# Patient Record
Sex: FEMALE | Race: WHITE | Employment: OTHER | URBAN - METROPOLITAN AREA
[De-identification: names, ages, dates, MRNs, and addresses within clinical notes are randomized per-mention and may not be internally consistent; named-entity substitution may affect disease eponyms.]

---

## 2017-01-03 PROBLEM — S72.002A FRACTURE OF FEMORAL NECK, LEFT (HCC): Status: ACTIVE | Noted: 2017-01-03

## 2017-01-17 ENCOUNTER — OFFICE VISIT (OUTPATIENT)
Dept: ORTHOPEDIC SURGERY | Age: 72
End: 2017-01-17

## 2017-01-17 DIAGNOSIS — M25.552 PAIN OF LEFT HIP JOINT: Primary | ICD-10-CM

## 2017-01-17 DIAGNOSIS — S72.002D CLOSED FRACTURE OF NECK OF LEFT FEMUR WITH ROUTINE HEALING, SUBSEQUENT ENCOUNTER: ICD-10-CM

## 2017-01-17 PROBLEM — S72.009D CLOSED FRACTURE OF NECK OF FEMUR WITH ROUTINE HEALING: Status: ACTIVE | Noted: 2017-01-17

## 2017-01-17 PROCEDURE — 73502 X-RAY EXAM HIP UNI 2-3 VIEWS: CPT | Performed by: ORTHOPAEDIC SURGERY

## 2017-01-17 PROCEDURE — 99024 POSTOP FOLLOW-UP VISIT: CPT | Performed by: ORTHOPAEDIC SURGERY

## 2017-01-17 RX ORDER — TRIAMTERENE AND HYDROCHLOROTHIAZIDE 75; 50 MG/1; MG/1
TABLET ORAL
COMMUNITY
Start: 2016-12-21 | End: 2017-01-24 | Stop reason: ALTCHOICE

## 2017-01-24 ENCOUNTER — OFFICE VISIT (OUTPATIENT)
Dept: INTERNAL MEDICINE CLINIC | Age: 72
End: 2017-01-24

## 2017-01-24 VITALS
HEART RATE: 72 BPM | DIASTOLIC BLOOD PRESSURE: 70 MMHG | HEIGHT: 69 IN | BODY MASS INDEX: 30.21 KG/M2 | WEIGHT: 204 LBS | SYSTOLIC BLOOD PRESSURE: 114 MMHG

## 2017-01-24 DIAGNOSIS — M81.0 OSTEOPOROSIS: ICD-10-CM

## 2017-01-24 DIAGNOSIS — Z09 HOSPITAL DISCHARGE FOLLOW-UP: Primary | ICD-10-CM

## 2017-01-24 DIAGNOSIS — S72.002D CLOSED FRACTURE OF NECK OF LEFT FEMUR WITH ROUTINE HEALING, SUBSEQUENT ENCOUNTER: ICD-10-CM

## 2017-01-24 DIAGNOSIS — N30.00 ACUTE CYSTITIS WITHOUT HEMATURIA: ICD-10-CM

## 2017-01-24 DIAGNOSIS — I10 ESSENTIAL HYPERTENSION: ICD-10-CM

## 2017-01-24 PROCEDURE — 4040F PNEUMOC VAC/ADMIN/RCVD: CPT | Performed by: FAMILY MEDICINE

## 2017-01-24 PROCEDURE — G8427 DOCREV CUR MEDS BY ELIG CLIN: HCPCS | Performed by: FAMILY MEDICINE

## 2017-01-24 PROCEDURE — 4005F PHARM THX FOR OP RXD: CPT | Performed by: FAMILY MEDICINE

## 2017-01-24 PROCEDURE — G8484 FLU IMMUNIZE NO ADMIN: HCPCS | Performed by: FAMILY MEDICINE

## 2017-01-24 PROCEDURE — 1036F TOBACCO NON-USER: CPT | Performed by: FAMILY MEDICINE

## 2017-01-24 PROCEDURE — G8419 CALC BMI OUT NRM PARAM NOF/U: HCPCS | Performed by: FAMILY MEDICINE

## 2017-01-24 PROCEDURE — 1090F PRES/ABSN URINE INCON ASSESS: CPT | Performed by: FAMILY MEDICINE

## 2017-01-24 PROCEDURE — 1111F DSCHRG MED/CURRENT MED MERGE: CPT | Performed by: FAMILY MEDICINE

## 2017-01-24 PROCEDURE — G8399 PT W/DXA RESULTS DOCUMENT: HCPCS | Performed by: FAMILY MEDICINE

## 2017-01-24 PROCEDURE — 3014F SCREEN MAMMO DOC REV: CPT | Performed by: FAMILY MEDICINE

## 2017-01-24 PROCEDURE — 1123F ACP DISCUSS/DSCN MKR DOCD: CPT | Performed by: FAMILY MEDICINE

## 2017-01-24 PROCEDURE — 99214 OFFICE O/P EST MOD 30 MIN: CPT | Performed by: FAMILY MEDICINE

## 2017-01-24 PROCEDURE — 3017F COLORECTAL CA SCREEN DOC REV: CPT | Performed by: FAMILY MEDICINE

## 2017-01-24 RX ORDER — ALENDRONATE SODIUM 70 MG/1
TABLET ORAL
Qty: 4 TABLET | Refills: 11 | Status: SHIPPED | OUTPATIENT
Start: 2017-01-24 | End: 2017-11-27 | Stop reason: SINTOL

## 2017-01-24 ASSESSMENT — ENCOUNTER SYMPTOMS
TROUBLE SWALLOWING: 0
FACIAL SWELLING: 0
DIARRHEA: 0
NAUSEA: 0
CHEST TIGHTNESS: 0
CONSTIPATION: 0
RHINORRHEA: 1
SHORTNESS OF BREATH: 0
VOMITING: 0
WHEEZING: 0

## 2017-01-25 ENCOUNTER — CARE COORDINATION (OUTPATIENT)
Dept: CASE MANAGEMENT | Age: 72
End: 2017-01-25

## 2017-01-25 DIAGNOSIS — Z12.11 COLON CANCER SCREENING: ICD-10-CM

## 2017-01-25 LAB
BACTERIA: ABNORMAL /HPF
BILIRUBIN URINE: ABNORMAL
BLOOD, URINE: ABNORMAL
CLARITY: ABNORMAL
COLOR: ABNORMAL
COMMENT UA: ABNORMAL
CONTROL: NORMAL
EPITHELIAL CELLS, UA: 11 /HPF (ref 0–5)
GLUCOSE URINE: NEGATIVE MG/DL
HEMOCCULT STL QL: NEGATIVE
KETONES, URINE: NEGATIVE MG/DL
LEUKOCYTE ESTERASE, URINE: ABNORMAL
MICROSCOPIC EXAMINATION: YES
NITRITE, URINE: NEGATIVE
PH UA: 6.5
PROTEIN UA: ABNORMAL MG/DL
RBC UA: ABNORMAL /HPF (ref 0–2)
SPECIFIC GRAVITY UA: 1.02
UROBILINOGEN, URINE: 0.2 E.U./DL
WBC UA: 150 /HPF (ref 0–5)

## 2017-01-25 PROCEDURE — 82274 ASSAY TEST FOR BLOOD FECAL: CPT | Performed by: FAMILY MEDICINE

## 2017-01-27 ENCOUNTER — CARE COORDINATION (OUTPATIENT)
Dept: CASE MANAGEMENT | Age: 72
End: 2017-01-27

## 2017-01-27 LAB — URINE CULTURE, ROUTINE: NORMAL

## 2017-01-31 ENCOUNTER — CARE COORDINATION (OUTPATIENT)
Dept: CASE MANAGEMENT | Age: 72
End: 2017-01-31

## 2017-02-07 ENCOUNTER — CARE COORDINATION (OUTPATIENT)
Dept: CASE MANAGEMENT | Age: 72
End: 2017-02-07

## 2017-02-14 ENCOUNTER — CARE COORDINATION (OUTPATIENT)
Dept: CASE MANAGEMENT | Age: 72
End: 2017-02-14

## 2017-02-21 ENCOUNTER — OFFICE VISIT (OUTPATIENT)
Dept: ORTHOPEDIC SURGERY | Age: 72
End: 2017-02-21

## 2017-02-21 DIAGNOSIS — S72.002D CLOSED FRACTURE OF NECK OF LEFT FEMUR WITH ROUTINE HEALING, SUBSEQUENT ENCOUNTER: ICD-10-CM

## 2017-02-21 DIAGNOSIS — M25.552 PAIN OF LEFT HIP JOINT: Primary | ICD-10-CM

## 2017-02-21 PROCEDURE — 73502 X-RAY EXAM HIP UNI 2-3 VIEWS: CPT | Performed by: ORTHOPAEDIC SURGERY

## 2017-02-21 PROCEDURE — 99024 POSTOP FOLLOW-UP VISIT: CPT | Performed by: ORTHOPAEDIC SURGERY

## 2017-02-21 RX ORDER — MELOXICAM 15 MG/1
15 TABLET ORAL DAILY
Qty: 30 TABLET | Refills: 3 | Status: SHIPPED | OUTPATIENT
Start: 2017-02-21 | End: 2017-05-08 | Stop reason: SDUPTHER

## 2017-02-23 ENCOUNTER — CARE COORDINATION (OUTPATIENT)
Dept: CASE MANAGEMENT | Age: 72
End: 2017-02-23

## 2017-03-08 ENCOUNTER — CARE COORDINATION (OUTPATIENT)
Dept: CASE MANAGEMENT | Age: 72
End: 2017-03-08

## 2017-03-29 DIAGNOSIS — I10 ESSENTIAL HYPERTENSION: Primary | ICD-10-CM

## 2017-03-29 RX ORDER — LISINOPRIL 10 MG/1
TABLET ORAL
Qty: 30 TABLET | Refills: 5 | Status: SHIPPED | OUTPATIENT
Start: 2017-03-29 | End: 2017-11-27 | Stop reason: SINTOL

## 2017-04-04 ENCOUNTER — OFFICE VISIT (OUTPATIENT)
Dept: ORTHOPEDIC SURGERY | Age: 72
End: 2017-04-04

## 2017-04-04 DIAGNOSIS — S72.002D CLOSED FRACTURE OF NECK OF LEFT FEMUR WITH ROUTINE HEALING, SUBSEQUENT ENCOUNTER: ICD-10-CM

## 2017-04-04 DIAGNOSIS — M25.552 LEFT HIP PAIN: Primary | ICD-10-CM

## 2017-04-04 PROCEDURE — 73502 X-RAY EXAM HIP UNI 2-3 VIEWS: CPT | Performed by: ORTHOPAEDIC SURGERY

## 2017-04-04 PROCEDURE — 99024 POSTOP FOLLOW-UP VISIT: CPT | Performed by: ORTHOPAEDIC SURGERY

## 2017-04-07 ENCOUNTER — CARE COORDINATION (OUTPATIENT)
Dept: CASE MANAGEMENT | Age: 72
End: 2017-04-07

## 2017-04-25 ENCOUNTER — OFFICE VISIT (OUTPATIENT)
Dept: INTERNAL MEDICINE CLINIC | Age: 72
End: 2017-04-25

## 2017-04-25 VITALS
BODY MASS INDEX: 30.24 KG/M2 | WEIGHT: 204.8 LBS | DIASTOLIC BLOOD PRESSURE: 82 MMHG | SYSTOLIC BLOOD PRESSURE: 120 MMHG | HEART RATE: 80 BPM

## 2017-04-25 DIAGNOSIS — F33.42 RECURRENT MAJOR DEPRESSIVE DISORDER, IN FULL REMISSION (HCC): ICD-10-CM

## 2017-04-25 DIAGNOSIS — G25.81 RESTLESS LEGS SYNDROME: ICD-10-CM

## 2017-04-25 DIAGNOSIS — Z23 NEED FOR SHINGLES VACCINE: ICD-10-CM

## 2017-04-25 DIAGNOSIS — E55.9 VITAMIN D DEFICIENCY: ICD-10-CM

## 2017-04-25 DIAGNOSIS — G40.109 LOCALIZATION-RELATED FOCAL EPILEPSY WITH SIMPLE PARTIAL SEIZURES (HCC): ICD-10-CM

## 2017-04-25 DIAGNOSIS — Z23 NEED FOR PROPHYLACTIC VACCINATION WITH COMBINED DIPHTHERIA-TETANUS-PERTUSSIS (DTP) VACCINE: ICD-10-CM

## 2017-04-25 DIAGNOSIS — Z00.00 ROUTINE GENERAL MEDICAL EXAMINATION AT A HEALTH CARE FACILITY: Primary | ICD-10-CM

## 2017-04-25 DIAGNOSIS — Z12.31 ENCOUNTER FOR SCREENING MAMMOGRAM FOR BREAST CANCER: ICD-10-CM

## 2017-04-25 PROBLEM — F32.5 MAJOR DEPRESSION IN FULL REMISSION (HCC): Status: ACTIVE | Noted: 2017-04-25

## 2017-04-25 PROCEDURE — G0438 PPPS, INITIAL VISIT: HCPCS | Performed by: FAMILY MEDICINE

## 2017-04-25 RX ORDER — ERGOCALCIFEROL 1.25 MG/1
CAPSULE ORAL
Qty: 12 CAPSULE | Refills: 3 | Status: SHIPPED | OUTPATIENT
Start: 2017-04-25 | End: 2018-05-06 | Stop reason: SDUPTHER

## 2017-04-25 RX ORDER — ROPINIROLE 0.5 MG/1
TABLET, FILM COATED ORAL
Qty: 270 TABLET | Refills: 3 | Status: SHIPPED | OUTPATIENT
Start: 2017-04-25 | End: 2018-06-13 | Stop reason: SDUPTHER

## 2017-04-25 ASSESSMENT — LIFESTYLE VARIABLES
HOW OFTEN DURING THE LAST YEAR HAVE YOU NEEDED AN ALCOHOLIC DRINK FIRST THING IN THE MORNING TO GET YOURSELF GOING AFTER A NIGHT OF HEAVY DRINKING: 0
HOW OFTEN DO YOU HAVE A DRINK CONTAINING ALCOHOL: 4
HOW OFTEN DURING THE LAST YEAR HAVE YOU FAILED TO DO WHAT WAS NORMALLY EXPECTED FROM YOU BECAUSE OF DRINKING: 0
AUDIT-C TOTAL SCORE: 4
HOW OFTEN DURING THE LAST YEAR HAVE YOU HAD A FEELING OF GUILT OR REMORSE AFTER DRINKING: 0
HOW MANY STANDARD DRINKS CONTAINING ALCOHOL DO YOU HAVE ON A TYPICAL DAY: 0
HAS A RELATIVE, FRIEND, DOCTOR, OR ANOTHER HEALTH PROFESSIONAL EXPRESSED CONCERN ABOUT YOUR DRINKING OR SUGGESTED YOU CUT DOWN: 0
HOW OFTEN DURING THE LAST YEAR HAVE YOU BEEN UNABLE TO REMEMBER WHAT HAPPENED THE NIGHT BEFORE BECAUSE YOU HAD BEEN DRINKING: 0
HOW OFTEN DO YOU HAVE SIX OR MORE DRINKS ON ONE OCCASION: 0
AUDIT TOTAL SCORE: 4
HOW OFTEN DURING THE LAST YEAR HAVE YOU FOUND THAT YOU WERE NOT ABLE TO STOP DRINKING ONCE YOU HAD STARTED: 0
HAVE YOU OR SOMEONE ELSE BEEN INJURED AS A RESULT OF YOUR DRINKING: 0

## 2017-04-25 ASSESSMENT — ANXIETY QUESTIONNAIRES: GAD7 TOTAL SCORE: 2

## 2017-04-25 ASSESSMENT — PATIENT HEALTH QUESTIONNAIRE - PHQ9: SUM OF ALL RESPONSES TO PHQ QUESTIONS 1-9: 2

## 2017-05-08 DIAGNOSIS — S72.002A CLOSED FRACTURE OF NECK OF LEFT FEMUR, INITIAL ENCOUNTER (HCC): Primary | ICD-10-CM

## 2017-05-08 RX ORDER — MELOXICAM 15 MG/1
TABLET ORAL
Qty: 120 TABLET | Refills: 2 | Status: SHIPPED | OUTPATIENT
Start: 2017-05-08 | End: 2017-11-27 | Stop reason: ALTCHOICE

## 2017-05-30 DIAGNOSIS — S72.002D CLOSED FRACTURE OF NECK OF LEFT FEMUR WITH ROUTINE HEALING, SUBSEQUENT ENCOUNTER: Primary | ICD-10-CM

## 2017-05-30 RX ORDER — AMOXICILLIN 500 MG/1
CAPSULE ORAL
Qty: 4 CAPSULE | Refills: 0 | Status: SHIPPED | OUTPATIENT
Start: 2017-05-30 | End: 2018-04-23 | Stop reason: ALTCHOICE

## 2017-06-15 ENCOUNTER — OFFICE VISIT (OUTPATIENT)
Dept: INTERNAL MEDICINE CLINIC | Age: 72
End: 2017-06-15

## 2017-06-15 VITALS
HEART RATE: 76 BPM | DIASTOLIC BLOOD PRESSURE: 78 MMHG | HEIGHT: 69 IN | SYSTOLIC BLOOD PRESSURE: 136 MMHG | WEIGHT: 204 LBS | BODY MASS INDEX: 30.21 KG/M2

## 2017-06-15 DIAGNOSIS — Z91.81 AT HIGH RISK FOR FALLS: ICD-10-CM

## 2017-06-15 DIAGNOSIS — E55.9 VITAMIN D DEFICIENCY: ICD-10-CM

## 2017-06-15 DIAGNOSIS — K21.9 GASTROESOPHAGEAL REFLUX DISEASE WITHOUT ESOPHAGITIS: ICD-10-CM

## 2017-06-15 DIAGNOSIS — E53.8 B12 DEFICIENCY: ICD-10-CM

## 2017-06-15 DIAGNOSIS — R41.3 MEMORY CHANGE: Primary | ICD-10-CM

## 2017-06-15 DIAGNOSIS — R79.89 ELEVATED TSH: ICD-10-CM

## 2017-06-15 DIAGNOSIS — I10 ESSENTIAL HYPERTENSION: ICD-10-CM

## 2017-06-15 DIAGNOSIS — E78.00 PURE HYPERCHOLESTEROLEMIA: ICD-10-CM

## 2017-06-15 DIAGNOSIS — M81.0 OSTEOPOROSIS: ICD-10-CM

## 2017-06-15 LAB
A/G RATIO: 1.7 (ref 1.1–2.2)
ALBUMIN SERPL-MCNC: 4.3 G/DL (ref 3.4–5)
ALP BLD-CCNC: 103 U/L (ref 40–129)
ALT SERPL-CCNC: 9 U/L (ref 10–40)
ANION GAP SERPL CALCULATED.3IONS-SCNC: 14 MMOL/L (ref 3–16)
AST SERPL-CCNC: 12 U/L (ref 15–37)
BASOPHILS ABSOLUTE: 0 K/UL (ref 0–0.2)
BASOPHILS RELATIVE PERCENT: 0.8 %
BILIRUB SERPL-MCNC: 0.6 MG/DL (ref 0–1)
BUN BLDV-MCNC: 9 MG/DL (ref 7–20)
CALCIUM SERPL-MCNC: 9.1 MG/DL (ref 8.3–10.6)
CHLORIDE BLD-SCNC: 101 MMOL/L (ref 99–110)
CHOLESTEROL, TOTAL: 260 MG/DL (ref 0–199)
CO2: 28 MMOL/L (ref 21–32)
CREAT SERPL-MCNC: 0.5 MG/DL (ref 0.6–1.2)
EOSINOPHILS ABSOLUTE: 0.1 K/UL (ref 0–0.6)
EOSINOPHILS RELATIVE PERCENT: 3.2 %
GFR AFRICAN AMERICAN: >60
GFR NON-AFRICAN AMERICAN: >60
GLOBULIN: 2.5 G/DL
GLUCOSE BLD-MCNC: 82 MG/DL (ref 70–99)
HCT VFR BLD CALC: 42.3 % (ref 36–48)
HDLC SERPL-MCNC: 76 MG/DL (ref 40–60)
HEMOGLOBIN: 13.6 G/DL (ref 12–16)
LDL CHOLESTEROL CALCULATED: 168 MG/DL
LYMPHOCYTES ABSOLUTE: 1.4 K/UL (ref 1–5.1)
LYMPHOCYTES RELATIVE PERCENT: 30 %
MAGNESIUM: 2.5 MG/DL (ref 1.8–2.4)
MCH RBC QN AUTO: 29.3 PG (ref 26–34)
MCHC RBC AUTO-ENTMCNC: 32.2 G/DL (ref 31–36)
MCV RBC AUTO: 90.9 FL (ref 80–100)
MONOCYTES ABSOLUTE: 0.3 K/UL (ref 0–1.3)
MONOCYTES RELATIVE PERCENT: 7.3 %
NEUTROPHILS ABSOLUTE: 2.8 K/UL (ref 1.7–7.7)
NEUTROPHILS RELATIVE PERCENT: 58.7 %
PDW BLD-RTO: 16.4 % (ref 12.4–15.4)
PLATELET # BLD: 246 K/UL (ref 135–450)
PMV BLD AUTO: 8.6 FL (ref 5–10.5)
POTASSIUM SERPL-SCNC: 4.9 MMOL/L (ref 3.5–5.1)
RBC # BLD: 4.65 M/UL (ref 4–5.2)
SODIUM BLD-SCNC: 143 MMOL/L (ref 136–145)
T4 FREE: 1.2 NG/DL (ref 0.9–1.8)
TOTAL PROTEIN: 6.8 G/DL (ref 6.4–8.2)
TRIGL SERPL-MCNC: 81 MG/DL (ref 0–150)
TSH REFLEX: 5.23 UIU/ML (ref 0.27–4.2)
VITAMIN B-12: 281 PG/ML (ref 211–911)
VITAMIN D 25-HYDROXY: 34.3 NG/ML
VLDLC SERPL CALC-MCNC: 16 MG/DL
WBC # BLD: 4.7 K/UL (ref 4–11)

## 2017-06-15 PROCEDURE — 4005F PHARM THX FOR OP RXD: CPT | Performed by: FAMILY MEDICINE

## 2017-06-15 PROCEDURE — G8399 PT W/DXA RESULTS DOCUMENT: HCPCS | Performed by: FAMILY MEDICINE

## 2017-06-15 PROCEDURE — 1036F TOBACCO NON-USER: CPT | Performed by: FAMILY MEDICINE

## 2017-06-15 PROCEDURE — 99214 OFFICE O/P EST MOD 30 MIN: CPT | Performed by: FAMILY MEDICINE

## 2017-06-15 PROCEDURE — 3017F COLORECTAL CA SCREEN DOC REV: CPT | Performed by: FAMILY MEDICINE

## 2017-06-15 PROCEDURE — G8417 CALC BMI ABV UP PARAM F/U: HCPCS | Performed by: FAMILY MEDICINE

## 2017-06-15 PROCEDURE — G8427 DOCREV CUR MEDS BY ELIG CLIN: HCPCS | Performed by: FAMILY MEDICINE

## 2017-06-15 PROCEDURE — 1090F PRES/ABSN URINE INCON ASSESS: CPT | Performed by: FAMILY MEDICINE

## 2017-06-15 PROCEDURE — 3014F SCREEN MAMMO DOC REV: CPT | Performed by: FAMILY MEDICINE

## 2017-06-15 PROCEDURE — 1123F ACP DISCUSS/DSCN MKR DOCD: CPT | Performed by: FAMILY MEDICINE

## 2017-06-15 PROCEDURE — 4040F PNEUMOC VAC/ADMIN/RCVD: CPT | Performed by: FAMILY MEDICINE

## 2017-06-15 ASSESSMENT — ENCOUNTER SYMPTOMS
COUGH: 0
CHEST TIGHTNESS: 0
SHORTNESS OF BREATH: 0
WHEEZING: 0

## 2017-06-28 ENCOUNTER — TELEPHONE (OUTPATIENT)
Dept: INTERNAL MEDICINE CLINIC | Age: 72
End: 2017-06-28

## 2017-06-28 DIAGNOSIS — E78.00 PURE HYPERCHOLESTEROLEMIA: Primary | ICD-10-CM

## 2017-06-28 RX ORDER — ROSUVASTATIN CALCIUM 10 MG/1
TABLET, COATED ORAL
Qty: 15 TABLET | Refills: 5 | Status: SHIPPED | OUTPATIENT
Start: 2017-06-28 | End: 2017-11-27 | Stop reason: SDUPTHER

## 2017-09-08 ENCOUNTER — HOSPITAL ENCOUNTER (OUTPATIENT)
Dept: MAMMOGRAPHY | Age: 72
Discharge: HOME | End: 2017-09-08
Attending: FAMILY MEDICINE | Admitting: FAMILY MEDICINE

## 2017-09-08 DIAGNOSIS — Z12.31 ENCOUNTER FOR SCREENING MAMMOGRAM FOR BREAST CANCER: ICD-10-CM

## 2017-11-27 ENCOUNTER — OFFICE VISIT (OUTPATIENT)
Dept: INTERNAL MEDICINE CLINIC | Age: 72
End: 2017-11-27

## 2017-11-27 VITALS
HEIGHT: 69 IN | HEART RATE: 80 BPM | SYSTOLIC BLOOD PRESSURE: 120 MMHG | WEIGHT: 204 LBS | DIASTOLIC BLOOD PRESSURE: 82 MMHG | BODY MASS INDEX: 30.21 KG/M2

## 2017-11-27 DIAGNOSIS — Z23 NEEDS FLU SHOT: ICD-10-CM

## 2017-11-27 DIAGNOSIS — E78.00 PURE HYPERCHOLESTEROLEMIA: ICD-10-CM

## 2017-11-27 DIAGNOSIS — K21.9 GASTROESOPHAGEAL REFLUX DISEASE, ESOPHAGITIS PRESENCE NOT SPECIFIED: ICD-10-CM

## 2017-11-27 DIAGNOSIS — Z79.899 HIGH RISK MEDICATION USE: ICD-10-CM

## 2017-11-27 DIAGNOSIS — E53.8 B12 DEFICIENCY: Primary | ICD-10-CM

## 2017-11-27 DIAGNOSIS — G25.81 RESTLESS LEGS SYNDROME: ICD-10-CM

## 2017-11-27 DIAGNOSIS — R79.89 ELEVATED TSH: ICD-10-CM

## 2017-11-27 DIAGNOSIS — R05.9 COUGH: ICD-10-CM

## 2017-11-27 DIAGNOSIS — I10 ESSENTIAL HYPERTENSION: ICD-10-CM

## 2017-11-27 LAB
A/G RATIO: 1.9 (ref 1.1–2.2)
ALBUMIN SERPL-MCNC: 4.5 G/DL (ref 3.4–5)
ALP BLD-CCNC: 100 U/L (ref 40–129)
ALT SERPL-CCNC: 12 U/L (ref 10–40)
ANION GAP SERPL CALCULATED.3IONS-SCNC: 19 MMOL/L (ref 3–16)
AST SERPL-CCNC: 15 U/L (ref 15–37)
BASOPHILS ABSOLUTE: 0.1 K/UL (ref 0–0.2)
BASOPHILS RELATIVE PERCENT: 0.8 %
BILIRUB SERPL-MCNC: 0.5 MG/DL (ref 0–1)
BUN BLDV-MCNC: 8 MG/DL (ref 7–20)
CALCIUM SERPL-MCNC: 9.3 MG/DL (ref 8.3–10.6)
CHLORIDE BLD-SCNC: 102 MMOL/L (ref 99–110)
CHOLESTEROL, TOTAL: 232 MG/DL (ref 0–199)
CO2: 26 MMOL/L (ref 21–32)
CREAT SERPL-MCNC: 0.6 MG/DL (ref 0.6–1.2)
EOSINOPHILS ABSOLUTE: 0.2 K/UL (ref 0–0.6)
EOSINOPHILS RELATIVE PERCENT: 2.8 %
GFR AFRICAN AMERICAN: >60
GFR NON-AFRICAN AMERICAN: >60
GLOBULIN: 2.4 G/DL
GLUCOSE BLD-MCNC: 85 MG/DL (ref 70–99)
HCT VFR BLD CALC: 44.1 % (ref 36–48)
HDLC SERPL-MCNC: 98 MG/DL (ref 40–60)
HEMOGLOBIN: 14.1 G/DL (ref 12–16)
LDL CHOLESTEROL CALCULATED: 121 MG/DL
LYMPHOCYTES ABSOLUTE: 1.8 K/UL (ref 1–5.1)
LYMPHOCYTES RELATIVE PERCENT: 30 %
MCH RBC QN AUTO: 30.7 PG (ref 26–34)
MCHC RBC AUTO-ENTMCNC: 32.1 G/DL (ref 31–36)
MCV RBC AUTO: 95.8 FL (ref 80–100)
MONOCYTES ABSOLUTE: 0.3 K/UL (ref 0–1.3)
MONOCYTES RELATIVE PERCENT: 5.3 %
NEUTROPHILS ABSOLUTE: 3.7 K/UL (ref 1.7–7.7)
NEUTROPHILS RELATIVE PERCENT: 61.1 %
PDW BLD-RTO: 15 % (ref 12.4–15.4)
PLATELET # BLD: 268 K/UL (ref 135–450)
PMV BLD AUTO: 9 FL (ref 5–10.5)
POTASSIUM SERPL-SCNC: 4.9 MMOL/L (ref 3.5–5.1)
RBC # BLD: 4.6 M/UL (ref 4–5.2)
SODIUM BLD-SCNC: 147 MMOL/L (ref 136–145)
TOTAL CK: 74 U/L (ref 26–192)
TOTAL PROTEIN: 6.9 G/DL (ref 6.4–8.2)
TRIGL SERPL-MCNC: 64 MG/DL (ref 0–150)
TSH REFLEX: 3.91 UIU/ML (ref 0.27–4.2)
VALPROIC ACID LEVEL: 38.1 UG/ML (ref 50–100)
VITAMIN B-12: 578 PG/ML (ref 211–911)
VLDLC SERPL CALC-MCNC: 13 MG/DL
WBC # BLD: 6 K/UL (ref 4–11)

## 2017-11-27 PROCEDURE — G8427 DOCREV CUR MEDS BY ELIG CLIN: HCPCS | Performed by: FAMILY MEDICINE

## 2017-11-27 PROCEDURE — G8484 FLU IMMUNIZE NO ADMIN: HCPCS | Performed by: FAMILY MEDICINE

## 2017-11-27 PROCEDURE — G8417 CALC BMI ABV UP PARAM F/U: HCPCS | Performed by: FAMILY MEDICINE

## 2017-11-27 PROCEDURE — 4040F PNEUMOC VAC/ADMIN/RCVD: CPT | Performed by: FAMILY MEDICINE

## 2017-11-27 PROCEDURE — 99214 OFFICE O/P EST MOD 30 MIN: CPT | Performed by: FAMILY MEDICINE

## 2017-11-27 PROCEDURE — 3017F COLORECTAL CA SCREEN DOC REV: CPT | Performed by: FAMILY MEDICINE

## 2017-11-27 PROCEDURE — 3014F SCREEN MAMMO DOC REV: CPT | Performed by: FAMILY MEDICINE

## 2017-11-27 PROCEDURE — G0008 ADMIN INFLUENZA VIRUS VAC: HCPCS | Performed by: FAMILY MEDICINE

## 2017-11-27 PROCEDURE — 1090F PRES/ABSN URINE INCON ASSESS: CPT | Performed by: FAMILY MEDICINE

## 2017-11-27 PROCEDURE — 1123F ACP DISCUSS/DSCN MKR DOCD: CPT | Performed by: FAMILY MEDICINE

## 2017-11-27 PROCEDURE — 90662 IIV NO PRSV INCREASED AG IM: CPT | Performed by: FAMILY MEDICINE

## 2017-11-27 PROCEDURE — G8399 PT W/DXA RESULTS DOCUMENT: HCPCS | Performed by: FAMILY MEDICINE

## 2017-11-27 PROCEDURE — 1036F TOBACCO NON-USER: CPT | Performed by: FAMILY MEDICINE

## 2017-11-27 RX ORDER — OMEPRAZOLE 20 MG/1
20 CAPSULE, DELAYED RELEASE ORAL DAILY
Qty: 90 CAPSULE | Refills: 0
Start: 2017-11-27 | End: 2017-12-28 | Stop reason: SDUPTHER

## 2017-11-27 RX ORDER — VALSARTAN 160 MG/1
160 TABLET ORAL DAILY
Qty: 90 TABLET | Refills: 3 | Status: SHIPPED | OUTPATIENT
Start: 2017-11-27 | End: 2018-04-23 | Stop reason: ALTCHOICE

## 2017-11-27 RX ORDER — ROSUVASTATIN CALCIUM 10 MG/1
TABLET, COATED ORAL
Qty: 45 TABLET | Refills: 2 | Status: SHIPPED | OUTPATIENT
Start: 2017-11-27 | End: 2018-12-09 | Stop reason: SDUPTHER

## 2017-11-27 RX ORDER — LISINOPRIL 10 MG/1
TABLET ORAL
Qty: 90 TABLET | Refills: 2 | Status: CANCELLED | OUTPATIENT
Start: 2017-11-27

## 2017-11-27 RX ORDER — FAMOTIDINE 20 MG/1
20 TABLET, FILM COATED ORAL NIGHTLY
Qty: 90 TABLET | Refills: 3 | Status: SHIPPED | OUTPATIENT
Start: 2017-11-27 | End: 2018-12-09 | Stop reason: SDUPTHER

## 2017-11-27 ASSESSMENT — ENCOUNTER SYMPTOMS
COUGH: 0
WHEEZING: 0
CHEST TIGHTNESS: 0
SHORTNESS OF BREATH: 0

## 2017-11-27 NOTE — PROGRESS NOTES
Subjective:      Patient ID: Pedro Bain is a 67 y.o. female. HPI   Chief Complaint   Patient presents with    Check-Up      follow up. Fasting today       5-6 month FU of HTN, HLP, RLS, elevated TSH, B12 deficiency.  is still doing poorly, even with heart valve surgery and pacer. She has been under a lot of stress with him and also her sister. Sister . Was in ECF with Alzheimers. Did not feel the ECF took good care of her. She did get hearing aides. She is really happy with her current aides. Restless legs symptoms. Started to have more cramps with starting the Rosuvastatin. Taking an extra ropinorole = 3 per day. This seems to make it better. She had 2 weeks of postnasal drip cough in Oct.  Still has a cough at night. Would like it to go away. She is on ACEI. Has used Flonase in past but not using recently. Patient Active Problem List   Diagnosis    Hypertension    Restless legs syndrome    Seasonal affective disorder ([address]    Hypercholesterolemia    Vitamin D deficiency    Localization-related focal epilepsy with simple partial seizures ([address]    Disturbance of skin sensation    Vasomotor rhinitis    Venous angioma of brain ([address]    Atypical facial pain    Macular degeneration (senile) of retina    Pain of both hip joints    Gastroesophageal reflux disease without esophagitis    17 LEFT hip hemiarthroplasty    Closed fracture of neck of femur with routine healing    Osteoporosis    Major depression in full remission ([address]    Elevated TSH    B12 deficiency         Outpatient Prescriptions Marked as Taking for the 17 encounter (Office Visit) with Tanja Ayala MD   Medication Sig Dispense Refill    rosuvastatin (CRESTOR) 10 MG tablet Take 1 tablet every other day on even days of the month.  15 tablet 5    Biotin 5000 MCG CAPS Take 5,000 mcg by mouth      vitamin D (ERGOCALCIFEROL) 39126 UNITS CAPS capsule TAKE ONE CAPSULE BY MOUTH WEEKLY 12 capsule 3    rOPINIRole (REQUIP) 0.5 MG tablet TAKE 2-3 TABLETS BY MOUTH AT BEDTIME OR IN EVENING FOR RESTLESS LEGS 270 tablet 3    aspirin 81 MG tablet Take 81 mg by mouth daily      lisinopril (PRINIVIL;ZESTRIL) 10 MG tablet Take 1 tablet daily for high blood pressure 30 tablet 5    divalproex (DEPAKOTE ER) 500 MG ER tablet Take 1 tablet by mouth daily Take 1 tablet by mouth  daily (Patient taking differently: Take 500 mg by mouth every other day Take 1 tablet by mouth  daily) 90 tablet 1    omeprazole (PRILOSEC) 20 MG capsule Take 1 capsule by mouth 2 times daily. 180 capsule 3    Multiple Vitamins-Minerals (ICAPS) CAPS Take  by mouth daily. Social History   Substance Use Topics    Smoking status: Former Smoker     Packs/day: 0.30     Years: 1.00     Types: Cigarettes     Quit date: 1/1/1966    Smokeless tobacco: Never Used      Comment: quit age 25    Alcohol use 3.0 oz/week     5 Glasses of wine per week      Comment: vodka drink daily             Review of Systems   Constitutional: Negative for activity change and unexpected weight change. Respiratory: Negative for cough, chest tightness, shortness of breath and wheezing. Cardiovascular: Negative for chest pain, palpitations and leg swelling. Skin: Negative for rash and wound. Neurological: Negative for dizziness, syncope, facial asymmetry, speech difficulty, weakness, numbness and headaches. Balance is still not very good. Objective:   Physical Exam   Constitutional: She is oriented to person, place, and time. She appears well-developed and well-nourished. No distress. Eyes: Conjunctivae are normal. No scleral icterus. Neck: Normal range of motion. Neck supple. Carotid bruit is not present. No thyroid mass and no thyromegaly present. Cardiovascular: Normal rate, regular rhythm, S1 normal, S2 normal, normal heart sounds and intact distal pulses. No murmur heard.   Pulmonary/Chest: Effort normal and breath sounds normal. No respiratory distress. She has no decreased breath sounds. She has no wheezes. She has no rhonchi. She has no rales. Abdominal: Soft. Normal appearance and bowel sounds are normal. She exhibits no abdominal bruit and no mass. There is no hepatomegaly. There is no tenderness. Lymphadenopathy:     She has no cervical adenopathy. Neurological: She is alert and oriented to person, place, and time. She displays no tremor. She exhibits normal muscle tone. Coordination and gait normal.   Skin: Skin is warm and dry. She is not diaphoretic. No cyanosis. No pallor. Nails show no clubbing. Psychiatric: She has a normal mood and affect. Her speech is normal and behavior is normal. Cognition and memory are normal.   Vitals reviewed. Lab Results   Component Value Date    LDLCALC 168 (H) 06/15/2017    LDLDIRECT 126 (H) 05/29/2015     Lab Results   Component Value Date     06/15/2017    K 4.9 06/15/2017     06/15/2017    CO2 28 06/15/2017    BUN 9 06/15/2017    CREATININE 0.5 (L) 06/15/2017    GLUCOSE 82 06/15/2017    CALCIUM 9.1 06/15/2017    PROT 6.8 06/15/2017    LABALBU 4.3 06/15/2017    BILITOT 0.6 06/15/2017    ALKPHOS 103 06/15/2017    AST 12 (L) 06/15/2017    ALT 9 (L) 06/15/2017    LABGLOM >60 06/15/2017    GFRAA >60 06/15/2017    AGRATIO 1.7 06/15/2017    GLOB 2.5 06/15/2017         Wt Readings from Last 3 Encounters:   11/27/17 204 lb (92.5 kg)   06/15/17 204 lb (92.5 kg)   04/25/17 204 lb 12.8 oz (92.9 kg)     Temp Readings from Last 3 Encounters:   01/06/17 97.9 °F (36.6 °C) (Oral)   06/16/16 98.2 °F (36.8 °C)   03/14/12 98.5 °F (36.9 °C) (Oral)     BP Readings from Last 3 Encounters:   11/27/17 120/82   06/15/17 136/78   04/25/17 120/82     Pulse Readings from Last 3 Encounters:   11/27/17 80   06/15/17 76   04/25/17 80         Assessment:      1. Essential hypertension  BP: 120/82   At goal and meeting medical guidelines.   Continue treatment, but trial of ARB

## 2017-12-15 ENCOUNTER — OFFICE VISIT (OUTPATIENT)
Dept: ORTHOPEDIC SURGERY | Age: 72
End: 2017-12-15

## 2017-12-15 DIAGNOSIS — S72.002D CLOSED FRACTURE OF NECK OF LEFT FEMUR WITH ROUTINE HEALING, SUBSEQUENT ENCOUNTER: ICD-10-CM

## 2017-12-15 DIAGNOSIS — M25.552 LEFT HIP PAIN: Primary | ICD-10-CM

## 2017-12-15 PROCEDURE — G8399 PT W/DXA RESULTS DOCUMENT: HCPCS | Performed by: ORTHOPAEDIC SURGERY

## 2017-12-15 PROCEDURE — 73502 X-RAY EXAM HIP UNI 2-3 VIEWS: CPT | Performed by: ORTHOPAEDIC SURGERY

## 2017-12-15 PROCEDURE — 3014F SCREEN MAMMO DOC REV: CPT | Performed by: ORTHOPAEDIC SURGERY

## 2017-12-15 PROCEDURE — G8427 DOCREV CUR MEDS BY ELIG CLIN: HCPCS | Performed by: ORTHOPAEDIC SURGERY

## 2017-12-15 PROCEDURE — 1123F ACP DISCUSS/DSCN MKR DOCD: CPT | Performed by: ORTHOPAEDIC SURGERY

## 2017-12-15 PROCEDURE — G8417 CALC BMI ABV UP PARAM F/U: HCPCS | Performed by: ORTHOPAEDIC SURGERY

## 2017-12-15 PROCEDURE — G8484 FLU IMMUNIZE NO ADMIN: HCPCS | Performed by: ORTHOPAEDIC SURGERY

## 2017-12-15 PROCEDURE — 1036F TOBACCO NON-USER: CPT | Performed by: ORTHOPAEDIC SURGERY

## 2017-12-15 PROCEDURE — 1090F PRES/ABSN URINE INCON ASSESS: CPT | Performed by: ORTHOPAEDIC SURGERY

## 2017-12-15 PROCEDURE — 99213 OFFICE O/P EST LOW 20 MIN: CPT | Performed by: ORTHOPAEDIC SURGERY

## 2017-12-15 PROCEDURE — 4040F PNEUMOC VAC/ADMIN/RCVD: CPT | Performed by: ORTHOPAEDIC SURGERY

## 2017-12-15 PROCEDURE — 3017F COLORECTAL CA SCREEN DOC REV: CPT | Performed by: ORTHOPAEDIC SURGERY

## 2017-12-15 NOTE — PROGRESS NOTES
hardware is intact and in good position. No evidence of fracture or dislocation         Assessment :  Status post left hip hemiarthroplasty    Impression:  Encounter Diagnoses   Name Primary?  Left hip pain Yes    Closed fracture of neck of left femur with routine healing, subsequent encounter        Office Procedures:  Orders Placed This Encounter   Procedures    XR HIP LEFT (2-3 VIEWS)     [id number]     Order Specific Question:   Reason for exam:     Answer:   Pain       Treatment Plan:  She is doing very well at this time. I did encourage her for low impact exercise continuation.   I will see her back in 2 years with repeat x-rays of the left hip

## 2017-12-28 ENCOUNTER — TELEPHONE (OUTPATIENT)
Dept: INTERNAL MEDICINE CLINIC | Age: 72
End: 2017-12-28

## 2017-12-28 DIAGNOSIS — K21.9 GASTROESOPHAGEAL REFLUX DISEASE, ESOPHAGITIS PRESENCE NOT SPECIFIED: ICD-10-CM

## 2017-12-28 RX ORDER — OMEPRAZOLE 20 MG/1
20 CAPSULE, DELAYED RELEASE ORAL DAILY
Qty: 90 CAPSULE | Refills: 0 | Status: SHIPPED | OUTPATIENT
Start: 2017-12-28 | End: 2021-03-10 | Stop reason: SDUPTHER

## 2017-12-28 NOTE — TELEPHONE ENCOUNTER
Pt asking for script for prilosec to be sent to [facility]. (last year to get for free through [employer])  Ph# [phone], fax [phone]. Needing it processed today or tomorrow.

## 2018-02-28 ENCOUNTER — TELEPHONE (OUTPATIENT)
Dept: ORTHOPEDIC SURGERY | Age: 73
End: 2018-02-28

## 2018-04-23 ENCOUNTER — OFFICE VISIT (OUTPATIENT)
Dept: INTERNAL MEDICINE CLINIC | Age: 73
End: 2018-04-23

## 2018-04-23 VITALS
DIASTOLIC BLOOD PRESSURE: 92 MMHG | HEART RATE: 76 BPM | WEIGHT: 204.4 LBS | SYSTOLIC BLOOD PRESSURE: 142 MMHG | BODY MASS INDEX: 30.27 KG/M2 | HEIGHT: 69 IN

## 2018-04-23 DIAGNOSIS — Z12.11 COLON CANCER SCREENING: ICD-10-CM

## 2018-04-23 DIAGNOSIS — Z13.31 POSITIVE DEPRESSION SCREENING: ICD-10-CM

## 2018-04-23 DIAGNOSIS — I10 ESSENTIAL HYPERTENSION: ICD-10-CM

## 2018-04-23 DIAGNOSIS — Z79.899 HIGH RISK MEDICATION USE: ICD-10-CM

## 2018-04-23 DIAGNOSIS — R53.83 FATIGUE, UNSPECIFIED TYPE: ICD-10-CM

## 2018-04-23 DIAGNOSIS — E78.00 PURE HYPERCHOLESTEROLEMIA: ICD-10-CM

## 2018-04-23 DIAGNOSIS — G40.109 LOCALIZATION-RELATED FOCAL EPILEPSY WITH SIMPLE PARTIAL SEIZURES (HCC): ICD-10-CM

## 2018-04-23 DIAGNOSIS — E55.9 VITAMIN D DEFICIENCY: ICD-10-CM

## 2018-04-23 DIAGNOSIS — F33.8 SEASONAL AFFECTIVE DISORDER (HCC): ICD-10-CM

## 2018-04-23 DIAGNOSIS — F33.42 RECURRENT MAJOR DEPRESSIVE DISORDER, IN FULL REMISSION (HCC): ICD-10-CM

## 2018-04-23 DIAGNOSIS — M81.0 AGE-RELATED OSTEOPOROSIS WITHOUT CURRENT PATHOLOGICAL FRACTURE: Primary | ICD-10-CM

## 2018-04-23 LAB
A/G RATIO: 2.1 (ref 1.1–2.2)
ALBUMIN SERPL-MCNC: 4.5 G/DL (ref 3.4–5)
ALP BLD-CCNC: 104 U/L (ref 40–129)
ALT SERPL-CCNC: 13 U/L (ref 10–40)
ANION GAP SERPL CALCULATED.3IONS-SCNC: 14 MMOL/L (ref 3–16)
AST SERPL-CCNC: 17 U/L (ref 15–37)
BASOPHILS ABSOLUTE: 0 K/UL (ref 0–0.2)
BASOPHILS RELATIVE PERCENT: 0.7 %
BILIRUB SERPL-MCNC: 0.6 MG/DL (ref 0–1)
BUN BLDV-MCNC: 11 MG/DL (ref 7–20)
CALCIUM SERPL-MCNC: 9 MG/DL (ref 8.3–10.6)
CHLORIDE BLD-SCNC: 102 MMOL/L (ref 99–110)
CO2: 29 MMOL/L (ref 21–32)
CREAT SERPL-MCNC: 0.6 MG/DL (ref 0.6–1.2)
EOSINOPHILS ABSOLUTE: 0.2 K/UL (ref 0–0.6)
EOSINOPHILS RELATIVE PERCENT: 2.8 %
GFR AFRICAN AMERICAN: >60
GFR NON-AFRICAN AMERICAN: >60
GLOBULIN: 2.1 G/DL
GLUCOSE BLD-MCNC: 88 MG/DL (ref 70–99)
HCT VFR BLD CALC: 43.4 % (ref 36–48)
HEMOGLOBIN: 14.3 G/DL (ref 12–16)
LYMPHOCYTES ABSOLUTE: 1.7 K/UL (ref 1–5.1)
LYMPHOCYTES RELATIVE PERCENT: 31.4 %
MAGNESIUM: 2.4 MG/DL (ref 1.8–2.4)
MCH RBC QN AUTO: 31.7 PG (ref 26–34)
MCHC RBC AUTO-ENTMCNC: 32.9 G/DL (ref 31–36)
MCV RBC AUTO: 96.4 FL (ref 80–100)
MONOCYTES ABSOLUTE: 0.3 K/UL (ref 0–1.3)
MONOCYTES RELATIVE PERCENT: 6.4 %
NEUTROPHILS ABSOLUTE: 3.2 K/UL (ref 1.7–7.7)
NEUTROPHILS RELATIVE PERCENT: 58.7 %
PDW BLD-RTO: 14.9 % (ref 12.4–15.4)
PLATELET # BLD: 246 K/UL (ref 135–450)
PMV BLD AUTO: 9.1 FL (ref 5–10.5)
POTASSIUM SERPL-SCNC: 4.8 MMOL/L (ref 3.5–5.1)
RBC # BLD: 4.5 M/UL (ref 4–5.2)
SODIUM BLD-SCNC: 145 MMOL/L (ref 136–145)
T4 FREE: 1.3 NG/DL (ref 0.9–1.8)
TOTAL PROTEIN: 6.6 G/DL (ref 6.4–8.2)
TSH REFLEX: 4.54 UIU/ML (ref 0.27–4.2)
VITAMIN B-12: 514 PG/ML (ref 211–911)
VITAMIN D 25-HYDROXY: 44.6 NG/ML
WBC # BLD: 5.4 K/UL (ref 4–11)

## 2018-04-23 PROCEDURE — 3017F COLORECTAL CA SCREEN DOC REV: CPT | Performed by: FAMILY MEDICINE

## 2018-04-23 PROCEDURE — G8399 PT W/DXA RESULTS DOCUMENT: HCPCS | Performed by: FAMILY MEDICINE

## 2018-04-23 PROCEDURE — 1036F TOBACCO NON-USER: CPT | Performed by: FAMILY MEDICINE

## 2018-04-23 PROCEDURE — 99214 OFFICE O/P EST MOD 30 MIN: CPT | Performed by: FAMILY MEDICINE

## 2018-04-23 PROCEDURE — 1123F ACP DISCUSS/DSCN MKR DOCD: CPT | Performed by: FAMILY MEDICINE

## 2018-04-23 PROCEDURE — G8431 POS CLIN DEPRES SCRN F/U DOC: HCPCS | Performed by: FAMILY MEDICINE

## 2018-04-23 PROCEDURE — G8427 DOCREV CUR MEDS BY ELIG CLIN: HCPCS | Performed by: FAMILY MEDICINE

## 2018-04-23 PROCEDURE — 1090F PRES/ABSN URINE INCON ASSESS: CPT | Performed by: FAMILY MEDICINE

## 2018-04-23 PROCEDURE — G8417 CALC BMI ABV UP PARAM F/U: HCPCS | Performed by: FAMILY MEDICINE

## 2018-04-23 PROCEDURE — G0444 DEPRESSION SCREEN ANNUAL: HCPCS | Performed by: FAMILY MEDICINE

## 2018-04-23 PROCEDURE — 4040F PNEUMOC VAC/ADMIN/RCVD: CPT | Performed by: FAMILY MEDICINE

## 2018-04-23 RX ORDER — VALSARTAN AND HYDROCHLOROTHIAZIDE 160; 12.5 MG/1; MG/1
1 TABLET, FILM COATED ORAL DAILY
Qty: 90 TABLET | Refills: 3 | Status: SHIPPED | OUTPATIENT
Start: 2018-04-23 | End: 2019-06-26 | Stop reason: SDUPTHER

## 2018-04-23 ASSESSMENT — PATIENT HEALTH QUESTIONNAIRE - PHQ9
10. IF YOU CHECKED OFF ANY PROBLEMS, HOW DIFFICULT HAVE THESE PROBLEMS MADE IT FOR YOU TO DO YOUR WORK, TAKE CARE OF THINGS AT HOME, OR GET ALONG WITH OTHER PEOPLE: 0
9. THOUGHTS THAT YOU WOULD BE BETTER OFF DEAD, OR OF HURTING YOURSELF: 0
6. FEELING BAD ABOUT YOURSELF - OR THAT YOU ARE A FAILURE OR HAVE LET YOURSELF OR YOUR FAMILY DOWN: 0
5. POOR APPETITE OR OVEREATING: 1
2. FEELING DOWN, DEPRESSED OR HOPELESS: 2
SUM OF ALL RESPONSES TO PHQ9 QUESTIONS 1 & 2: 2
1. LITTLE INTEREST OR PLEASURE IN DOING THINGS: 0
4. FEELING TIRED OR HAVING LITTLE ENERGY: 3
3. TROUBLE FALLING OR STAYING ASLEEP: 3
SUM OF ALL RESPONSES TO PHQ QUESTIONS 1-9: 10
8. MOVING OR SPEAKING SO SLOWLY THAT OTHER PEOPLE COULD HAVE NOTICED. OR THE OPPOSITE, BEING SO FIGETY OR RESTLESS THAT YOU HAVE BEEN MOVING AROUND A LOT MORE THAN USUAL: 1
7. TROUBLE CONCENTRATING ON THINGS, SUCH AS READING THE NEWSPAPER OR WATCHING TELEVISION: 0

## 2018-04-23 ASSESSMENT — ENCOUNTER SYMPTOMS
CHEST TIGHTNESS: 0
COUGH: 0
WHEEZING: 0
SHORTNESS OF BREATH: 0

## 2018-05-06 DIAGNOSIS — E55.9 VITAMIN D DEFICIENCY: ICD-10-CM

## 2018-05-07 RX ORDER — ERGOCALCIFEROL 1.25 MG/1
CAPSULE ORAL
Qty: 12 CAPSULE | Refills: 2 | Status: SHIPPED | OUTPATIENT
Start: 2018-05-07 | End: 2019-06-26 | Stop reason: SDUPTHER

## 2018-05-30 ENCOUNTER — HOSPITAL ENCOUNTER (OUTPATIENT)
Dept: CT IMAGING | Age: 73
Discharge: HOME | End: 2018-05-30
Attending: NURSE PRACTITIONER | Admitting: NURSE PRACTITIONER

## 2018-05-30 DIAGNOSIS — R29.810 DROOPING OF MOUTH: ICD-10-CM

## 2018-05-30 DIAGNOSIS — G40.909 EPILEPSY WITHOUT STATUS EPILEPTICUS, NOT INTRACTABLE (HCC): ICD-10-CM

## 2018-08-02 ENCOUNTER — TELEPHONE (OUTPATIENT)
Dept: INTERNAL MEDICINE CLINIC | Age: 73
End: 2018-08-02

## 2018-08-02 NOTE — TELEPHONE ENCOUNTER
Pt call back to cancel the all message what jean-claude was took. She states that everything is fine, she got her answer.

## 2018-08-02 NOTE — TELEPHONE ENCOUNTER
Pt calling, she is currently taking Valsartan. She is aware of the recall. She has not been contacted by [employer] or notified that her medication was affected. I advised her to call the pharmacy and enquire. She states her sister called them as she takes the same medication and was told they do not have to contact Pt's about this. Pt was hesitant to even try and call them herself. I advised a message would be sent back to physician to advise.

## 2018-10-24 ENCOUNTER — OFFICE VISIT (OUTPATIENT)
Dept: INTERNAL MEDICINE CLINIC | Age: 73
End: 2018-10-24
Payer: MEDICARE

## 2018-10-24 VITALS
HEIGHT: 69 IN | HEART RATE: 68 BPM | SYSTOLIC BLOOD PRESSURE: 132 MMHG | BODY MASS INDEX: 30.54 KG/M2 | WEIGHT: 206.2 LBS | DIASTOLIC BLOOD PRESSURE: 82 MMHG

## 2018-10-24 DIAGNOSIS — Z23 INFLUENZA VACCINE NEEDED: ICD-10-CM

## 2018-10-24 DIAGNOSIS — E55.9 VITAMIN D DEFICIENCY: ICD-10-CM

## 2018-10-24 DIAGNOSIS — D18.02 VENOUS ANGIOMA OF BRAIN (HCC): ICD-10-CM

## 2018-10-24 DIAGNOSIS — E53.8 B12 DEFICIENCY: ICD-10-CM

## 2018-10-24 DIAGNOSIS — I10 ESSENTIAL HYPERTENSION: ICD-10-CM

## 2018-10-24 DIAGNOSIS — F33.42 RECURRENT MAJOR DEPRESSIVE DISORDER, IN FULL REMISSION (HCC): Primary | ICD-10-CM

## 2018-10-24 DIAGNOSIS — R53.82 CHRONIC FATIGUE: ICD-10-CM

## 2018-10-24 DIAGNOSIS — E78.00 PURE HYPERCHOLESTEROLEMIA: ICD-10-CM

## 2018-10-24 DIAGNOSIS — K21.9 GASTROESOPHAGEAL REFLUX DISEASE WITHOUT ESOPHAGITIS: ICD-10-CM

## 2018-10-24 DIAGNOSIS — Z23 NEED FOR TDAP VACCINATION: ICD-10-CM

## 2018-10-24 DIAGNOSIS — R79.89 ELEVATED TSH: ICD-10-CM

## 2018-10-24 DIAGNOSIS — G40.909 SEIZURE DISORDER (HCC): ICD-10-CM

## 2018-10-24 LAB
A/G RATIO: 2 (ref 1.1–2.2)
ALBUMIN SERPL-MCNC: 4.5 G/DL (ref 3.4–5)
ALP BLD-CCNC: 90 U/L (ref 40–129)
ALT SERPL-CCNC: 11 U/L (ref 10–40)
ANION GAP SERPL CALCULATED.3IONS-SCNC: 12 MMOL/L (ref 3–16)
AST SERPL-CCNC: 14 U/L (ref 15–37)
BASOPHILS ABSOLUTE: 0 K/UL (ref 0–0.2)
BASOPHILS RELATIVE PERCENT: 0.6 %
BILIRUB SERPL-MCNC: 0.6 MG/DL (ref 0–1)
BUN BLDV-MCNC: 8 MG/DL (ref 7–20)
CALCIUM SERPL-MCNC: 9.6 MG/DL (ref 8.3–10.6)
CHLORIDE BLD-SCNC: 99 MMOL/L (ref 99–110)
CHOLESTEROL, TOTAL: 202 MG/DL (ref 0–199)
CO2: 29 MMOL/L (ref 21–32)
CREAT SERPL-MCNC: 0.5 MG/DL (ref 0.6–1.2)
EOSINOPHILS ABSOLUTE: 0.2 K/UL (ref 0–0.6)
EOSINOPHILS RELATIVE PERCENT: 3.1 %
GFR AFRICAN AMERICAN: >60
GFR NON-AFRICAN AMERICAN: >60
GLOBULIN: 2.3 G/DL
GLUCOSE BLD-MCNC: 91 MG/DL (ref 70–99)
HCT VFR BLD CALC: 42.2 % (ref 36–48)
HDLC SERPL-MCNC: 79 MG/DL (ref 40–60)
HEMOGLOBIN: 13.9 G/DL (ref 12–16)
LDL CHOLESTEROL CALCULATED: 99 MG/DL
LYMPHOCYTES ABSOLUTE: 1.7 K/UL (ref 1–5.1)
LYMPHOCYTES RELATIVE PERCENT: 31.7 %
MAGNESIUM: 2.2 MG/DL (ref 1.8–2.4)
MCH RBC QN AUTO: 31.6 PG (ref 26–34)
MCHC RBC AUTO-ENTMCNC: 33 G/DL (ref 31–36)
MCV RBC AUTO: 95.7 FL (ref 80–100)
MONOCYTES ABSOLUTE: 0.3 K/UL (ref 0–1.3)
MONOCYTES RELATIVE PERCENT: 6.4 %
NEUTROPHILS ABSOLUTE: 3.1 K/UL (ref 1.7–7.7)
NEUTROPHILS RELATIVE PERCENT: 58.2 %
PDW BLD-RTO: 13.8 % (ref 12.4–15.4)
PLATELET # BLD: 285 K/UL (ref 135–450)
PMV BLD AUTO: 8.8 FL (ref 5–10.5)
POTASSIUM SERPL-SCNC: 4.4 MMOL/L (ref 3.5–5.1)
RBC # BLD: 4.41 M/UL (ref 4–5.2)
SODIUM BLD-SCNC: 140 MMOL/L (ref 136–145)
T4 FREE: 1.3 NG/DL (ref 0.9–1.8)
TOTAL PROTEIN: 6.8 G/DL (ref 6.4–8.2)
TRIGL SERPL-MCNC: 118 MG/DL (ref 0–150)
TSH REFLEX: 5.03 UIU/ML (ref 0.27–4.2)
URIC ACID, SERUM: 5.4 MG/DL (ref 2.6–6)
VALPROIC ACID LEVEL: 45 UG/ML (ref 50–100)
VITAMIN B-12: 887 PG/ML (ref 211–911)
VITAMIN D 25-HYDROXY: 40.3 NG/ML
VLDLC SERPL CALC-MCNC: 24 MG/DL
WBC # BLD: 5.4 K/UL (ref 4–11)

## 2018-10-24 PROCEDURE — 1101F PT FALLS ASSESS-DOCD LE1/YR: CPT | Performed by: FAMILY MEDICINE

## 2018-10-24 PROCEDURE — 1036F TOBACCO NON-USER: CPT | Performed by: FAMILY MEDICINE

## 2018-10-24 PROCEDURE — 3017F COLORECTAL CA SCREEN DOC REV: CPT | Performed by: FAMILY MEDICINE

## 2018-10-24 PROCEDURE — G8399 PT W/DXA RESULTS DOCUMENT: HCPCS | Performed by: FAMILY MEDICINE

## 2018-10-24 PROCEDURE — G8417 CALC BMI ABV UP PARAM F/U: HCPCS | Performed by: FAMILY MEDICINE

## 2018-10-24 PROCEDURE — 4040F PNEUMOC VAC/ADMIN/RCVD: CPT | Performed by: FAMILY MEDICINE

## 2018-10-24 PROCEDURE — G8482 FLU IMMUNIZE ORDER/ADMIN: HCPCS | Performed by: FAMILY MEDICINE

## 2018-10-24 PROCEDURE — 1090F PRES/ABSN URINE INCON ASSESS: CPT | Performed by: FAMILY MEDICINE

## 2018-10-24 PROCEDURE — G8427 DOCREV CUR MEDS BY ELIG CLIN: HCPCS | Performed by: FAMILY MEDICINE

## 2018-10-24 PROCEDURE — G0008 ADMIN INFLUENZA VIRUS VAC: HCPCS | Performed by: FAMILY MEDICINE

## 2018-10-24 PROCEDURE — 90662 IIV NO PRSV INCREASED AG IM: CPT | Performed by: FAMILY MEDICINE

## 2018-10-24 PROCEDURE — 99214 OFFICE O/P EST MOD 30 MIN: CPT | Performed by: FAMILY MEDICINE

## 2018-10-24 PROCEDURE — 1123F ACP DISCUSS/DSCN MKR DOCD: CPT | Performed by: FAMILY MEDICINE

## 2018-10-24 ASSESSMENT — ENCOUNTER SYMPTOMS
SHORTNESS OF BREATH: 0
WHEEZING: 0
COUGH: 0
CHEST TIGHTNESS: 0

## 2018-10-24 NOTE — PROGRESS NOTES
Subjective:      Patient ID: Yoko Barton is a 68 y.o. female. HPI   Chief Complaint   Patient presents with    6 Month Follow-Up     F/U labs and med (pt is fasting)     FU of HTN, HLP, SAD, venous angioma of brain, GERD    Other than fatigue, she  Is doing OK. Increased dose of Depakote has helped. Found a shoe lift helps her gait. One leg longer than the other due to joint replacement. Her  is dependent on her but she is able to get out for short trips. She is napping because not able to sleep all night with him. She is tired but trying to hold up. Does not think she is depressed. She would like to lose weight and asks about contrave. dwp contraindication with seizures.     Patient Active Problem List   Diagnosis    Hypertension    Restless legs syndrome    Seasonal affective disorder ([address]    Hypercholesterolemia    Vitamin D deficiency    Localization-related focal epilepsy with simple partial seizures ([address]    Disturbance of skin sensation    Vasomotor rhinitis    Venous angioma of brain ([facility])    Atypical facial pain    Macular degeneration (senile) of retina    Pain of both hip joints    Gastroesophageal reflux disease without esophagitis    1/4/17 LEFT hip hemiarthroplasty    Closed fracture of neck of femur with routine healing    Osteoporosis    Major depression in full remission ([address]    Elevated TSH    B12 deficiency         Outpatient Prescriptions Marked as Taking for the 10/24/18 encounter (Office Visit) with Haylie Chua MD   Medication Sig Dispense Refill    rOPINIRole (REQUIP) 0.5 MG tablet TAKE 2-3 TABLETS BY MOUTH AT BEDTIME OR IN EVENING FOR RESTLESS LEGS 270 tablet 1    vitamin D (ERGOCALCIFEROL) 63236 units CAPS capsule TAKE ONE CAPSULE BY MOUTH WEEKLY 12 capsule 2    valsartan-hydrochlorothiazide (DIOVAN HCT) 160-12.5 MG per tablet Take 1 tablet by mouth daily 90 tablet 3    omeprazole (PRILOSEC) 20 MG delayed release capsule Take 1 capsule by mouth Daily In AM before first bite of food 90 capsule 0    rosuvastatin (CRESTOR) 10 MG tablet Take 1 tablet every other day on even days of the month. 45 tablet 2    famotidine (PEPCID) 20 MG tablet Take 1 tablet by mouth nightly 90 tablet 3    Biotin 5000 MCG CAPS Take 5,000 mcg by mouth      aspirin 81 MG tablet Take 81 mg by mouth daily      Multiple Vitamins-Minerals (ICAPS) CAPS Take  by mouth daily. Social History   Substance Use Topics    Smoking status: Former Smoker     Packs/day: 0.30     Years: 1.00     Types: Cigarettes     Quit date: 1/1/1966    Smokeless tobacco: Never Used      Comment: quit age 25    Alcohol use 3.0 oz/week     5 Glasses of wine per week      Comment: vodka drink daily           Review of Systems   Constitutional: Positive for fatigue. Negative for unexpected weight change. Respiratory: Negative for cough, chest tightness, shortness of breath and wheezing. Cardiovascular: Negative for chest pain, palpitations and leg swelling. Skin: Negative for rash and wound. Neurological: Negative for dizziness, syncope, facial asymmetry, speech difficulty, weakness, numbness and headaches. Objective:   Physical Exam   Constitutional: She is oriented to person, place, and time. She appears well-developed and well-nourished. No distress. Eyes: Conjunctivae are normal. No scleral icterus. Neck: Normal range of motion. Neck supple. Carotid bruit is not present. No thyroid mass and no thyromegaly present. Cardiovascular: Normal rate, regular rhythm, S1 normal, S2 normal, normal heart sounds and intact distal pulses. No murmur heard. Pulmonary/Chest: Effort normal and breath sounds normal. No respiratory distress. She has no decreased breath sounds. She has no wheezes. She has no rhonchi. She has no rales. Abdominal: Soft. Normal appearance and bowel sounds are normal. She exhibits no abdominal bruit and no mass. There is no hepatomegaly.  There is no tenderness. Lymphadenopathy:     She has no cervical adenopathy. Neurological: She is alert and oriented to person, place, and time. She displays no tremor. She exhibits normal muscle tone. Coordination and gait normal.   Skin: Skin is warm and dry. She is not diaphoretic. No cyanosis. No pallor. Nails show no clubbing. Psychiatric: She has a normal mood and affect. Her speech is normal and behavior is normal. Cognition and memory are normal.   Pleasant and smiling. Looks tired more than depressed   Vitals reviewed. Wt Readings from Last 3 Encounters:   10/24/18 206 lb 3.2 oz (93.5 kg)   04/23/18 204 lb 6.4 oz (92.7 kg)   11/27/17 204 lb (92.5 kg)     Temp Readings from Last 3 Encounters:   01/06/17 97.9 °F (36.6 °C) (Oral)   06/16/16 98.2 °F (36.8 °C)   03/14/12 98.5 °F (36.9 °C) (Oral)     BP Readings from Last 3 Encounters:   10/24/18 132/82   04/23/18 (!) 142/92   11/27/17 120/82     Pulse Readings from Last 3 Encounters:   10/24/18 68   04/23/18 76   11/27/17 80       . Lab Results   Component Value Date     04/23/2018    K 4.8 04/23/2018     04/23/2018    CO2 29 04/23/2018    BUN 11 04/23/2018    CREATININE 0.6 04/23/2018    GLUCOSE 88 04/23/2018    CALCIUM 9.0 04/23/2018    PROT 6.6 04/23/2018    LABALBU 4.5 04/23/2018    BILITOT 0.6 04/23/2018    ALKPHOS 104 04/23/2018    AST 17 04/23/2018    ALT 13 04/23/2018    LABGLOM >60 04/23/2018    GFRAA >60 04/23/2018    AGRATIO 2.1 04/23/2018    GLOB 2.1 04/23/2018       Lab Results   Component Value Date    LDLCALC 121 (H) 11/27/2017    LDLDIRECT 126 (H) 05/29/2015     Lab Results   Component Value Date    WBC 5.4 04/23/2018    HGB 14.3 04/23/2018    HCT 43.4 04/23/2018    MCV 96.4 04/23/2018     04/23/2018       Assessment:      1. Recurrent major depressive disorder, in full remission ([facility]  Seems to be doing better. Hopefully, will not decline with the winter. Still under tremendous stress with ill .   No change in

## 2018-12-09 DIAGNOSIS — K21.9 GASTROESOPHAGEAL REFLUX DISEASE, ESOPHAGITIS PRESENCE NOT SPECIFIED: ICD-10-CM

## 2018-12-09 DIAGNOSIS — E78.00 PURE HYPERCHOLESTEROLEMIA: ICD-10-CM

## 2018-12-10 RX ORDER — FAMOTIDINE 20 MG/1
20 TABLET, FILM COATED ORAL NIGHTLY
Qty: 90 TABLET | Refills: 2 | Status: SHIPPED | OUTPATIENT
Start: 2018-12-10 | End: 2019-09-11 | Stop reason: SDUPTHER

## 2018-12-10 RX ORDER — ROSUVASTATIN CALCIUM 10 MG/1
TABLET, COATED ORAL
Qty: 45 TABLET | Refills: 2 | Status: SHIPPED | OUTPATIENT
Start: 2018-12-10 | End: 2019-09-12 | Stop reason: SDUPTHER

## 2019-03-18 DIAGNOSIS — G25.81 RESTLESS LEGS SYNDROME: ICD-10-CM

## 2019-03-18 RX ORDER — ROPINIROLE 0.5 MG/1
TABLET, FILM COATED ORAL
Qty: 270 TABLET | Refills: 1 | Status: SHIPPED | OUTPATIENT
Start: 2019-03-18 | End: 2019-10-05 | Stop reason: SDUPTHER

## 2019-05-30 ENCOUNTER — OFFICE VISIT (OUTPATIENT)
Dept: INTERNAL MEDICINE CLINIC | Age: 74
End: 2019-05-30
Payer: MEDICARE

## 2019-05-30 VITALS
WEIGHT: 211 LBS | DIASTOLIC BLOOD PRESSURE: 84 MMHG | BODY MASS INDEX: 31.25 KG/M2 | SYSTOLIC BLOOD PRESSURE: 122 MMHG | HEART RATE: 88 BPM | HEIGHT: 69 IN

## 2019-05-30 DIAGNOSIS — R60.0 BILATERAL LEG EDEMA: ICD-10-CM

## 2019-05-30 DIAGNOSIS — I87.2 VENOUS STASIS DERMATITIS OF BOTH LOWER EXTREMITIES: Primary | ICD-10-CM

## 2019-05-30 PROBLEM — S72.009D CLOSED FRACTURE OF NECK OF FEMUR WITH ROUTINE HEALING: Status: RESOLVED | Noted: 2017-01-17 | Resolved: 2019-05-30

## 2019-05-30 PROCEDURE — 1036F TOBACCO NON-USER: CPT | Performed by: FAMILY MEDICINE

## 2019-05-30 PROCEDURE — G8399 PT W/DXA RESULTS DOCUMENT: HCPCS | Performed by: FAMILY MEDICINE

## 2019-05-30 PROCEDURE — G8417 CALC BMI ABV UP PARAM F/U: HCPCS | Performed by: FAMILY MEDICINE

## 2019-05-30 PROCEDURE — 3017F COLORECTAL CA SCREEN DOC REV: CPT | Performed by: FAMILY MEDICINE

## 2019-05-30 PROCEDURE — 99213 OFFICE O/P EST LOW 20 MIN: CPT | Performed by: FAMILY MEDICINE

## 2019-05-30 PROCEDURE — 1090F PRES/ABSN URINE INCON ASSESS: CPT | Performed by: FAMILY MEDICINE

## 2019-05-30 PROCEDURE — G8427 DOCREV CUR MEDS BY ELIG CLIN: HCPCS | Performed by: FAMILY MEDICINE

## 2019-05-30 PROCEDURE — 4040F PNEUMOC VAC/ADMIN/RCVD: CPT | Performed by: FAMILY MEDICINE

## 2019-05-30 PROCEDURE — 1123F ACP DISCUSS/DSCN MKR DOCD: CPT | Performed by: FAMILY MEDICINE

## 2019-05-30 RX ORDER — FUROSEMIDE 20 MG/1
TABLET ORAL
Qty: 60 TABLET | Refills: 2 | Status: SHIPPED | OUTPATIENT
Start: 2019-05-30 | End: 2019-06-21 | Stop reason: SDUPTHER

## 2019-05-30 ASSESSMENT — ENCOUNTER SYMPTOMS
SHORTNESS OF BREATH: 0
CHEST TIGHTNESS: 0
COUGH: 0
WHEEZING: 0

## 2019-05-30 NOTE — PROGRESS NOTES
Subjective:      Patient ID: Valeria Singletary is a 68 y.o. female. HPI     Chief Complaint   Patient presents with    Edema     bilateral leg edema. Lower legs and feet. Painful and red. Hot to the touch. Started around April 15th when her  was in the hospital.      Same day visit for symptom present about 5-6 weeks. Both legs are swelling. Her  was in the hospital 4/10 - 4/16/19 and then again 5/14- 5/19/19    She cannot get a pair of sandals on. The swelling does go down some overnight. They start hurting within 1 hour of being up. Outpatient Medications Marked as Taking for the 5/30/19 encounter (Office Visit) with Ayla Steen MD   Medication Sig Dispense Refill    rOPINIRole (REQUIP) 0.5 MG tablet TAKE 2-3 TABLETS BY MOUTH AT BEDTIME OR IN EVENING FOR RESTLESS LEGS 270 tablet 1    rosuvastatin (CRESTOR) 10 MG tablet TAKE 1 TABLET EVERY OTHER DAY ON EVEN DAYS OF THE MONTH. 45 tablet 2    famotidine (PEPCID) 20 MG tablet TAKE 1 TABLET BY MOUTH NIGHTLY 90 tablet 2    vitamin D (ERGOCALCIFEROL) 25478 units CAPS capsule TAKE ONE CAPSULE BY MOUTH WEEKLY 12 capsule 2    valsartan-hydrochlorothiazide (DIOVAN HCT) 160-12.5 MG per tablet Take 1 tablet by mouth daily 90 tablet 3    omeprazole (PRILOSEC) 20 MG delayed release capsule Take 1 capsule by mouth Daily In AM before first bite of food 90 capsule 0    Biotin 5000 MCG CAPS Take 5,000 mcg by mouth      aspirin 81 MG tablet Take 81 mg by mouth daily      divalproex (DEPAKOTE ER) 500 MG ER tablet Take 1 tablet by mouth daily Take 1 tablet by mouth  daily 90 tablet 1    Multiple Vitamins-Minerals (ICAPS) CAPS Take  by mouth daily.            Patient Active Problem List   Diagnosis    Hypertension    Restless legs syndrome    Seasonal affective disorder ([address]    Hypercholesterolemia    Vitamin D deficiency    Localization-related focal epilepsy with simple partial seizures ([address]    Disturbance of skin sensation    Vasomotor rhinitis    Venous angioma of brain (HCC)    Atypical facial pain    Macular degeneration (senile) of retina    Pain of both hip joints    Gastroesophageal reflux disease without esophagitis    1/4/17 LEFT hip hemiarthroplasty    Closed fracture of neck of femur with routine healing    Osteoporosis    Major depression in full remission ([facility]    Elevated TSH    B12 deficiency         Review of Systems   Constitutional: Negative for fever. Respiratory: Negative for cough, chest tightness, shortness of breath and wheezing. Cardiovascular: Positive for palpitations (when laying down) and leg swelling. Negative for chest pain. Skin: Negative for rash and wound. The legs do itch   Neurological: Negative for dizziness, syncope, facial asymmetry, speech difficulty, weakness, numbness and headaches. Objective:   Physical Exam   Constitutional: She appears well-developed and well-nourished. Cardiovascular: Normal rate, regular rhythm, normal heart sounds and intact distal pulses. + varicose veins of LEs   Pulmonary/Chest: Effort normal and breath sounds normal.   Musculoskeletal: She exhibits edema (2+ tense non or min pitting edema). Skin: Skin is warm and dry. No rash noted. There is erythema. No pallor. Skin on lower legs to ankles and feet is red diffusely but not warm and not indurated. Psychiatric: She has a normal mood and affect. Her behavior is normal. Cognition and memory are normal.   Vitals reviewed.       Wt Readings from Last 3 Encounters:   05/30/19 211 lb (95.7 kg)   10/24/18 206 lb 3.2 oz (93.5 kg)   04/23/18 204 lb 6.4 oz (92.7 kg)     Temp Readings from Last 3 Encounters:   01/06/17 97.9 °F (36.6 °C) (Oral)   06/16/16 98.2 °F (36.8 °C)   03/14/12 98.5 °F (36.9 °C) (Oral)     BP Readings from Last 3 Encounters:   05/30/19 122/84   10/24/18 132/82   04/23/18 (!) 142/92     Pulse Readings from Last 3 Encounters:   05/30/19 88   10/24/18 68   04/23/18 76

## 2019-05-30 NOTE — PATIENT INSTRUCTIONS
ACE wraps are an option if you cannot get the stockings. A stocking hola can help you put the stocking on. Put talcum powder on the legs before you put the stockings on. Put them on as soon as you get up out of bed. We will use Lasix short term to get some of the initial swelling down. Patient Education        Leg and Ankle Edema: Care Instructions  Your Care Instructions  Swelling in the legs, ankles, and feet is called edema. It is common after you sit or stand for a while. Long plane flights or car rides often cause swelling in the legs and feet. You may also have swelling if you have to stand for long periods of time at your job. Problems with the veins in the legs (varicose veins) and changes in hormones can also cause swelling. Sometimes the swelling in the ankles and feet is caused by a more serious problem, such as heart failure, infection, blood clots, or liver or kidney disease. Follow-up care is a key part of your treatment and safety. Be sure to make and go to all appointments, and call your doctor if you are having problems. It's also a good idea to know your test results and keep a list of the medicines you take. How can you care for yourself at home? · If your doctor gave you medicine, take it as prescribed. Call your doctor if you think you are having a problem with your medicine. · Whenever you are resting, raise your legs up. Try to keep the swollen area higher than the level of your heart. · Take breaks from standing or sitting in one position. ? Walk around to increase the blood flow in your lower legs. ? Move your feet and ankles often while you stand, or tighten and relax your leg muscles. · Wear support stockings. Put them on in the morning, before swelling gets worse. · Eat a balanced diet. Lose weight if you need to. · Limit the amount of salt (sodium) in your diet. Salt holds fluid in the body and may increase swelling. When should you call for help?   Call [phone] anytime you think you may need emergency care. For example, call if:    · You have symptoms of a blood clot in your lung (called a pulmonary embolism). These may include:  ? Sudden chest pain. ? Trouble breathing. ? Coughing up blood.    Call your doctor now or seek immediate medical care if:    · You have signs of a blood clot, such as:  ? Pain in your calf, back of the knee, thigh, or groin. ? Redness and swelling in your leg or groin.     · You have symptoms of infection, such as:  ? Increased pain, swelling, warmth, or redness. ? Red streaks or pus. ? A fever.    Watch closely for changes in your health, and be sure to contact your doctor if:    · Your swelling is getting worse.     · You have new or worsening pain in your legs.     · You do not get better as expected. Where can you learn more? Go to [email or web address].[email or web address] org and sign in to your [employer] account. Enter E485 in the ROLI box to learn more about \"Leg and Ankle Edema: Care Instructions. \"     If you do not have an account, please click on the \"Sign Up Now\" link. Current as of: September 23, 2018  Content Version: 12.0  © 5507-6013 [employer]. Care instructions adapted under license by [facility] ([facility]). If you have questions about a medical condition or this instruction, always ask your healthcare professional. [place] [address] any warranty or liability for your use of this information.

## 2019-06-26 ENCOUNTER — OFFICE VISIT (OUTPATIENT)
Dept: INTERNAL MEDICINE CLINIC | Age: 74
End: 2019-06-26
Payer: MEDICARE

## 2019-06-26 VITALS
BODY MASS INDEX: 31.25 KG/M2 | HEIGHT: 69 IN | DIASTOLIC BLOOD PRESSURE: 72 MMHG | WEIGHT: 211 LBS | HEART RATE: 80 BPM | SYSTOLIC BLOOD PRESSURE: 110 MMHG

## 2019-06-26 DIAGNOSIS — F33.8 SEASONAL AFFECTIVE DISORDER (HCC): ICD-10-CM

## 2019-06-26 DIAGNOSIS — I10 ESSENTIAL HYPERTENSION: Primary | ICD-10-CM

## 2019-06-26 DIAGNOSIS — R60.0 BILATERAL LEG EDEMA: ICD-10-CM

## 2019-06-26 DIAGNOSIS — G40.109 LOCALIZATION-RELATED FOCAL EPILEPSY WITH SIMPLE PARTIAL SEIZURES (HCC): ICD-10-CM

## 2019-06-26 DIAGNOSIS — E78.00 PURE HYPERCHOLESTEROLEMIA: ICD-10-CM

## 2019-06-26 DIAGNOSIS — E55.9 VITAMIN D DEFICIENCY: ICD-10-CM

## 2019-06-26 DIAGNOSIS — E03.8 SUBCLINICAL HYPOTHYROIDISM: ICD-10-CM

## 2019-06-26 DIAGNOSIS — K21.9 GASTROESOPHAGEAL REFLUX DISEASE WITHOUT ESOPHAGITIS: ICD-10-CM

## 2019-06-26 DIAGNOSIS — M81.0 AGE-RELATED OSTEOPOROSIS WITHOUT CURRENT PATHOLOGICAL FRACTURE: ICD-10-CM

## 2019-06-26 DIAGNOSIS — D18.02 VENOUS ANGIOMA OF BRAIN (HCC): ICD-10-CM

## 2019-06-26 DIAGNOSIS — F33.42 RECURRENT MAJOR DEPRESSIVE DISORDER, IN FULL REMISSION (HCC): ICD-10-CM

## 2019-06-26 PROBLEM — F32.5 MAJOR DEPRESSION IN FULL REMISSION (HCC): Status: RESOLVED | Noted: 2017-04-25 | Resolved: 2019-06-26

## 2019-06-26 PROBLEM — M85.89 OSTEOPENIA OF MULTIPLE SITES: Status: ACTIVE | Noted: 2017-01-24

## 2019-06-26 LAB
BASOPHILS ABSOLUTE: 0 K/UL (ref 0–0.2)
BASOPHILS RELATIVE PERCENT: 0.8 %
EOSINOPHILS ABSOLUTE: 0.1 K/UL (ref 0–0.6)
EOSINOPHILS RELATIVE PERCENT: 1.9 %
HCT VFR BLD CALC: 38 % (ref 36–48)
HEMOGLOBIN: 12.6 G/DL (ref 12–16)
LYMPHOCYTES ABSOLUTE: 1.3 K/UL (ref 1–5.1)
LYMPHOCYTES RELATIVE PERCENT: 25.9 %
MCH RBC QN AUTO: 31.4 PG (ref 26–34)
MCHC RBC AUTO-ENTMCNC: 33.2 G/DL (ref 31–36)
MCV RBC AUTO: 94.7 FL (ref 80–100)
MONOCYTES ABSOLUTE: 0.7 K/UL (ref 0–1.3)
MONOCYTES RELATIVE PERCENT: 13.6 %
NEUTROPHILS ABSOLUTE: 2.9 K/UL (ref 1.7–7.7)
NEUTROPHILS RELATIVE PERCENT: 57.8 %
PDW BLD-RTO: 13.8 % (ref 12.4–15.4)
PLATELET # BLD: 283 K/UL (ref 135–450)
PMV BLD AUTO: 8.3 FL (ref 5–10.5)
RBC # BLD: 4.01 M/UL (ref 4–5.2)
WBC # BLD: 5 K/UL (ref 4–11)

## 2019-06-26 PROCEDURE — 3017F COLORECTAL CA SCREEN DOC REV: CPT | Performed by: FAMILY MEDICINE

## 2019-06-26 PROCEDURE — 99214 OFFICE O/P EST MOD 30 MIN: CPT | Performed by: FAMILY MEDICINE

## 2019-06-26 PROCEDURE — 1090F PRES/ABSN URINE INCON ASSESS: CPT | Performed by: FAMILY MEDICINE

## 2019-06-26 PROCEDURE — G8427 DOCREV CUR MEDS BY ELIG CLIN: HCPCS | Performed by: FAMILY MEDICINE

## 2019-06-26 PROCEDURE — G8399 PT W/DXA RESULTS DOCUMENT: HCPCS | Performed by: FAMILY MEDICINE

## 2019-06-26 PROCEDURE — G8417 CALC BMI ABV UP PARAM F/U: HCPCS | Performed by: FAMILY MEDICINE

## 2019-06-26 PROCEDURE — 1036F TOBACCO NON-USER: CPT | Performed by: FAMILY MEDICINE

## 2019-06-26 PROCEDURE — 4040F PNEUMOC VAC/ADMIN/RCVD: CPT | Performed by: FAMILY MEDICINE

## 2019-06-26 PROCEDURE — 1123F ACP DISCUSS/DSCN MKR DOCD: CPT | Performed by: FAMILY MEDICINE

## 2019-06-26 RX ORDER — ERGOCALCIFEROL 1.25 MG/1
CAPSULE ORAL
Qty: 12 CAPSULE | Refills: 2 | Status: SHIPPED | OUTPATIENT
Start: 2019-06-26 | End: 2021-03-10 | Stop reason: SDUPTHER

## 2019-06-26 RX ORDER — VALSARTAN AND HYDROCHLOROTHIAZIDE 160; 12.5 MG/1; MG/1
1 TABLET, FILM COATED ORAL DAILY
Qty: 90 TABLET | Refills: 3 | Status: SHIPPED | OUTPATIENT
Start: 2019-06-26 | End: 2020-07-16

## 2019-06-26 RX ORDER — FUROSEMIDE 20 MG/1
TABLET ORAL
Qty: 60 TABLET | Refills: 5 | Status: SHIPPED | OUTPATIENT
Start: 2019-06-26 | End: 2020-01-06

## 2019-06-26 NOTE — PROGRESS NOTES
Normal rate, regular rhythm, S1 normal, S2 normal, normal heart sounds and intact distal pulses. No murmur heard. Pulmonary/Chest: Effort normal and breath sounds normal. No respiratory distress. She has no decreased breath sounds. She has no wheezes. She has no rhonchi. She has no rales. Abdominal: Soft. Normal appearance and bowel sounds are normal. She exhibits no abdominal bruit and no mass. There is no hepatomegaly. There is no tenderness. Lymphadenopathy:     She has no cervical adenopathy. Neurological: She is alert and oriented to person, place, and time. She displays no tremor. She exhibits normal muscle tone. Coordination and gait normal.   Skin: Skin is warm and dry. She is not diaphoretic. No cyanosis. No pallor. Nails show no clubbing. Psychiatric: She has a normal mood and affect. Her speech is normal and behavior is normal. Cognition and memory are normal.   Pleasant and smiling. Looks less depressed than in the past.  Hopeful. Vitals reviewed. Wt Readings from Last 3 Encounters:   06/26/19 211 lb (95.7 kg)   05/30/19 211 lb (95.7 kg)   10/24/18 206 lb 3.2 oz (93.5 kg)     Temp Readings from Last 3 Encounters:   01/06/17 97.9 °F (36.6 °C) (Oral)   06/16/16 98.2 °F (36.8 °C)   03/14/12 98.5 °F (36.9 °C) (Oral)     BP Readings from Last 3 Encounters:   06/26/19 110/72   05/30/19 122/84   10/24/18 132/82     Pulse Readings from Last 3 Encounters:   06/26/19 80   05/30/19 88   10/24/18 68     .   Lab Results   Component Value Date    WBC 5.4 10/24/2018    HGB 13.9 10/24/2018    HCT 42.2 10/24/2018    MCV 95.7 10/24/2018     10/24/2018     Lab Results   Component Value Date     10/24/2018    K 4.4 10/24/2018    CL 99 10/24/2018    CO2 29 10/24/2018    BUN 8 10/24/2018    CREATININE 0.5 (L) 10/24/2018    GLUCOSE 91 10/24/2018    CALCIUM 9.6 10/24/2018    PROT 6.8 10/24/2018    LABALBU 4.5 10/24/2018    BILITOT 0.6 10/24/2018    ALKPHOS 90 10/24/2018    AST 14 (L) 10/24/2018    ALT 11 10/24/2018    LABGLOM >60 10/24/2018    GFRAA >60 10/24/2018    AGRATIO 2.0 10/24/2018    GLOB 2.3 10/24/2018       Lab Results   Component Value Date    LDLCALC 99 10/24/2018    LDLDIRECT 126 (H) 05/29/2015       The 10-year ASCVD risk score (Gilford Marinas., et al., 2013) is: 12.7%    Values used to calculate the score:      Age: 68 years      Sex: Female      Is Non- : No      Diabetic: No      Tobacco smoker: No      Systolic Blood Pressure: 591 mmHg      Is BP treated: Yes      HDL Cholesterol: 79 mg/dL      Total Cholesterol: 202 mg/dL      Assessment:      1. Essential hypertension  BP: 110/72   At goal and meeting medical guidelines. Continue treatment.    - valsartan-hydrochlorothiazide (DIOVAN HCT) 160-12.5 MG per tablet; Take 1 tablet by mouth daily  Dispense: 90 tablet; Refill: 3  - Comprehensive Metabolic Panel  - Uric Acid    2. Hypercholesterolemia  Lab Results   Component Value Date    LDLCALC 99 10/24/2018    LDLDIRECT 126 (H) 05/29/2015     At goal and meeting medical guidelines. Continue treatment. 3. Age-related osteoporosis without current pathological fracture  Her last scan 2-1/2 years ago showed osteopenia not osteoporosis. She had side effects with alendronate so was not on currently any active treatment. She is due for bone density sometime this year. This will be ordered at the next visit. If her FRAX score is elevated or T-scores -2.5 or more negative, we will consider other medication. 4. Gastroesophageal reflux disease without esophagitis  On PPI and H2 blocker. Symptoms controlled. - CBC Auto Differential  - Comprehensive Metabolic Panel    5. Vitamin D deficiency  Continues to take prescription strength high-dose vitamin D. This keeps her in range and has helped her bone density  - vitamin D (ERGOCALCIFEROL) 06589 units CAPS capsule; TAKE ONE CAPSULE BY MOUTH WEEKLY  Dispense: 12 capsule; Refill: 2    6.  Bilateral leg edema  Appears to be due to venous insufficiency. We spent time discussing special stockings and tricks to try to get them on. We may need to consider zipper stockings. We did review and discuss surgical treatment. Surgery is a temporary fix for varicose veins. She will likely develop new ones within 1 to 2 years. - furosemide (LASIX) 20 MG tablet; TAKE 1 OR 2 TABS DAILY AT LUNCH OR IN THE AFTERNOON UNTIL SWELLING DOWN. Dispense: 60 tablet; Refill: 5  - CBC Auto Differential    7. Subclinical hypothyroidism  Monitor  - TSH with Reflex    8. Localization-related focal epilepsy with simple partial seizures ([facility]  She is active with neurologist.  - Valproic acid level, total    9. Recurrent major depressive disorder, in full remission [facility])  She is doing well. A lot of her depression is reactive because of her 's really poor health. She appears to be staying optimistic. 10. Seasonal affective disorder ([facility]  Doing fine this time of year. 11. Ven[facility] ([facility]  Continues to follow-up with neurology. This appears to be stable. Plan:      See orders. See after visit summary, patient instructions, and reference hand-outs. A PRINTED SUMMARY = AVS GIVEN TO THE PATIENT. Discussed use, benefit, and side effects of prescribed medications. Barriers to medication compliance addressed. All patient questions answered.           Saadia Herman MD

## 2019-06-27 LAB
A/G RATIO: 1.9 (ref 1.1–2.2)
ALBUMIN SERPL-MCNC: 4.3 G/DL (ref 3.4–5)
ALP BLD-CCNC: 79 U/L (ref 40–129)
ALT SERPL-CCNC: 11 U/L (ref 10–40)
ANION GAP SERPL CALCULATED.3IONS-SCNC: 15 MMOL/L (ref 3–16)
AST SERPL-CCNC: 16 U/L (ref 15–37)
BILIRUB SERPL-MCNC: <0.2 MG/DL (ref 0–1)
BUN BLDV-MCNC: 9 MG/DL (ref 7–20)
CALCIUM SERPL-MCNC: 9.1 MG/DL (ref 8.3–10.6)
CHLORIDE BLD-SCNC: 99 MMOL/L (ref 99–110)
CO2: 27 MMOL/L (ref 21–32)
CREAT SERPL-MCNC: 0.7 MG/DL (ref 0.6–1.2)
GFR AFRICAN AMERICAN: >60
GFR NON-AFRICAN AMERICAN: >60
GLOBULIN: 2.3 G/DL
GLUCOSE BLD-MCNC: 82 MG/DL (ref 70–99)
POTASSIUM SERPL-SCNC: 4.2 MMOL/L (ref 3.5–5.1)
SODIUM BLD-SCNC: 141 MMOL/L (ref 136–145)
T4 FREE: 1.3 NG/DL (ref 0.9–1.8)
TOTAL PROTEIN: 6.6 G/DL (ref 6.4–8.2)
TSH REFLEX: 5.11 UIU/ML (ref 0.27–4.2)
URIC ACID, SERUM: 5.8 MG/DL (ref 2.6–6)
VALPROIC ACID LEVEL: 52.4 UG/ML (ref 50–100)

## 2019-07-05 ENCOUNTER — TELEPHONE (OUTPATIENT)
Dept: INTERNAL MEDICINE CLINIC | Age: 74
End: 2019-07-05

## 2019-07-05 RX ORDER — BENZONATATE 100 MG/1
100 CAPSULE ORAL EVERY 6 HOURS PRN
Qty: 30 CAPSULE | Refills: 2 | Status: SHIPPED | OUTPATIENT
Start: 2019-07-05 | End: 2019-08-04

## 2019-07-05 ASSESSMENT — ENCOUNTER SYMPTOMS
SHORTNESS OF BREATH: 0
WHEEZING: 0
CHEST TIGHTNESS: 0
COUGH: 0

## 2019-07-05 NOTE — TELEPHONE ENCOUNTER
Pt c/o cough , thick mucus - white, had fever taking mucinex - no better     [facility] in [place]

## 2019-09-11 DIAGNOSIS — K21.9 GASTROESOPHAGEAL REFLUX DISEASE, ESOPHAGITIS PRESENCE NOT SPECIFIED: ICD-10-CM

## 2019-09-11 RX ORDER — FAMOTIDINE 20 MG/1
20 TABLET, FILM COATED ORAL NIGHTLY
Qty: 90 TABLET | Refills: 2 | Status: SHIPPED | OUTPATIENT
Start: 2019-09-11 | End: 2021-03-10 | Stop reason: SDUPTHER

## 2019-09-12 DIAGNOSIS — E78.00 PURE HYPERCHOLESTEROLEMIA: ICD-10-CM

## 2019-09-12 RX ORDER — ROSUVASTATIN CALCIUM 10 MG/1
TABLET, COATED ORAL
Qty: 45 TABLET | Refills: 2 | Status: SHIPPED | OUTPATIENT
Start: 2019-09-12 | End: 2021-03-10 | Stop reason: SDUPTHER

## 2019-10-05 DIAGNOSIS — G25.81 RESTLESS LEGS SYNDROME: ICD-10-CM

## 2019-10-07 RX ORDER — ROPINIROLE 0.5 MG/1
TABLET, FILM COATED ORAL
Qty: 270 TABLET | Refills: 1 | Status: SHIPPED | OUTPATIENT
Start: 2019-10-07 | End: 2020-04-13

## 2019-10-23 ENCOUNTER — OFFICE VISIT (OUTPATIENT)
Dept: INTERNAL MEDICINE CLINIC | Age: 74
End: 2019-10-23
Payer: MEDICARE

## 2019-10-23 VITALS
HEART RATE: 80 BPM | BODY MASS INDEX: 29.62 KG/M2 | WEIGHT: 200 LBS | SYSTOLIC BLOOD PRESSURE: 134 MMHG | DIASTOLIC BLOOD PRESSURE: 80 MMHG | HEIGHT: 69 IN

## 2019-10-23 DIAGNOSIS — M25.551 PAIN OF BOTH HIP JOINTS: ICD-10-CM

## 2019-10-23 DIAGNOSIS — R73.09 ABNORMAL GLUCOSE: ICD-10-CM

## 2019-10-23 DIAGNOSIS — Z79.899 HIGH RISK MEDICATION USE: ICD-10-CM

## 2019-10-23 DIAGNOSIS — K21.9 GASTROESOPHAGEAL REFLUX DISEASE WITHOUT ESOPHAGITIS: ICD-10-CM

## 2019-10-23 DIAGNOSIS — E78.00 PURE HYPERCHOLESTEROLEMIA: Primary | ICD-10-CM

## 2019-10-23 DIAGNOSIS — Z78.0 POSTMENOPAUSAL: ICD-10-CM

## 2019-10-23 DIAGNOSIS — G40.109 LOCALIZATION-RELATED FOCAL EPILEPSY WITH SIMPLE PARTIAL SEIZURES (HCC): ICD-10-CM

## 2019-10-23 DIAGNOSIS — G25.81 RESTLESS LEGS SYNDROME: ICD-10-CM

## 2019-10-23 DIAGNOSIS — M25.552 PAIN OF BOTH HIP JOINTS: ICD-10-CM

## 2019-10-23 DIAGNOSIS — Z12.31 ENCOUNTER FOR SCREENING MAMMOGRAM FOR BREAST CANCER: ICD-10-CM

## 2019-10-23 DIAGNOSIS — E55.9 VITAMIN D DEFICIENCY: ICD-10-CM

## 2019-10-23 DIAGNOSIS — R53.83 FATIGUE, UNSPECIFIED TYPE: ICD-10-CM

## 2019-10-23 LAB
A/G RATIO: 2 (ref 1.1–2.2)
ALBUMIN SERPL-MCNC: 4.5 G/DL (ref 3.4–5)
ALP BLD-CCNC: 89 U/L (ref 40–129)
ALT SERPL-CCNC: 11 U/L (ref 10–40)
ANION GAP SERPL CALCULATED.3IONS-SCNC: 15 MMOL/L (ref 3–16)
AST SERPL-CCNC: 14 U/L (ref 15–37)
BASOPHILS ABSOLUTE: 0.1 K/UL (ref 0–0.2)
BASOPHILS RELATIVE PERCENT: 0.7 %
BILIRUB SERPL-MCNC: 0.5 MG/DL (ref 0–1)
BUN BLDV-MCNC: 8 MG/DL (ref 7–20)
CALCIUM SERPL-MCNC: 9.4 MG/DL (ref 8.3–10.6)
CHLORIDE BLD-SCNC: 97 MMOL/L (ref 99–110)
CHOLESTEROL, TOTAL: 248 MG/DL (ref 0–199)
CO2: 29 MMOL/L (ref 21–32)
CREAT SERPL-MCNC: 0.7 MG/DL (ref 0.6–1.2)
EOSINOPHILS ABSOLUTE: 0.1 K/UL (ref 0–0.6)
EOSINOPHILS RELATIVE PERCENT: 1.9 %
GFR AFRICAN AMERICAN: >60
GFR NON-AFRICAN AMERICAN: >60
GLOBULIN: 2.3 G/DL
GLUCOSE BLD-MCNC: 91 MG/DL (ref 70–99)
HCT VFR BLD CALC: 42.3 % (ref 36–48)
HDLC SERPL-MCNC: 90 MG/DL (ref 40–60)
HEMOGLOBIN: 13.9 G/DL (ref 12–16)
LDL CHOLESTEROL CALCULATED: 140 MG/DL
LYMPHOCYTES ABSOLUTE: 1.9 K/UL (ref 1–5.1)
LYMPHOCYTES RELATIVE PERCENT: 26.5 %
MAGNESIUM: 2.3 MG/DL (ref 1.8–2.4)
MCH RBC QN AUTO: 31 PG (ref 26–34)
MCHC RBC AUTO-ENTMCNC: 32.9 G/DL (ref 31–36)
MCV RBC AUTO: 94.2 FL (ref 80–100)
MONOCYTES ABSOLUTE: 0.5 K/UL (ref 0–1.3)
MONOCYTES RELATIVE PERCENT: 7.6 %
NEUTROPHILS ABSOLUTE: 4.5 K/UL (ref 1.7–7.7)
NEUTROPHILS RELATIVE PERCENT: 63.3 %
PDW BLD-RTO: 14 % (ref 12.4–15.4)
PLATELET # BLD: 289 K/UL (ref 135–450)
PMV BLD AUTO: 8.4 FL (ref 5–10.5)
POTASSIUM SERPL-SCNC: 4 MMOL/L (ref 3.5–5.1)
RBC # BLD: 4.5 M/UL (ref 4–5.2)
SODIUM BLD-SCNC: 141 MMOL/L (ref 136–145)
T4 FREE: 1.3 NG/DL (ref 0.9–1.8)
TOTAL PROTEIN: 6.8 G/DL (ref 6.4–8.2)
TRIGL SERPL-MCNC: 89 MG/DL (ref 0–150)
TSH REFLEX: 6.05 UIU/ML (ref 0.27–4.2)
VALPROIC ACID LEVEL: 36.3 UG/ML (ref 50–100)
VITAMIN B-12: 503 PG/ML (ref 211–911)
VITAMIN D 25-HYDROXY: 35.6 NG/ML
VLDLC SERPL CALC-MCNC: 18 MG/DL
WBC # BLD: 7.1 K/UL (ref 4–11)

## 2019-10-23 PROCEDURE — 4040F PNEUMOC VAC/ADMIN/RCVD: CPT | Performed by: FAMILY MEDICINE

## 2019-10-23 PROCEDURE — G8427 DOCREV CUR MEDS BY ELIG CLIN: HCPCS | Performed by: FAMILY MEDICINE

## 2019-10-23 PROCEDURE — G8399 PT W/DXA RESULTS DOCUMENT: HCPCS | Performed by: FAMILY MEDICINE

## 2019-10-23 PROCEDURE — 3017F COLORECTAL CA SCREEN DOC REV: CPT | Performed by: FAMILY MEDICINE

## 2019-10-23 PROCEDURE — 99214 OFFICE O/P EST MOD 30 MIN: CPT | Performed by: FAMILY MEDICINE

## 2019-10-23 PROCEDURE — 1036F TOBACCO NON-USER: CPT | Performed by: FAMILY MEDICINE

## 2019-10-23 PROCEDURE — G8417 CALC BMI ABV UP PARAM F/U: HCPCS | Performed by: FAMILY MEDICINE

## 2019-10-23 PROCEDURE — 1123F ACP DISCUSS/DSCN MKR DOCD: CPT | Performed by: FAMILY MEDICINE

## 2019-10-23 PROCEDURE — G8482 FLU IMMUNIZE ORDER/ADMIN: HCPCS | Performed by: FAMILY MEDICINE

## 2019-10-23 PROCEDURE — 1090F PRES/ABSN URINE INCON ASSESS: CPT | Performed by: FAMILY MEDICINE

## 2019-10-23 RX ORDER — GABAPENTIN 300 MG/1
300 CAPSULE ORAL EVERY EVENING
Qty: 90 CAPSULE | Refills: 0
Start: 2019-10-23 | End: 2020-02-24 | Stop reason: SINTOL

## 2019-10-24 LAB
ESTIMATED AVERAGE GLUCOSE: 102.5 MG/DL
HBA1C MFR BLD: 5.2 %

## 2019-10-26 ASSESSMENT — ENCOUNTER SYMPTOMS
CHEST TIGHTNESS: 0
SHORTNESS OF BREATH: 0
COUGH: 0
WHEEZING: 0
ABDOMINAL PAIN: 0

## 2019-12-26 ENCOUNTER — NURSE TRIAGE (OUTPATIENT)
Dept: OTHER | Facility: CLINIC | Age: 74
End: 2019-12-26

## 2019-12-27 ENCOUNTER — OFFICE VISIT (OUTPATIENT)
Dept: INTERNAL MEDICINE CLINIC | Age: 74
End: 2019-12-27
Payer: MEDICARE

## 2019-12-27 VITALS
DIASTOLIC BLOOD PRESSURE: 80 MMHG | TEMPERATURE: 97.7 F | OXYGEN SATURATION: 96 % | HEART RATE: 90 BPM | SYSTOLIC BLOOD PRESSURE: 122 MMHG | WEIGHT: 205 LBS | BODY MASS INDEX: 30.27 KG/M2

## 2019-12-27 DIAGNOSIS — J01.10 ACUTE FRONTAL SINUSITIS, RECURRENCE NOT SPECIFIED: Primary | ICD-10-CM

## 2019-12-27 PROCEDURE — G8427 DOCREV CUR MEDS BY ELIG CLIN: HCPCS | Performed by: INTERNAL MEDICINE

## 2019-12-27 PROCEDURE — 4040F PNEUMOC VAC/ADMIN/RCVD: CPT | Performed by: INTERNAL MEDICINE

## 2019-12-27 PROCEDURE — 99213 OFFICE O/P EST LOW 20 MIN: CPT | Performed by: INTERNAL MEDICINE

## 2019-12-27 PROCEDURE — 1123F ACP DISCUSS/DSCN MKR DOCD: CPT | Performed by: INTERNAL MEDICINE

## 2019-12-27 PROCEDURE — G8417 CALC BMI ABV UP PARAM F/U: HCPCS | Performed by: INTERNAL MEDICINE

## 2019-12-27 PROCEDURE — 3017F COLORECTAL CA SCREEN DOC REV: CPT | Performed by: INTERNAL MEDICINE

## 2019-12-27 PROCEDURE — 1036F TOBACCO NON-USER: CPT | Performed by: INTERNAL MEDICINE

## 2019-12-27 PROCEDURE — G8482 FLU IMMUNIZE ORDER/ADMIN: HCPCS | Performed by: INTERNAL MEDICINE

## 2019-12-27 PROCEDURE — 1090F PRES/ABSN URINE INCON ASSESS: CPT | Performed by: INTERNAL MEDICINE

## 2019-12-27 PROCEDURE — G8399 PT W/DXA RESULTS DOCUMENT: HCPCS | Performed by: INTERNAL MEDICINE

## 2019-12-27 RX ORDER — AMOXICILLIN AND CLAVULANATE POTASSIUM 500; 125 MG/1; MG/1
1 TABLET, FILM COATED ORAL 3 TIMES DAILY
Qty: 30 TABLET | Refills: 0 | Status: SHIPPED | OUTPATIENT
Start: 2019-12-27 | End: 2020-01-06

## 2020-01-06 RX ORDER — FUROSEMIDE 20 MG/1
TABLET ORAL
Qty: 180 TABLET | Refills: 1 | Status: SHIPPED | OUTPATIENT
Start: 2020-01-06 | End: 2020-07-16

## 2020-01-16 ENCOUNTER — CARE COORDINATION (OUTPATIENT)
Dept: CARE COORDINATION | Age: 75
End: 2020-01-16

## 2020-01-16 ENCOUNTER — TELEPHONE (OUTPATIENT)
Dept: RHEUMATOLOGY | Age: 75
End: 2020-01-16

## 2020-01-16 NOTE — CARE COORDINATION
Unsuccessful outreach for care coordination engagement. Message left on vm to return call to [phone].

## 2020-01-16 NOTE — LETTER
1/16/2020    [address] [place] [address] Dr Jagdish [place] [address]      Dear Ronda Ramirez,    My name is Hernán Goode and I am a registered nurse who partners with Tyree Zaidi MD to improve patients' health. Tyree Zaidi MD believes you would benefit from working with me. As a member of your health care team, I would work with other providers involved in your care, offer education for your specific health conditions, and connect you with additional resources as needed. I will collaborate with Tyree Zaidi MD to support you in following your treatment plan. The additional support I provide is no additional cost to you. My primary focus is to help you achieve specific goals and improve your health. We are committed to walk with you on this journey and look forward to working with you. Please call me to further discuss your healthcare needs. I am available by phone or for appointments at the office. You can reach me at [phone].     In good health,     Henrán Goode RN

## 2020-01-29 ENCOUNTER — HOSPITAL ENCOUNTER (OUTPATIENT)
Dept: GENERAL RADIOLOGY | Age: 75
Discharge: HOME | End: 2020-01-29
Payer: MEDICARE

## 2020-01-29 ENCOUNTER — HOSPITAL ENCOUNTER (OUTPATIENT)
Dept: WOMENS IMAGING | Age: 75
Discharge: HOME | End: 2020-01-29
Payer: MEDICARE

## 2020-01-29 PROCEDURE — 77080 DXA BONE DENSITY AXIAL: CPT

## 2020-01-29 PROCEDURE — 77063 BREAST TOMOSYNTHESIS BI: CPT

## 2020-02-05 ENCOUNTER — HOSPITAL ENCOUNTER (OUTPATIENT)
Dept: WOMENS IMAGING | Age: 75
Discharge: HOME | End: 2020-02-05
Payer: MEDICARE

## 2020-02-05 PROCEDURE — 3209999900 MAM MAMMOGRAM AT NO CHARGE

## 2020-02-19 ENCOUNTER — CARE COORDINATION (OUTPATIENT)
Dept: CARE COORDINATION | Age: 75
End: 2020-02-19

## 2020-02-24 ENCOUNTER — OFFICE VISIT (OUTPATIENT)
Dept: INTERNAL MEDICINE CLINIC | Age: 75
End: 2020-02-24
Payer: MEDICARE

## 2020-02-24 VITALS
DIASTOLIC BLOOD PRESSURE: 76 MMHG | SYSTOLIC BLOOD PRESSURE: 136 MMHG | BODY MASS INDEX: 30.66 KG/M2 | HEART RATE: 88 BPM | HEIGHT: 69 IN | WEIGHT: 207 LBS

## 2020-02-24 PROCEDURE — 1090F PRES/ABSN URINE INCON ASSESS: CPT | Performed by: FAMILY MEDICINE

## 2020-02-24 PROCEDURE — G8399 PT W/DXA RESULTS DOCUMENT: HCPCS | Performed by: FAMILY MEDICINE

## 2020-02-24 PROCEDURE — 4040F PNEUMOC VAC/ADMIN/RCVD: CPT | Performed by: FAMILY MEDICINE

## 2020-02-24 PROCEDURE — G8417 CALC BMI ABV UP PARAM F/U: HCPCS | Performed by: FAMILY MEDICINE

## 2020-02-24 PROCEDURE — 1123F ACP DISCUSS/DSCN MKR DOCD: CPT | Performed by: FAMILY MEDICINE

## 2020-02-24 PROCEDURE — 3017F COLORECTAL CA SCREEN DOC REV: CPT | Performed by: FAMILY MEDICINE

## 2020-02-24 PROCEDURE — G8482 FLU IMMUNIZE ORDER/ADMIN: HCPCS | Performed by: FAMILY MEDICINE

## 2020-02-24 PROCEDURE — 99214 OFFICE O/P EST MOD 30 MIN: CPT | Performed by: FAMILY MEDICINE

## 2020-02-24 PROCEDURE — G8427 DOCREV CUR MEDS BY ELIG CLIN: HCPCS | Performed by: FAMILY MEDICINE

## 2020-02-24 PROCEDURE — 1036F TOBACCO NON-USER: CPT | Performed by: FAMILY MEDICINE

## 2020-02-24 RX ORDER — ALENDRONATE SODIUM 70 MG/1
TABLET ORAL
Qty: 4 TABLET | Refills: 5 | Status: SHIPPED | OUTPATIENT
Start: 2020-02-24 | End: 2020-06-26

## 2020-02-24 SDOH — ECONOMIC STABILITY: TRANSPORTATION INSECURITY
IN THE PAST 12 MONTHS, HAS LACK OF TRANSPORTATION KEPT YOU FROM MEETINGS, WORK, OR FROM GETTING THINGS NEEDED FOR DAILY LIVING?: NO

## 2020-02-24 SDOH — ECONOMIC STABILITY: INCOME INSECURITY: HOW HARD IS IT FOR YOU TO PAY FOR THE VERY BASICS LIKE FOOD, HOUSING, MEDICAL CARE, AND HEATING?: NOT HARD AT ALL

## 2020-02-24 SDOH — ECONOMIC STABILITY: FOOD INSECURITY: WITHIN THE PAST 12 MONTHS, YOU WORRIED THAT YOUR FOOD WOULD RUN OUT BEFORE YOU GOT MONEY TO BUY MORE.: NEVER TRUE

## 2020-02-24 SDOH — ECONOMIC STABILITY: FOOD INSECURITY: WITHIN THE PAST 12 MONTHS, THE FOOD YOU BOUGHT JUST DIDN'T LAST AND YOU DIDN'T HAVE MONEY TO GET MORE.: NEVER TRUE

## 2020-02-24 SDOH — ECONOMIC STABILITY: TRANSPORTATION INSECURITY
IN THE PAST 12 MONTHS, HAS THE LACK OF TRANSPORTATION KEPT YOU FROM MEDICAL APPOINTMENTS OR FROM GETTING MEDICATIONS?: NO

## 2020-02-24 NOTE — PROGRESS NOTES
Subjective:      Patient ID: Armani Woodard is a 76 y.o. female. HPI   Chief Complaint   Patient presents with    Medication Check     FU of HLP, h/o HTN, RLS, seasonal affective, GERD and localized epilepsy  She also had DEXA scan and shows 1 femoral neck T score in the minus 2.5 range. She has good teeth and no plan on extractions or implants at this time    Would like to discuss her neurologist.  Judi Navarrete was forcing her to do specific testing. She was not convinced she has epilepsy and feels like she should be off the Depakote due to osteoporosis risk. Gabe Alfaro did stop the medication completely on her own for 5 days but got scared and went back to 500 mg per day instead of 1000 mg per day. No seizures or symptoms for a long time including being off the med. She really does not want to do the hospital video EEG. Has decided to drive the 1 hour trip and stay with [employer]. Likes [employer] a lot. Says she listens to her and her needs. Stopped 300 mg gabapentin at bedtime. Made her too drowsy. RLS is doing OK. Adjusted time of her ropinirole. Hips still hurt. Probably will need to do something in the future but living with it for now. See Assessment for more detail on conditions addressed today.     Patient Active Problem List   Diagnosis    Hypertension    Restless legs syndrome    Seasonal affective disorder ([address]    Hypercholesterolemia    Vitamin D deficiency    Localization-related focal epilepsy with simple partial seizures ([address]    Disturbance of skin sensation    Vasomotor rhinitis    Venous angioma of brain (HCC)    Atypical facial pain    Macular degeneration (senile) of retina    Pain of both hip joints    Gastroesophageal reflux disease without esophagitis    1/4/17 LEFT hip hemiarthroplasty    Osteopenia of multiple sites    Elevated TSH    B12 deficiency         Outpatient Medications Marked as Taking for the 2/24/20 encounter (Office Visit) with Zia Anders MD   Medication Sig Dispense Refill    furosemide (LASIX) 20 MG tablet TAKE 1 OR 2 TABS DAILY AT LUNCH OR IN THE AFTERNOON UNTIL SWELLING DOWN. 180 tablet 1    rOPINIRole (REQUIP) 0.5 MG tablet TAKE 2-3 TABLETS BY MOUTH AT BEDTIME OR IN EVENING FOR RESTLESS LEGS (Patient taking differently: 2-4 mg TAKE 2-3 TABLETS BY MOUTH AT BEDTIME OR IN EVENING FOR RESTLESS LEGS) 270 tablet 1    rosuvastatin (CRESTOR) 10 MG tablet TAKE 1 TABLET EVERY OTHER DAY ON EVEN DAYS OF THE MONTH. 45 tablet 2    famotidine (PEPCID) 20 MG tablet TAKE 1 TABLET BY MOUTH NIGHTLY 90 tablet 2    valsartan-hydrochlorothiazide (DIOVAN HCT) 160-12.5 MG per tablet Take 1 tablet by mouth daily 90 tablet 3    vitamin D (ERGOCALCIFEROL) 85835 units CAPS capsule TAKE ONE CAPSULE BY MOUTH WEEKLY 12 capsule 2    omeprazole (PRILOSEC) 20 MG delayed release capsule Take 1 capsule by mouth Daily In AM before first bite of food 90 capsule 0    Biotin 5000 MCG CAPS Take 5,000 mcg by mouth      aspirin 81 MG tablet Take 81 mg by mouth daily      divalproex (DEPAKOTE ER) 500 MG ER tablet Take 1 tablet by mouth daily Take 1 tablet by mouth  daily 90 tablet 1    Multiple Vitamins-Minerals (ICAPS) CAPS Take  by mouth daily. Social History     Tobacco Use    Smoking status: Former Smoker     Packs/day: 0.30     Years: 1.00     Pack years: 0.30     Types: Cigarettes     Last attempt to quit: 1966     Years since quittin.1    Smokeless tobacco: Never Used    Tobacco comment: quit age 25   Substance Use Topics    Alcohol use: Yes     Alcohol/week: 5.0 standard drinks     Types: 5 Glasses of wine per week     Comment: vodka drink daily    Drug use: No         Review of Systems   Constitutional: Negative for unexpected weight change. Respiratory: Negative for cough, chest tightness, shortness of breath and wheezing. Cardiovascular: Negative for chest pain, palpitations and leg swelling.    Skin: Negative for rash and wound. Neurological: Negative for dizziness, syncope, facial asymmetry, speech difficulty, weakness, numbness and headaches. Psychiatric/Behavioral: Negative for dysphoric mood. Objective:   Physical Exam  Vitals signs reviewed. Constitutional:       General: She is not in acute distress. Appearance: Normal appearance. She is well-developed. She is not diaphoretic. Eyes:      General: No scleral icterus. Neck:      Musculoskeletal: Neck supple. Thyroid: No thyroid mass or thyromegaly. Vascular: No carotid bruit. Cardiovascular:      Rate and Rhythm: Normal rate and regular rhythm. Heart sounds: Normal heart sounds, S1 normal and S2 normal. No murmur. Pulmonary:      Effort: Pulmonary effort is normal. No respiratory distress. Breath sounds: Normal breath sounds. No decreased breath sounds, wheezing, rhonchi or rales. Abdominal:      General: Bowel sounds are normal. There is no abdominal bruit. Palpations: Abdomen is soft. There is no hepatomegaly or mass. Tenderness: There is no abdominal tenderness. Musculoskeletal:      Right lower leg: No edema. Left lower leg: No edema. Lymphadenopathy:      Cervical: No cervical adenopathy. Skin:     General: Skin is warm and dry. Coloration: Skin is not pale. Nails: There is no clubbing. Neurological:      Mental Status: She is alert and oriented to person, place, and time. Motor: No tremor or abnormal muscle tone. Coordination: Coordination normal.      Gait: Gait normal.   Psychiatric:         Speech: Speech normal.         Behavior: Behavior normal.         Assessment:      1. Localized osteoporosis without current pathological fracture  Reviewed DEXA scan. Start oral med and if not tolerated then will consider Reclast or Prolia.     She will work with neurology to see if she can get off of down on Depakote   - alendronate (FOSAMAX) 70 MG tablet; 1 tab each wk w/8 oz. water

## 2020-02-25 PROBLEM — M81.6 LOCALIZED OSTEOPOROSIS WITHOUT CURRENT PATHOLOGICAL FRACTURE: Status: ACTIVE | Noted: 2020-02-25

## 2020-02-25 ASSESSMENT — ENCOUNTER SYMPTOMS
SHORTNESS OF BREATH: 0
WHEEZING: 0
CHEST TIGHTNESS: 0
COUGH: 0

## 2020-03-06 ENCOUNTER — CARE COORDINATION (OUTPATIENT)
Dept: CARE COORDINATION | Age: 75
End: 2020-03-06

## 2020-04-13 RX ORDER — ROPINIROLE 0.5 MG/1
TABLET, FILM COATED ORAL
Qty: 270 TABLET | Refills: 1 | Status: SHIPPED | OUTPATIENT
Start: 2020-04-13 | End: 2020-12-24

## 2020-05-22 ENCOUNTER — NURSE TRIAGE (OUTPATIENT)
Dept: OTHER | Facility: CLINIC | Age: 75
End: 2020-05-22

## 2020-06-16 ENCOUNTER — TELEPHONE (OUTPATIENT)
Dept: INTERNAL MEDICINE CLINIC | Age: 75
End: 2020-06-16

## 2020-06-16 NOTE — TELEPHONE ENCOUNTER
Patient requesting an in office visit with PCP. Said she feels tired and her heart flutters occasionally.     [phone]

## 2020-06-26 ENCOUNTER — OFFICE VISIT (OUTPATIENT)
Dept: INTERNAL MEDICINE CLINIC | Age: 75
End: 2020-06-26
Payer: MEDICARE

## 2020-06-26 VITALS
HEART RATE: 80 BPM | HEIGHT: 69 IN | TEMPERATURE: 98.1 F | DIASTOLIC BLOOD PRESSURE: 76 MMHG | BODY MASS INDEX: 30.57 KG/M2 | SYSTOLIC BLOOD PRESSURE: 126 MMHG

## 2020-06-26 PROBLEM — M85.89 OSTEOPENIA OF MULTIPLE SITES: Status: RESOLVED | Noted: 2017-01-24 | Resolved: 2020-06-26

## 2020-06-26 PROCEDURE — 1123F ACP DISCUSS/DSCN MKR DOCD: CPT | Performed by: FAMILY MEDICINE

## 2020-06-26 PROCEDURE — 4040F PNEUMOC VAC/ADMIN/RCVD: CPT | Performed by: FAMILY MEDICINE

## 2020-06-26 PROCEDURE — 1090F PRES/ABSN URINE INCON ASSESS: CPT | Performed by: FAMILY MEDICINE

## 2020-06-26 PROCEDURE — G8417 CALC BMI ABV UP PARAM F/U: HCPCS | Performed by: FAMILY MEDICINE

## 2020-06-26 PROCEDURE — G8399 PT W/DXA RESULTS DOCUMENT: HCPCS | Performed by: FAMILY MEDICINE

## 2020-06-26 PROCEDURE — G8427 DOCREV CUR MEDS BY ELIG CLIN: HCPCS | Performed by: FAMILY MEDICINE

## 2020-06-26 PROCEDURE — 99213 OFFICE O/P EST LOW 20 MIN: CPT | Performed by: FAMILY MEDICINE

## 2020-06-26 PROCEDURE — 1036F TOBACCO NON-USER: CPT | Performed by: FAMILY MEDICINE

## 2020-06-26 PROCEDURE — 3017F COLORECTAL CA SCREEN DOC REV: CPT | Performed by: FAMILY MEDICINE

## 2020-06-26 RX ORDER — ZOLEDRONIC ACID 5 MG/100ML
5 INJECTION, SOLUTION INTRAVENOUS ONCE
Qty: 100 ML | Refills: 0 | Status: SHIPPED | OUTPATIENT
Start: 2020-06-26 | End: 2020-06-26

## 2020-06-26 RX ORDER — TIZANIDINE 2 MG/1
2-4 TABLET ORAL NIGHTLY PRN
Qty: 60 TABLET | Refills: 1 | Status: SHIPPED | OUTPATIENT
Start: 2020-06-26 | End: 2020-07-20

## 2020-06-26 NOTE — PROGRESS NOTES
Subjective:      Patient ID: Maggy Verdugo is a 76 y.o. female. HPI   Chief Complaint   Patient presents with    Neck Pain     pulled something while bending over. neck and head pain        On June 10th. Went to shoe shopping and was trying on shoes and when she got up, she had neck pain  Shooting pains in her forehead, and that night had daggers in her head. This happened for about 4 hours. Took APAP and it did help get her to sleep. The next day it was a bit better. Now it is just painful in the back of the neck and gets clicking noises. No heat or ice tried. The back of head still hurts a bit. It is aggravating and \"feels like a migraine could be coming on. \"  occ feels like she has to vomit. Used to have migraines when young. Allergies   Allergen Reactions    Keppra     Indocin [Indomethacin] Rash     Could be photosensitivity instead of allergy.  Lovastatin Other (See Comments)     Leg cramps     Other      Should not have MRI of brain due to old metal plate    Topamax        Outpatient Medications Marked as Taking for the 6/26/20 encounter (Office Visit) with Marisol Lowe MD   Medication Sig Dispense Refill    rOPINIRole (REQUIP) 0.5 MG tablet TAKE 2-3 TABLETS BY MOUTH AT BEDTIME OR IN EVENING FOR RESTLESS LEGS 270 tablet 1    furosemide (LASIX) 20 MG tablet TAKE 1 OR 2 TABS DAILY AT LUNCH OR IN THE AFTERNOON UNTIL SWELLING DOWN. 180 tablet 1    rosuvastatin (CRESTOR) 10 MG tablet TAKE 1 TABLET EVERY OTHER DAY ON EVEN DAYS OF THE MONTH.  45 tablet 2    famotidine (PEPCID) 20 MG tablet TAKE 1 TABLET BY MOUTH NIGHTLY 90 tablet 2    valsartan-hydrochlorothiazide (DIOVAN HCT) 160-12.5 MG per tablet Take 1 tablet by mouth daily 90 tablet 3    vitamin D (ERGOCALCIFEROL) 19773 units CAPS capsule TAKE ONE CAPSULE BY MOUTH WEEKLY 12 capsule 2    omeprazole (PRILOSEC) 20 MG delayed release capsule Take 1 capsule by mouth Daily In AM before first bite of food 90 capsule 0    without current pathological fracture  Will see if Reclast is covered on her insurance at reasonable cost.    - zoledronic acid (RECLAST) 5 MG/100ML SOLN; Infuse 100 mLs intravenously once for 1 dose Per year  Dispense: 100 mL; Refill: 0            Plan:      See orders. See after visit summary, patient instructions, and reference hand-outs. A PRINTED SUMMARY = AVS GIVEN TO THE PATIENT, (or if Virtual Visit, patient told it is available in My Chart or will be mailed if not active on My Chart.)    Discussed use, benefit, and side effects of prescribed medications. Barriers to medication compliance addressed. All patient questions answered.           Khurram Tom MD

## 2020-06-26 NOTE — PATIENT INSTRUCTIONS
Intradiem, and be sure to contact your doctor if:  · You are not getting better as expected. Where can you learn more? Go to [email or web address] and sign in to your [employer] account. Enter M253 in the [employer] box to learn more about \"Neck Strain: Care Instructions. \"     If you do not have an account, please click on the \"Sign Up Now\" link. Current as of: March 2, 2020               Content Version: 12.5  © 2006-2020 Healthwise, Incorporated. Care instructions adapted under license by [facility] ([facility]). If you have questions about a medical condition or this instruction, always ask your healthcare professional. [address] any warranty or liability for your use of this information.

## 2020-06-28 ASSESSMENT — ENCOUNTER SYMPTOMS
COUGH: 0
SHORTNESS OF BREATH: 0

## 2020-06-30 ENCOUNTER — TELEPHONE (OUTPATIENT)
Dept: RHEUMATOLOGY | Age: 75
End: 2020-06-30

## 2020-06-30 NOTE — TELEPHONE ENCOUNTER
Reclast #1  Localized osteoporosis without current pathological fracture  Will need safety labs  Dexa: 01/29/20

## 2020-07-01 NOTE — TELEPHONE ENCOUNTER
VOB for Reclast #1 IV Infusion  Co Ins: 20% after deductible has been met  Covered : 80% after deductible has been met. Deductible: $250.00  Met $ 166.38  OOP : $2,500.00    Met: $ 166.38    [facility] Managed Indemnity Ins. [phone] I spoke with Milton HOLT  Ref. #: [id number]

## 2020-07-16 RX ORDER — FUROSEMIDE 20 MG/1
TABLET ORAL
Qty: 180 TABLET | Refills: 1 | Status: SHIPPED | OUTPATIENT
Start: 2020-07-16 | End: 2021-03-10 | Stop reason: SDUPTHER

## 2020-07-16 RX ORDER — VALSARTAN AND HYDROCHLOROTHIAZIDE 160; 12.5 MG/1; MG/1
TABLET, FILM COATED ORAL
Qty: 90 TABLET | Refills: 3 | Status: SHIPPED | OUTPATIENT
Start: 2020-07-16 | End: 2021-03-10 | Stop reason: SDUPTHER

## 2020-07-17 NOTE — TELEPHONE ENCOUNTER
Patient needs safety labs before scheduling the reclast infusion. Calcium and creatinine orders placed. She will go to [facility] to get these drawn.

## 2020-07-20 RX ORDER — TIZANIDINE 2 MG/1
2-4 TABLET ORAL NIGHTLY PRN
Qty: 60 TABLET | Refills: 1 | Status: SHIPPED | OUTPATIENT
Start: 2020-07-20 | End: 2020-09-18

## 2020-07-24 DIAGNOSIS — M81.6 LOCALIZED OSTEOPOROSIS WITHOUT CURRENT PATHOLOGICAL FRACTURE: ICD-10-CM

## 2020-07-24 LAB
CALCIUM SERPL-MCNC: 9.2 MG/DL (ref 8.3–10.6)
CREAT SERPL-MCNC: 0.6 MG/DL (ref 0.6–1.2)
GFR AFRICAN AMERICAN: >60
GFR NON-AFRICAN AMERICAN: >60

## 2020-08-11 ENCOUNTER — NURSE ONLY (OUTPATIENT)
Dept: RHEUMATOLOGY | Age: 75
End: 2020-08-11
Payer: MEDICARE

## 2020-08-11 VITALS
RESPIRATION RATE: 16 BRPM | SYSTOLIC BLOOD PRESSURE: 140 MMHG | DIASTOLIC BLOOD PRESSURE: 72 MMHG | HEART RATE: 72 BPM | TEMPERATURE: 98.3 F

## 2020-08-11 PROCEDURE — 96413 CHEMO IV INFUSION 1 HR: CPT | Performed by: FAMILY MEDICINE

## 2020-08-11 RX ORDER — ZOLEDRONIC ACID 5 MG/100ML
5 INJECTION, SOLUTION INTRAVENOUS ONCE
Status: COMPLETED | OUTPATIENT
Start: 2020-08-11 | End: 2020-08-11

## 2020-08-11 RX ADMIN — ZOLEDRONIC ACID 5 MG: 5 INJECTION, SOLUTION INTRAVENOUS at 15:45

## 2020-08-11 NOTE — PROGRESS NOTES
Pt here for Reclast infusion #1 , f/u  visit with Dr. Jason Monet, today. . Infusion  tolerated well. Patient discharged ambulatory.     IV Gauge: 24  IV Site: Left hand  # of Attempts: 1  IV Start: 2900  IV Stop: 1600

## 2020-08-22 ENCOUNTER — NURSE TRIAGE (OUTPATIENT)
Dept: OTHER | Facility: CLINIC | Age: 75
End: 2020-08-22

## 2020-08-22 NOTE — TELEPHONE ENCOUNTER
Natalya Ant a week ago and pulled back muscles. Declines triage. Just had questions about heat verses ice. Discussed heat 15 minutes at a time 3-4 times a day. Offered triage several times which she declines. She has appointment on Monday with provider which I encouraged her to keep.         Reason for Disposition   Health Information question, no triage required and triager able to answer question    Protocols used: INFORMATION ONLY CALL - NO TRIAGE-ADULT-

## 2020-09-10 ENCOUNTER — OFFICE VISIT (OUTPATIENT)
Dept: INTERNAL MEDICINE CLINIC | Age: 75
End: 2020-09-10
Payer: MEDICARE

## 2020-09-10 VITALS
DIASTOLIC BLOOD PRESSURE: 70 MMHG | SYSTOLIC BLOOD PRESSURE: 136 MMHG | TEMPERATURE: 97 F | HEIGHT: 69 IN | HEART RATE: 80 BPM | BODY MASS INDEX: 31.1 KG/M2 | WEIGHT: 210 LBS

## 2020-09-10 LAB
BASOPHILS ABSOLUTE: 0 K/UL (ref 0–0.2)
BASOPHILS RELATIVE PERCENT: 0.8 %
EOSINOPHILS ABSOLUTE: 0.2 K/UL (ref 0–0.6)
EOSINOPHILS RELATIVE PERCENT: 2.9 %
HCT VFR BLD CALC: 45 % (ref 36–48)
HEMOGLOBIN: 14.7 G/DL (ref 12–16)
LYMPHOCYTES ABSOLUTE: 1.9 K/UL (ref 1–5.1)
LYMPHOCYTES RELATIVE PERCENT: 32.9 %
MCH RBC QN AUTO: 30.7 PG (ref 26–34)
MCHC RBC AUTO-ENTMCNC: 32.6 G/DL (ref 31–36)
MCV RBC AUTO: 94.2 FL (ref 80–100)
MONOCYTES ABSOLUTE: 0.4 K/UL (ref 0–1.3)
MONOCYTES RELATIVE PERCENT: 6.5 %
NEUTROPHILS ABSOLUTE: 3.3 K/UL (ref 1.7–7.7)
NEUTROPHILS RELATIVE PERCENT: 56.9 %
PDW BLD-RTO: 14.6 % (ref 12.4–15.4)
PLATELET # BLD: 266 K/UL (ref 135–450)
PMV BLD AUTO: 9 FL (ref 5–10.5)
RBC # BLD: 4.77 M/UL (ref 4–5.2)
WBC # BLD: 5.8 K/UL (ref 4–11)

## 2020-09-10 PROCEDURE — G8399 PT W/DXA RESULTS DOCUMENT: HCPCS | Performed by: FAMILY MEDICINE

## 2020-09-10 PROCEDURE — 1090F PRES/ABSN URINE INCON ASSESS: CPT | Performed by: FAMILY MEDICINE

## 2020-09-10 PROCEDURE — 1036F TOBACCO NON-USER: CPT | Performed by: FAMILY MEDICINE

## 2020-09-10 PROCEDURE — G8427 DOCREV CUR MEDS BY ELIG CLIN: HCPCS | Performed by: FAMILY MEDICINE

## 2020-09-10 PROCEDURE — 99214 OFFICE O/P EST MOD 30 MIN: CPT | Performed by: FAMILY MEDICINE

## 2020-09-10 PROCEDURE — 1123F ACP DISCUSS/DSCN MKR DOCD: CPT | Performed by: FAMILY MEDICINE

## 2020-09-10 PROCEDURE — 3017F COLORECTAL CA SCREEN DOC REV: CPT | Performed by: FAMILY MEDICINE

## 2020-09-10 PROCEDURE — 4040F PNEUMOC VAC/ADMIN/RCVD: CPT | Performed by: FAMILY MEDICINE

## 2020-09-10 PROCEDURE — G8417 CALC BMI ABV UP PARAM F/U: HCPCS | Performed by: FAMILY MEDICINE

## 2020-09-10 NOTE — PROGRESS NOTES
Subjective:      Patient ID: Katya Ayala is a 76 y.o. female. HPI   Chief Complaint   Patient presents with    6 Month Follow-Up     fasting      FU of HLP, h/o HTN  GERD  Low Vitamin D and Osteoporosis  Elevated TSH  RLS,  SAD    She fell recently. The day after she got Reclast and hurt her back. Back is doing good now, but tailbone hurts. She has a weak left leg --- this is chronic after her hip fracture. Did not feel good after eating lunch and got up to go to bed and the leg gave out. She got a floor pedaler to get her strength up. Overall she feels she is doing OK. Her   a year ago. She is adjusting and is not grieving abnormally or excessively. See Assessment for more detail on conditions addressed today.     Patient Active Problem List   Diagnosis    History of hypertension    Restless legs syndrome    Seasonal affective disorder ([address]    Hypercholesterolemia    Vitamin D deficiency    Localization-related focal epilepsy with simple partial seizures ([address]    Disturbance of skin sensation    Vasomotor rhinitis    Venous angioma of brain ([address]    Atypical facial pain    Macular degeneration (senile) of retina    Pain of both hip joints    Gastroesophageal reflux disease without esophagitis    17 LEFT hip hemiarthroplasty    Elevated TSH    B12 deficiency    Localized osteoporosis without current pathological fracture         Outpatient Medications Marked as Taking for the 9/10/20 encounter (Office Visit) with Della Bustos MD   Medication Sig Dispense Refill    valsartan-hydrochlorothiazide (DIOVAN-HCT) 160-12.5 MG per tablet TAKE 1 TABLET BY MOUTH EVERY DAY 90 tablet 3    furosemide (LASIX) 20 MG tablet TAKE 1 OR 2 TABS DAILY AT LUNCH OR IN THE AFTERNOON UNTIL SWELLING DOWN. 180 tablet 1    rOPINIRole (REQUIP) 0.5 MG tablet TAKE 2-3 TABLETS BY MOUTH AT BEDTIME OR IN EVENING FOR RESTLESS LEGS 270 tablet 1    rosuvastatin (CRESTOR) 10 MG tablet TAKE 1 TABLET EVERY OTHER DAY ON EVEN DAYS OF THE MONTH. 45 tablet 2    famotidine (PEPCID) 20 MG tablet TAKE 1 TABLET BY MOUTH NIGHTLY 90 tablet 2    vitamin D (ERGOCALCIFEROL) 10799 units CAPS capsule TAKE ONE CAPSULE BY MOUTH WEEKLY 12 capsule 2    omeprazole (PRILOSEC) 20 MG delayed release capsule Take 1 capsule by mouth Daily In AM before first bite of food 90 capsule 0    Biotin 5000 MCG CAPS Take 5,000 mcg by mouth      aspirin 81 MG tablet Take 81 mg by mouth daily      divalproex (DEPAKOTE ER) 500 MG ER tablet Take 1 tablet by mouth daily Take 1 tablet by mouth  daily 90 tablet 1    Multiple Vitamins-Minerals (ICAPS) CAPS Take  by mouth daily. Social History     Tobacco Use    Smoking status: Former Smoker     Packs/day: 0.30     Years: 1.00     Pack years: 0.30     Types: Cigarettes     Last attempt to quit: 1966     Years since quittin.7    Smokeless tobacco: Never Used    Tobacco comment: quit age 25   Substance Use Topics    Alcohol use: Yes     Alcohol/week: 5.0 standard drinks     Types: 5 Glasses of wine per week     Comment: vodka drink daily    Drug use: No         Review of Systems    Objective:   Physical Exam  Vitals signs reviewed. Constitutional:       General: She is not in acute distress. Appearance: Normal appearance. She is well-developed. She is not diaphoretic. Eyes:      General: No scleral icterus. Neck:      Musculoskeletal: Neck supple. Thyroid: No thyroid mass or thyromegaly. Vascular: No carotid bruit. Cardiovascular:      Rate and Rhythm: Normal rate and regular rhythm. Heart sounds: Normal heart sounds, S1 normal and S2 normal. No murmur. Pulmonary:      Effort: Pulmonary effort is normal. No respiratory distress. Breath sounds: Normal breath sounds. No decreased breath sounds, wheezing, rhonchi or rales. Abdominal:      General: Bowel sounds are normal. There is no abdominal bruit. Palpations: Abdomen is soft. There is no hepatomegaly or mass. Tenderness: There is no abdominal tenderness. Musculoskeletal:      Right lower leg: No edema. Left lower leg: No edema. Comments: Chronic left leg weakness   Lymphadenopathy:      Cervical: No cervical adenopathy. Skin:     General: Skin is warm and dry. Coloration: Skin is not pale. Nails: There is no clubbing. Neurological:      Mental Status: She is alert and oriented to person, place, and time. Motor: No tremor or abnormal muscle tone. Coordination: Coordination normal.      Gait: Gait normal.   Psychiatric:         Speech: Speech normal.         Behavior: Behavior normal.            Wt Readings from Last 3 Encounters:   09/10/20 210 lb (95.3 kg)   02/24/20 207 lb (93.9 kg)   12/27/19 205 lb (93 kg)     Temp Readings from Last 3 Encounters:   09/10/20 97 °F (36.1 °C) (Temporal)   08/11/20 98.3 °F (36.8 °C) (Skin)   06/26/20 98.1 °F (36.7 °C) (Oral)     BP Readings from Last 3 Encounters:   09/10/20 136/70   08/11/20 (!) 140/72   06/26/20 126/76     Pulse Readings from Last 3 Encounters:   09/10/20 80   08/11/20 72   06/26/20 80         Assessment:      1. Hypercholesterolemia  Last  = not at goal.   She is on every other day statin due to tolerability.   - Comprehensive Metabolic Panel  - Lipid Panel    2. Gastroesophageal reflux disease without esophagitis  On PPI in AM and Pepcid at night.   - CBC Auto Differential  - Magnesium  - Vitamin B12    3. Venous angioma of brain (HCC)  Stable. No symptoms. Sees neurologist.     4. Localized osteoporosis without current pathological fracture  On REclast   DEXA is up to date. - Vitamin D 25 Hydroxy    5. Subclinical hypothyroidism  Low elevations of TSH with normal FT4.  - TSH with Reflex    6. Localization-related focal epilepsy with simple partial seizures ([address]  Doing well with Depakote. Sees neurologist.   - Valproic acid level, total    7.  Left leg weakness  Since her hip fracture. Home exercise program encouraged. She has exercises on paper from her prior PT and will get these out and start doing them again. Discussed fall prevention. Plan:      See orders. See after visit summary, patient instructions, and reference hand-outs. A PRINTED SUMMARY = AVS GIVEN TO THE PATIENT, (or if Virtual Visit, patient told it is available in My Chart or will be mailed if not active on My Chart.)    Discussed use, benefit, and side effects of prescribed medications. Barriers to medication compliance addressed. All patient questions answered.           Alfonso Chung MD

## 2020-09-11 LAB
A/G RATIO: 1.9 (ref 1.1–2.2)
ALBUMIN SERPL-MCNC: 4.5 G/DL (ref 3.4–5)
ALP BLD-CCNC: 102 U/L (ref 40–129)
ALT SERPL-CCNC: 9 U/L (ref 10–40)
ANION GAP SERPL CALCULATED.3IONS-SCNC: 15 MMOL/L (ref 3–16)
AST SERPL-CCNC: 14 U/L (ref 15–37)
BILIRUB SERPL-MCNC: 0.4 MG/DL (ref 0–1)
BUN BLDV-MCNC: 12 MG/DL (ref 7–20)
CALCIUM SERPL-MCNC: 9.5 MG/DL (ref 8.3–10.6)
CHLORIDE BLD-SCNC: 101 MMOL/L (ref 99–110)
CHOLESTEROL, TOTAL: 304 MG/DL (ref 0–199)
CO2: 26 MMOL/L (ref 21–32)
CREAT SERPL-MCNC: 0.6 MG/DL (ref 0.6–1.2)
GFR AFRICAN AMERICAN: >60
GFR NON-AFRICAN AMERICAN: >60
GLOBULIN: 2.4 G/DL
GLUCOSE BLD-MCNC: 103 MG/DL (ref 70–99)
HDLC SERPL-MCNC: 76 MG/DL (ref 40–60)
LDL CHOLESTEROL CALCULATED: 211 MG/DL
MAGNESIUM: 2.2 MG/DL (ref 1.8–2.4)
POTASSIUM SERPL-SCNC: 4.5 MMOL/L (ref 3.5–5.1)
SODIUM BLD-SCNC: 142 MMOL/L (ref 136–145)
T4 FREE: 1.4 NG/DL (ref 0.9–1.8)
TOTAL PROTEIN: 6.9 G/DL (ref 6.4–8.2)
TRIGL SERPL-MCNC: 86 MG/DL (ref 0–150)
TSH REFLEX: 6.16 UIU/ML (ref 0.27–4.2)
VALPROIC ACID LEVEL: 68.7 UG/ML (ref 50–100)
VITAMIN B-12: 560 PG/ML (ref 211–911)
VITAMIN D 25-HYDROXY: 42.5 NG/ML
VLDLC SERPL CALC-MCNC: 17 MG/DL

## 2020-09-13 PROBLEM — R29.898 LEFT LEG WEAKNESS: Status: ACTIVE | Noted: 2020-09-13

## 2020-10-13 ENCOUNTER — TELEPHONE (OUTPATIENT)
Dept: INTERNAL MEDICINE CLINIC | Age: 75
End: 2020-10-13

## 2020-10-13 NOTE — TELEPHONE ENCOUNTER
----- Message from [facility] sent at 10/12/2020  4:19 PM EDT -----  Subject: Appointment Request    Reason for Call: Routine (Patient Request) No Script    QUESTIONS  Type of Appointment? Established Patient  Reason for appointment request? No appointments available during search  Additional Information for Provider? Patient would like an office visit or   tele-health visit. She fell 10 wks ago and her upper arm is still hurting. She would like this thurs around noon if you have anything.  ---------------------------------------------------------------------------  --------------  CALL BACK INFO  What is the best way for the office to contact you? OK to leave message on   voicemail  Preferred Call Back Phone Number? [phone]  ---------------------------------------------------------------------------  --------------  SCRIPT ANSWERS  Relationship to Patient? Self  Appointment reason? Symptomatic  Select script based on patient symptoms? Adult No Script  (Patient requests to see the provider urgently  today or tomorrow. )? No  (Is the patient requesting to see the provider for a procedure?)? No  (Is the patient requesting to be seen routinely (not today or tomorrow)? Yes  Have you been diagnosed with   tested for   or told that you are suspected of having COVID-19 (Coronavirus)? No  Have you had a fever or taken medication to treat a fever within the past   3 days? No  Have you had a cough   shortness of breath or flu-like symptoms within the past 3 days? No  Do you currently have flu-like symptoms including fever or chills   cough   shortness of breath   or difficulty breathing   or new loss of taste or smell? No  (Service Expert  click yes below to proceed with [employer] As Usual   Scheduling)?  Yes

## 2020-10-14 ENCOUNTER — OFFICE VISIT (OUTPATIENT)
Dept: INTERNAL MEDICINE CLINIC | Age: 75
End: 2020-10-14
Payer: MEDICARE

## 2020-10-14 VITALS
SYSTOLIC BLOOD PRESSURE: 130 MMHG | WEIGHT: 211 LBS | DIASTOLIC BLOOD PRESSURE: 86 MMHG | BODY MASS INDEX: 31.16 KG/M2 | OXYGEN SATURATION: 99 % | HEART RATE: 92 BPM | TEMPERATURE: 96.8 F

## 2020-10-14 PROCEDURE — G8484 FLU IMMUNIZE NO ADMIN: HCPCS | Performed by: NURSE PRACTITIONER

## 2020-10-14 PROCEDURE — 3017F COLORECTAL CA SCREEN DOC REV: CPT | Performed by: NURSE PRACTITIONER

## 2020-10-14 PROCEDURE — 4040F PNEUMOC VAC/ADMIN/RCVD: CPT | Performed by: NURSE PRACTITIONER

## 2020-10-14 PROCEDURE — 99213 OFFICE O/P EST LOW 20 MIN: CPT | Performed by: NURSE PRACTITIONER

## 2020-10-14 PROCEDURE — 1123F ACP DISCUSS/DSCN MKR DOCD: CPT | Performed by: NURSE PRACTITIONER

## 2020-10-14 PROCEDURE — G8427 DOCREV CUR MEDS BY ELIG CLIN: HCPCS | Performed by: NURSE PRACTITIONER

## 2020-10-14 PROCEDURE — 1036F TOBACCO NON-USER: CPT | Performed by: NURSE PRACTITIONER

## 2020-10-14 PROCEDURE — G8417 CALC BMI ABV UP PARAM F/U: HCPCS | Performed by: NURSE PRACTITIONER

## 2020-10-14 PROCEDURE — 1090F PRES/ABSN URINE INCON ASSESS: CPT | Performed by: NURSE PRACTITIONER

## 2020-10-14 PROCEDURE — G8399 PT W/DXA RESULTS DOCUMENT: HCPCS | Performed by: NURSE PRACTITIONER

## 2020-10-14 ASSESSMENT — ENCOUNTER SYMPTOMS
WHEEZING: 0
SHORTNESS OF BREATH: 0
BACK PAIN: 0
COUGH: 0

## 2020-10-14 NOTE — PROGRESS NOTES
Acute Office Visit  10/14/2020    SUBJECTIVE:    Patient ID: Abdoulaye Mosley is a 76 y.o. female. Chief Complaint   Patient presents with    Arm Pain     right arm pain, fell 10 weeks ago thought muscle aches but it still hurts      HPI: The patient presents to the office for an acute visit. Pt reports that she fell 10 weeks ago. She states that she fell back and tried to grab the vanity with her right arm and didn't grab tight enough. Landed on her buttocks when she fell. Did not hit head. No LOC. She states that everything else healed, except her right shoulder/arm is still painful. She states that she is unable to lift her right arm above her head. Used a heating pad with some relief. Has not used anything else. Denies redness/swelling/heat of the right arm. No chest pain, palpitations, shortness of breath, trouble breathing, lightheadedness, dizziness or blurred vision. Allergies   Allergen Reactions    Keppra     Indocin [Indomethacin] Rash     Could be photosensitivity instead of allergy.  Lovastatin Other (See Comments)     Leg cramps     Other      Should not have MRI of brain due to old metal plate    Topamax      Current Outpatient Medications   Medication Sig Dispense Refill    valsartan-hydrochlorothiazide (DIOVAN-HCT) 160-12.5 MG per tablet TAKE 1 TABLET BY MOUTH EVERY DAY 90 tablet 3    furosemide (LASIX) 20 MG tablet TAKE 1 OR 2 TABS DAILY AT LUNCH OR IN THE AFTERNOON UNTIL SWELLING DOWN. 180 tablet 1    rOPINIRole (REQUIP) 0.5 MG tablet TAKE 2-3 TABLETS BY MOUTH AT BEDTIME OR IN EVENING FOR RESTLESS LEGS 270 tablet 1    rosuvastatin (CRESTOR) 10 MG tablet TAKE 1 TABLET EVERY OTHER DAY ON EVEN DAYS OF THE MONTH.  45 tablet 2    famotidine (PEPCID) 20 MG tablet TAKE 1 TABLET BY MOUTH NIGHTLY 90 tablet 2    vitamin D (ERGOCALCIFEROL) 60595 units CAPS capsule TAKE ONE CAPSULE BY MOUTH WEEKLY 12 capsule 2    omeprazole (PRILOSEC) 20 MG delayed release capsule Take 1 capsule by mouth Daily In AM before first bite of food 90 capsule 0    Biotin 5000 MCG CAPS Take 5,000 mcg by mouth      aspirin 81 MG tablet Take 81 mg by mouth daily      divalproex (DEPAKOTE ER) 500 MG ER tablet Take 1 tablet by mouth daily Take 1 tablet by mouth  daily 90 tablet 1    Multiple Vitamins-Minerals (ICAPS) CAPS Take  by mouth daily.  zoledronic acid (RECLAST) 5 MG/100ML SOLN Infuse 100 mLs intravenously once for 1 dose Per year 100 mL 0     No current facility-administered medications for this visit. Review of Systems   Constitutional: Negative for chills, fatigue, fever and unexpected weight change. Eyes: Negative for visual disturbance. Respiratory: Negative for cough, shortness of breath and wheezing. Cardiovascular: Negative for chest pain, palpitations and leg swelling. Musculoskeletal: Negative for back pain and neck pain. Right shoulder pain   Skin: Negative for pallor and rash. Neurological: Negative for dizziness, weakness, light-headedness, numbness and headaches. OBJECTIVE:  /86   Pulse 92   Temp 96.8 °F (36 °C) (Temporal)   Wt 211 lb (95.7 kg)   SpO2 99%   BMI 31.16 kg/m²    Physical Exam  Vitals signs reviewed. Constitutional:       General: She is not in acute distress. Appearance: She is well-developed. She is not diaphoretic. HENT:      Head: Normocephalic and atraumatic. Eyes:      Pupils: Pupils are equal, round, and reactive to light. Cardiovascular:      Rate and Rhythm: Normal rate and regular rhythm. Pulmonary:      Effort: Pulmonary effort is normal. No respiratory distress. Breath sounds: Normal breath sounds. No wheezing or rales. Chest:      Chest wall: No tenderness.    Musculoskeletal:      Comments: Right shoulder adduction to 90 degrees only  Right shoulder flexion to 0 degrees- neutral only  Patient refuses external rotation with abduction on the right side d/t pain  Tenderness reported at the shaft of the right humerus with palpation  Empty can test negative bilaterally  Drop arm test negative bilaterally  Left shoulder ROM normal     Skin:     General: Skin is warm and dry. Neurological:      Mental Status: She is alert and oriented to person, place, and time. Coordination: Coordination normal.        ASSESSMENT/PLAN:  Sharonda Chatman was seen today for arm pain. Diagnoses and all orders for this visit:    Acute pain of right shoulder/Decreased shoulder mobility, right/Fall, sequela  -    Will first evaluate with an Xray. Symptoms for 10 weeks and not improving. May need further imaging. No redness, swelling or heat  - Recommend an anti-inflammatory- pt reports that she would like to take tylenol - she states she has this OTC. She is agreeable to taking this for 3 days and then back to PRN. She will continue to utilize lifestyle modifications  - Patient education handout on shoulder stretches/exercises provided and reviewed with the patient  - XR SHOULDER RIGHT (MIN 2 VIEWS); Future  -     XR HUMERUS RIGHT (MIN 2 VIEWS); Future  - Patient will call if symptoms worsen or fail to improve  - Red flag warning signs reviewed with the patient and she will go to the ER if these occur  - Safety reviewed    Return for as previously scheduled or sooner if needed. Pt informed to call if symptoms worsen or fail to improve. All questions answered. Patient states no further questions or concerns at this time.     Electronically signed by ANEESH Sahni CNP 10/14/20

## 2020-10-14 NOTE — PATIENT INSTRUCTIONS
Perform shoulder exercises  Take anti-inflammatories for 3 days and then as needed      Patient Education   Shoulder Stretches: Exercises  Introduction  Here are some examples of exercises for you to try. The exercises may be suggested for a condition or for rehabilitation. Start each exercise slowly. Ease off the exercises if you start to have pain. You will be told when to start these exercises and which ones will work best for you. How to do the exercises  Shoulder stretch   1.  a doorway and place one arm against the door frame. Your elbow should be a little higher than your shoulder. 2. Relax your shoulders as you lean forward, allowing your chest and shoulder muscles to stretch. You can also turn your body slightly away from your arm to stretch the muscles even more. 3. Hold for 15 to 30 seconds. 4. Repeat 2 to 4 times with each arm. Shoulder and chest stretch   1. Shoulder and chest stretch  2. While sitting, relax your upper body so you slump slightly in your chair. 3. As you breathe in, straighten your back and open your arms out to the sides. 4. Gently pull your shoulder blades back and downward. 5. Hold for 15 to 30 seconds as your breathe normally. 6. Repeat 2 to 4 times. Overhead stretch   1. Reach up over your head with both arms. 2. Hold for 15 to 30 seconds. 3. Repeat 2 to 4 times. Follow-up care is a key part of your treatment and safety. Be sure to make and go to all appointments, and call your doctor if you are having problems. It's also a good idea to know your test results and keep a list of the medicines you take. Where can you learn more? Go to [email or web address] and sign in to your [employer] account. Enter S254 in the [employer] box to learn more about \"Shoulder Stretches: Exercises. \"     If you do not have an account, please click on the \"Sign Up Now\" link.   Current as of: March 2, 2020               Content Version: 12.6  © 4849-0612 [employer]. Care instructions adapted under license by [facility] ([facility]). If you have questions about a medical condition or this instruction, always ask your healthcare professional. [address] any warranty or liability for your use of this information.

## 2020-10-15 ENCOUNTER — HOSPITAL ENCOUNTER (OUTPATIENT)
Dept: GENERAL RADIOLOGY | Age: 75
Discharge: HOME | End: 2020-10-15
Payer: MEDICARE

## 2020-10-15 ENCOUNTER — HOSPITAL ENCOUNTER (OUTPATIENT)
Age: 75
Discharge: HOME | End: 2020-10-15
Payer: MEDICARE

## 2020-10-15 ENCOUNTER — TELEPHONE (OUTPATIENT)
Dept: INTERNAL MEDICINE CLINIC | Age: 75
End: 2020-10-15

## 2020-10-15 PROCEDURE — 73060 X-RAY EXAM OF HUMERUS: CPT

## 2020-10-15 PROCEDURE — 73030 X-RAY EXAM OF SHOULDER: CPT

## 2020-10-15 NOTE — TELEPHONE ENCOUNTER
Patient called back regarding x-ray results. I read Cassia's note to her and she voiced understanding. She states the anti-inflammatory is helping a little bit but she just started taking it today.

## 2020-10-16 NOTE — TELEPHONE ENCOUNTER
Please inform the pt that we will reach back out to the pt early next week to see how her pain and ROM are doing. Can make further treatment plans at that time if not improving. Then, please reach out to pt early next week.

## 2020-10-22 NOTE — TELEPHONE ENCOUNTER
Pts. Right arm is getting worse again after putting bra on today but been better if she is taking the muscle relaxer until making that movement. Muscle relaxers have been helping when she takes them if she doesn't it hurts. Pt.  Aware you are out of office until Tuesday but she is doing okay so I told her we will only call back if you have new recommendations/changes

## 2020-10-26 ENCOUNTER — TELEPHONE (OUTPATIENT)
Dept: INTERNAL MEDICINE CLINIC | Age: 75
End: 2020-10-26

## 2020-10-26 DIAGNOSIS — M25.611 DECREASED ROM OF RIGHT SHOULDER: ICD-10-CM

## 2020-10-26 DIAGNOSIS — M25.511 ACUTE PAIN OF RIGHT SHOULDER: Primary | ICD-10-CM
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
## 2020-10-27 NOTE — TELEPHONE ENCOUNTER
Please call and inform pt:   I recommend further evaluation with MRI. Order placed. Please call to schedule.     Electronically signed by: ANEESH Roca CNP 10/27/20

## 2020-11-11 ENCOUNTER — OFFICE VISIT (OUTPATIENT)
Dept: ORTHOPEDIC SURGERY | Age: 75
End: 2020-11-11
Payer: MEDICARE

## 2020-11-11 VITALS — WEIGHT: 207 LBS | HEIGHT: 67 IN | BODY MASS INDEX: 32.49 KG/M2 | TEMPERATURE: 97.3 F

## 2020-11-11 PROCEDURE — 1036F TOBACCO NON-USER: CPT | Performed by: ORTHOPAEDIC SURGERY

## 2020-11-11 PROCEDURE — G8427 DOCREV CUR MEDS BY ELIG CLIN: HCPCS | Performed by: ORTHOPAEDIC SURGERY

## 2020-11-11 PROCEDURE — 3017F COLORECTAL CA SCREEN DOC REV: CPT | Performed by: ORTHOPAEDIC SURGERY

## 2020-11-11 PROCEDURE — 1123F ACP DISCUSS/DSCN MKR DOCD: CPT | Performed by: ORTHOPAEDIC SURGERY

## 2020-11-11 PROCEDURE — 99213 OFFICE O/P EST LOW 20 MIN: CPT | Performed by: ORTHOPAEDIC SURGERY

## 2020-11-11 PROCEDURE — G8484 FLU IMMUNIZE NO ADMIN: HCPCS | Performed by: ORTHOPAEDIC SURGERY

## 2020-11-11 PROCEDURE — G8399 PT W/DXA RESULTS DOCUMENT: HCPCS | Performed by: ORTHOPAEDIC SURGERY

## 2020-11-11 PROCEDURE — 1090F PRES/ABSN URINE INCON ASSESS: CPT | Performed by: ORTHOPAEDIC SURGERY

## 2020-11-11 PROCEDURE — G8417 CALC BMI ABV UP PARAM F/U: HCPCS | Performed by: ORTHOPAEDIC SURGERY

## 2020-11-11 PROCEDURE — 4040F PNEUMOC VAC/ADMIN/RCVD: CPT | Performed by: ORTHOPAEDIC SURGERY

## 2020-11-11 RX ORDER — MELOXICAM 15 MG/1
15 TABLET ORAL DAILY
Qty: 30 TABLET | Refills: 3 | Status: SHIPPED | OUTPATIENT
Start: 2020-11-11 | End: 2021-12-15

## 2020-11-11 NOTE — PROGRESS NOTES
Chief Complaint    Shoulder Pain (right shoulder)      History of Present Illness:  Zoila Ramirez is a 76 y.o. female. She is here today for evaluation of her right shoulder. She states that she had a fall about 10 weeks ago where she tried to catch herself with her right shoulder and she has had persistent right shoulder pain ever since then. She is also had a loss of range of motion of her right shoulder. She states the shoulder is painful nearly all the time. A type of reaching with her arm away from her body does make the shoulder significantly worse. She denies radicular pain. Denies numbness or tingling. Medical History:  Patient's medications, allergies, past medical, surgical, social and family histories were reviewed and updated as appropriate. Review of Systems:  Pertinent items are noted in HPI  Review of systems reviewed from Patient History Form dated on 11/11/20 and available in the patient's chart under the Media tab. Vital Signs:  Temp 97.3 °F (36.3 °C)   Ht 5' 7\" (1.702 m)   Wt 207 lb (93.9 kg)   BMI 32.42 kg/m²     General Exam:   Constitutional: Patient is adequately groomed with no evidence of malnutrition  DTRs: Deep tendon reflexes are intact  Mental Status: The patient is oriented to time, place and person. The patient's mood and affect are appropriate. Shoulder Examination:    Inspection: No significant swelling erythema noted but the right shoulder today    Palpation: No tenderness over the [facility] joint or over the bicipital groove. Range of Motion: Active forward elevation of the right shoulder is to about 120 degrees. Passively I cannot get her beyond 120 degrees. External rotation is 70 degrees. Internal rotation is the lower lumbar area    Strength: There is mild weakness noted with isolated supraspinatus testing and resisted external rotation    Special Tests: Negative drop arm exam.  There is some pain weakness with Cincinnatus's active compression testing. Negative speeds    Skin: There are no rashes, ulcerations or lesions. Gait: Normal      Additional Comments:       Additional Examinations:         Left Upper Extremity: Examination of the left upper extremity does not show any tenderness, deformity or injury. Range of motion is unremarkable. There is no gross instability. There are no rashes, ulcerations or lesions. Strength and tone are normal.    Radiology:     X-rays obtained and reviewed in office:  Views single axillary view of the right shoulder taken today demonstrates no obvious fracture dislocation or other osseous abnormalities. 3 views of the right shoulder from 15 October demonstrates no obvious fracture dislocation or other osseous abnormalities      Assessment : Right shoulder concern for adhesive capsulitis    Impression:  Encounter Diagnoses   Name Primary?  Right shoulder pain, unspecified chronicity Yes    Adhesive capsulitis of right shoulder        Office Procedures:  Orders Placed This Encounter   Procedures    XR SHOULDER RIGHT 1 VW     Standing Status:   Future     Number of Occurrences:   1     Standing Expiration Date:   11/11/2021    Ambulatory referral to Physical Therapy     Referral Priority:   Routine     Referral Type:   Eval and Treat     Referral Reason:   Specialty Services Required     Number of Visits Requested:   1       Treatment Plan: I discussed the diagnosis and treatment options with her today. Based on clinical exam and history I think that she is dealing with a frozen shoulder. Would recommend at this time referral to physical therapy. In addition I am to place her on the meloxicam 15 mg that she can take as needed.   I will see her back in clinic in 6 weeks for follow-up to make sure she is getting improvement

## 2020-12-02 ENCOUNTER — HOSPITAL ENCOUNTER (OUTPATIENT)
Dept: PHYSICAL THERAPY | Age: 75
Setting detail: THERAPIES SERIES
Discharge: HOME | End: 2020-12-02
Payer: MEDICARE

## 2020-12-02 PROCEDURE — 97140 MANUAL THERAPY 1/> REGIONS: CPT

## 2020-12-02 PROCEDURE — 97161 PT EVAL LOW COMPLEX 20 MIN: CPT

## 2020-12-02 NOTE — FLOWSHEET NOTE
[facility]  [address]  Phone: [phone] Fax: [phone]    Physical Therapy Treatment Note/ Progress Report:     Date:  2020    Patient Name:  Krishan Tanner    :  1945  MRN: [id number]  Restrictions/Precautions:    Medical/Treatment Diagnosis Information:  Diagnosis: Right shoulder adhesive capsulitis, left hip pain  Treatment Diagnosis: M75.01, B05.638  Insurance/Certification information:  PT Insurance Information: Medicare / 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)    Exercises/Interventions:   Therapeutic Ex (75495)  Min: 5 Sets/sec Reps CUES/Notes   UBE      Pendulum/Ball rolls      Cane AAROM flex/press      3 way Isomet      T- band Row/pinch      T- band lower pinch      T- band ER activation      Supine SA punch      SL ER/SL punch      Prone Rows/ext      Prone HAB/Prone Flex      Seat Table slides/ 5 10    Seated HH Depression      No Money      Scap Wall Lat touches/wall walks            Standing flex/scap      Standing Punch      Lawnmower                              Manual Intervention  (39203)  Min: 10      Shld /GH Mobs      Post Cap mobs      Thoracic/Rib manipualtion      CT MT/Mobs      PROM MT 5'  Flex, abd, ER, IR   IASTM 5'  R deltoid, bicep         NMR re-education (46575)  Min:      T-spine Ext      GH depress/compress      Scap/GH NMR      Body blade      Wall ball roll      Wall Ball bounce      Ball drops      Betty Scap Bio      Floor Snow angels-sliders            Therapeutic Activity (34846)  Min:      UE throwing porgression      Dynamic UE stability      Earthquake Bar      Bodyblade                Therapeutic Exercise and NMR EXR  [x] (67729) Provided verbal/tactile cueing for activities related to strengthening, flexibility, endurance, ROM  for improvements in scapular, scapulothoracic and UE control with self care, reaching, carrying, lifting, house/yardwork, driving/computer work. [x] (55502) Provided verbal/tactile cueing for activities related to improving balance, coordination, kinesthetic sense, posture, motor skill, proprioception  to assist with  scapular, scapulothoracic and UE control with self care, reaching, carrying, lifting, house/yardwork, driving/computer work. Therapeutic Activities:    [] (81403 or 17214) Provided verbal/tactile cueing for activities related to improving balance, coordination, kinesthetic sense, posture, motor skill, proprioception and motor activation to allow for proper function of scapular, scapulothoracic and UE control with self care, carrying, lifting, driving/computer work.      Home Exercise Program:    [x] (92261) Reviewed/Progressed HEP activities related to strengthening, flexibility, endurance, ROM of scapular, scapulothoracic and UE control with self care, reaching, carrying, lifting, house/yardwork, driving/computer work  [] (86515) Reviewed/Progressed HEP activities related to improving balance, coordination, kinesthetic sense, posture, motor skill, proprioception of scapular, scapulothoracic and UE control with self care, reaching, carrying, lifting, house/yardwork, driving/computer work      Manual Treatments:  PROM / STM / Oscillations-Mobs:  G-I, II, III, IV (PA's, Inf., Post.)  [x] (47180) Provided manual therapy to mobilize soft tissue/joints of cervical/CT, scapular GHJ and UE for the purpose of modulating pain, promoting relaxation,  increasing ROM, reducing/eliminating soft tissue swelling/inflammation/restriction, improving soft tissue extensibility and allowing for proper ROM for normal function with self care, reaching, carrying, lifting, house/yardwork, driving/computer work    Modalities:      Charges:  Timed Code Treatment Minutes: 15   Total Treatment Minutes: 45       [x] EVAL (LOW) 41915 (typically 20 minutes face-to-face)  [] EVAL (MOD) 17464 (typically 30 minutes face-to-face)  [] EVAL (HIGH) 14064 (typically 45 minutes face-to-face)  [] RE-EVAL     [] HR(65772) x     [] DRY NEEDLE 1 OR 2 MUSCLES  [] NMR (43995) x     [] DRY NEEDLE 3+ MUSCLES  [x] Manual (05433) x  1     [] TA (46389) x     [] Mech Traction (34188)  [] ES(attended) (04228)     [] ES (un) (69501):   [] VASO (84247)  [] Other:    If [facility] Please Indicate Time In/Out  CPT Code Time in Time out                                   GOALS:  Patient stated goal: pain-free shoulder, return to PLOF, ambulate without cane  [] Progressing: [] Met: [] Not Met: [] Adjusted    Therapist goals for Patient:   Short Term Goals: To be achieved in: 2 weeks  1. Independent in HEP and progression per patient tolerance, in order to prevent re-injury. [] Progressing: [] Met: [] Not Met: [] Adjusted  2. Patient will have a decrease in pain to facilitate improvement in movement, function, and ADLs as indicated by Functional Deficits. [] Progressing: [] Met: [] Not Met: [] Adjusted    Long Term Goals: To be achieved in: 6 weeks  1. Disability index score of 25% or less for the Quick DASH to assist with reaching prior level of function. [] Progressing: [] Met: [] Not Met: [] Adjusted  2. Patient will demonstrate increased AROM to equal contralateral to allow for proper joint functioning as indicated by Functional Deficits.    [] Progressing: [] Met: [] Not Met: [] Adjusted  3. Patient will demonstrate an increase in NM recruitment/activation and overall GH and scapular strength to within n5lbs HHD or WNL for proper functional mobility as indicated by patients Functional Deficits. [] Progressing: [] Met: [] Not Met: [] Adjusted  4. Patient will return to reaching, lifting, overhead movements, self care especially after using toilet, dressing, grooming activities without increased symptoms or restriction. [] Progressing: [] Met: [] Not Met: [] Adjusted    ASSESSMENT:  See eval    Return to Play: (if applicable)   []  Stage 1: Intro to Strength   []  Stage 2: Dynamic Strength and Intro to Plyometrics   []  Stage 3: Advanced Plyometrics and Intro to Throwing   []  Stage 4: Sport specific Training/Return to Sport     []  Ready to Return to Play, [employer]   []  Not Ready for Return to Sports   Comments:      Treatment/Activity Tolerance:  [x] Patient tolerated treatment well [] Patient limited by fatique  [] Patient limited by pain  [] Patient limited by other medical complications  [] Other:     Overall Progression Towards Functional goals/ Treatment Progress Update:  [] Patient is progressing as expected towards functional goals listed. [] Progression is slowed due to complexities/Impairments listed. [] Progression has been slowed due to co-morbidities.   [x] Plan just implemented, too soon to assess goals progression <30days   [] Goals require adjustment due to lack of progress  [] Patient is not progressing as expected and requires additional follow up with physician  [] Other    Prognosis for POC: [x] Good [] Fair  [] Poor    Patient requires continued skilled intervention: [x] Yes  [] No      PLAN: See eval  [] Continue per plan of care [] Alter current plan (see comments)  [x] Plan of care initiated [] Hold pending MD visit [] Discharge    Electronically signed by: Silva Quiroz PT     Note: If patient does not return for scheduled/recommended

## 2020-12-02 NOTE — PLAN OF CARE
[facility]  Phone: [phone]   Fax:     [phone]                                                       Physical Therapy Certification    Dear Referring Practitioner: Edouard Chavez,    We had the pleasure of evaluating the following patient for physical therapy services at [address] [place] [address]. A summary of our findings can be found in the initial assessment below. This includes our plan of care. If you have any questions or concerns regarding these findings, please do not hesitate to contact me at the office phone number checked above. Thank you for the referral.       Physician Signature:_______________________________Date:__________________  By signing above (or electronic signature), therapists plan is approved by physician              Patient: Nunu Rao   : 1945   MRN: [id number]  Referring Physician: Referring Practitioner: Edouard Chavez      Evaluation Date: 2020      Medical Diagnosis Information:  Diagnosis: Right shoulder adhesive capsulitis, left hip pain   Treatment Diagnosis: M75.01, M25.552                                         Insurance information: PT Insurance Information: Medicare / [facility]    Precautions/ Contra-indications: None  Latex Allergy:   [x]  NO      []YES  Preferred Language for Healthcare:   [x]English       []other:    C-SSRS Triggered by Intake questionnaire (Past 2 wk assessment ):   [x] No, Questionnaire did not trigger screening.   [] Yes, Patient intake triggered C-SSRS Screening      [] C-SSRS Screening completed  [] PCP notified via Epic     SUBJECTIVE: Patient reports falling in her kitchen about 3 months ago, during which she attempted to catch herself by grabbing her island countertop with the RUE and in the process sustained a significant strain to the shoulder.  She has pain and decreased AROM afterward, specifically notes difficulty reaching behind her back and out to the side. She saw Dr. Brianda Amos and had x-rays that were negative, he diagnosed her with frozen shoulder. She is right hand dominant. She states that she cannot get an MRI because she has history of a brain tumor and has metal in her head. She also has history of left hip replacement, but was unable to rehabilitate it properly as she was taking care of her ailing  at the time and had no time to take care of herself. As a result, her hip has never been \"right\" and she has walked with a cane ever since. She also wears a heel lift on the left side which seems to help some.      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, history of brain tumor  Functional Scale/Score:  Quick DASH - 55% impairment    Pain Scale: 3/10  Easing factors: rest, heat  Provocative factors: reaching, lifting, overhead movements, self care especially after using toilet, dressing, grooming      Type: []Constant   [x]Intermittent  []Radiating [x]Localized []other:     Numbness/Tingling: Denies    Occupation/School: Retired     Living Status/Prior Level of Function: Independent with ADLs and IADLs    OBJECTIVE:     CERV ROM     Cervical Flexion     Cervical Extension     Cervical SB     Cervical rotation          ROM Left Right   Shoulder Flex 118 / 130 97 / 95   Shoulder Abd 100 / 112 56 / 95   Shoulder ER 90 70   Shoulder IR T8 / 50 IT, 25               Strength  Left Right   Shoulder Flex 4/5 3-/5   Shoulder Scap 4/5 3-5   Shoulder ER 4/5 3/5   Shoulder IR 4/5 3/5           Reflexes Normal Abnormal Comments   [x]ALL NORMAL            S1-2 Seated achilles [] []    S1-2 Prone knee bend [] []    L3-4 Patellar tendon [] []    C5-6 Biceps [] []    C6 Brachioradialis [] []    C7-8 Triceps [] []    Clonus [] []    Babinski [] []    Castellanos's [] []      Reflexes/Sensation:    [x]Dermatomes/Myotomes intact    [x]Reflexes equal and normal bilaterally   []Other:    Joint mobility:    []Normal    [x]Hypo - bilat GH joints R>L   []Hyper    Palpation: tender to palpation of right lateral deltoid/insertion, proximal biceps, soft tissue restrictions noted thoughout    Functional Mobility/Transfers: UE assist for sit to stand    Posture: bilateral scapular downward rotation / anterior tilt    Bandages/Dressings/Incisions: NA    Gait: (include devices/WB status): antalgic with standard cane due to left hip dysfunction (decreased stance time, decreased hip extension, lateral trunk lean)    Orthopedic Special Tests: NA                       [x] Patient history, allergies, meds reviewed. Medical chart reviewed. See intake form. Review Of Systems (ROS):  [x]Performed Review of systems (Integumentary, CardioPulmonary, Neurological) by intake and observation. Intake form has been scanned into medical record. Patient has been instructed to contact their primary care physician regarding ROS issues if not already being addressed at this time.       Co-morbidities/Complexities (which will affect course of rehabilitation):   []None           Arthritic conditions   []Rheumatoid arthritis (M05.9)  []Osteoarthritis (M19.91)   Cardiovascular conditions   [x]Hypertension (I10)  []Hyperlipidemia (E78.5)  []Angina pectoris (I20)  []Atherosclerosis (I70)  []CVA Musculoskeletal conditions   []Disc pathology   []Congenital spine pathologies   []Prior surgical intervention  [x]Osteoporosis (M81.8)  [x]Osteopenia (M85.8)   Endocrine conditions   []Hypothyroid (E03.9)  []Hyperthyroid Gastrointestinal conditions   []Constipation (T22.71)   Metabolic conditions   []Morbid obesity (E66.01)  []Diabetes type 1(E10.65) or 2 (E11.65)   []Neuropathy (G60.9)     Pulmonary conditions   []Asthma (J45)  []Coughing   []COPD (J44.9)   Psychological Disorders  []Anxiety (F41.9)  []Depression (F32.9)   []Other:   [x]Other:   Cancer       Barriers to/and or personal factors that will affect carrying tasks   [x]Reduced ability to reach behind back   [x]Reduced ability to sleep    [x]Reduced ability to tolerate any impact through UE or spine   []Reduced ability to  or hold objects   [x]Reduced ability to throw or toss an object   []other:    Participation Restrictions   [x]Reduced participation in self care activities   [x]Reduced participation in home management activities   []Reduced participation in work activities   [x]Reduced participation in social activities. []Reduced participation in sport/recreational activities. Classification/Subgrouping:   []signs/symptoms consistent with post-surgical status including decreased ROM, strength and function.     [x]signs/symptoms consistent with joint sprain/strain    []signs/symptoms consistent with shoulder impingement (internal, external, primary or secondary)   []signs/symptoms consistent with shoulder/elbow/wrist tendinopathy   []Signs/symptoms consistent with Rotator cuff tear   []sign/symptoms consistent with labral tear   []signs/symptoms consistent with rib dysfunction   []signs/symptoms consistent with postural dysfunction   [x]signs/symptoms consistent with Glenohumeral IR Deficit - <45 degrees   []signs/symptoms consistent with facet dysfunction of cervical/thoracic spine   [x]signs/symptoms consistent with pathology which may benefit from Dry Needling   []signs/symptoms which may limit the use of advanced manual therapy techniques: (Elevated CV risk profile, recent trauma, intolerance to end range positions, prior TIA, visual issues, UE neurological compromise )     Prognosis/Rehab Potential:      []Excellent   [x]Good    []Fair   []Poor    Tolerance of evaluation/treatment:    [x]Excellent   []Good    []Fair   []Poor    Physical Therapy Evaluation Complexity Justification  [x] A history of present problem with:  [] no personal factors and/or comorbidities that impact the plan of care;  [x]1-2 personal factors and/or comorbidities that impact the plan of care  []3 personal factors and/or comorbidities that impact the plan of care  [x] An examination of body systems using standardized tests and measures addressing any of the following: body structures and functions (impairments), activity limitations, and/or participation restrictions;:  [x] a total of 1-2 or more elements   [] a total of 3 or more elements   [] a total of 4 or more elements   [x] A clinical presentation with:  [x] stable and/or uncomplicated characteristics   [] evolving clinical presentation with changing characteristics  [] unstable and unpredictable characteristics;   [x] Clinical decision making of [x] low, [] moderate, [] high complexity using standardized patient assessment instrument and/or measurable assessment of functional outcome. [x] EVAL (LOW) 09943 (typically 20 minutes face-to-face)  [] EVAL (MOD) 28463 (typically 30 minutes face-to-face)  [] EVAL (HIGH) 20013 (typically 45 minutes face-to-face)  [] RE-EVAL     PLAN:   Frequency/Duration:  2 days per week for 4-6 Weeks:  Interventions:  [x]  Therapeutic exercise including: strength training, ROM, for scapula, core and Upper extremity, including postural re-education. [x]  NMR activation and proprioception for UE, periscapular and RC muscles and Core, including postural re-education. [x]  Manual therapy as indicated for shoulder, scapula, spine and associated soft tissue including: Dry Needling/IASTM, STM, PROM, Gr I-IV mobilizations, manipulation. [x] Modalities as needed that may include: thermal agents, E-stim, Biofeedback, US, iontophoresis as indicated  [x] Patient education on joint protection, postural re-education, activity modification, progression of HEP.      HEP instruction: table slides (see scanned forms)    GOALS:  Patient stated goal: pain-free shoulder, return to PLOF, ambulate without cane  [] Progressing: [] Met: [] Not Met: [] Adjusted    Therapist goals for Patient:   Short Term Goals:

## 2020-12-04 ENCOUNTER — HOSPITAL ENCOUNTER (OUTPATIENT)
Dept: PHYSICAL THERAPY | Age: 75
Setting detail: THERAPIES SERIES
Discharge: HOME | End: 2020-12-04
Payer: MEDICARE

## 2020-12-04 PROCEDURE — 97110 THERAPEUTIC EXERCISES: CPT

## 2020-12-04 PROCEDURE — 97140 MANUAL THERAPY 1/> REGIONS: CPT

## 2020-12-04 NOTE — FLOWSHEET NOTE
[place] [address] [place] [place]  Phone: [phone] Fax: [phone]    Physical Therapy Treatment Note/ Progress Report:     Date:  2020    Patient Name:  Rafael Moseley    :  1945  MRN: [id number]  Restrictions/Precautions:    Medical/Treatment Diagnosis Information:  · Diagnosis: Right shoulder adhesive capsulitis, left hip pain  · Treatment Diagnosis: M75.01, P19.424  Insurance/Certification information:  PT Insurance Information: [facility]  Physician Information:  Referring Practitioner: Esteban Force of care signed (Y/N):     Date of Patient follow up with Physician:      Progress Report: []  Yes  [x]  No     Date Range for reporting period:  Beginnin20  Ending:  NA    Progress report due (10 Rx/or 30 days whichever is less): 73     Recertification due (POC duration/ or 90 days whichever is less): 20     Visit # Insurance Allowable Auth Needed   2 Medicare []Yes    []No     Pain level:  3/10     SUBJECTIVE:  Patient reports that her shoulder felt great when she left her initial evaluation, but the next day her muscle was really sore from the STM. However, she did her exercises and this loosened it up. Does sense some overall improvement in her symptoms, though still feeling stiff.      OBJECTIVE:    Observation: Antalgic gait with standard cane used in RUE   Test measurements:      RESTRICTIONS/PRECAUTIONS:     Exercises/Interventions:   Therapeutic Ex (74471)  Min: 26 Sets/sec Reps CUES/Notes   UBE 4'  retro   Pendulum/Ball rolls      Cane AAROM flex/press 5 12 3#   3 way Isomet      T- band Row/pinch   add   T- band lower pinch   add   T- band ER activation   add   Supine SA punch   add   SL ER/SL punch/SL abd 2 10 1#   Prone Rows/ext      Prone HAB/Prone Flex      Seat Table slides/ 5 15 Small incline   Seated HH Depression   add   No Money      Scap Wall Lat touches/wall walks kinesthetic sense, posture, motor skill, proprioception of scapular, scapulothoracic and UE control with self care, reaching, carrying, lifting, house/yardwork, driving/computer work      Manual Treatments:  PROM / STM / Oscillations-Mobs:  G-I, II, III, IV (PA's, Inf., Post.)  [x] (78515) Provided manual therapy to mobilize soft tissue/joints of cervical/CT, scapular GHJ and UE for the purpose of modulating pain, promoting relaxation,  increasing ROM, reducing/eliminating soft tissue swelling/inflammation/restriction, improving soft tissue extensibility and allowing for proper ROM for normal function with self care, reaching, carrying, lifting, house/yardwork, driving/computer work    Modalities:  None    Charges:  Timed Code Treatment Minutes: 44   Total Treatment Minutes: 44       [] EVAL (LOW) 67169 (typically 20 minutes face-to-face)  [] EVAL (MOD) 50011 (typically 30 minutes face-to-face)  [] EVAL (HIGH) 12978 (typically 45 minutes face-to-face)  [] RE-EVAL     [x] MY(34433) x 2     [] DRY NEEDLE 1 OR 2 MUSCLES  [] NMR (85158) x     [] DRY NEEDLE 3+ MUSCLES  [x] Manual (33258) x 1      [] TA (45534) x     [] Mech Traction (33518)  [] ES(attended) (48180)     [] ES (un) (70056):   [] VASO (38014)  [] Other:    If [facility] Please Indicate Time In/Out  CPT Code Time in Time out                                   GOALS:  Patient stated goal: pain-free shoulder, return to PLOF, ambulate without cane  [] Progressing: [] Met: [] Not Met: [] Adjusted    Therapist goals for Patient:   Short Term Goals: To be achieved in: 2 weeks  1. Independent in HEP and progression per patient tolerance, in order to prevent re-injury. [] Progressing: [] Met: [] Not Met: [] Adjusted  2. Patient will have a decrease in pain to facilitate improvement in movement, function, and ADLs as indicated by Functional Deficits. [] Progressing: [] Met: [] Not Met: [] Adjusted    Long Term Goals: To be achieved in: 6 weeks  1.  Disability index score of 25% or less for the Quick DASH to assist with reaching prior level of function. [] Progressing: [] Met: [] Not Met: [] Adjusted  2. Patient will demonstrate increased AROM to equal contralateral to allow for proper joint functioning as indicated by Functional Deficits. [] Progressing: [] Met: [] Not Met: [] Adjusted  3. Patient will demonstrate an increase in NM recruitment/activation and overall GH and scapular strength to within n5lbs HHD or WNL for proper functional mobility as indicated by patients Functional Deficits. [] Progressing: [] Met: [] Not Met: [] Adjusted  4. Patient will return to reaching, lifting, overhead movements, self care especially after using toilet, dressing, grooming activities without increased symptoms or restriction. [] Progressing: [] Met: [] Not Met: [] Adjusted    ASSESSMENT:  See eval    Return to Play: (if applicable)   []  Stage 1: Intro to Strength   []  Stage 2: Dynamic Strength and Intro to Plyometrics   []  Stage 3: Advanced Plyometrics and Intro to Throwing   []  Stage 4: Sport specific Training/Return to Sport     []  Ready to Return to Play, [employer]   []  Not Ready for Return to Sports   Comments:      Treatment/Activity Tolerance:  [x] Patient tolerated treatment well [] Patient limited by fatique  [] Patient limited by pain  [] Patient limited by other medical complications  [] Other:     Overall Progression Towards Functional goals/ Treatment Progress Update:  [] Patient is progressing as expected towards functional goals listed. [] Progression is slowed due to complexities/Impairments listed. [] Progression has been slowed due to co-morbidities.   [x] Plan just implemented, too soon to assess goals progression <30days   [] Goals require adjustment due to lack of progress  [] Patient is not progressing as expected and requires additional follow up with physician  [] Other    Prognosis for POC: [x] Good [] Fair  [] Poor    Patient requires continued skilled intervention: [x] Yes  [] No      PLAN:  [x] Continue per plan of care [] Alter current plan (see comments)  [] Plan of care initiated [] Hold pending MD visit [] Discharge    Electronically signed by: Maco Monique PT     Note: If patient does not return for scheduled/recommended follow up visits, this note will serve as a discharge from care along with the most recent update on progress.

## 2020-12-09 ENCOUNTER — HOSPITAL ENCOUNTER (OUTPATIENT)
Dept: PHYSICAL THERAPY | Age: 75
Setting detail: THERAPIES SERIES
Discharge: HOME | End: 2020-12-09
Payer: MEDICARE

## 2020-12-09 PROCEDURE — 97140 MANUAL THERAPY 1/> REGIONS: CPT

## 2020-12-09 PROCEDURE — 97110 THERAPEUTIC EXERCISES: CPT

## 2020-12-09 NOTE — FLOWSHEET NOTE
[address] [place], [place] [address]Y[place]  Phone: [phone] Fax: [phone]    Physical Therapy Treatment Note/ Progress Report:     Date:  2020    Patient Name:  Yue Che    :  1945  MRN: [id number]  Restrictions/Precautions:    Medical/Treatment Diagnosis Information:  · Diagnosis: Right shoulder adhesive capsulitis, left hip pain  · Treatment Diagnosis: M75.01, R70.749  Insurance/Certification information:  PT Insurance Information: Medicare / [facility]  Physician Information:  Referring Practitioner: Milana Plasencia signed (Y/N):     Date of Patient follow up with Physician:      Progress Report: []  Yes  [x]  No     Date Range for reporting period:  Beginnin20  Ending:  NA    Progress report due (10 Rx/or 30 days whichever is less): 97     Recertification due (POC duration/ or 90 days whichever is less): 20     Visit # Insurance Allowable Auth Needed   3 Medicare []Yes    []No     Pain level:  3/10     SUBJECTIVE:  Patient states that she felt good for a couple days following her last session, but then her shoulder stiffened back up again. She had no increased pain with the new exercises added to her program at last session.     OBJECTIVE:    Observation: Antalgic gait with standard cane used in RUE   Test measurements:      RESTRICTIONS/PRECAUTIONS:     Exercises/Interventions:   Therapeutic Ex (70979)  Min: 28 Sets/sec Reps CUES/Notes   UBE 4'  retro   Pendulum/Ball rolls      Cane AAROM flex/press 5 12 3#   3 way Isomet      T- band Row/pinch   add   T- band lower pinch   add   T- band ER activation   add   Supine SA punch   add   SL ER/SL punch/SL abd 2 10 1#   Prone Rows/ext - over table 2 10 1#   Prone HAB/Prone Flex      Wall Slides 5 10 Small range   Seated HH Depression   add   No Money      Scap Wall Lat touches/wall walks            Standing flex/scap      Standing Punch Lawnmower                              Manual Intervention  (54262)  Min: 16      Shld /GH Mobs      Post Cap mobs      Thoracic/Rib manipualtion      CT MT/Mobs      PROM MT 8'  Flex, abd, ER, IR   IASTM 8'  R deltoid, bicep         NMR re-education (68752)  Min:      T-spine Ext      GH depress/compress      Scap/GH NMR      Body blade      Wall ball roll      Wall Ball bounce      Ball drops      Betty Scap Bio      Floor Snow angels-sliders            Therapeutic Activity (74096)  Min:      UE throwing porgression      Dynamic UE stability      Earthquake Bar      Bodyblade                Therapeutic Exercise and NMR EXR  [x] (11002) Provided verbal/tactile cueing for activities related to strengthening, flexibility, endurance, ROM  for improvements in scapular, scapulothoracic and UE control with self care, reaching, carrying, lifting, house/yardwork, driving/computer work. [x] (07549) Provided verbal/tactile cueing for activities related to improving balance, coordination, kinesthetic sense, posture, motor skill, proprioception  to assist with  scapular, scapulothoracic and UE control with self care, reaching, carrying, lifting, house/yardwork, driving/computer work. Therapeutic Activities:    [] (58470 or 69275) Provided verbal/tactile cueing for activities related to improving balance, coordination, kinesthetic sense, posture, motor skill, proprioception and motor activation to allow for proper function of scapular, scapulothoracic and UE control with self care, carrying, lifting, driving/computer work.      Home Exercise Program:    [x] (22170) Reviewed/Progressed HEP activities related to strengthening, flexibility, endurance, ROM of scapular, scapulothoracic and UE control with self care, reaching, carrying, lifting, house/yardwork, driving/computer work  [] (80510) Reviewed/Progressed HEP activities related to improving balance, coordination, kinesthetic sense, posture, motor skill, proprioception of scapular, scapulothoracic and UE control with self care, reaching, carrying, lifting, house/yardwork, driving/computer work      Manual Treatments:  PROM / STM / Oscillations-Mobs:  G-I, II, III, IV (PA's, Inf., Post.)  [x] (21910) Provided manual therapy to mobilize soft tissue/joints of cervical/CT, scapular GHJ and UE for the purpose of modulating pain, promoting relaxation,  increasing ROM, reducing/eliminating soft tissue swelling/inflammation/restriction, improving soft tissue extensibility and allowing for proper ROM for normal function with self care, reaching, carrying, lifting, house/yardwork, driving/computer work    Modalities:  None    Charges:  Timed Code Treatment Minutes: 44   Total Treatment Minutes: 44       [] EVAL (LOW) 83453 (typically 20 minutes face-to-face)  [] EVAL (MOD) 04673 (typically 30 minutes face-to-face)  [] EVAL (HIGH) 43099 (typically 45 minutes face-to-face)  [] RE-EVAL     [x] DEMARCO(42386) x 2     [] DRY NEEDLE 1 OR 2 MUSCLES  [] NMR (92121) x     [] DRY NEEDLE 3+ MUSCLES  [x] Manual (80381) x 1      [] TA (98179) x     [] Mech Traction (45322)  [] ES(attended) (58083)     [] ES (un) (55633):   [] VASO (11783)  [] Other:    If [facility] Please Indicate Time In/Out  CPT Code Time in Time out                                   GOALS:  Patient stated goal: pain-free shoulder, return to PLOF, ambulate without cane  [] Progressing: [] Met: [] Not Met: [] Adjusted    Therapist goals for Patient:   Short Term Goals: To be achieved in: 2 weeks  1. Independent in HEP and progression per patient tolerance, in order to prevent re-injury. [] Progressing: [] Met: [] Not Met: [] Adjusted  2. Patient will have a decrease in pain to facilitate improvement in movement, function, and ADLs as indicated by Functional Deficits. [] Progressing: [] Met: [] Not Met: [] Adjusted    Long Term Goals: To be achieved in: 6 weeks  1.  Disability index score of 25% or less for the Quick DASH to assist with reaching prior level of function. [] Progressing: [] Met: [] Not Met: [] Adjusted  2. Patient will demonstrate increased AROM to equal contralateral to allow for proper joint functioning as indicated by Functional Deficits. [] Progressing: [] Met: [] Not Met: [] Adjusted  3. Patient will demonstrate an increase in NM recruitment/activation and overall GH and scapular strength to within n5lbs HHD or WNL for proper functional mobility as indicated by patients Functional Deficits. [] Progressing: [] Met: [] Not Met: [] Adjusted  4. Patient will return to reaching, lifting, overhead movements, self care especially after using toilet, dressing, grooming activities without increased symptoms or restriction. [] Progressing: [] Met: [] Not Met: [] Adjusted    ASSESSMENT:  Improvement in shoulder stiffness at conclusion, appropriate fatigue with strengthening exercises with no increase in pain. Continued deficits in rotator cuff and periscapular strength. PROM/AROM making gradual gains. Added SL abd and ER as well as wall slides to HEP. Return to Play: (if applicable)   []  Stage 1: Intro to Strength   []  Stage 2: Dynamic Strength and Intro to Plyometrics   []  Stage 3: Advanced Plyometrics and Intro to Throwing   []  Stage 4: Sport specific Training/Return to Sport     []  Ready to Return to Play, [employer]   []  Not Ready for Return to Sports   Comments:      Treatment/Activity Tolerance:  [x] Patient tolerated treatment well [] Patient limited by fatique  [] Patient limited by pain  [] Patient limited by other medical complications  [] Other:     Overall Progression Towards Functional goals/ Treatment Progress Update:  [] Patient is progressing as expected towards functional goals listed. [] Progression is slowed due to complexities/Impairments listed. [] Progression has been slowed due to co-morbidities.   [x] Plan just implemented, too soon to assess goals progression <30days   [] Goals require adjustment due to lack of progress  [] Patient is not progressing as expected and requires additional follow up with physician  [] Other    Prognosis for POC: [x] Good [] Fair  [] Poor    Patient requires continued skilled intervention: [x] Yes  [] No      PLAN:  [x] Continue per plan of care [] Alter current plan (see comments)  [] Plan of care initiated [] Hold pending MD visit [] Discharge    Electronically signed by: Yinka Turpin PT     Note: If patient does not return for scheduled/recommended follow up visits, this note will serve as a discharge from care along with the most recent update on progress.

## 2020-12-11 ENCOUNTER — HOSPITAL ENCOUNTER (OUTPATIENT)
Dept: PHYSICAL THERAPY | Age: 75
Setting detail: THERAPIES SERIES
Discharge: HOME | End: 2020-12-11
Payer: MEDICARE

## 2020-12-11 PROCEDURE — 97140 MANUAL THERAPY 1/> REGIONS: CPT

## 2020-12-11 PROCEDURE — 97110 THERAPEUTIC EXERCISES: CPT

## 2020-12-11 NOTE — FLOWSHEET NOTE
[place] [address] [facility]  Phone: [phone] Fax: [phone]    Physical Therapy Treatment Note/ Progress Report:     Date:  2020    Patient Name:  Marcos Justice    :  1945  MRN: [id number]  Restrictions/Precautions:    Medical/Treatment Diagnosis Information:  · Diagnosis: Right shoulder adhesive capsulitis, left hip pain  · Treatment Diagnosis: M75.01, M02.899  Insurance/Certification information:  PT Insurance Information: Medicare / [facility]  Physician Information:  Referring Practitioner: Ta Balderas signed (Y/N):     Date of Patient follow up with Physician:      Progress Report: []  Yes  [x]  No     Date Range for reporting period:  Beginnin20  Ending:  NA    Progress report due (10 Rx/or 30 days whichever is less): 74     Recertification due (POC duration/ or 90 days whichever is less): 20     Visit # Insurance Allowable Auth Needed   4 Medicare []Yes    []No     Pain level:  3/10     SUBJECTIVE:  Patient states that yesterday her shoulder was basically pain-free. Felt a little more stiff when she woke up this morning though. No increased pain with the new exercises from last session. Attempted the prone HEP exercises at home but does not have a good place to perform them since she cannot lie flat on her bed comfortably.      OBJECTIVE:    Observation: Antalgic gait with standard cane used in RUE   Test measurements:      RESTRICTIONS/PRECAUTIONS:     Exercises/Interventions:   Therapeutic Ex (23040)  Min: 28 Sets/sec Reps CUES/Notes   UBE 4'  retro   Cane AAROM flex/press 5 12 3#   3 way Isomet      T- band Row/pinch 5 12 red   T- band lower pinch 5 12 red   T- band ER activation   add   Supine SA punch   add   SL ER/SL punch/SL abd 2 10 1#   Prone Rows/ext - over table 2 10 2# / 1#   Prone HAB/Prone Flex      Incline table Slides 5 10    Seated HH Depression   add   No related to improving balance, coordination, kinesthetic sense, posture, motor skill, proprioception of scapular, scapulothoracic and UE control with self care, reaching, carrying, lifting, house/yardwork, driving/computer work      Manual Treatments:  PROM / STM / Oscillations-Mobs:  G-I, II, III, IV (PA's, Inf., Post.)  [x] (40256) Provided manual therapy to mobilize soft tissue/joints of cervical/CT, scapular GHJ and UE for the purpose of modulating pain, promoting relaxation,  increasing ROM, reducing/eliminating soft tissue swelling/inflammation/restriction, improving soft tissue extensibility and allowing for proper ROM for normal function with self care, reaching, carrying, lifting, house/yardwork, driving/computer work    Modalities:  None    Charges:  Timed Code Treatment Minutes: 44   Total Treatment Minutes: 44       [] EVAL (LOW) 53604 (typically 20 minutes face-to-face)  [] EVAL (MOD) 80905 (typically 30 minutes face-to-face)  [] EVAL (HIGH) 16740 (typically 45 minutes face-to-face)  [] RE-EVAL     [x] JN(02093) x 2     [] DRY NEEDLE 1 OR 2 MUSCLES  [] NMR (30227) x     [] DRY NEEDLE 3+ MUSCLES  [x] Manual (73013) x 1      [] TA (39344) x     [] Mech Traction (25432)  [] ES(attended) (79015)     [] ES (un) (23981):   [] VASO (44111)  [] Other:    If [facility] Please Indicate Time In/Out  CPT Code Time in Time out                                   GOALS:  Patient stated goal: pain-free shoulder, return to PLOF, ambulate without cane  [] Progressing: [] Met: [] Not Met: [] Adjusted    Therapist goals for Patient:   Short Term Goals: To be achieved in: 2 weeks  1. Independent in HEP and progression per patient tolerance, in order to prevent re-injury. [] Progressing: [] Met: [] Not Met: [] Adjusted  2. Patient will have a decrease in pain to facilitate improvement in movement, function, and ADLs as indicated by Functional Deficits. [] Progressing: [] Met: [] Not Met: [] Adjusted    Long Term Goals:  To be achieved in: 6 weeks  1. Disability index score of 25% or less for the Quick DASH to assist with reaching prior level of function. [] Progressing: [] Met: [] Not Met: [] Adjusted  2. Patient will demonstrate increased AROM to equal contralateral to allow for proper joint functioning as indicated by Functional Deficits. [] Progressing: [] Met: [] Not Met: [] Adjusted  3. Patient will demonstrate an increase in NM recruitment/activation and overall GH and scapular strength to within n5lbs HHD or WNL for proper functional mobility as indicated by patients Functional Deficits. [] Progressing: [] Met: [] Not Met: [] Adjusted  4. Patient will return to reaching, lifting, overhead movements, self care especially after using toilet, dressing, grooming activities without increased symptoms or restriction. [] Progressing: [] Met: [] Not Met: [] Adjusted    ASSESSMENT:  Improvement in shoulder stiffness at conclusion, appropriate fatigue with strengthening exercises with no increase in pain. Continued deficits in rotator cuff and periscapular strength. PROM/AROM making gradual gains. Issued patient a red theraband to add rows and bilateral scapular retraction to HEP. Return to Play: (if applicable)   []  Stage 1: Intro to Strength   []  Stage 2: Dynamic Strength and Intro to Plyometrics   []  Stage 3: Advanced Plyometrics and Intro to Throwing   []  Stage 4: Sport specific Training/Return to Sport     []  Ready to Return to Play, [employer]   []  Not Ready for Return to Sports   Comments:      Treatment/Activity Tolerance:  [x] Patient tolerated treatment well [] Patient limited by fatique  [] Patient limited by pain  [] Patient limited by other medical complications  [] Other:     Overall Progression Towards Functional goals/ Treatment Progress Update:  [] Patient is progressing as expected towards functional goals listed. [] Progression is slowed due to complexities/Impairments listed.   [] Progression has been slowed due to co-morbidities. [x] Plan just implemented, too soon to assess goals progression <30days   [] Goals require adjustment due to lack of progress  [] Patient is not progressing as expected and requires additional follow up with physician  [] Other    Prognosis for POC: [x] Good [] Fair  [] Poor    Patient requires continued skilled intervention: [x] Yes  [] No      PLAN:  [x] Continue per plan of care [] Alter current plan (see comments)  [] Plan of care initiated [] Hold pending MD visit [] Discharge    Electronically signed by: Amador Duffy PT     Note: If patient does not return for scheduled/recommended follow up visits, this note will serve as a discharge from care along with the most recent update on progress.

## 2020-12-14 ENCOUNTER — HOSPITAL ENCOUNTER (OUTPATIENT)
Dept: PHYSICAL THERAPY | Age: 75
Setting detail: THERAPIES SERIES
Discharge: HOME | End: 2020-12-14
Payer: MEDICARE

## 2020-12-14 PROCEDURE — 97110 THERAPEUTIC EXERCISES: CPT

## 2020-12-14 PROCEDURE — 97140 MANUAL THERAPY 1/> REGIONS: CPT

## 2020-12-14 NOTE — FLOWSHEET NOTE
[address] [place] [address] [place] [address]  Phone: [phone] Fax: [phone]    Physical Therapy Treatment Note/ Progress Report:     Date:  2020    Patient Name:  Anabel Menon    :  1945  MRN: [id number]  Restrictions/Precautions:    Medical/Treatment Diagnosis Information:  · Diagnosis: Right shoulder adhesive capsulitis, left hip pain  · Treatment Diagnosis: M75.01, L68.485  Insurance/Certification information:  PT Insurance Information: Medicare / [facility]  Physician Information:  Referring Practitioner: Dottie Elliott of care signed (Y/N):     Date of Patient follow up with Physician:      Progress Report: []  Yes  [x]  No     Date Range for reporting period:  Beginnin20  Ending:  NA    Progress report due (10 Rx/or 30 days whichever is less): 75     Recertification due (POC duration/ or 90 days whichever is less): 20     Visit # Insurance Allowable Auth Needed   5 Medicare []Yes    []No     Pain level:  0/10 currently, 2-3/10 upon waking     SUBJECTIVE:  Patient reports that she is really only having shoulder pain when sleeping at this point. States that she likes to lay on the right shoulder and it is a little sore when she wakes up, but then after awhile it goes away. She continues to have some difficulty reaching the RUE behind her back. OBJECTIVE:   ? Observation: Antalgic gait with standard cane used in RUE  ?  Test measurements:      RESTRICTIONS/PRECAUTIONS:     Exercises/Interventions:   Therapeutic Ex (32413)  Min: 28 Sets/sec Reps CUES/Notes   UBE 4'  retro   Cane AAROM flex/press 5 12 3#   3 way Isomet      T- band Row/pinch 5 12 red   T- band lower pinch 5 12 red   T- band ER activation   add   Supine SA punch   add   SL ER/SL punch/SL abd 2 10 1#   Prone Rows/ext - over table 2 10 2# / 1#   Prone HAB/Prone Flex 1 10 0#   Incline table Slides 5 10    Seated HH Depression   add No Money      Scap Wall Lat touches/wall walks      Towel IR stretch behind back 5 10    Flexbar pass around body 2 10 Green, cw, ccw   Standing flex/scap      Standing Punch      Lawnmower                              Manual Intervention  (02300)  Min: 16      Shld /GH Mobs 5'  Gentle inferior glides   Post Cap mobs 3'  Prone LLLD IR   Thoracic/Rib manipualtion      CT MT/Mobs      PROM MT 8'  Flex, abd, ER, IR   IASTM 0'  R deltoid, bicep         NMR re-education (43730)  Min:      T-spine Ext      GH depress/compress      Scap/GH NMR      Body blade      Wall ball roll      Wall Ball bounce      Ball drops      Betty Scap Bio      Floor Snow angels-sliders            Therapeutic Activity (00650)  Min:      UE throwing porgression      Dynamic UE stability      Earthquake Bar      Bodyblade                Therapeutic Exercise and NMR EXR  [x] (98952) Provided verbal/tactile cueing for activities related to strengthening, flexibility, endurance, ROM  for improvements in scapular, scapulothoracic and UE control with self care, reaching, carrying, lifting, house/yardwork, driving/computer work. [x] (12590) Provided verbal/tactile cueing for activities related to improving balance, coordination, kinesthetic sense, posture, motor skill, proprioception  to assist with  scapular, scapulothoracic and UE control with self care, reaching, carrying, lifting, house/yardwork, driving/computer work. Therapeutic Activities:    [] (91472 or 48149) Provided verbal/tactile cueing for activities related to improving balance, coordination, kinesthetic sense, posture, motor skill, proprioception and motor activation to allow for proper function of scapular, scapulothoracic and UE control with self care, carrying, lifting, driving/computer work.      Home Exercise Program: [x] (01444) Reviewed/Progressed HEP activities related to strengthening, flexibility, endurance, ROM of scapular, scapulothoracic and UE control with self care, reaching, carrying, lifting, house/yardwork, driving/computer work  [] (34055) Reviewed/Progressed HEP activities related to improving balance, coordination, kinesthetic sense, posture, motor skill, proprioception of scapular, scapulothoracic and UE control with self care, reaching, carrying, lifting, house/yardwork, driving/computer work      Manual Treatments:  PROM / STM / Oscillations-Mobs:  G-I, II, III, IV (PA's, Inf., Post.)  [x] (67745) Provided manual therapy to mobilize soft tissue/joints of cervical/CT, scapular GHJ and UE for the purpose of modulating pain, promoting relaxation,  increasing ROM, reducing/eliminating soft tissue swelling/inflammation/restriction, improving soft tissue extensibility and allowing for proper ROM for normal function with self care, reaching, carrying, lifting, house/yardwork, driving/computer work    Modalities:  None    Charges:  Timed Code Treatment Minutes: 44   Total Treatment Minutes: 44       [] EVAL (LOW) 58116 (typically 20 minutes face-to-face)  [] EVAL (MOD) 30692 (typically 30 minutes face-to-face)  [] EVAL (HIGH) 87556 (typically 45 minutes face-to-face)  [] RE-EVAL     [x] NJ(24155) x 2     [] DRY NEEDLE 1 OR 2 MUSCLES  [] NMR (59857) x     [] DRY NEEDLE 3+ MUSCLES  [x] Manual (17759) x 1      [] TA (76471) x     [] [facility] (92718)  [] ES(attended) (41212)     [] ES (un) (73750):   [] VASO (24860)  [] Other:    If [facility] Please Indicate Time In/Out  CPT Code Time in Time out                                   GOALS:  Patient stated goal: pain-free shoulder, return to PLOF, ambulate without cane  [] Progressing: [] Met: [] Not Met: [] Adjusted    Therapist goals for Patient:   Short Term Goals:  To be achieved in: 2 weeks 1. Independent in HEP and progression per patient tolerance, in order to prevent re-injury. [] Progressing: [] Met: [] Not Met: [] Adjusted  2. Patient will have a decrease in pain to facilitate improvement in movement, function, and ADLs as indicated by Functional Deficits. [] Progressing: [] Met: [] Not Met: [] Adjusted    Long Term Goals: To be achieved in: 6 weeks  1. Disability index score of 25% or less for the Quick DASH to assist with reaching prior level of function. [] Progressing: [] Met: [] Not Met: [] Adjusted  2. Patient will demonstrate increased AROM to equal contralateral to allow for proper joint functioning as indicated by Functional Deficits. [] Progressing: [] Met: [] Not Met: [] Adjusted  3. Patient will demonstrate an increase in NM recruitment/activation and overall GH and scapular strength to within n5lbs HHD or WNL for proper functional mobility as indicated by patients Functional Deficits. [] Progressing: [] Met: [] Not Met: [] Adjusted  4. Patient will return to reaching, lifting, overhead movements, self care especially after using toilet, dressing, grooming activities without increased symptoms or restriction. [] Progressing: [] Met: [] Not Met: [] Adjusted    ASSESSMENT:  Improvement in shoulder stiffness at conclusion, appropriate fatigue with strengthening exercises with no increase in pain. Continued deficits in rotator cuff and periscapular strength. PROM/AROM making gradual gains, able to achieve greater functional IR following stretching and flexbar pass around body.      Return to Play: (if applicable)   []  Stage 1: Intro to Strength   []  Stage 2: Dynamic Strength and Intro to Plyometrics   []  Stage 3: Advanced Plyometrics and Intro to Throwing   []  Stage 4: Sport specific Training/Return to Sport     []  Ready to Return to Play, [employer]   []  Not Ready for Return to Sports   Comments:      Treatment/Activity Tolerance: [x] Patient tolerated treatment well [] Patient limited by fatique  [] Patient limited by pain  [] Patient limited by other medical complications  [] Other:     Overall Progression Towards Functional goals/ Treatment Progress Update:  [] Patient is progressing as expected towards functional goals listed. [] Progression is slowed due to complexities/Impairments listed. [] Progression has been slowed due to co-morbidities. [x] Plan just implemented, too soon to assess goals progression <30days   [] Goals require adjustment due to lack of progress  [] Patient is not progressing as expected and requires additional follow up with physician  [] Other    Prognosis for POC: [x] Good [] Fair  [] Poor    Patient requires continued skilled intervention: [x] Yes  [] No      PLAN:  [x] Continue per plan of care [] Alter current plan (see comments)  [] Plan of care initiated [] Hold pending MD visit [] Discharge    Electronically signed by: Chantelle Ward PT     Note: If patient does not return for scheduled/recommended follow up visits, this note will serve as a discharge from care along with the most recent update on progress.

## 2020-12-16 ENCOUNTER — HOSPITAL ENCOUNTER (OUTPATIENT)
Dept: PHYSICAL THERAPY | Age: 75
Setting detail: THERAPIES SERIES
Discharge: HOME | End: 2020-12-16
Payer: MEDICARE

## 2020-12-16 NOTE — FLOWSHEET NOTE
[facility]    Physical Therapy  Cancellation/No-show Note  Patient Name:  Luis Saez  :  1945   Date:  2020  Cancelled visits to date: 1  No-shows to date: 0    For today's appointment patient:  []  Cancelled  [x]  Rescheduled appointment  []  No-show     Reason given by patient:  []  Patient ill  []  Conflicting appointment  []  No transportation    []  Conflict with work  []  No reason given  [x]  Other:     Comments:  Weather    Electronically signed by:  Rosa Maria Burns, PT

## 2020-12-21 ENCOUNTER — HOSPITAL ENCOUNTER (OUTPATIENT)
Dept: PHYSICAL THERAPY | Age: 75
Setting detail: THERAPIES SERIES
Discharge: HOME | End: 2020-12-21
Payer: MEDICARE

## 2020-12-21 PROCEDURE — 97140 MANUAL THERAPY 1/> REGIONS: CPT

## 2020-12-21 PROCEDURE — 97110 THERAPEUTIC EXERCISES: CPT

## 2020-12-21 NOTE — FLOWSHEET NOTE
[facility] [address] [address]  Phone: [phone] Fax: [phone]    Physical Therapy Treatment Note/ Progress Report:     Date:  2020    Patient Name:  Abdoulaye Mosley    :  1945  MRN: [id number]  Restrictions/Precautions:    Medical/Treatment Diagnosis Information:  · Diagnosis: Right shoulder adhesive capsulitis, left hip pain  · Treatment Diagnosis: M75.01, K61.888  Insurance/Certification information:  PT Insurance Information: Medicare / [facility]  Physician Information:  Referring Practitioner: Geneva Williamson of care signed (Y/N):     Date of Patient follow up with Physician:      Progress Report: []  Yes  [x]  No     Date Range for reporting period:  Beginnin20  Ending:  NA    Progress report due (10 Rx/or 30 days whichever is less): 7/3/05     Recertification due (POC duration/ or 90 days whichever is less): 20     Visit # Insurance Allowable Auth Needed   6 Medicare []Yes    []No     Pain level:  0/10 currently, 2-3/10 upon waking     SUBJECTIVE:  Patient states that her shoulder is doing better and better. Now seems to bother her more just when it's cold and is stiff when she first wakes up. Pleased with her continued progress. OBJECTIVE:   ? Observation: Antalgic gait with standard cane used in RUE  ?  Test measurements:        RESTRICTIONS/PRECAUTIONS:     Exercises/Interventions:   Therapeutic Ex (45512)  Min: 28 Sets/sec Reps CUES/Notes   UBE 4'  retro   Cane AAROM flex/press 5 12 3#   3 way Isomet      T- band Row/pinch 5 12 red   T- band lower pinch 5 12 red   T- band ER activation   add   Supine SA punch   add   SL ER/SL punch/SL abd 2 10 1#   Prone Rows/ext - over table 2 10 2# / 1#   Prone HAB/Prone Flex 1 10 0#   Incline table Slides 5 10    Seated HH Depression   add   No Money      Scap Wall Lat touches/wall walks      Towel IR stretch behind back 5 10 Flexbar pass around body 2 10 Green, cw, ccw   Standing flex/scap      Standing Punch      Lawnmower                              Manual Intervention  (82709)  Min: 16      Shld /GH Mobs 5'  Gentle inferior glides   Post Cap mobs 3'  Prone LLLD IR   Thoracic/Rib manipualtion      CT MT/Mobs      PROM MT 8'  Flex, abd, ER, IR   IASTM 0'  R deltoid, bicep         NMR re-education (31445)  Min:      T-spine Ext      GH depress/compress      Scap/GH NMR      Body blade      Wall ball roll      Wall Ball bounce      Ball drops      Betty Scap Bio      Floor Snow angels-sliders            Therapeutic Activity (23450)  Min:      UE throwing porgression      Dynamic UE stability      Earthquake Bar      Bodyblade                Therapeutic Exercise and NMR EXR  [x] (49280) Provided verbal/tactile cueing for activities related to strengthening, flexibility, endurance, ROM  for improvements in scapular, scapulothoracic and UE control with self care, reaching, carrying, lifting, house/yardwork, driving/computer work. [x] (57746) Provided verbal/tactile cueing for activities related to improving balance, coordination, kinesthetic sense, posture, motor skill, proprioception  to assist with  scapular, scapulothoracic and UE control with self care, reaching, carrying, lifting, house/yardwork, driving/computer work. Therapeutic Activities:    [] (06165 or 97363) Provided verbal/tactile cueing for activities related to improving balance, coordination, kinesthetic sense, posture, motor skill, proprioception and motor activation to allow for proper function of scapular, scapulothoracic and UE control with self care, carrying, lifting, driving/computer work.      Home Exercise Program:    [x] (46773) Reviewed/Progressed HEP activities related to strengthening, flexibility, endurance, ROM of scapular, scapulothoracic and UE control with self care, reaching, carrying, lifting, house/yardwork, driving/computer work [] (89701) Reviewed/Progressed HEP activities related to improving balance, coordination, kinesthetic sense, posture, motor skill, proprioception of scapular, scapulothoracic and UE control with self care, reaching, carrying, lifting, house/yardwork, driving/computer work      Manual Treatments:  PROM / STM / Oscillations-Mobs:  G-I, II, III, IV (PA's, Inf., Post.)  [x] (91122) Provided manual therapy to mobilize soft tissue/joints of cervical/CT, scapular GHJ and UE for the purpose of modulating pain, promoting relaxation,  increasing ROM, reducing/eliminating soft tissue swelling/inflammation/restriction, improving soft tissue extensibility and allowing for proper ROM for normal function with self care, reaching, carrying, lifting, house/yardwork, driving/computer work    Modalities:  None    Charges:  Timed Code Treatment Minutes: 44   Total Treatment Minutes: 44       [] EVAL (LOW) 31834 (typically 20 minutes face-to-face)  [] EVAL (MOD) 45154 (typically 30 minutes face-to-face)  [] EVAL (HIGH) 95984 (typically 45 minutes face-to-face)  [] RE-EVAL     [x] AY(66161) x 2     [] DRY NEEDLE 1 OR 2 MUSCLES  [] NMR (22209) x     [] DRY NEEDLE 3+ MUSCLES  [x] Manual (26506) x 1      [] TA (48335) x     [] Mech Traction (68344)  [] ES(attended) (91074)     [] ES (un) (13648):   [] VASO (31647)  [] Other:    If [facility] Please Indicate Time In/Out  CPT Code Time in Time out                                   GOALS:  Patient stated goal: pain-free shoulder, return to PLOF, ambulate without cane  [] Progressing: [] Met: [] Not Met: [] Adjusted    Therapist goals for Patient:   Short Term Goals: To be achieved in: 2 weeks  1. Independent in HEP and progression per patient tolerance, in order to prevent re-injury. [] Progressing: [] Met: [] Not Met: [] Adjusted  2. Patient will have a decrease in pain to facilitate improvement in movement, function, and ADLs as indicated by Functional Deficits. [] Progressing: [] Met: [] Not Met: [] Adjusted    Long Term Goals: To be achieved in: 6 weeks  1. Disability index score of 25% or less for the Quick DASH to assist with reaching prior level of function. [] Progressing: [] Met: [] Not Met: [] Adjusted  2. Patient will demonstrate increased AROM to equal contralateral to allow for proper joint functioning as indicated by Functional Deficits. [] Progressing: [] Met: [] Not Met: [] Adjusted  3. Patient will demonstrate an increase in NM recruitment/activation and overall GH and scapular strength to within n5lbs HHD or WNL for proper functional mobility as indicated by patients Functional Deficits. [] Progressing: [] Met: [] Not Met: [] Adjusted  4. Patient will return to reaching, lifting, overhead movements, self care especially after using toilet, dressing, grooming activities without increased symptoms or restriction. [] Progressing: [] Met: [] Not Met: [] Adjusted    ASSESSMENT:  Improvement in shoulder stiffness at conclusion, appropriate fatigue with strengthening exercises with no increase in pain. Continued deficits in rotator cuff and periscapular strength. PROM/AROM making gradual gains. Return to Play: (if applicable)   []  Stage 1: Intro to Strength   []  Stage 2: Dynamic Strength and Intro to Plyometrics   []  Stage 3: Advanced Plyometrics and Intro to Throwing   []  Stage 4: Sport specific Training/Return to Sport     []  Ready to Return to Play, [employer]   []  Not Ready for Return to Sports   Comments:      Treatment/Activity Tolerance:  [x] Patient tolerated treatment well [] Patient limited by fatique  [] Patient limited by pain  [] Patient limited by other medical complications  [] Other:     Overall Progression Towards Functional goals/ Treatment Progress Update:  [] Patient is progressing as expected towards functional goals listed. [] Progression is slowed due to complexities/Impairments listed. [] Progression has been slowed due to co-morbidities. [x] Plan just implemented, too soon to assess goals progression <30days   [] Goals require adjustment due to lack of progress  [] Patient is not progressing as expected and requires additional follow up with physician  [] Other    Prognosis for POC: [x] Good [] Fair  [] Poor    Patient requires continued skilled intervention: [x] Yes  [] No      PLAN:  [x] Continue per plan of care [] Alter current plan (see comments)  [] Plan of care initiated [] Hold pending MD visit [] Discharge    Electronically signed by: Kraig Omalley PT     Note: If patient does not return for scheduled/recommended follow up visits, this note will serve as a discharge from care along with the most recent update on progress.

## 2020-12-23 ENCOUNTER — HOSPITAL ENCOUNTER (OUTPATIENT)
Dept: PHYSICAL THERAPY | Age: 75
Setting detail: THERAPIES SERIES
Discharge: HOME | End: 2020-12-23
Payer: MEDICARE

## 2020-12-23 PROCEDURE — 97110 THERAPEUTIC EXERCISES: CPT

## 2020-12-23 PROCEDURE — 97140 MANUAL THERAPY 1/> REGIONS: CPT

## 2020-12-23 NOTE — FLOWSHEET NOTE
[address] [place] [address] [facility]  Phone: [phone] Fax: [phone]        Physical Therapy Treatment Note/ Progress Report:     Date:  2020    Patient Name:  Katia Olivares    :  1945  MRN: [id number]  Restrictions/Precautions:    Medical/Treatment Diagnosis Information:  · Diagnosis: Right shoulder adhesive capsulitis, left hip pain  · Treatment Diagnosis: M75.01, H70.134  Insurance/Certification information:  PT Insurance Information: Medicare / [facility]  Physician Information:  Referring Practitioner: Rosa Dumont of care signed (Y/N):     Date of Patient follow up with Physician:      Progress Report: []  Yes  [x]  No     Date Range for reporting period:  Beginnin20  Ending:  NA    Progress report due (10 Rx/or 30 days whichever is less): 27     Recertification due (POC duration/ or 90 days whichever is less): 20     Visit # Insurance Allowable Auth Needed   7 Medicare []Yes    []No     Pain level:  0/10 currently, 2-3/10 upon waking     SUBJECTIVE:  Patient reports continued improvement in her shoulder. Able to reach behind her back a lot easier now especially after warming it up with some stretches. Almost feels that her entire shoulder ROM is just about back to her PLOF. Wants to begin focusing on her hip more now, which never got the appropriate amount of therapy after her hip replacement in 2017. OBJECTIVE:   ? Observation: Antalgic gait with standard cane used in RUE  ?  Test measurements:        RESTRICTIONS/PRECAUTIONS:     Exercises/Interventions:   Therapeutic Ex (74830)  Min: 28 Sets/sec Reps CUES/Notes   UBE 4'  retro   Cane AAROM flex/press 5 12 3#   3 way Isomet      T- band Row/pinch 5 12 red   T- band lower pinch 5 12 red   T- band ER activation   add   Supine SA punch   add   SL ER/SL punch/SL abd 2 10 1#   Prone Rows/ext - over table 2 10 2# / 1# Prone HAB/Prone Flex 1 10 0#   Incline table Slides 5 10    Seated HH Depression   add   No Money      Scap Wall Lat touches/wall walks      Towel IR stretch behind back 5 10    Flexbar pass around body 2 10 Green, cw, ccw   Standing flex/scap      Standing Punch      Lawnmower            Bike 5'     Bridge      SLR      SAQ 5 30 3#, full roll   Clam      Leg press - SL            Manual Intervention  (72052)  Min: 16      Shld /GH Mobs 5'  Gentle inferior glides   Post Cap mobs 3'  Prone LLLD IR   Thoracic/Rib manipualtion      CT MT/Mobs      PROM MT 8'  Flex, abd, ER, IR   IASTM 0'  R deltoid, bicep         NMR re-education (63596)  Min:      T-spine Ext      GH depress/compress      Scap/GH NMR      Body blade      Wall ball roll      Wall Ball bounce      Ball drops      Betty Scap Bio      Floor Snow angels-sliders            Therapeutic Activity (11320)  Min:      UE throwing porgression      Dynamic UE stability      Earthquake Bar      Bodyblade                Therapeutic Exercise and NMR EXR  [x] (81546) Provided verbal/tactile cueing for activities related to strengthening, flexibility, endurance, ROM  for improvements in scapular, scapulothoracic and UE control with self care, reaching, carrying, lifting, house/yardwork, driving/computer work. [x] (16371) Provided verbal/tactile cueing for activities related to improving balance, coordination, kinesthetic sense, posture, motor skill, proprioception  to assist with  scapular, scapulothoracic and UE control with self care, reaching, carrying, lifting, house/yardwork, driving/computer work. Therapeutic Activities:    [] (66898 or 90741) Provided verbal/tactile cueing for activities related to improving balance, coordination, kinesthetic sense, posture, motor skill, proprioception and motor activation to allow for proper function of scapular, scapulothoracic and UE control with self care, carrying, lifting, driving/computer work. Home Exercise Program:    [x] (98883) Reviewed/Progressed HEP activities related to strengthening, flexibility, endurance, ROM of scapular, scapulothoracic and UE control with self care, reaching, carrying, lifting, house/yardwork, driving/computer work  [] (67010) Reviewed/Progressed HEP activities related to improving balance, coordination, kinesthetic sense, posture, motor skill, proprioception of scapular, scapulothoracic and UE control with self care, reaching, carrying, lifting, house/yardwork, driving/computer work      Manual Treatments:  PROM / STM / Oscillations-Mobs:  G-I, II, III, IV (PA's, Inf., Post.)  [x] (29147) Provided manual therapy to mobilize soft tissue/joints of cervical/CT, scapular GHJ and UE for the purpose of modulating pain, promoting relaxation,  increasing ROM, reducing/eliminating soft tissue swelling/inflammation/restriction, improving soft tissue extensibility and allowing for proper ROM for normal function with self care, reaching, carrying, lifting, house/yardwork, driving/computer work    Modalities:  None    Charges:  Timed Code Treatment Minutes: 44   Total Treatment Minutes: 44       [] EVAL (LOW) 23975 (typically 20 minutes face-to-face)  [] EVAL (MOD) 04281 (typically 30 minutes face-to-face)  [] EVAL (HIGH) 83227 (typically 45 minutes face-to-face)  [] RE-EVAL     [x] XB(96580) x 2     [] DRY NEEDLE 1 OR 2 MUSCLES  [] NMR (62995) x     [] DRY NEEDLE 3+ MUSCLES  [x] Manual (00242) x 1      [] TA (24113) x     [] Mech Traction (97564)  [] ES(attended) (11945)     [] ES (un) (72298):   [] VASO (48119)  [] Other:    If [facility] Please Indicate Time In/Out  CPT Code Time in Time out                                   GOALS:  Patient stated goal: pain-free shoulder, return to PLOF, ambulate without cane  [] Progressing: [] Met: [] Not Met: [] Adjusted    Therapist goals for Patient:   Short Term Goals:  To be achieved in: 2 weeks 1. Independent in HEP and progression per patient tolerance, in order to prevent re-injury. [] Progressing: [] Met: [] Not Met: [] Adjusted  2. Patient will have a decrease in pain to facilitate improvement in movement, function, and ADLs as indicated by Functional Deficits. [] Progressing: [] Met: [] Not Met: [] Adjusted    Long Term Goals: To be achieved in: 6 weeks  1. Disability index score of 25% or less for the Quick DASH to assist with reaching prior level of function. [] Progressing: [] Met: [] Not Met: [] Adjusted  2. Patient will demonstrate increased AROM to equal contralateral to allow for proper joint functioning as indicated by Functional Deficits. [] Progressing: [] Met: [] Not Met: [] Adjusted  3. Patient will demonstrate an increase in NM recruitment/activation and overall GH and scapular strength to within n5lbs HHD or WNL for proper functional mobility as indicated by patients Functional Deficits. [] Progressing: [] Met: [] Not Met: [] Adjusted  4. Patient will return to reaching, lifting, overhead movements, self care especially after using toilet, dressing, grooming activities without increased symptoms or restriction. [] Progressing: [] Met: [] Not Met: [] Adjusted    ASSESSMENT:  Improvement in shoulder stiffness at conclusion, appropriate fatigue with strengthening exercises with no increase in pain. Continued deficits in rotator cuff and periscapular strength. PROM/AROM nearly [facility] and equal to contralateral shoulder. 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      Return to Play: (if applicable)   []  Stage 1: Intro to Strength   []  Stage 2: Dynamic Strength and Intro to Plyometrics   []  Stage 3: Advanced Plyometrics and Intro to Throwing   []  Stage 4: Sport specific Training/Return to [employer] []  Ready to Return to Play, [employer] Stages   []  Not Ready for Return to Sports   Comments:      Treatment/Activity Tolerance:  [x] Patient tolerated treatment well [] Patient limited by fatique  [] Patient limited by pain  [] Patient limited by other medical complications  [] Other:     Overall Progression Towards Functional goals/ Treatment Progress Update:  [] Patient is progressing as expected towards functional goals listed. [] Progression is slowed due to complexities/Impairments listed. [] Progression has been slowed due to co-morbidities. [x] Plan just implemented, too soon to assess goals progression <30days   [] Goals require adjustment due to lack of progress  [] Patient is not progressing as expected and requires additional follow up with physician  [] Other    Prognosis for POC: [x] Good [] Fair  [] Poor    Patient requires continued skilled intervention: [x] Yes  [] No      PLAN: Re-eval at next session to include formal physical therapy for s/p history of old left hip hemiarthroplasty in January 2017  [x] Continue per plan of care [] Alter current plan (see comments)  [] Plan of care initiated [] Hold pending MD visit [] Discharge    Electronically signed by: Abiodun Corral PT     Note: If patient does not return for scheduled/recommended follow up visits, this note will serve as a discharge from care along with the most recent update on progress.

## 2020-12-24 RX ORDER — ROPINIROLE 0.5 MG/1
TABLET, FILM COATED ORAL
Qty: 270 TABLET | Refills: 1 | Status: SHIPPED | OUTPATIENT
Start: 2020-12-24 | End: 2021-03-10 | Stop reason: SDUPTHER

## 2020-12-28 ENCOUNTER — HOSPITAL ENCOUNTER (OUTPATIENT)
Dept: PHYSICAL THERAPY | Age: 75
Setting detail: THERAPIES SERIES
Discharge: HOME | End: 2020-12-28
Payer: MEDICARE

## 2020-12-28 PROCEDURE — 97140 MANUAL THERAPY 1/> REGIONS: CPT

## 2020-12-28 PROCEDURE — 97110 THERAPEUTIC EXERCISES: CPT

## 2020-12-28 PROCEDURE — 97164 PT RE-EVAL EST PLAN CARE: CPT

## 2020-12-28 NOTE — PLAN OF CARE
[address] [place] [address] Yash Jane  Phone: [phone] Fax: [phone]          Physical Therapy Re-Certification Plan of Care/MD UPDATE      Dear  Martinez Raymundo,    We had the pleasure of treating the following patient for physical therapy services at [address] [place] [address]. A summary of our findings can be found in the updated assessment below. This includes our plan of care. If you have any questions or concerns regarding these findings, please do not hesitate to contact me at the office phone number checked above.   Thank you for the referral.     Physician Signature:________________________________Date:__________________  By signing above (or electronic signature), therapists plan is approved by physician    Date Range Of Visits: 12/2/20 - 12/28/20  Total Visits to Date: 8  Overall Response to Treatment:   [x]Patient is responding well to treatment and improvement is noted with regards  to goals   []Patient should continue to improve in reasonable time if they continue HEP   []Patient has plateaued and is no longer responding to skilled PT intervention    []Patient is getting worse and would benefit from return to referring MD   []Patient unable to adhere to initial POC SUBJECTIVE:  Patient states that her right shoulder still doing much better, only bothers her when it is cold, which she cannot figure out. Functionally she is doing great and reports significant improvement in her ability to elevate RUE and to reach behind her back, no loner struggling to perform aftercare after using the toilet. States that the left hip and general LLE weakness is now her most prominent concern, as it has never been the same since she had left hip hemiarthroplasty in January 2017. Has very little control of the left hip and LLE in general and has to rely on using her cane for balance, and even then the LLE is so shaky and weak that she looses her balance and at times will fall, which led to her right shoulder strain in the first place. She would very much like to continue rehabbing the right shoulder as needed but also focus on trying to improve her left hip function. OBJECTIVE:   ? Observation: Antalgic gait with standard cane used in RUE  ?  Test measurements:  Quick DASH - 27% impairment, LEFS - 80% impairment            ROM Left Right   Shoulder Flex 118 / 130 114 / 95   Shoulder Abd 100 / 112 100 / 95   Shoulder ER 90 85   Shoulder IR T8 / 50 L2, 45      Hip Flexion 110 WNL   Hip Abduction WNL WNL   Hip ER 20 WNL   Hip IR WNL WNL   Hip Ext Decreased WNL   Strength  Left Right   Shoulder Flex 4/5 3-/5   Shoulder Scap 4/5 3-5   Shoulder ER 4/5 3/5   Shoulder IR 4/5 3/5        Hip Abd 7.0 lb 9.0 lb   Hip Ext NT NT   Knee Ext 22.3 lb 38.2 lb   Knee Flex 21.7 lb  35.4 lb          RESTRICTIONS/PRECAUTIONS:     Exercises/Interventions:   Therapeutic Ex (97103)  Min: 20 Sets/sec Reps CUES/Notes         Bike 5'  Recumbent   Bridge 5 10 Orange band   SLR      SAQ 5 30 3#, full roll   Clam - side lying 5 10 No resistance   Supine clam - unilateral 10 10 Orange band, each   Prone hip ext - over table 5 10 0#, each   BOSU lunge   add               Leg press - SL   add Manual Intervention  (34540)  Min: 8      Shld /GH Mobs 0'  Gentle inferior glides   Post Cap mobs 0'  Prone LLLD IR   Thoracic/Rib manipualtion      CT MT/Mobs      PROM MT 8'  Flex, abd, ER, IR   IASTM 0'  R deltoid, bicep         NMR re-education (32901)  Min: 6      Biodex balance 6'  WS, PS   GH depress/compress      Scap/GH NMR      Body blade      Wall ball roll      Wall Ball bounce      Ball drops      Betty Scap Bio      Floor Snow angels-sliders            Therapeutic Activity (06326)  Min:      UE throwing porgression      Dynamic UE stability      Earthquake Bar      Bodyblade                Therapeutic Exercise and NMR EXR  [x] (79595) Provided verbal/tactile cueing for activities related to strengthening, flexibility, endurance, ROM  for improvements in scapular, scapulothoracic and UE control with self care, reaching, carrying, lifting, house/yardwork, driving/computer work. [x] (27607) Provided verbal/tactile cueing for activities related to improving balance, coordination, kinesthetic sense, posture, motor skill, proprioception  to assist with  scapular, scapulothoracic and UE control with self care, reaching, carrying, lifting, house/yardwork, driving/computer work. Therapeutic Activities:    [] (02100 or 25858) Provided verbal/tactile cueing for activities related to improving balance, coordination, kinesthetic sense, posture, motor skill, proprioception and motor activation to allow for proper function of scapular, scapulothoracic and UE control with self care, carrying, lifting, driving/computer work.      Home Exercise Program:    [x] (57833) Reviewed/Progressed HEP activities related to strengthening, flexibility, endurance, ROM of scapular, scapulothoracic and UE control with self care, reaching, carrying, lifting, house/yardwork, driving/computer work [] (21982) Reviewed/Progressed HEP activities related to improving balance, coordination, kinesthetic sense, posture, motor skill, proprioception of scapular, scapulothoracic and UE control with self care, reaching, carrying, lifting, house/yardwork, driving/computer work      Manual Treatments:  PROM / STM / Oscillations-Mobs:  G-I, II, III, IV (PA's, Inf., Post.)  [x] (75861) Provided manual therapy to mobilize soft tissue/joints of cervical/CT, scapular GHJ and UE for the purpose of modulating pain, promoting relaxation,  increasing ROM, reducing/eliminating soft tissue swelling/inflammation/restriction, improving soft tissue extensibility and allowing for proper ROM for normal function with self care, reaching, carrying, lifting, house/yardwork, driving/computer work    Modalities:  None    Charges:  Timed Code Treatment Minutes: 34   Total Treatment Minutes: 49       [] EVAL (LOW) 08817 (typically 20 minutes face-to-face)  [] EVAL (MOD) 98448 (typically 30 minutes face-to-face)  [] EVAL (HIGH) 83923 (typically 45 minutes face-to-face)  [x] RE-EVAL     [x] LS(89079) x 1     [] DRY NEEDLE 1 OR 2 MUSCLES  [] NMR (70474) x     [] DRY NEEDLE 3+ MUSCLES  [x] Manual (22573) x 1      [] TA (67302) x     [] Mech Traction (00251)  [] ES(attended) (75789)     [] ES (un) (46532):   [] VASO (21159)  [] Other:    If [facility] Please Indicate Time In/Out  CPT Code Time in Time out                                   SHOULDER GOALS:  Patient stated goal: pain-free shoulder, return to PLOF, ambulate without cane  [x] Progressing: [] Met: [] Not Met: [] Adjusted    Therapist goals for Patient:   Short Term Goals: To be achieved in: 2 weeks  1. Independent in HEP and progression per patient tolerance, in order to prevent re-injury. [] Progressing: [x] Met: [] Not Met: [] Adjusted  2. Patient will have a decrease in pain to facilitate improvement in movement, function, and ADLs as indicated by Functional Deficits. [] Progressing: [x] Met: [] Not Met: [] Adjusted    Long Term Goals: To be achieved in: 6 weeks  1. Disability index score of 25% or less for the Quick DASH to assist with reaching prior level of function. [x] Progressing: [] Met: [] Not Met: [] Adjusted  2. Patient will demonstrate increased AROM to equal contralateral to allow for proper joint functioning as indicated by Functional Deficits. [x] Progressing: [] Met: [] Not Met: [] Adjusted  3. Patient will demonstrate an increase in NM recruitment/activation and overall GH and scapular strength to within n5lbs HHD or WNL for proper functional mobility as indicated by patients Functional Deficits. [x] Progressing: [] Met: [] Not Met: [] Adjusted  4. Patient will return to reaching, lifting, overhead movements, self care especially after using toilet, dressing, grooming activities without increased symptoms or restriction. [x] Progressing: [] Met: [] Not Met: [] Adjusted       HIP GOALS:  Patient stated goal: strengthen LLE, ambulate without cane for short distances  [] Progressing: [] Met: [] Not Met: [] Adjusted    Therapist goals for Patient:   Short Term Goals: To be achieved in: 2 weeks  1. Independent in HEP and progression per patient tolerance, in order to prevent re-injury. []?? Progressing: []?? Met: []?? Not Met: []?? Adjusted  2. Patient will have a decrease in pain to facilitate improvement in movement, function, and ADLs as indicated by Functional Deficits. []?? Progressing: []?? Met: []?? Not Met: []?? Adjusted     Long Term Goals: To be achieved in: 4-6 weeks  1. Disability index score of 40% or less for the LEFS to assist with reaching prior level of function.   []?? Progressing: []?? Met: []?? Not Met: []?? Adjusted  2. Patient will demonstrate increased AROM to WFL to allow for proper joint functioning as indicated by patients Functional Deficits.    []?? Progressing: []?? Met: []?? Not Met: []?? Adjusted 3. Patient will demonstrate an increase in Strength to good proximal hip strength and control, within 5lb HHD in LE to allow for proper functional mobility as indicated by patients Functional Deficits. []?? Progressing: []?? Met: []?? Not Met: []?? Adjusted  4. Patient will return to standing, ambulation (with AD as needed), transfers, stairs, squatting without increased symptoms or restriction. [] Progressing: [] Met: [] Not Met: [] Adjusted      ASSESSMENT:  Patient has attended 8 physical therapy visits from 12/2/20 through 12/28/20 for treatment of right shoulder adhesive capsulitis and shoulder strain. Patient is making excellent progress with regard to right shoulder, at least 80% perceived improvement and ROM/strength nearly rivaling uninvolved side at this point. However, patient has long-standing history of chronic left hip dysfunction, fell in January 2017 and had left hip hemiarthroplasty with course of physical therapy afterward but states that her hip never really gained full return to [facility] and has actually been steadily declining, which seems to be large contributor to frequent falls recently. Patient presents with decreased hip ER PROM as well as significant lack of proximal hip control and generalized LLE weakness. Patient would benefit from continued physical therapy to right shoulder with additional focus on left hip rehabilitation and balance training to improve patient's overall function and reduce risk of falls.       Return to Play: (if applicable)   []  Stage 1: Intro to Strength   []  Stage 2: Dynamic Strength and Intro to Plyometrics   []  Stage 3: Advanced Plyometrics and Intro to Throwing   []  Stage 4: Sport specific Training/Return to Sport     []  Ready to Return to Play, [employer]   []  Not Ready for Return to Sports   Comments:      Treatment/Activity Tolerance:  [x] Patient tolerated treatment well [] Patient limited by dian [] Patient limited by pain  [] Patient limited by other medical complications  [] Other:     Overall Progression Towards Functional goals/ Treatment Progress Update:  [x] Patient is progressing as expected towards functional goals listed. - R shoulder  [] Progression is slowed due to complexities/Impairments listed. [] Progression has been slowed due to co-morbidities. [] Plan just implemented, too soon to assess goals progression <30days   [] Goals require adjustment due to lack of progress  [] Patient is not progressing as expected and requires additional follow up with physician  [x] Other: Plan just implemented for left hip rehabilitation, goals added    Prognosis for POC: [x] Good [] Fair  [] Poor    Patient requires continued skilled intervention: [x] Yes  [] No      PLAN: Continue PT 2x/week for 4-6 weeks for shoulder strengthening/ROM as needed, with addition of left hip rehabilitation to focus on ROM, proximal hip strengthening, general LLE strength training, and balance training  [] Continue per plan of care [x] Alter current plan (see comments)  [] Plan of care initiated [] Hold pending MD visit [] Discharge    Electronically signed by: Raman Gross PT     Note: If patient does not return for scheduled/recommended follow up visits, this note will serve as a discharge from care along with the most recent update on progress.

## 2020-12-30 ENCOUNTER — HOSPITAL ENCOUNTER (OUTPATIENT)
Dept: PHYSICAL THERAPY | Age: 75
Setting detail: THERAPIES SERIES
Discharge: HOME | End: 2020-12-30
Payer: MEDICARE

## 2020-12-30 NOTE — FLOWSHEET NOTE
[facility]    Physical Therapy  Cancellation/No-show Note  Patient Name:  Iggy Houser  :  1945   Date:  2020  Cancelled visits to date: 2  No-shows to date: 0    For today's appointment patient:  [x]  Cancelled  []  Rescheduled appointment  []  No-show     Reason given by patient:  []  Patient ill  [x]  Conflicting appointment - mix up with scheduled appointment time, unable to stay   []  No transportation    []  Conflict with work  []  No reason given  []  Other:     Comments:      Electronically signed by:  Acosta Crespo PTA

## 2021-01-04 ENCOUNTER — HOSPITAL ENCOUNTER (OUTPATIENT)
Dept: PHYSICAL THERAPY | Age: 76
Setting detail: THERAPIES SERIES
Discharge: HOME | End: 2021-01-04
Payer: MEDICARE

## 2021-01-04 PROCEDURE — 97110 THERAPEUTIC EXERCISES: CPT

## 2021-01-04 PROCEDURE — 97140 MANUAL THERAPY 1/> REGIONS: CPT

## 2021-01-04 NOTE — FLOWSHEET NOTE
[address] [place], [place] [address] [place]  Phone: [phone] Fax: [phone]        Physical Therapy Treatment Note/ Progress Report:     Date:  2021    Patient Name:  Baljinder Limon    :  1945  MRN: [id number]  Restrictions/Precautions:    Medical/Treatment Diagnosis Information:  · Diagnosis: Right shoulder adhesive capsulitis, left hip pain  · Treatment Diagnosis: M75.01, L18.447  Insurance/Certification information:  PT Insurance Information: Medicare / [facility]  Physician Information:  Referring Practitioner: Kylie Amanda of care signed (Y/N):     Date of Patient follow up with Physician: N/A      Progress Report: [x]  Yes  [x]  No     Date Range for reporting period:  Beginnin20  Ending:  NA    Progress report due (10 Rx/or 30 days whichever is less): 3/5/89     Recertification due (POC duration/ or 90 days whichever is less): 20     Visit # Insurance Allowable Auth Needed   9 Medicare []Yes    []No     Pain level:  0/10 currently, 1-2/10 upon waking     SUBJECTIVE:  Patient reports that she was able to cross the left leg over the right this morning to put on her sock, though she couldn't keep it there for long. LLE still feels very weak but she does feel that her ROM is improving. No issues with last session. States that her shoulder is still doing pretty well, just a little stiff in the morning but works out as she uses it throughout the day. OBJECTIVE:   ? Observation: Antalgic gait with standard cane used in RUE  ?  Test measurements:  Quick DASH - 27% impairment, LEFS - 80% impairment      RESTRICTIONS/PRECAUTIONS:     Exercises/Interventions:   Therapeutic Ex (19634)  Min: 28 Sets/sec Reps CUES/Notes         Bike 5'  Recumbent   Bridge 5 10 Orange band   SLR      SAQ 5 30 3#, full roll   Clam - side lying 5 10 No resistance   Prone hip ext - over table 5 10 0#, each   BOSU lunge   add Supine TrA March 1 10    Slant board gastroc stretch      Sit to stand 2 10 airex on seat, sink for support   Leg press - SL   add         Manual Intervention  (76022)  Min: 8      Shld /GH Mobs 0'  Gentle inferior glides   Post Cap mobs 0'  Prone LLLD IR   Thoracic/Rib manipualtion      CT MT/Mobs      PROM MT 8'  Flex, abd, ER, IR   IASTM 0'  R deltoid, bicep         NMR re-education (41393)  Min: 6      Biodex balance 6'  LS x 2   GH depress/compress      Scap/GH NMR      Body blade      Wall ball roll      Wall Ball bounce      Ball drops      Betty Scap Bio      Floor Snow angels-sliders            Therapeutic Activity (11756)  Min:      UE throwing porgression      Dynamic UE stability      Earthquake Bar      Bodyblade                Therapeutic Exercise and NMR EXR  [x] (16065) Provided verbal/tactile cueing for activities related to strengthening, flexibility, endurance, ROM  for improvements in scapular, scapulothoracic and UE control with self care, reaching, carrying, lifting, house/yardwork, driving/computer work. [x] (49134) Provided verbal/tactile cueing for activities related to improving balance, coordination, kinesthetic sense, posture, motor skill, proprioception  to assist with  scapular, scapulothoracic and UE control with self care, reaching, carrying, lifting, house/yardwork, driving/computer work. Therapeutic Activities:    [] (14454 or 32639) Provided verbal/tactile cueing for activities related to improving balance, coordination, kinesthetic sense, posture, motor skill, proprioception and motor activation to allow for proper function of scapular, scapulothoracic and UE control with self care, carrying, lifting, driving/computer work.      Home Exercise Program: [x] (34736) Reviewed/Progressed HEP activities related to strengthening, flexibility, endurance, ROM of scapular, scapulothoracic and UE control with self care, reaching, carrying, lifting, house/yardwork, driving/computer work  [] (15954) Reviewed/Progressed HEP activities related to improving balance, coordination, kinesthetic sense, posture, motor skill, proprioception of scapular, scapulothoracic and UE control with self care, reaching, carrying, lifting, house/yardwork, driving/computer work      Manual Treatments:  PROM / STM / Oscillations-Mobs:  G-I, II, III, IV (PA's, Inf., Post.)  [x] (91097) Provided manual therapy to mobilize soft tissue/joints of cervical/CT, scapular GHJ and UE for the purpose of modulating pain, promoting relaxation,  increasing ROM, reducing/eliminating soft tissue swelling/inflammation/restriction, improving soft tissue extensibility and allowing for proper ROM for normal function with self care, reaching, carrying, lifting, house/yardwork, driving/computer work    Modalities:  None    Charges:  Timed Code Treatment Minutes: 42   Total Treatment Minutes: 42       [] EVAL (LOW) 96397 (typically 20 minutes face-to-face)  [] EVAL (MOD) 18095 (typically 30 minutes face-to-face)  [] EVAL (HIGH) 83588 (typically 45 minutes face-to-face)  [] RE-EVAL     [x] PG(24288) x 2     [] DRY NEEDLE 1 OR 2 MUSCLES  [] NMR (63297) x     [] DRY NEEDLE 3+ MUSCLES  [x] Manual (33323) x 1      [] TA (76640) x     [] [facility] (39534)  [] ES(attended) (87831)     [] ES (un) (33054):   [] VASO (50380)  [] Other:    If [facility] Please Indicate Time In/Out  CPT Code Time in Time out                                   SHOULDER GOALS:  Patient stated goal: pain-free shoulder, return to PLOF, ambulate without cane  [x] Progressing: [] Met: [] Not Met: [] Adjusted    Therapist goals for Patient:   Short Term Goals:  To be achieved in: 2 weeks 1. Independent in HEP and progression per patient tolerance, in order to prevent re-injury. [] Progressing: [x] Met: [] Not Met: [] Adjusted  2. Patient will have a decrease in pain to facilitate improvement in movement, function, and ADLs as indicated by Functional Deficits. [] Progressing: [x] Met: [] Not Met: [] Adjusted    Long Term Goals: To be achieved in: 6 weeks  1. Disability index score of 25% or less for the Quick DASH to assist with reaching prior level of function. [x] Progressing: [] Met: [] Not Met: [] Adjusted  2. Patient will demonstrate increased AROM to equal contralateral to allow for proper joint functioning as indicated by Functional Deficits. [x] Progressing: [] Met: [] Not Met: [] Adjusted  3. Patient will demonstrate an increase in NM recruitment/activation and overall GH and scapular strength to within n5lbs HHD or WNL for proper functional mobility as indicated by patients Functional Deficits. [x] Progressing: [] Met: [] Not Met: [] Adjusted  4. Patient will return to reaching, lifting, overhead movements, self care especially after using toilet, dressing, grooming activities without increased symptoms or restriction. [x] Progressing: [] Met: [] Not Met: [] Adjusted       HIP GOALS:  Patient stated goal: strengthen LLE, ambulate without cane for short distances  [] Progressing: [] Met: [] Not Met: [] Adjusted    Therapist goals for Patient:   Short Term Goals: To be achieved in: 2 weeks  1. Independent in HEP and progression per patient tolerance, in order to prevent re-injury. []?? Progressing: []?? Met: []?? Not Met: []?? Adjusted  2. Patient will have a decrease in pain to facilitate improvement in movement, function, and ADLs as indicated by Functional Deficits. []?? Progressing: []?? Met: []?? Not Met: []?? Adjusted     Long Term Goals: To be achieved in: 4-6 weeks  1. Disability index score of 40% or less for the LEFS to assist with reaching prior level of function. []?? Progressing: []?? Met: []?? Not Met: []?? Adjusted  2. Patient will demonstrate increased AROM to WFL to allow for proper joint functioning as indicated by patients Functional Deficits. []?? Progressing: []?? Met: []?? Not Met: []?? Adjusted  3. Patient will demonstrate an increase in Strength to good proximal hip strength and control, within 5lb HHD in LE to allow for proper functional mobility as indicated by patients Functional Deficits. []?? Progressing: []?? Met: []?? Not Met: []?? Adjusted  4. Patient will return to standing, ambulation (with AD as needed), transfers, stairs, squatting without increased symptoms or restriction. [] Progressing: [] Met: [] Not Met: [] Adjusted      ASSESSMENT:  Appropriate fatigue at conclusion with no increase in pain. Patient actually quite fatigued by end of session particularly after chair sit exercise. Patient presents with decreased hip ER PROM as well as significant lack of proximal hip control and generalized LLE weakness. Patient would benefit from continued physical therapy to right shoulder with additional focus on left hip rehabilitation and balance training to improve patient's overall function and reduce risk of falls. Return to Play: (if applicable)   []  Stage 1: Intro to Strength   []  Stage 2: Dynamic Strength and Intro to Plyometrics   []  Stage 3: Advanced Plyometrics and Intro to Throwing   []  Stage 4: Sport specific Training/Return to Sport     []  Ready to Return to Play, [employer]   []  Not Ready for Return to Sports   Comments:      Treatment/Activity Tolerance:  [x] Patient tolerated treatment well [] Patient limited by fatique  [] Patient limited by pain  [] Patient limited by other medical complications  [] Other:     Overall Progression Towards Functional goals/ Treatment Progress Update:  [x] Patient is progressing as expected towards functional goals listed.  - R shoulder [] Progression is slowed due to complexities/Impairments listed. [] Progression has been slowed due to co-morbidities. [] Plan just implemented, too soon to assess goals progression <30days   [] Goals require adjustment due to lack of progress  [] Patient is not progressing as expected and requires additional follow up with physician  [x] Other: Plan just implemented for left hip rehabilitation, goals added    Prognosis for POC: [x] Good [] Fair  [] Poor    Patient requires continued skilled intervention: [x] Yes  [] No      PLAN: Continue PT 2x/week for 4-6 weeks for shoulder strengthening/ROM as needed, with addition of left hip rehabilitation to focus on ROM, proximal hip strengthening, general LLE strength training, and balance training  [x] Continue per plan of care [] Alter current plan (see comments)  [] Plan of care initiated [] Hold pending MD visit [] Discharge    Electronically signed by: Yamileth Conway PT     Note: If patient does not return for scheduled/recommended follow up visits, this note will serve as a discharge from care along with the most recent update on progress.

## 2021-01-06 ENCOUNTER — HOSPITAL ENCOUNTER (OUTPATIENT)
Dept: PHYSICAL THERAPY | Age: 76
Setting detail: THERAPIES SERIES
Discharge: HOME | End: 2021-01-06
Payer: MEDICARE

## 2021-01-06 PROCEDURE — 97110 THERAPEUTIC EXERCISES: CPT

## 2021-01-06 PROCEDURE — 97140 MANUAL THERAPY 1/> REGIONS: CPT

## 2021-01-06 NOTE — FLOWSHEET NOTE
[place] [address] [facility] [address]  Phone: [phone] Fax: [phone]        Physical Therapy Treatment Note/ Progress Report:     Date:  2021    Patient Name:  Yoana Mauro    :  1945  MRN: [id number]  Restrictions/Precautions:    Medical/Treatment Diagnosis Information:  · Diagnosis: Right shoulder adhesive capsulitis, left hip pain  · Treatment Diagnosis: M75.01, A85.177  Insurance/Certification information:  PT Insurance Information: Medicare / [facility]  Physician Information:  Referring Practitioner: Sharmaine Dunn of [facility] signed (Y/N):     Date of Patient follow up with Physician: N/A      Progress Report: [x]  Yes  [x]  No     Date Range for reporting period:  Beginnin20  Ending:  NA    Progress report due (10 Rx/or 30 days whichever is less): 3/95/47     Recertification due (POC duration/ or 90 days whichever is less): 21     Visit # Insurance Allowable Auth Needed   10 Medicare []Yes    [x]No     Pain level:  0/10 currently, 1-2/10 upon waking     SUBJECTIVE:  Patient reports that her leg was very shaky for the rest of the day following her last session and even into the next day. Feels that was definitely a sign of getting a good workout. She did not have any increased pain, and today she is feeling less shaky. Wants to keep pushing herself to get stronger. OBJECTIVE:   ? Observation: Antalgic gait with standard cane used in RUE  ?  Test measurements:  Quick DASH - 27% impairment, LEFS - 80% impairment      RESTRICTIONS/PRECAUTIONS:     Exercises/Interventions:   Therapeutic Ex (04599)  Min: 36 Sets/sec Reps CUES/Notes         Bike 5'  Recumbent   Bridge 5 10 Orange band   SLR      SAQ 5 30 4#, full roll   Clam - side lying 5 10 No resistance   Prone hip ext - over table 5 10 0#, each   BOSU lunge   add   Supine TrA  10    Slant board gastroc stretch      TKE 5 15 3.5pl Side stepping 1 15 HHA, no resistance   Sit to stand 2 10 airex on seat, sink for support   Leg press - SL   add         Manual Intervention  (12855)  Min: 8      Shld /GH Mobs 0'  Gentle inferior glides   Post Cap mobs 0'  Prone LLLD IR   Thoracic/Rib manipualtion      CT MT/Mobs      PROM MT 8'  Flex, abd, ER, IR   IASTM 0'  R deltoid, bicep         NMR re-education (37884)  Min: 0      Biodex balance 0'  LS x 2   GH depress/compress      Scap/GH NMR      Body blade      Wall ball roll      Wall Ball bounce      Ball drops      Betty Scap Bio      Floor Snow angels-sliders            Therapeutic Activity (57369)  Min:      UE throwing porgression      Dynamic UE stability      Earthquake Bar      Bodyblade                Therapeutic Exercise and NMR EXR  [x] (69887) Provided verbal/tactile cueing for activities related to strengthening, flexibility, endurance, ROM  for improvements in scapular, scapulothoracic and UE control with self care, reaching, carrying, lifting, house/yardwork, driving/computer work. [x] (73182) Provided verbal/tactile cueing for activities related to improving balance, coordination, kinesthetic sense, posture, motor skill, proprioception  to assist with  scapular, scapulothoracic and UE control with self care, reaching, carrying, lifting, house/yardwork, driving/computer work. Therapeutic Activities:    [] (89957 or 87981) Provided verbal/tactile cueing for activities related to improving balance, coordination, kinesthetic sense, posture, motor skill, proprioception and motor activation to allow for proper function of scapular, scapulothoracic and UE control with self care, carrying, lifting, driving/computer work.      Home Exercise Program:    [x] (48430) Reviewed/Progressed HEP activities related to strengthening, flexibility, endurance, ROM of scapular, scapulothoracic and UE control with self care, reaching, carrying, lifting, house/yardwork, driving/computer work [] (54748) Reviewed/Progressed HEP activities related to improving balance, coordination, kinesthetic sense, posture, motor skill, proprioception of scapular, scapulothoracic and UE control with self care, reaching, carrying, lifting, house/yardwork, driving/computer work      Manual Treatments:  PROM / STM / Oscillations-Mobs:  G-I, II, III, IV (PA's, Inf., Post.)  [x] (30520) Provided manual therapy to mobilize soft tissue/joints of cervical/CT, scapular GHJ and UE for the purpose of modulating pain, promoting relaxation,  increasing ROM, reducing/eliminating soft tissue swelling/inflammation/restriction, improving soft tissue extensibility and allowing for proper ROM for normal function with self care, reaching, carrying, lifting, house/yardwork, driving/computer work    Modalities:  None    Charges:  Timed Code Treatment Minutes: 44   Total Treatment Minutes: 44       [] EVAL (LOW) 54699 (typically 20 minutes face-to-face)  [] EVAL (MOD) 81060 (typically 30 minutes face-to-face)  [] EVAL (HIGH) 69447 (typically 45 minutes face-to-face)  [] RE-EVAL     [x] VY(86605) x 2     [] DRY NEEDLE 1 OR 2 MUSCLES  [] NMR (38010) x     [] DRY NEEDLE 3+ MUSCLES  [x] Manual (67013) x 1      [] TA (70022) x     [] Mech Traction (75495)  [] ES(attended) (17769)     [] ES (un) (00698):   [] VASO (56950)  [] Other:    If [facility] Please Indicate Time In/Out  CPT Code Time in Time out                                   SHOULDER GOALS:  Patient stated goal: pain-free shoulder, return to PLOF, ambulate without cane  [x] Progressing: [] Met: [] Not Met: [] Adjusted    Therapist goals for Patient:   Short Term Goals: To be achieved in: 2 weeks  1. Independent in HEP and progression per patient tolerance, in order to prevent re-injury. [] Progressing: [x] Met: [] Not Met: [] Adjusted  2. Patient will have a decrease in pain to facilitate improvement in movement, function, and ADLs as indicated by Functional Deficits. [] Progressing: [x] Met: [] Not Met: [] Adjusted    Long Term Goals: To be achieved in: 6 weeks  1. Disability index score of 25% or less for the Quick DASH to assist with reaching prior level of function. [x] Progressing: [] Met: [] Not Met: [] Adjusted  2. Patient will demonstrate increased AROM to equal contralateral to allow for proper joint functioning as indicated by Functional Deficits. [x] Progressing: [] Met: [] Not Met: [] Adjusted  3. Patient will demonstrate an increase in NM recruitment/activation and overall GH and scapular strength to within n5lbs HHD or WNL for proper functional mobility as indicated by patients Functional Deficits. [x] Progressing: [] Met: [] Not Met: [] Adjusted  4. Patient will return to reaching, lifting, overhead movements, self care especially after using toilet, dressing, grooming activities without increased symptoms or restriction. [x] Progressing: [] Met: [] Not Met: [] Adjusted       HIP GOALS:  Patient stated goal: strengthen LLE, ambulate without cane for short distances  [] Progressing: [] Met: [] Not Met: [] Adjusted    Therapist goals for Patient:   Short Term Goals: To be achieved in: 2 weeks  1. Independent in HEP and progression per patient tolerance, in order to prevent re-injury. []?? Progressing: []?? Met: []?? Not Met: []?? Adjusted  2. Patient will have a decrease in pain to facilitate improvement in movement, function, and ADLs as indicated by Functional Deficits. []?? Progressing: []?? Met: []?? Not Met: []?? Adjusted     Long Term Goals: To be achieved in: 4-6 weeks  1. Disability index score of 40% or less for the LEFS to assist with reaching prior level of function.   []?? Progressing: []?? Met: []?? Not Met: []?? Adjusted  2. Patient will demonstrate increased AROM to WFL to allow for proper joint functioning as indicated by patients Functional Deficits.    []?? Progressing: []?? Met: []?? Not Met: []?? Adjusted 3. Patient will demonstrate an increase in Strength to good proximal hip strength and control, within 5lb HHD in LE to allow for proper functional mobility as indicated by patients Functional Deficits. []?? Progressing: []?? Met: []?? Not Met: []?? Adjusted  4. Patient will return to standing, ambulation (with AD as needed), transfers, stairs, squatting without increased symptoms or restriction. [] Progressing: [] Met: [] Not Met: [] Adjusted      ASSESSMENT:  Appropriate fatigue at conclusion with no increase in pain. Patient actually quite fatigued by end of session particularly after TKE. Patient presents with decreased hip ER PROM as well as significant lack of proximal hip control and generalized LLE weakness. Patient would benefit from continued physical therapy to right shoulder with additional focus on left hip rehabilitation and balance training to improve patient's overall function and reduce risk of falls. Return to Play: (if applicable)   []  Stage 1: Intro to Strength   []  Stage 2: Dynamic Strength and Intro to Plyometrics   []  Stage 3: Advanced Plyometrics and Intro to Throwing   []  Stage 4: Sport specific Training/Return to Sport     []  Ready to Return to Play, [employer]   []  Not Ready for Return to Sports   Comments:      Treatment/Activity Tolerance:  [x] Patient tolerated treatment well [] Patient limited by fatique  [] Patient limited by pain  [] Patient limited by other medical complications  [] Other:     Overall Progression Towards Functional goals/ Treatment Progress Update:  [x] Patient is progressing as expected towards functional goals listed. - R shoulder  [] Progression is slowed due to complexities/Impairments listed. [] Progression has been slowed due to co-morbidities.   [] Plan just implemented, too soon to assess goals progression <30days   [] Goals require adjustment due to lack of progress [] Patient is not progressing as expected and requires additional follow up with physician  [x] Other: Plan just implemented for left hip rehabilitation, goals added    Prognosis for POC: [x] Good [] Fair  [] Poor    Patient requires continued skilled intervention: [x] Yes  [] No      PLAN: Continue PT 2x/week for 4-6 weeks for shoulder strengthening/ROM as needed, with addition of left hip rehabilitation to focus on ROM, proximal hip strengthening, general LLE strength training, and balance training  [x] Continue per plan of care [] Alter current plan (see comments)  [] Plan of care initiated [] Hold pending MD visit [] Discharge    Electronically signed by: Julissa Reaves PT     Note: If patient does not return for scheduled/recommended follow up visits, this note will serve as a discharge from care along with the most recent update on progress.

## 2021-01-11 ENCOUNTER — HOSPITAL ENCOUNTER (OUTPATIENT)
Dept: PHYSICAL THERAPY | Age: 76
Setting detail: THERAPIES SERIES
Discharge: HOME | End: 2021-01-11
Payer: MEDICARE

## 2021-01-11 PROCEDURE — 97110 THERAPEUTIC EXERCISES: CPT

## 2021-01-11 NOTE — FLOWSHEET NOTE
[place] [address] [facility] [address]  Phone: [phone] Fax: [phone]        Physical Therapy Treatment Note/ Progress Report:     Date:  2021    Patient Name:  Donny Gonzalez    :  1945  MRN: [id number]  Restrictions/Precautions:    Medical/Treatment Diagnosis Information:  · Diagnosis: Right shoulder adhesive capsulitis, left hip pain  · Treatment Diagnosis: M75.01, A13.713  Insurance/Certification information:  PT Insurance Information: Medicare / [facility]  Physician Information:  Referring Practitioner: Boo Ordonez of care signed (Y/N):     Date of Patient follow up with Physician: N/A      Progress Report: [x]  Yes  [x]  No     Date Range for reporting period:  Beginnin20  Ending:  NA    Progress report due (10 Rx/or 30 days whichever is less): 32     Recertification due (POC duration/ or 90 days whichever is less): 21     Visit # Insurance Allowable Auth Needed   11 Medicare []Yes    [x]No     Pain level:  0/10 currently, 1-2/10 upon waking     SUBJECTIVE:  Patient states that she is able to cross her left leg over her right now. She continues to note that the left leg gets very shaky with prolonged use. No increased pain following last session, just increased fatigue. OBJECTIVE:   ? Observation: Antalgic gait with standard cane used in RUE  ?  Test measurements:  Quick DASH - 27% impairment, LEFS - 80% impairment      RESTRICTIONS/PRECAUTIONS:     Exercises/Interventions:   Therapeutic Ex (04254)  Min: 40 Sets/sec Reps CUES/Notes         Bike 5'  Recumbent   Bridge 5 10 Orange band   SLR      SAQ 5 30 4#, full roll   Clam - side lying 5 10 No resistance   BOSU lunge   add   Slide lunge - retro at ledge 1 12 Manual cues for L knee   Standing hip abd 2 10 2# eachm    Supine TrA  10    Slant board gastroc stretch      TKE 5 15 3.5pl   Side stepping 1 15 HHA, orange above knees Sit to stand 2 10 airex on seat, sink for support   Leg press - SL   add         Manual Intervention  (01826)  Min: 5      Shld /GH Mobs 0'  Gentle inferior glides   Post Cap mobs 0'  Prone LLLD IR   Thoracic/Rib manipualtion      CT MT/Mobs      PROM MT 5'  Flex, abd, ER, IR   IASTM 0'  R deltoid, bicep         NMR re-education (90815)  Min: 0      Biodex balance 0'  LS x 2   GH depress/compress      Scap/GH NMR      Body blade      Wall ball roll      Wall Ball bounce      Ball drops      Betty Scap Bio      Floor Snow angels-sliders            Therapeutic Activity (73985)  Min:      UE throwing porgression      Dynamic UE stability      Earthquake Bar      Bodyblade                Therapeutic Exercise and NMR EXR  [x] (80220) Provided verbal/tactile cueing for activities related to strengthening, flexibility, endurance, ROM  for improvements in scapular, scapulothoracic and UE control with self care, reaching, carrying, lifting, house/yardwork, driving/computer work. [x] (73004) Provided verbal/tactile cueing for activities related to improving balance, coordination, kinesthetic sense, posture, motor skill, proprioception  to assist with  scapular, scapulothoracic and UE control with self care, reaching, carrying, lifting, house/yardwork, driving/computer work. Therapeutic Activities:    [] (85882 or 28585) Provided verbal/tactile cueing for activities related to improving balance, coordination, kinesthetic sense, posture, motor skill, proprioception and motor activation to allow for proper function of scapular, scapulothoracic and UE control with self care, carrying, lifting, driving/computer work.      Home Exercise Program:    [x] (72858) Reviewed/Progressed HEP activities related to strengthening, flexibility, endurance, ROM of scapular, scapulothoracic and UE control with self care, reaching, carrying, lifting, house/yardwork, driving/computer work [] (08239) Reviewed/Progressed HEP activities related to improving balance, coordination, kinesthetic sense, posture, motor skill, proprioception of scapular, scapulothoracic and UE control with self care, reaching, carrying, lifting, house/yardwork, driving/computer work      Manual Treatments:  PROM / STM / Oscillations-Mobs:  G-I, II, III, IV (PA's, Inf., Post.)  [x] (26082) Provided manual therapy to mobilize soft tissue/joints of cervical/CT, scapular GHJ and UE for the purpose of modulating pain, promoting relaxation,  increasing ROM, reducing/eliminating soft tissue swelling/inflammation/restriction, improving soft tissue extensibility and allowing for proper ROM for normal function with self care, reaching, carrying, lifting, house/yardwork, driving/computer work    Modalities:  None    Charges:  Timed Code Treatment Minutes: 45   Total Treatment Minutes: 45       [] EVAL (LOW) 76390 (typically 20 minutes face-to-face)  [] EVAL (MOD) 99688 (typically 30 minutes face-to-face)  [] EVAL (HIGH) 59344 (typically 45 minutes face-to-face)  [] RE-EVAL     [x] MORROW(38489) x 3     [] DRY NEEDLE 1 OR 2 MUSCLES  [] NMR (03097) x     [] DRY NEEDLE 3+ MUSCLES  [] Manual (62425) x       [] TA (15017) x     [] Mech Traction (00990)  [] ES(attended) (03669)     [] ES (un) (25836):   [] VASO (87964)  [] Other:    If [facility] Please Indicate Time In/Out  CPT Code Time in Time out                                   SHOULDER GOALS:  Patient stated goal: pain-free shoulder, return to PLOF, ambulate without cane  [x] Progressing: [] Met: [] Not Met: [] Adjusted    Therapist goals for Patient:   Short Term Goals: To be achieved in: 2 weeks  1. Independent in HEP and progression per patient tolerance, in order to prevent re-injury. [] Progressing: [x] Met: [] Not Met: [] Adjusted  2. Patient will have a decrease in pain to facilitate improvement in movement, function, and ADLs as indicated by Functional Deficits. [] Patient is not progressing as expected and requires additional follow up with physician  [x] Other: Plan just implemented for left hip rehabilitation, goals added    Prognosis for POC: [x] Good [] Fair  [] Poor    Patient requires continued skilled intervention: [x] Yes  [] No      PLAN: Continue PT 2x/week for 4-6 weeks for shoulder strengthening/ROM as needed, with addition of left hip rehabilitation to focus on ROM, proximal hip strengthening, general LLE strength training, and balance training  [x] Continue per plan of care [] Alter current plan (see comments)  [] Plan of care initiated [] Hold pending MD visit [] Discharge    Electronically signed by: Barb Nunez PT     Note: If patient does not return for scheduled/recommended follow up visits, this note will serve as a discharge from care along with the most recent update on progress.

## 2021-01-13 ENCOUNTER — HOSPITAL ENCOUNTER (OUTPATIENT)
Dept: PHYSICAL THERAPY | Age: 76
Setting detail: THERAPIES SERIES
Discharge: HOME | End: 2021-01-13
Payer: MEDICARE

## 2021-01-13 PROCEDURE — 97140 MANUAL THERAPY 1/> REGIONS: CPT

## 2021-01-13 PROCEDURE — 97110 THERAPEUTIC EXERCISES: CPT

## 2021-01-13 NOTE — FLOWSHEET NOTE
[address] [place], [place] [address]Yash  Phone: [phone] Fax: [phone]        Physical Therapy Treatment Note/ Progress Report:     Date:  2021    Patient Name:  Brittani Ayala    :  1945  MRN: [id number]  Restrictions/Precautions:    Medical/Treatment Diagnosis Information:  · Diagnosis: Right shoulder adhesive capsulitis, left hip pain  · Treatment Diagnosis: M75.01, B53.349  Insurance/Certification information:  PT Insurance Information: Medicare / [facility]  Physician Information:  Referring Practitioner: Riri Mcgee of care signed (Y/N):     Date of Patient follow up with Physician: N/A      Progress Report: [x]  Yes  [x]  No     Date Range for reporting period:  Beginnin20  Ending:  NA    Progress report due (10 Rx/or 30 days whichever is less):      Recertification due (POC duration/ or 90 days whichever is less): 21     Visit # Insurance Allowable Auth Needed   12 Medicare []Yes    [x]No     Pain level:  0/10 currently, 1-2/10 upon waking     SUBJECTIVE:  Patient reports that her shoulder is more stiff today because she was lifting boxes of pennies yesterday and thinks that all the lifting aggravated it. She feels it would be a good idea to address the shoulder a little bit with some manual today. No complaints from last session, feels that her LLE does continue to get stronger. OBJECTIVE:   ? Observation: Antalgic gait with standard cane used in RUE  ?  Test measurements:  Quick DASH - 27% impairment, LEFS - 80% impairment      RESTRICTIONS/PRECAUTIONS:     Exercises/Interventions:   Therapeutic Ex (14120)  Min: 30 Sets/sec Reps CUES/Notes   UBE 4'  retro         Bike 5'  Recumbent   Bridge 10 12    SLR      SAQ 5 30 4#, full roll   Clam - side lying 5 10 No resistance   BOSU lunge   add   Slide lunge - retro at ledge 1 12 Manual cues for L knee   Standing hip abd 2 10 2# eachm Supine TrA March 1 10    Slant board gastroc stretch      TKE 5 15 3.5pl   Side stepping 2 15 HHA, orange above knees   Sit to stand 2 10 2 airex on seat, sink for support   Step up 1 15 4-6\"   Standing hip 3-way kicks 2 10 Purple band for TKE   On 2\" step   Leg press - SL   add         Manual Intervention  (10685)  Min: 15      Shld /GH Mobs 0'  Gentle inferior glides   Post Cap mobs 0'  Prone LLLD IR   Thoracic/Rib manipualtion      CT MT/Mobs      PROM MT - shoulder 7'  Flex, abd, ER, IR   IASTM 8'  R deltoid, bicep         NMR re-education (05098)  Min: 0      Biodex balance 0'  LS x 2   GH depress/compress      Scap/GH NMR      Body blade      Wall ball roll      Wall Ball bounce      Ball drops      Betty Scap Bio      Floor Snow angels-sliders            Therapeutic Activity (52067)  Min:      UE throwing porgression      Dynamic UE stability      Earthquake Bar      Bodyblade                Therapeutic Exercise and NMR EXR  [x] (12222) Provided verbal/tactile cueing for activities related to strengthening, flexibility, endurance, ROM  for improvements in scapular, scapulothoracic and UE control with self care, reaching, carrying, lifting, house/yardwork, driving/computer work. [x] (37729) Provided verbal/tactile cueing for activities related to improving balance, coordination, kinesthetic sense, posture, motor skill, proprioception  to assist with  scapular, scapulothoracic and UE control with self care, reaching, carrying, lifting, house/yardwork, driving/computer work. Therapeutic Activities:    [] (51332 or 00416) Provided verbal/tactile cueing for activities related to improving balance, coordination, kinesthetic sense, posture, motor skill, proprioception and motor activation to allow for proper function of scapular, scapulothoracic and UE control with self care, carrying, lifting, driving/computer work.      Home Exercise Program: [x] (40739) Reviewed/Progressed HEP activities related to strengthening, flexibility, endurance, ROM of scapular, scapulothoracic and UE control with self care, reaching, carrying, lifting, house/yardwork, driving/computer work  [] (19991) Reviewed/Progressed HEP activities related to improving balance, coordination, kinesthetic sense, posture, motor skill, proprioception of scapular, scapulothoracic and UE control with self care, reaching, carrying, lifting, house/yardwork, driving/computer work      Manual Treatments:  PROM / STM / Oscillations-Mobs:  G-I, II, III, IV (PA's, Inf., Post.)  [x] (57517) Provided manual therapy to mobilize soft tissue/joints of cervical/CT, scapular GHJ and UE for the purpose of modulating pain, promoting relaxation,  increasing ROM, reducing/eliminating soft tissue swelling/inflammation/restriction, improving soft tissue extensibility and allowing for proper ROM for normal function with self care, reaching, carrying, lifting, house/yardwork, driving/computer work    Modalities:  None    Charges:  Timed Code Treatment Minutes: 45   Total Treatment Minutes: 45       [] EVAL (LOW) 36963 (typically 20 minutes face-to-face)  [] EVAL (MOD) 57252 (typically 30 minutes face-to-face)  [] EVAL (HIGH) 49893 (typically 45 minutes face-to-face)  [] RE-EVAL     [x] TL(15732) x 2     [] DRY NEEDLE 1 OR 2 MUSCLES  [] NMR (96771) x     [] DRY NEEDLE 3+ MUSCLES  [x] Manual (02071) x 1      [] TA (45504) x     [] Mech Traction (48762)  [] ES(attended) (01823)     [] ES (un) (06139):   [] VASO (19987)  [] Other:    If BWC Please Indicate Time In/Out  CPT Code Time in Time out                                   SHOULDER GOALS:  Patient stated goal: pain-free shoulder, return to PLOF, ambulate without cane  [x] Progressing: [] Met: [] Not Met: [] Adjusted    Therapist goals for Patient:   Short Term Goals:  To be achieved in: 2 weeks 1. Independent in HEP and progression per patient tolerance, in order to prevent re-injury. [] Progressing: [x] Met: [] Not Met: [] Adjusted  2. Patient will have a decrease in pain to facilitate improvement in movement, function, and ADLs as indicated by Functional Deficits. [] Progressing: [x] Met: [] Not Met: [] Adjusted    Long Term Goals: To be achieved in: 6 weeks  1. Disability index score of 25% or less for the Quick DASH to assist with reaching prior level of function. [x] Progressing: [] Met: [] Not Met: [] Adjusted  2. Patient will demonstrate increased AROM to equal contralateral to allow for proper joint functioning as indicated by Functional Deficits. [x] Progressing: [] Met: [] Not Met: [] Adjusted  3. Patient will demonstrate an increase in NM recruitment/activation and overall GH and scapular strength to within n5lbs HHD or WNL for proper functional mobility as indicated by patients Functional Deficits. [x] Progressing: [] Met: [] Not Met: [] Adjusted  4. Patient will return to reaching, lifting, overhead movements, self care especially after using toilet, dressing, grooming activities without increased symptoms or restriction. [x] Progressing: [] Met: [] Not Met: [] Adjusted       HIP GOALS:  Patient stated goal: strengthen LLE, ambulate without cane for short distances  [] Progressing: [] Met: [] Not Met: [] Adjusted    Therapist goals for Patient:   Short Term Goals: To be achieved in: 2 weeks  1. Independent in HEP and progression per patient tolerance, in order to prevent re-injury. []?? Progressing: []?? Met: []?? Not Met: []?? Adjusted  2. Patient will have a decrease in pain to facilitate improvement in movement, function, and ADLs as indicated by Functional Deficits. []?? Progressing: []?? Met: []?? Not Met: []?? Adjusted     Long Term Goals: To be achieved in: 4-6 weeks  1. Disability index score of 40% or less for the LEFS to assist with reaching prior level of function. [] Progression is slowed due to complexities/Impairments listed. [] Progression has been slowed due to co-morbidities. [] Plan just implemented, too soon to assess goals progression <30days   [] Goals require adjustment due to lack of progress  [] Patient is not progressing as expected and requires additional follow up with physician  [x] Other: Plan just implemented for left hip rehabilitation, goals added    Prognosis for POC: [x] Good [] Fair  [] Poor    Patient requires continued skilled intervention: [x] Yes  [] No      PLAN: Continue PT 2x/week for 4-6 weeks for shoulder strengthening/ROM as needed, with addition of left hip rehabilitation to focus on ROM, proximal hip strengthening, general LLE strength training, and balance training  [x] Continue per plan of care [] Alter current plan (see comments)  [] Plan of care initiated [] Hold pending MD visit [] Discharge    Electronically signed by: Rosemarie Melvin PT     Note: If patient does not return for scheduled/recommended follow up visits, this note will serve as a discharge from care along with the most recent update on progress.

## 2021-01-18 ENCOUNTER — HOSPITAL ENCOUNTER (OUTPATIENT)
Dept: PHYSICAL THERAPY | Age: 76
Setting detail: THERAPIES SERIES
Discharge: HOME | End: 2021-01-18
Payer: MEDICARE

## 2021-01-18 NOTE — FLOWSHEET NOTE
[address], [place] Office    Physical Therapy  Cancellation/No-show Note  Patient Name:  Brianda Walters  :  1945   Date:  2021  Cancelled visits to date: 2  No-shows to date: 0    For today's appointment patient:  []  Cancelled  [x]  Rescheduled appointment  []  No-show     Reason given by patient:  []  Patient ill  []  Conflicting appointment   []  No transportation    []  Conflict with work  []  No reason given  [x]  Other:     Comments: Weather     Electronically signed by:  Liam Champion PTA

## 2021-01-20 ENCOUNTER — HOSPITAL ENCOUNTER (OUTPATIENT)
Dept: PHYSICAL THERAPY | Age: 76
Setting detail: THERAPIES SERIES
Discharge: HOME | End: 2021-01-20
Payer: MEDICARE

## 2021-01-20 PROCEDURE — 97110 THERAPEUTIC EXERCISES: CPT

## 2021-01-20 NOTE — FLOWSHEET NOTE
[address] [place] [address]  Phone: [phone] Fax: [phone]        Physical Therapy Treatment Note/ Progress Report:     Date:  2021    Patient Name:  Guzamn Stoll    :  1945  MRN: [id number]  Restrictions/Precautions:    Medical/Treatment Diagnosis Information:  · Diagnosis: Right shoulder adhesive capsulitis, left hip pain  · Treatment Diagnosis: M75.01, P54.245  Insurance/Certification information:  PT Insurance Information: Medicare / [facility]  Physician Information:  Referring Practitioner: Taisha Goodman of care signed (Y/N):     Date of Patient follow up with Physician: N/A      Progress Report: [x]  Yes  [x]  No     Date Range for reporting period:  Beginnin20  Ending:  NA    Progress report due (10 Rx/or 30 days whichever is less):      Recertification due (POC duration/ or 90 days whichever is less): 21     Visit # Insurance Allowable Auth Needed   13 Medicare []Yes    [x]No     Pain level:  0/10 currently     SUBJECTIVE:  Patient states that her shoulder bruised up a little after the manual therapy from last session, but since that time she has had absolutely no shoulder pain, even upon waking. Left lateral hip was a little sore afterward. Feels that she is getting a little stronger, walking a little more without her cane for short distances. OBJECTIVE:   ? Observation: Antalgic gait with standard cane used in RUE  ?  Test measurements:  Quick DASH - 27% impairment, LEFS - 80% impairment      RESTRICTIONS/PRECAUTIONS:     Exercises/Interventions:   Therapeutic Ex (33825)  Min: 40 Sets/sec Reps CUES/Notes   UBE 4'  retro         Bike 5'  Recumbent   Bridge 10 12    SLR      SAQ 5 30 4#, full roll   Clam - side lying 5 10 No resistance   Supine clam - unilateral 10 10 Maroon band, each   BOSU lunge   add   Slide lunge - retro at ledge 1 15 Manual cues for L knee Standing hip abd 2 10 orange   Supine TrA March 1 10    Slant board gastroc stretch      TKE 5 15 3.5pl   Side stepping 2 15 HHA, orange above knees   Sit to stand 2 10 1 airex on seat, sink for support   Step up 1 15 4-6\"   Standing hip 3-way kicks 2 10 Purple band for TKE   On 2\" step   Leg press - SL   add         Manual Intervention  (81048)  Min: 5      Shld /GH Mobs 0'  Gentle inferior glides   Post Cap mobs 0'  Prone LLLD IR   Thoracic/Rib manipualtion      Hip PROM 5'     PROM MT - shoulder 0'  Flex, abd, ER, IR   IASTM 0'  R deltoid, bicep         NMR re-education (24100)  Min: 0      Biodex balance 0'  LS x 2   GH depress/compress      Scap/GH NMR      Body blade      Wall ball roll      Wall Ball bounce      Ball drops      Betty Scap Bio      Floor Snow angels-sliders            Therapeutic Activity (07339)  Min:      UE throwing porgression      Dynamic UE stability      Earthquake Bar      Bodyblade                Therapeutic Exercise and NMR EXR  [x] (15917) Provided verbal/tactile cueing for activities related to strengthening, flexibility, endurance, ROM  for improvements in scapular, scapulothoracic and UE control with self care, reaching, carrying, lifting, house/yardwork, driving/computer work. [x] (05497) Provided verbal/tactile cueing for activities related to improving balance, coordination, kinesthetic sense, posture, motor skill, proprioception  to assist with  scapular, scapulothoracic and UE control with self care, reaching, carrying, lifting, house/yardwork, driving/computer work. Therapeutic Activities:    [] (22260 or 01198) Provided verbal/tactile cueing for activities related to improving balance, coordination, kinesthetic sense, posture, motor skill, proprioception and motor activation to allow for proper function of scapular, scapulothoracic and UE control with self care, carrying, lifting, driving/computer work.      Home Exercise Program: [x] (84744) Reviewed/Progressed HEP activities related to strengthening, flexibility, endurance, ROM of scapular, scapulothoracic and UE control with self care, reaching, carrying, lifting, house/yardwork, driving/computer work  [] (67167) Reviewed/Progressed HEP activities related to improving balance, coordination, kinesthetic sense, posture, motor skill, proprioception of scapular, scapulothoracic and UE control with self care, reaching, carrying, lifting, house/yardwork, driving/computer work      Manual Treatments:  PROM / STM / Oscillations-Mobs:  G-I, II, III, IV (PA's, Inf., Post.)  [x] (34823) Provided manual therapy to mobilize soft tissue/joints of cervical/CT, scapular GHJ and UE for the purpose of modulating pain, promoting relaxation,  increasing ROM, reducing/eliminating soft tissue swelling/inflammation/restriction, improving soft tissue extensibility and allowing for proper ROM for normal function with self care, reaching, carrying, lifting, house/yardwork, driving/computer work    Modalities:  None    Charges:  Timed Code Treatment Minutes: 45   Total Treatment Minutes: 45       [] EVAL (LOW) 60620 (typically 20 minutes face-to-face)  [] EVAL (MOD) 46429 (typically 30 minutes face-to-face)  [] EVAL (HIGH) 48530 (typically 45 minutes face-to-face)  [] RE-EVAL     [x] FO(72771) x 3     [] DRY NEEDLE 1 OR 2 MUSCLES  [] NMR (90753) x     [] DRY NEEDLE 3+ MUSCLES  [] Manual (26033) x       [] TA (72161) x     [] [facility] (89560)  [] ES(attended) (17382)     [] ES (un) (53726):   [] VASO (69953)  [] Other:    If BWC Please Indicate Time In/Out  CPT Code Time in Time out                                   SHOULDER GOALS:  Patient stated goal: pain-free shoulder, return to PLOF, ambulate without cane  [x] Progressing: [] Met: [] Not Met: [] Adjusted    Therapist goals for Patient:   Short Term Goals:  To be achieved in: 2 weeks 1. Independent in HEP and progression per patient tolerance, in order to prevent re-injury. [] Progressing: [x] Met: [] Not Met: [] Adjusted  2. Patient will have a decrease in pain to facilitate improvement in movement, function, and ADLs as indicated by Functional Deficits. [] Progressing: [x] Met: [] Not Met: [] Adjusted    Long Term Goals: To be achieved in: 6 weeks  1. Disability index score of 25% or less for the Quick DASH to assist with reaching prior level of function. [x] Progressing: [] Met: [] Not Met: [] Adjusted  2. Patient will demonstrate increased AROM to equal contralateral to allow for proper joint functioning as indicated by Functional Deficits. [x] Progressing: [] Met: [] Not Met: [] Adjusted  3. Patient will demonstrate an increase in NM recruitment/activation and overall GH and scapular strength to within n5lbs HHD or WNL for proper functional mobility as indicated by patients Functional Deficits. [x] Progressing: [] Met: [] Not Met: [] Adjusted  4. Patient will return to reaching, lifting, overhead movements, self care especially after using toilet, dressing, grooming activities without increased symptoms or restriction. [x] Progressing: [] Met: [] Not Met: [] Adjusted       HIP GOALS:  Patient stated goal: strengthen LLE, ambulate without cane for short distances  [] Progressing: [] Met: [] Not Met: [] Adjusted    Therapist goals for Patient:   Short Term Goals: To be achieved in: 2 weeks  1. Independent in HEP and progression per patient tolerance, in order to prevent re-injury. []?? Progressing: []?? Met: []?? Not Met: []?? Adjusted  2. Patient will have a decrease in pain to facilitate improvement in movement, function, and ADLs as indicated by Functional Deficits. []?? Progressing: []?? Met: []?? Not Met: []?? Adjusted     Long Term Goals: To be achieved in: 4-6 weeks  1. Disability index score of 40% or less for the LEFS to assist with reaching prior level of function. []?? Progressing: []?? Met: []?? Not Met: []?? Adjusted  2. Patient will demonstrate increased AROM to WFL to allow for proper joint functioning as indicated by patients Functional Deficits. []?? Progressing: []?? Met: []?? Not Met: []?? Adjusted  3. Patient will demonstrate an increase in Strength to good proximal hip strength and control, within 5lb HHD in LE to allow for proper functional mobility as indicated by patients Functional Deficits. []?? Progressing: []?? Met: []?? Not Met: []?? Adjusted  4. Patient will return to standing, ambulation (with AD as needed), transfers, stairs, squatting without increased symptoms or restriction. [] Progressing: [] Met: [] Not Met: [] Adjusted      ASSESSMENT:  Appropriate LLE fatigue at conclusion with no increase in pain. Increased shakiness of LLE as patient fatigues with worsening TKE control. Patient presents with decreased hip ER PROM as well as significant lack of proximal hip control and generalized LLE weakness. Patient would benefit from continued physical therapy to right shoulder as needed with greater focus on left hip rehabilitation and balance training to improve patient's overall function and reduce risk of falls. Return to Play: (if applicable)   []  Stage 1: Intro to Strength   []  Stage 2: Dynamic Strength and Intro to Plyometrics   []  Stage 3: Advanced Plyometrics and Intro to Throwing   []  Stage 4: Sport specific Training/Return to Sport     []  Ready to Return to Play, [employer]   []  Not Ready for Return to Sports   Comments:      Treatment/Activity Tolerance:  [x] Patient tolerated treatment well [] Patient limited by fatique  [] Patient limited by pain  [] Patient limited by other medical complications  [] Other:     Overall Progression Towards Functional goals/ Treatment Progress Update:  [x] Patient is progressing as expected towards functional goals listed.  - R shoulder [] Progression is slowed due to complexities/Impairments listed. [] Progression has been slowed due to co-morbidities. [] Plan just implemented, too soon to assess goals progression <30days   [] Goals require adjustment due to lack of progress  [] Patient is not progressing as expected and requires additional follow up with physician  [x] Other: Plan just implemented for left hip rehabilitation, goals added    Prognosis for POC: [x] Good [] Fair  [] Poor    Patient requires continued skilled intervention: [x] Yes  [] No      PLAN: Continue PT 2x/week for 4-6 weeks for shoulder strengthening/ROM as needed, with addition of left hip rehabilitation to focus on ROM, proximal hip strengthening, general LLE strength training, and balance training  [x] Continue per plan of care [] Alter current plan (see comments)  [] Plan of care initiated [] Hold pending MD visit [] Discharge    Electronically signed by: Liam Champion PT     Note: If patient does not return for scheduled/recommended follow up visits, this note will serve as a discharge from care along with the most recent update on progress.

## 2021-01-22 ENCOUNTER — HOSPITAL ENCOUNTER (OUTPATIENT)
Dept: PHYSICAL THERAPY | Age: 76
Setting detail: THERAPIES SERIES
Discharge: HOME | End: 2021-01-22
Payer: MEDICARE

## 2021-01-22 ENCOUNTER — NURSE TRIAGE (OUTPATIENT)
Dept: OTHER | Facility: CLINIC | Age: 76
End: 2021-01-22

## 2021-01-22 NOTE — TELEPHONE ENCOUNTER
schedule appointment. Attention Provider: Thank you for allowing me to participate in the care of your patient. The  patient was connected to triage in response to information provided to the [facility]. Please do not respond through this encounter as the response is not directed to a shared pool. Patient reports mild cold symptoms: Nasal congestion and dry cough. She also reports some broken blood vessels in her left eye. Recommended home care and encouraged patient to call back with ANY new or worsening symptoms. Provided care advice.

## 2021-01-22 NOTE — FLOWSHEET NOTE
[address], [place] Office    Physical Therapy  Cancellation/No-show Note  Patient Name:  Aziza Valadez  :  1945   Date:  2021  Cancelled visits to date: 4  No-shows to date: 0    For today's appointment patient:  []  Cancelled  [x]  Rescheduled appointment  []  No-show     Reason given by patient:  [x]  Patient ill  []  Conflicting appointment   []  No transportation    []  Conflict with work  []  No reason given  []  Other:     Comments:     Electronically signed by:  Eladio Yanes PT

## 2021-01-25 ENCOUNTER — APPOINTMENT (OUTPATIENT)
Dept: PHYSICAL THERAPY | Age: 76
End: 2021-01-25
Payer: MEDICARE

## 2021-01-26 ENCOUNTER — TELEPHONE (OUTPATIENT)
Dept: INTERNAL MEDICINE CLINIC | Age: 76
End: 2021-01-26

## 2021-01-26 NOTE — TELEPHONE ENCOUNTER
I am not sure who told her to use saline for her blood shot eyes but as long as the drops say ophthalmic on them, it would be OK to use by following instructions on the bottle. Kane Antoine

## 2021-01-26 NOTE — TELEPHONE ENCOUNTER
Pt calling was told to use saline for blood shot eyes---couldn't get so the pharmacist suggested Sodium Chloride Hypertonicigy---she wasn't sure about this stuff so calling here to see if ok to use ---please call pt. Thanks.

## 2021-01-27 ENCOUNTER — HOSPITAL ENCOUNTER (OUTPATIENT)
Dept: PHYSICAL THERAPY | Age: 76
Setting detail: THERAPIES SERIES
Discharge: HOME | End: 2021-01-27
Payer: MEDICARE

## 2021-01-27 PROCEDURE — 97110 THERAPEUTIC EXERCISES: CPT

## 2021-01-27 NOTE — FLOWSHEET NOTE
[facility] [address] [address] [place]  Phone: [phone] Fax: [phone]        Physical Therapy Treatment Note/ Progress Report:     Date:  2021    Patient Name:  Hannah Velasquez    :  1945  MRN: [id number]  Restrictions/Precautions:    Medical/Treatment Diagnosis Information:  · Diagnosis: Right shoulder adhesive capsulitis, left hip pain  · Treatment Diagnosis: M75.01, D77.463  Insurance/Certification information:  PT Insurance Information: Medicare / [facility]  Physician Information:  Referring Practitioner: Nadege Schmidt of care signed (Y/N):     Date of Patient follow up with Physician: N/A      Progress Report: [x]  Yes  [x]  No     Date Range for reporting period:  Beginnin20  Ending:  NA    Progress report due (10 Rx/or 30 days whichever is less):      Recertification due (POC duration/ or 90 days whichever is less): 21     Visit # Insurance Allowable Auth Needed   14 Medicare []Yes    [x]No     Pain level:  0/10 currently     SUBJECTIVE:  Patient notes that some days she feels progress in balance and strength and other days not so much. Does feel that it is on the whole better than when she started PT though. She is using her cane less and less around the house. Continues to report no pain in the shoulder. OBJECTIVE:   ? Observation: Antalgic gait with standard cane used in RUE  ?  Test measurements:  Quick DASH - 27% impairment, LEFS - 80% impairment      RESTRICTIONS/PRECAUTIONS:     Exercises/Interventions:   Therapeutic Ex (82098)  Min: 40 Sets/sec Reps CUES/Notes   UBE 4'  retro         Bike 5'  Recumbent   Bridge 10 12    SLR      SAQ 5 30 4#, full roll   Clam - side lying 5 10 No resistance   Supine clam - unilateral 10 10 Maroon band, each   BOSU lunge   add   Slide lunge - retro at ledge 1 15 Manual cues for L knee   Standing hip abd 2 10 orange   Supine TrA March 1 10 [x] (17073) Reviewed/Progressed HEP activities related to strengthening, flexibility, endurance, ROM of scapular, scapulothoracic and UE control with self care, reaching, carrying, lifting, house/yardwork, driving/computer work  [] (44346) Reviewed/Progressed HEP activities related to improving balance, coordination, kinesthetic sense, posture, motor skill, proprioception of scapular, scapulothoracic and UE control with self care, reaching, carrying, lifting, house/yardwork, driving/computer work      Manual Treatments:  PROM / STM / Oscillations-Mobs:  G-I, II, III, IV (PA's, Inf., Post.)  [x] (35918) Provided manual therapy to mobilize soft tissue/joints of cervical/CT, scapular GHJ and UE for the purpose of modulating pain, promoting relaxation,  increasing ROM, reducing/eliminating soft tissue swelling/inflammation/restriction, improving soft tissue extensibility and allowing for proper ROM for normal function with self care, reaching, carrying, lifting, house/yardwork, driving/computer work    Modalities:  None    Charges:  Timed Code Treatment Minutes: 45   Total Treatment Minutes: 45       [] EVAL (LOW) 21695 (typically 20 minutes face-to-face)  [] EVAL (MOD) 98538 (typically 30 minutes face-to-face)  [] EVAL (HIGH) 54441 (typically 45 minutes face-to-face)  [] RE-EVAL     [x] OK(42999) x 3     [] DRY NEEDLE 1 OR 2 MUSCLES  [] NMR (61012) x     [] DRY NEEDLE 3+ MUSCLES  [] Manual (15270) x       [] TA (13358) x     [] Mech Traction (94399)  [] ES(attended) (54460)     [] ES (un) (70170):   [] VASO (91551)  [] Other:    If BW Please Indicate Time In/Out  CPT Code Time in Time out                                   SHOULDER GOALS:  Patient stated goal: pain-free shoulder, return to PLOF, ambulate without cane  [x] Progressing: [] Met: [] Not Met: [] Adjusted    Therapist goals for Patient:   Short Term Goals:  To be achieved in: 2 weeks 1. Independent in HEP and progression per patient tolerance, in order to prevent re-injury. [] Progressing: [x] Met: [] Not Met: [] Adjusted  2. Patient will have a decrease in pain to facilitate improvement in movement, function, and ADLs as indicated by Functional Deficits. [] Progressing: [x] Met: [] Not Met: [] Adjusted    Long Term Goals: To be achieved in: 6 weeks  1. Disability index score of 25% or less for the Quick DASH to assist with reaching prior level of function. [x] Progressing: [] Met: [] Not Met: [] Adjusted  2. Patient will demonstrate increased AROM to equal contralateral to allow for proper joint functioning as indicated by Functional Deficits. [x] Progressing: [] Met: [] Not Met: [] Adjusted  3. Patient will demonstrate an increase in NM recruitment/activation and overall GH and scapular strength to within n5lbs HHD or WNL for proper functional mobility as indicated by patients Functional Deficits. [x] Progressing: [] Met: [] Not Met: [] Adjusted  4. Patient will return to reaching, lifting, overhead movements, self care especially after using toilet, dressing, grooming activities without increased symptoms or restriction. [x] Progressing: [] Met: [] Not Met: [] Adjusted       HIP GOALS:  Patient stated goal: strengthen LLE, ambulate without cane for short distances  [] Progressing: [] Met: [] Not Met: [] Adjusted    Therapist goals for Patient:   Short Term Goals: To be achieved in: 2 weeks  1. Independent in HEP and progression per patient tolerance, in order to prevent re-injury. []?? Progressing: []?? Met: []?? Not Met: []?? Adjusted  2. Patient will have a decrease in pain to facilitate improvement in movement, function, and ADLs as indicated by Functional Deficits. []?? Progressing: []?? Met: []?? Not Met: []?? Adjusted     Long Term Goals: To be achieved in: 4-6 weeks  1. Disability index score of 40% or less for the LEFS to assist with reaching prior level of function. []?? Progressing: []?? Met: []?? Not Met: []?? Adjusted  2. Patient will demonstrate increased AROM to WFL to allow for proper joint functioning as indicated by patients Functional Deficits. []?? Progressing: []?? Met: []?? Not Met: []?? Adjusted  3. Patient will demonstrate an increase in Strength to good proximal hip strength and control, within 5lb HHD in LE to allow for proper functional mobility as indicated by patients Functional Deficits. []?? Progressing: []?? Met: []?? Not Met: []?? Adjusted  4. Patient will return to standing, ambulation (with AD as needed), transfers, stairs, squatting without increased symptoms or restriction. [] Progressing: [] Met: [] Not Met: [] Adjusted      ASSESSMENT:  Appropriate LLE fatigue at conclusion with no increase in pain. Increased shakiness of LLE as patient fatigues with worsening TKE control. Patient presents with decreased hip ER PROM as well as significant lack of proximal hip control and generalized LLE weakness. Patient would benefit from continued physical therapy to right shoulder as needed with greater focus on left hip rehabilitation and balance training to improve patient's overall function and reduce risk of falls. Return to Play: (if applicable)   []  Stage 1: Intro to Strength   []  Stage 2: Dynamic Strength and Intro to Plyometrics   []  Stage 3: Advanced Plyometrics and Intro to Throwing   []  Stage 4: Sport specific Training/Return to Sport     []  Ready to Return to Play, [employer]   []  Not Ready for Return to Sports   Comments:      Treatment/Activity Tolerance:  [x] Patient tolerated treatment well [] Patient limited by fatique  [] Patient limited by pain  [] Patient limited by other medical complications  [] Other:     Overall Progression Towards Functional goals/ Treatment Progress Update:  [x] Patient is progressing as expected towards functional goals listed.  - R shoulder [] Progression is slowed due to complexities/Impairments listed. [] Progression has been slowed due to co-morbidities. [] Plan just implemented, too soon to assess goals progression <30days   [] Goals require adjustment due to lack of progress  [] Patient is not progressing as expected and requires additional follow up with physician  [x] Other: Plan just implemented for left hip rehabilitation, goals added    Prognosis for POC: [x] Good [] Fair  [] Poor    Patient requires continued skilled intervention: [x] Yes  [] No      PLAN: Continue PT 2x/week for 4-6 weeks for shoulder strengthening/ROM as needed, with addition of left hip rehabilitation to focus on ROM, proximal hip strengthening, general LLE strength training, and balance training  [x] Continue per plan of care [] Alter current plan (see comments)  [] Plan of care initiated [] Hold pending MD visit [] Discharge    Electronically signed by: Zari Bear PT     Note: If patient does not return for scheduled/recommended follow up visits, this note will serve as a discharge from care along with the most recent update on progress.

## 2021-01-29 ENCOUNTER — HOSPITAL ENCOUNTER (OUTPATIENT)
Dept: PHYSICAL THERAPY | Age: 76
Setting detail: THERAPIES SERIES
Discharge: HOME | End: 2021-01-29
Payer: MEDICARE

## 2021-01-29 PROCEDURE — 97110 THERAPEUTIC EXERCISES: CPT

## 2021-01-29 NOTE — FLOWSHEET NOTE
[address] [place] [address]  Phone: [phone] Fax: [phone]        Physical Therapy Treatment Note/ Progress Report:     Date:  2021    Patient Name:  Codie Mcnamara    :  1945  MRN: [id number]  Restrictions/Precautions:    Medical/Treatment Diagnosis Information:  · Diagnosis: Right shoulder adhesive capsulitis, left hip pain  · Treatment Diagnosis: M75.01, F11.688  Insurance/Certification information:  PT Insurance Information: Medicare / [facility]  Physician Information:  Referring Practitioner: Doretha Crew of care signed (Y/N):     Date of Patient follow up with Physician: N/A      Progress Report: []  Yes  [x]  No     Date Range for reporting period:  Beginnin20  Ending:  NA    Progress report due (10 Rx/or 30 days whichever is less): 70     Recertification due (POC duration/ or 90 days whichever is less): 21     Visit # Insurance Allowable Auth Needed   15 Medicare []Yes    [x]No     Pain level:  0/10 currently     SUBJECTIVE:  Patient has no new complaints since last session, felt that last session was a good workout. Notes that she is a little smoother with her revolutions on the bike now. OBJECTIVE:   ? Observation: Antalgic gait with standard cane used in RUE  ?  Test measurements:  Quick DASH - 27% impairment, LEFS - 80% impairment      RESTRICTIONS/PRECAUTIONS:     Exercises/Interventions:   Therapeutic Ex (66380)  Min: 40 Sets/sec Reps CUES/Notes   UBE 4'  retro         Bike 5'  Recumbent   Bridge 10 12    SLR      SAQ 5 30 4#, full roll   Clam - side lying 5 10 No resistance   Supine clam - unilateral 10 10 Maroon band, each   BOSU lunge   add   Slide lunge - retro at ledge 1 15 Manual cues for L knee   Standing hip abd 2 10 orange   Supine TrA  10    Slant board gastroc stretch      TKE 5 15 3.5pl   Side stepping 2 15 HHA, orange above knees Sit to stand 2 10 1 airex on seat, sink for support   Step up 1 15 4-6\"   Lateral step down 2 10 4\"   Standing hip 3-way kicks 5 10 Purple band for TKE   On 2\" step   Wobble board A/P, circles 1 10 DL, standing   Leg press - SL 3 10 40# S7         Manual Intervention  (85485)  Min: 0      Shld /GH Mobs 0'  Gentle inferior glides   Post Cap mobs 0'  Prone LLLD IR   Thoracic/Rib manipualtion      Hip PROM 5'     PROM MT - shoulder 0'  Flex, abd, ER, IR   IASTM 0'  R deltoid, bicep         NMR re-education (03832)  Min: 0      Biodex balance 0'  LS x 2   GH depress/compress      Scap/GH NMR      Body blade      Wall ball roll      Wall Ball bounce      Ball drops      Betty Scap Bio      Floor Snow angels-sliders            Therapeutic Activity (07248)  Min:      UE throwing porgression      Dynamic UE stability      Earthquake Bar      Bodyblade                Therapeutic Exercise and NMR EXR  [x] (80378) Provided verbal/tactile cueing for activities related to strengthening, flexibility, endurance, ROM  for improvements in scapular, scapulothoracic and UE control with self care, reaching, carrying, lifting, house/yardwork, driving/computer work. [x] (68161) Provided verbal/tactile cueing for activities related to improving balance, coordination, kinesthetic sense, posture, motor skill, proprioception  to assist with  scapular, scapulothoracic and UE control with self care, reaching, carrying, lifting, house/yardwork, driving/computer work. Therapeutic Activities:    [] (02659 or 52846) Provided verbal/tactile cueing for activities related to improving balance, coordination, kinesthetic sense, posture, motor skill, proprioception and motor activation to allow for proper function of scapular, scapulothoracic and UE control with self care, carrying, lifting, driving/computer work.      Home Exercise Program: [x] (24891) Reviewed/Progressed HEP activities related to strengthening, flexibility, endurance, ROM of scapular, scapulothoracic and UE control with self care, reaching, carrying, lifting, house/yardwork, driving/computer work  [] (61803) Reviewed/Progressed HEP activities related to improving balance, coordination, kinesthetic sense, posture, motor skill, proprioception of scapular, scapulothoracic and UE control with self care, reaching, carrying, lifting, house/yardwork, driving/computer work      Manual Treatments:  PROM / STM / Oscillations-Mobs:  G-I, II, III, IV (PA's, Inf., Post.)  [x] (43286) Provided manual therapy to mobilize soft tissue/joints of cervical/CT, scapular GHJ and UE for the purpose of modulating pain, promoting relaxation,  increasing ROM, reducing/eliminating soft tissue swelling/inflammation/restriction, improving soft tissue extensibility and allowing for proper ROM for normal function with self care, reaching, carrying, lifting, house/yardwork, driving/computer work    Modalities:  None    Charges:  Timed Code Treatment Minutes: 40   Total Treatment Minutes: 40       [] EVAL (LOW) 75824 (typically 20 minutes face-to-face)  [] EVAL (MOD) 22224 (typically 30 minutes face-to-face)  [] EVAL (HIGH) 62906 (typically 45 minutes face-to-face)  [] RE-EVAL     [x] SI(46186) x 3     [] DRY NEEDLE 1 OR 2 MUSCLES  [] NMR (65650) x     [] DRY NEEDLE 3+ MUSCLES  [] Manual (63629) x       [] TA (19410) x     [] Mech Traction (61802)  [] ES(attended) (65704)     [] ES (un) (79259):   [] VASO (84599)  [] Other:    If [facility] Please Indicate Time In/Out  CPT Code Time in Time out                                   SHOULDER GOALS:  Patient stated goal: pain-free shoulder, return to PLOF, ambulate without cane  [x] Progressing: [] Met: [] Not Met: [] Adjusted    Therapist goals for Patient:   Short Term Goals:  To be achieved in: 2 weeks 1. Independent in HEP and progression per patient tolerance, in order to prevent re-injury. [] Progressing: [x] Met: [] Not Met: [] Adjusted  2. Patient will have a decrease in pain to facilitate improvement in movement, function, and ADLs as indicated by Functional Deficits. [] Progressing: [x] Met: [] Not Met: [] Adjusted    Long Term Goals: To be achieved in: 6 weeks  1. Disability index score of 25% or less for the Quick DASH to assist with reaching prior level of function. [x] Progressing: [] Met: [] Not Met: [] Adjusted  2. Patient will demonstrate increased AROM to equal contralateral to allow for proper joint functioning as indicated by Functional Deficits. [x] Progressing: [] Met: [] Not Met: [] Adjusted  3. Patient will demonstrate an increase in NM recruitment/activation and overall GH and scapular strength to within n5lbs HHD or WNL for proper functional mobility as indicated by patients Functional Deficits. [x] Progressing: [] Met: [] Not Met: [] Adjusted  4. Patient will return to reaching, lifting, overhead movements, self care especially after using toilet, dressing, grooming activities without increased symptoms or restriction. [x] Progressing: [] Met: [] Not Met: [] Adjusted       HIP GOALS:  Patient stated goal: strengthen LLE, ambulate without cane for short distances  [] Progressing: [] Met: [] Not Met: [] Adjusted    Therapist goals for Patient:   Short Term Goals: To be achieved in: 2 weeks  1. Independent in HEP and progression per patient tolerance, in order to prevent re-injury. []?? Progressing: []?? Met: []?? Not Met: []?? Adjusted  2. Patient will have a decrease in pain to facilitate improvement in movement, function, and ADLs as indicated by Functional Deficits. []?? Progressing: []?? Met: []?? Not Met: []?? Adjusted     Long Term Goals: To be achieved in: 4-6 weeks  1. Disability index score of 40% or less for the LEFS to assist with reaching prior level of function. [x] Patient is progressing as expected towards functional goals listed. - R shoulder  [] Progression is slowed due to complexities/Impairments listed. [] Progression has been slowed due to co-morbidities. [] Plan just implemented, too soon to assess goals progression <30days   [] Goals require adjustment due to lack of progress  [] Patient is not progressing as expected and requires additional follow up with physician  [x] Other: Plan just implemented for left hip rehabilitation, goals added    Prognosis for POC: [x] Good [] Fair  [] Poor    Patient requires continued skilled intervention: [x] Yes  [] No      PLAN: Continue PT 2x/week for 4-6 weeks for shoulder strengthening/ROM as needed, with addition of left hip rehabilitation to focus on ROM, proximal hip strengthening, general LLE strength training, and balance training  [x] Continue per plan of care [] Alter current plan (see comments)  [] Plan of care initiated [] Hold pending MD visit [] Discharge    Electronically signed by: Bia Puga PT     Note: If patient does not return for scheduled/recommended follow up visits, this note will serve as a discharge from care along with the most recent update on progress.

## 2021-02-01 ENCOUNTER — APPOINTMENT (OUTPATIENT)
Dept: PHYSICAL THERAPY | Age: 76
End: 2021-02-01
Payer: MEDICARE

## 2021-02-03 ENCOUNTER — HOSPITAL ENCOUNTER (OUTPATIENT)
Dept: PHYSICAL THERAPY | Age: 76
Setting detail: THERAPIES SERIES
Discharge: HOME | End: 2021-02-03
Payer: MEDICARE

## 2021-02-03 PROCEDURE — 97110 THERAPEUTIC EXERCISES: CPT

## 2021-02-03 NOTE — FLOWSHEET NOTE
[place] [address] [address] [place]  Phone: [phone] Fax: [phone]        Physical Therapy Treatment Note/ Progress Report:     Date:  2/3/2021    Patient Name:  Hayley Valencia    :  1945  MRN: [id number]  Restrictions/Precautions:    Medical/Treatment Diagnosis Information:  · Diagnosis: Right shoulder adhesive capsulitis, left hip pain  · Treatment Diagnosis: M75.01, W54.348  Insurance/Certification information:  PT Insurance Information: Medicare / [facility]  Physician Information:  Referring Practitioner: Marilynn Records of care signed (Y/N):     Date of Patient follow up with Physician: N/A      Progress Report: []  Yes  [x]  No     Date Range for reporting period:  Beginnin20  Ending:  NA    Progress report due (10 Rx/or 30 days whichever is less):      Recertification due (POC duration/ or 90 days whichever is less): 21     Visit # Insurance Allowable Auth Needed   16 Medicare []Yes    [x]No     Pain level:  0/10 currently     SUBJECTIVE:  Patient states that she was a little sore/tired after last session but nothing other than muscle fatigue. Can tell that the left leg is getting stronger, but it still gets shaky when she is tired. OBJECTIVE:   ? Observation: Antalgic gait with standard cane used in RUE  ?  Test measurements:  Quick DASH - 27% impairment, LEFS - 80% impairment      RESTRICTIONS/PRECAUTIONS:     Exercises/Interventions:   Therapeutic Ex (12286)  Min: 40 Sets/sec Reps CUES/Notes   UBE 4'  retro         Bike 5'  Recumbent   Bridge 10 12    SLR      SAQ 5 30 4#, full roll   Clam - side lying 5 10 No resistance   Supine clam - unilateral 10 10 Maroon band, each   BOSU lunge   add   Slide lunge - retro at ledge 1 15 Manual cues for L knee   Standing hip abd 2 10 orange   Supine TrA  10    Slant board gastroc stretch      TKE 5 15 3.5pl Side stepping 2 15 HHA, orange at ankles   Sit to stand with TRX 2 10 1 airex on seat, sink for support   Step up 1 15 4-6\"   Lateral step down 2 10 4\"   Standing hip 3-way kicks 5 10 Purple band for TKE   On 2\" step   Wobble board A/P, circles 1 10 DL, standing   SLS - modified with plyo box 20 3    Leg press - SL 3 10 40# S7         Manual Intervention  (26209)  Min: 0      Shld /GH Mobs 0'  Gentle inferior glides   Post Cap mobs 0'  Prone LLLD IR   Thoracic/Rib manipualtion      Hip PROM 5'     PROM MT - shoulder 0'  Flex, abd, ER, IR   IASTM 0'  R deltoid, bicep         NMR re-education (23429)  Min: 0      Biodex balance 0'  LS x 2   GH depress/compress      Scap/GH NMR      Body blade      Wall ball roll      Wall Ball bounce      Ball drops      Betty Scap Bio      Floor Snow angels-sliders            Therapeutic Activity (05843)  Min:      UE throwing porgression      Dynamic UE stability      Earthquake Bar      Bodyblade                Therapeutic Exercise and NMR EXR  [x] (30428) Provided verbal/tactile cueing for activities related to strengthening, flexibility, endurance, ROM  for improvements in scapular, scapulothoracic and UE control with self care, reaching, carrying, lifting, house/yardwork, driving/computer work. [x] (81969) Provided verbal/tactile cueing for activities related to improving balance, coordination, kinesthetic sense, posture, motor skill, proprioception  to assist with  scapular, scapulothoracic and UE control with self care, reaching, carrying, lifting, house/yardwork, driving/computer work. Therapeutic Activities:    [] (04542 or 34664) Provided verbal/tactile cueing for activities related to improving balance, coordination, kinesthetic sense, posture, motor skill, proprioception and motor activation to allow for proper function of scapular, scapulothoracic and UE control with self care, carrying, lifting, driving/computer work.      Home Exercise Program: [x] (31243) Reviewed/Progressed HEP activities related to strengthening, flexibility, endurance, ROM of scapular, scapulothoracic and UE control with self care, reaching, carrying, lifting, house/yardwork, driving/computer work  [] (30848) Reviewed/Progressed HEP activities related to improving balance, coordination, kinesthetic sense, posture, motor skill, proprioception of scapular, scapulothoracic and UE control with self care, reaching, carrying, lifting, house/yardwork, driving/computer work      Manual Treatments:  PROM / STM / Oscillations-Mobs:  G-I, II, III, IV (PA's, Inf., Post.)  [x] (73640) Provided manual therapy to mobilize soft tissue/joints of cervical/CT, scapular GHJ and UE for the purpose of modulating pain, promoting relaxation,  increasing ROM, reducing/eliminating soft tissue swelling/inflammation/restriction, improving soft tissue extensibility and allowing for proper ROM for normal function with self care, reaching, carrying, lifting, house/yardwork, driving/computer work    Modalities:  None    Charges:  Timed Code Treatment Minutes: 40   Total Treatment Minutes: 40       [] EVAL (LOW) 88204 (typically 20 minutes face-to-face)  [] EVAL (MOD) 59184 (typically 30 minutes face-to-face)  [] EVAL (HIGH) 07848 (typically 45 minutes face-to-face)  [] RE-EVAL     [x] QG(45953) x 3     [] DRY NEEDLE 1 OR 2 MUSCLES  [] NMR (00298) x     [] DRY NEEDLE 3+ MUSCLES  [] Manual (61214) x       [] TA (55175) x     [] Mech Traction (78409)  [] ES(attended) (17834)     [] ES (un) (99887):   [] VASO (07459)  [] Other:    If BWC Please Indicate Time In/Out  CPT Code Time in Time out                                   SHOULDER GOALS:  Patient stated goal: pain-free shoulder, return to PLOF, ambulate without cane  [x] Progressing: [] Met: [] Not Met: [] Adjusted    Therapist goals for Patient:   Short Term Goals:  To be achieved in: 2 weeks []?? Progressing: []?? Met: []?? Not Met: []?? Adjusted  2. Patient will demonstrate increased AROM to WFL to allow for proper joint functioning as indicated by patients Functional Deficits. []?? Progressing: []?? Met: []?? Not Met: []?? Adjusted  3. Patient will demonstrate an increase in Strength to good proximal hip strength and control, within 5lb HHD in LE to allow for proper functional mobility as indicated by patients Functional Deficits. []?? Progressing: []?? Met: []?? Not Met: []?? Adjusted  4. Patient will return to standing, ambulation (with AD as needed), transfers, stairs, squatting without increased symptoms or restriction. [] Progressing: [] Met: [] Not Met: [] Adjusted      ASSESSMENT:  Appropriate LLE fatigue at conclusion with no increase in pain. Increased shakiness of LLE as patient fatigues with worsening TKE control, but patient taking a little longer to reach this level of fatigue now with each exercise. Patient presents with decreased hip ER PROM as well as significant lack of proximal hip control and generalized LLE weakness. Patient would benefit from continued physical therapy to right shoulder as needed with greater focus on left hip rehabilitation and balance training to improve patient's overall function and reduce risk of falls.       Return to Play: (if applicable)   []  Stage 1: Intro to Strength   []  Stage 2: Dynamic Strength and Intro to Plyometrics   []  Stage 3: Advanced Plyometrics and Intro to Throwing   []  Stage 4: Sport specific Training/Return to Sport     []  Ready to Return to Play, [employer]   []  Not Ready for Return to Sports   Comments:      Treatment/Activity Tolerance:  [x] Patient tolerated treatment well [] Patient limited by fatique  [] Patient limited by pain  [] Patient limited by other medical complications  [] Other:     Overall Progression Towards Functional goals/ Treatment Progress Update: [x] Patient is progressing as expected towards functional goals listed. - R shoulder  [] Progression is slowed due to complexities/Impairments listed. [] Progression has been slowed due to co-morbidities. [] Plan just implemented, too soon to assess goals progression <30days   [] Goals require adjustment due to lack of progress  [] Patient is not progressing as expected and requires additional follow up with physician  [x] Other: Plan just implemented for left hip rehabilitation, goals added    Prognosis for POC: [x] Good [] Fair  [] Poor    Patient requires continued skilled intervention: [x] Yes  [] No      PLAN: Continue PT 2x/week for 4-6 weeks for shoulder strengthening/ROM as needed, with addition of left hip rehabilitation to focus on ROM, proximal hip strengthening, general LLE strength training, and balance training  [x] Continue per plan of care [] Alter current plan (see comments)  [] Plan of care initiated [] Hold pending MD visit [] Discharge    Electronically signed by: Rosemarie Melvin, PT     Note: If patient does not return for scheduled/recommended follow up visits, this note will serve as a discharge from care along with the most recent update on progress.

## 2021-02-05 ENCOUNTER — HOSPITAL ENCOUNTER (OUTPATIENT)
Dept: PHYSICAL THERAPY | Age: 76
Setting detail: THERAPIES SERIES
Discharge: HOME | End: 2021-02-05
Payer: MEDICARE

## 2021-02-05 PROCEDURE — 97110 THERAPEUTIC EXERCISES: CPT

## 2021-02-05 NOTE — FLOWSHEET NOTE
[address] [place] [address] [place] [address]  Phone: [phone] Fax: [phone]        Physical Therapy Treatment Note/ Progress Report:     Date:  2021    Patient Name:  Zoe Rodas    :  1945  MRN: [id number]  Restrictions/Precautions:    Medical/Treatment Diagnosis Information:  · Diagnosis: Right shoulder adhesive capsulitis, left hip pain  · Treatment Diagnosis: M75.01, N42.416  Insurance/Certification information:  PT Insurance Information: Medicare / [facility]  Physician Information:  Referring Practitioner: Lyndsay Rendon of care signed (Y/N):     Date of Patient follow up with Physician: N/A      Progress Report: []  Yes  [x]  No     Date Range for reporting period:  Beginnin20  Ending:  NA    Progress report due (10 Rx/or 30 days whichever is less):      Recertification due (POC duration/ or 90 days whichever is less): 21     Visit # Insurance Allowable Auth Needed   17 Medicare []Yes    [x]No     Pain level:  0/10 currently     SUBJECTIVE:  Patient denies any increased pain following last session, just got a good workout. Can tell that she is starting to get stronger and more stable. OBJECTIVE:   ? Observation: Antalgic gait with standard cane used in RUE  ?  Test measurements:  Quick DASH - 27% impairment, LEFS - 80% impairment      RESTRICTIONS/PRECAUTIONS:     Exercises/Interventions:   Therapeutic Ex (46965)  Min: 40 Sets/sec Reps CUES/Notes   UBE 4'  retro         Bike 5'  Recumbent   Bridge 10 12    SLR      SAQ 5 30 4#, full roll   Clam - side lying 5 10 No resistance   Supine clam - unilateral 10 10 Maroon band, each   BOSU lunge   add   Slide lunge - retro at ledge 1 15 Manual cues for L knee   Standing hip abd 2 10 orange   Supine TrA  10    Slant board gastroc stretch      TKE 5 15 3.5pl   Side stepping 2 15 HHA, orange at ankles Sit to stand with TRX 2 10 1 airex on seat, sink for support   Step up 3 10 6\"   Lateral step down 2 10 4\"   Standing hip 3-way kicks 5 10 Purple band for TKE   On 2\" step   Wobble board A/P, circles 1 10 DL, standing   SLS - modified with plyo box 20 3    Leg press - SL 3 10 40# S7         Manual Intervention  (89695)  Min: 0      Shld /GH Mobs 0'  Gentle inferior glides   Post Cap mobs 0'  Prone LLLD IR   Thoracic/Rib manipualtion      Hip PROM 5'     PROM MT - shoulder 0'  Flex, abd, ER, IR   IASTM 0'  R deltoid, bicep         NMR re-education (51255)  Min: 0      Biodex balance 0'  LS x 2   GH depress/compress      Scap/GH NMR      Body blade      Wall ball roll      Wall Ball bounce      Ball drops      Betty Scap Bio      Floor Snow angels-sliders            Therapeutic Activity (39482)  Min:      UE throwing porgression      Dynamic UE stability      Earthquake Bar      Bodyblade                Therapeutic Exercise and NMR EXR  [x] (99993) Provided verbal/tactile cueing for activities related to strengthening, flexibility, endurance, ROM  for improvements in scapular, scapulothoracic and UE control with self care, reaching, carrying, lifting, house/yardwork, driving/computer work. [x] (40266) Provided verbal/tactile cueing for activities related to improving balance, coordination, kinesthetic sense, posture, motor skill, proprioception  to assist with  scapular, scapulothoracic and UE control with self care, reaching, carrying, lifting, house/yardwork, driving/computer work. Therapeutic Activities:    [] (95679 or 14311) Provided verbal/tactile cueing for activities related to improving balance, coordination, kinesthetic sense, posture, motor skill, proprioception and motor activation to allow for proper function of scapular, scapulothoracic and UE control with self care, carrying, lifting, driving/computer work.      Home Exercise Program: 1. Independent in HEP and progression per patient tolerance, in order to prevent re-injury. [] Progressing: [x] Met: [] Not Met: [] Adjusted  2. Patient will have a decrease in pain to facilitate improvement in movement, function, and ADLs as indicated by Functional Deficits. [] Progressing: [x] Met: [] Not Met: [] Adjusted    Long Term Goals: To be achieved in: 6 weeks  1. Disability index score of 25% or less for the Quick DASH to assist with reaching prior level of function. [x] Progressing: [] Met: [] Not Met: [] Adjusted  2. Patient will demonstrate increased AROM to equal contralateral to allow for proper joint functioning as indicated by Functional Deficits. [x] Progressing: [] Met: [] Not Met: [] Adjusted  3. Patient will demonstrate an increase in NM recruitment/activation and overall GH and scapular strength to within n5lbs HHD or WNL for proper functional mobility as indicated by patients Functional Deficits. [x] Progressing: [] Met: [] Not Met: [] Adjusted  4. Patient will return to reaching, lifting, overhead movements, self care especially after using toilet, dressing, grooming activities without increased symptoms or restriction. [x] Progressing: [] Met: [] Not Met: [] Adjusted       HIP GOALS:  Patient stated goal: strengthen LLE, ambulate without cane for short distances  [] Progressing: [] Met: [] Not Met: [] Adjusted    Therapist goals for Patient:   Short Term Goals: To be achieved in: 2 weeks  1. Independent in HEP and progression per patient tolerance, in order to prevent re-injury. []?? Progressing: []?? Met: []?? Not Met: []?? Adjusted  2. Patient will have a decrease in pain to facilitate improvement in movement, function, and ADLs as indicated by Functional Deficits. []?? Progressing: []?? Met: []?? Not Met: []?? Adjusted     Long Term Goals: To be achieved in: 4-6 weeks  1. Disability index score of 40% or less for the LEFS to assist with reaching prior level of function. []?? Progressing: []?? Met: []?? Not Met: []?? Adjusted  2. Patient will demonstrate increased AROM to WFL to allow for proper joint functioning as indicated by patients Functional Deficits. []?? Progressing: []?? Met: []?? Not Met: []?? Adjusted  3. Patient will demonstrate an increase in Strength to good proximal hip strength and control, within 5lb HHD in LE to allow for proper functional mobility as indicated by patients Functional Deficits. []?? Progressing: []?? Met: []?? Not Met: []?? Adjusted  4. Patient will return to standing, ambulation (with AD as needed), transfers, stairs, squatting without increased symptoms or restriction. [] Progressing: [] Met: [] Not Met: [] Adjusted      ASSESSMENT:  Appropriate LLE fatigue at conclusion with no increase in pain. Increased shakiness of LLE as patient fatigues with worsening TKE control, but patient taking a little longer to reach this level of fatigue now with each exercise. Patient presents with decreased hip ER PROM as well as significant lack of proximal hip control and generalized LLE weakness. Patient would benefit from continued physical therapy to right shoulder as needed with greater focus on left hip rehabilitation and balance training to improve patient's overall function and reduce risk of falls.       Return to Play: (if applicable)   []  Stage 1: Intro to Strength   []  Stage 2: Dynamic Strength and Intro to Plyometrics   []  Stage 3: Advanced Plyometrics and Intro to Throwing   []  Stage 4: Sport specific Training/Return to Sport     []  Ready to Return to Play, [employer]   []  Not Ready for Return to Sports   Comments:      Treatment/Activity Tolerance:  [x] Patient tolerated treatment well [] Patient limited by fatique  [] Patient limited by pain  [] Patient limited by other medical complications  [] Other:     Overall Progression Towards Functional goals/ Treatment Progress Update: [x] Patient is progressing as expected towards functional goals listed. - R shoulder  [] Progression is slowed due to complexities/Impairments listed. [] Progression has been slowed due to co-morbidities. [] Plan just implemented, too soon to assess goals progression <30days   [] Goals require adjustment due to lack of progress  [] Patient is not progressing as expected and requires additional follow up with physician  [x] Other: Plan just implemented for left hip rehabilitation, goals added    Prognosis for POC: [x] Good [] Fair  [] Poor    Patient requires continued skilled intervention: [x] Yes  [] No      PLAN: Continue PT 2x/week for 4-6 weeks for shoulder strengthening/ROM as needed, with addition of left hip rehabilitation to focus on ROM, proximal hip strengthening, general LLE strength training, and balance training  [x] Continue per plan of care [] Alter current plan (see comments)  [] Plan of care initiated [] Hold pending MD visit [] Discharge    Electronically signed by: Syd Marcum PT     Note: If patient does not return for scheduled/recommended follow up visits, this note will serve as a discharge from care along with the most recent update on progress.

## 2021-02-10 ENCOUNTER — HOSPITAL ENCOUNTER (OUTPATIENT)
Dept: PHYSICAL THERAPY | Age: 76
Setting detail: THERAPIES SERIES
Discharge: HOME | End: 2021-02-10
Payer: MEDICARE

## 2021-02-12 ENCOUNTER — HOSPITAL ENCOUNTER (OUTPATIENT)
Dept: PHYSICAL THERAPY | Age: 76
Setting detail: THERAPIES SERIES
Discharge: HOME | End: 2021-02-12
Payer: MEDICARE

## 2021-02-12 PROCEDURE — 97110 THERAPEUTIC EXERCISES: CPT

## 2021-02-12 NOTE — FLOWSHEET NOTE
[address] [place] [address] [place] [address]  Phone: [phone] Fax: [phone]        Physical Therapy Treatment Note/ Progress Report:     Date:  2021    Patient Name:  Kathrine Williamson    :  1945  MRN: [id number]  Restrictions/Precautions:    Medical/Treatment Diagnosis Information:  · Diagnosis: Right shoulder adhesive capsulitis, left hip pain  · Treatment Diagnosis: M75.01, E21.842  Insurance/Certification information:  PT Insurance Information: Medicare / [facility]  Physician Information:  Referring Practitioner: Torrie Harris of care signed (Y/N):     Date of Patient follow up with Physician: N/A      Progress Report: []  Yes  [x]  No     Date Range for reporting period:  Beginnin20  Ending:  NA    Progress report due (10 Rx/or 30 days whichever is less):      Recertification due (POC duration/ or 90 days whichever is less): 21     Visit # Insurance Allowable Auth Needed   18 Medicare []Yes    [x]No     Pain level:  0/10 currently     SUBJECTIVE:  Patient reports that anytime she has to cancel a therapy appointment she really misses the strength training. She will likely be missing a lot next week because of all the snow in the forecast, so would like to get an updated home program to work on when she cannot come in. Continues to feel that she is making good progress with her strength. Would also like to try walking on the treadmill today to see if she would be safe to do this at home. OBJECTIVE:   ? Observation: Antalgic gait with standard cane used in RUE  ?  Test measurements:  Quick DASH - 27% impairment, LEFS - 80% impairment      RESTRICTIONS/PRECAUTIONS:     Exercises/Interventions:   Therapeutic Ex (81111)  Min: 40 Sets/sec Reps CUES/Notes   Bike 5'           Treadmill 3'  2.0 mph   Bike 5'  Recumbent   Bridge 10 12    SLR      SAQ 5 30 4#, full roll   Clam - side lying 5 10 No resistance Supine clam - unilateral 10 10 Maroon band, each   BOSU lunge   add   Slide lunge - retro at ledge 1 15 Manual cues for L knee   Standing hip abd 2 10 orange   Supine TrA March  1 10    Slant board gastroc stretch      TKE 5 15 3.5pl   Side stepping 2 15 HHA, orange at ankles   Sit to stand with TRX 2 10 1 airex on seat, sink for support   Step up 3 10 6\"   Lateral step down 2 10 4\"   Standing hip 3-way kicks 5 10 Purple band for TKE   On 2\" step   Wobble board A/P, circles 1 10 DL, standing   SLS - modified with plyo box 20 3    Leg press - SL 3 10 40# S7         Manual Intervention  (89130)  Min: 0      Shld /GH Mobs 0'  Gentle inferior glides   Post Cap mobs 0'  Prone LLLD IR   Thoracic/Rib manipualtion      Hip PROM 5'     PROM MT - shoulder 0'  Flex, abd, ER, IR   IASTM 0'  R deltoid, bicep         NMR re-education (96926)  Min: 0      Biodex balance 0'  LS x 2   GH depress/compress      Scap/GH NMR      Body blade      Wall ball roll      Wall Ball bounce      Ball drops      Betty Scap Bio      Floor Snow angels-sliders            Therapeutic Activity (06709)  Min:      UE throwing porgression      Dynamic UE stability      Earthquake Bar      Bodyblade                Therapeutic Exercise and NMR EXR  [x] (46198) Provided verbal/tactile cueing for activities related to strengthening, flexibility, endurance, ROM  for improvements in scapular, scapulothoracic and UE control with self care, reaching, carrying, lifting, house/yardwork, driving/computer work. [x] (55568) Provided verbal/tactile cueing for activities related to improving balance, coordination, kinesthetic sense, posture, motor skill, proprioception  to assist with  scapular, scapulothoracic and UE control with self care, reaching, carrying, lifting, house/yardwork, driving/computer work.     Therapeutic Activities: [] (01824 or 42935) Provided verbal/tactile cueing for activities related to improving balance, coordination, kinesthetic sense, posture, motor skill, proprioception and motor activation to allow for proper function of scapular, scapulothoracic and UE control with self care, carrying, lifting, driving/computer work.      Home Exercise Program:    [x] (03039) Reviewed/Progressed HEP activities related to strengthening, flexibility, endurance, ROM of scapular, scapulothoracic and UE control with self care, reaching, carrying, lifting, house/yardwork, driving/computer work  [] (97404) Reviewed/Progressed HEP activities related to improving balance, coordination, kinesthetic sense, posture, motor skill, proprioception of scapular, scapulothoracic and UE control with self care, reaching, carrying, lifting, house/yardwork, driving/computer work      Manual Treatments:  PROM / STM / Oscillations-Mobs:  G-I, II, III, IV (PA's, Inf., Post.)  [x] (41296) Provided manual therapy to mobilize soft tissue/joints of cervical/CT, scapular GHJ and UE for the purpose of modulating pain, promoting relaxation,  increasing ROM, reducing/eliminating soft tissue swelling/inflammation/restriction, improving soft tissue extensibility and allowing for proper ROM for normal function with self care, reaching, carrying, lifting, house/yardwork, driving/computer work    Modalities:  None    Charges:  Timed Code Treatment Minutes: 40   Total Treatment Minutes: 40       [] EVAL (LOW) 51727 (typically 20 minutes face-to-face)  [] EVAL (MOD) 31865 (typically 30 minutes face-to-face)  [] EVAL (HIGH) 84583 (typically 45 minutes face-to-face)  [] RE-EVAL     [x] UN(66660) x 3     [] DRY NEEDLE 1 OR 2 MUSCLES  [] NMR (20873) x     [] DRY NEEDLE 3+ MUSCLES  [] Manual (42762) x       [] TA (29249) x     [] Mech Traction (03434)  [] ES(attended) (25871)     [] ES (un) (54646):   [] VASO (89297)  [] Other:    If [facility] Please Indicate Time In/Out CPT Code Time in Time out                                   SHOULDER GOALS:  Patient stated goal: pain-free shoulder, return to PLOF, ambulate without cane  [x] Progressing: [] Met: [] Not Met: [] Adjusted    Therapist goals for Patient:   Short Term Goals: To be achieved in: 2 weeks  1. Independent in HEP and progression per patient tolerance, in order to prevent re-injury. [] Progressing: [x] Met: [] Not Met: [] Adjusted  2. Patient will have a decrease in pain to facilitate improvement in movement, function, and ADLs as indicated by Functional Deficits. [] Progressing: [x] Met: [] Not Met: [] Adjusted    Long Term Goals: To be achieved in: 6 weeks  1. Disability index score of 25% or less for the Quick DASH to assist with reaching prior level of function. [x] Progressing: [] Met: [] Not Met: [] Adjusted  2. Patient will demonstrate increased AROM to equal contralateral to allow for proper joint functioning as indicated by Functional Deficits. [x] Progressing: [] Met: [] Not Met: [] Adjusted  3. Patient will demonstrate an increase in NM recruitment/activation and overall GH and scapular strength to within n5lbs HHD or WNL for proper functional mobility as indicated by patients Functional Deficits. [x] Progressing: [] Met: [] Not Met: [] Adjusted  4. Patient will return to reaching, lifting, overhead movements, self care especially after using toilet, dressing, grooming activities without increased symptoms or restriction. [x] Progressing: [] Met: [] Not Met: [] Adjusted       HIP GOALS:  Patient stated goal: strengthen LLE, ambulate without cane for short distances  [] Progressing: [] Met: [] Not Met: [] Adjusted    Therapist goals for Patient:   Short Term Goals: To be achieved in: 2 weeks  1. Independent in HEP and progression per patient tolerance, in order to prevent re-injury.    []?? Progressing: []?? Met: []?? Not Met: []?? Adjusted 2. Patient will have a decrease in pain to facilitate improvement in movement, function, and ADLs as indicated by Functional Deficits. []?? Progressing: []?? Met: []?? Not Met: []?? Adjusted     Long Term Goals: To be achieved in: 4-6 weeks  1. Disability index score of 40% or less for the LEFS to assist with reaching prior level of function.   []?? Progressing: []?? Met: []?? Not Met: []?? Adjusted  2. Patient will demonstrate increased AROM to WFL to allow for proper joint functioning as indicated by patients Functional Deficits. []?? Progressing: []?? Met: []?? Not Met: []?? Adjusted  3. Patient will demonstrate an increase in Strength to good proximal hip strength and control, within 5lb HHD in LE to allow for proper functional mobility as indicated by patients Functional Deficits. []?? Progressing: []?? Met: []?? Not Met: []?? Adjusted  4. Patient will return to standing, ambulation (with AD as needed), transfers, stairs, squatting without increased symptoms or restriction. [] Progressing: [] Met: [] Not Met: [] Adjusted      ASSESSMENT:  Appropriate LLE fatigue at conclusion with no increase in pain. Patient demonstrated adequate safety with use of the treadmill in the clinic today, was given the okay to start attempting treadmill ambulation at home. Also provided patient with updated HEP that focuses on standing LE exercises. Increased shakiness of LLE as patient fatigues with worsening TKE control, but patient taking a little longer to reach this level of fatigue now with each exercise. Patient continues to demonstrate deficits in proximal hip control and generalized LLE weakness. Patient would benefit from continued physical therapy to right shoulder as needed with greater focus on left hip rehabilitation and balance training to improve patient's overall function and reduce risk of falls.       Return to Play: (if applicable)   []  Stage 1: Intro to Strength

## 2021-02-22 ENCOUNTER — HOSPITAL ENCOUNTER (OUTPATIENT)
Dept: PHYSICAL THERAPY | Age: 76
Setting detail: THERAPIES SERIES
Discharge: HOME | End: 2021-02-22
Payer: MEDICARE

## 2021-02-22 PROCEDURE — 97112 NEUROMUSCULAR REEDUCATION: CPT

## 2021-02-22 PROCEDURE — 97110 THERAPEUTIC EXERCISES: CPT

## 2021-02-22 NOTE — FLOWSHEET NOTE
[address] [place], [place] [address]  Phone: [phone] Fax: [phone]        Physical Therapy Treatment Note/ Progress Report:     Date:  2021    Patient Name:  Hannah Velasquez    :  1945  MRN: [id number]  Restrictions/Precautions:    Medical/Treatment Diagnosis Information:  · Diagnosis: Right shoulder adhesive capsulitis, left hip pain  · Treatment Diagnosis: M75.01, U19.001  Insurance/Certification information:  PT Insurance Information: Medicare / [facility]  Physician Information:  Referring Practitioner: Nadege Schmidt of care signed (Y/N):     Date of Patient follow up with Physician: N/A      Progress Report: []  Yes  [x]  No     Date Range for reporting period:  Beginnin20  Ending:  NA    Progress report due (10 Rx/or 30 days whichever is less): 65     Recertification due (POC duration/ or 90 days whichever is less): 21     Visit # Insurance Allowable Auth Needed   19 Medicare []Yes    [x]No     Pain level:  0/10 currently     SUBJECTIVE:  Patient states that she was able to maintain strength with her HEP while she was absent from PT all last week due to the bad weather. She went to the [facility] with her daughter yesterday and was able to walk around the complex without her cane. She did have one minor LOB but she was able to self-correct, which she states is a huge improvement in her balance. OBJECTIVE:   ? Observation: Antalgic gait with standard cane used in RUE  ?  Test measurements:  Quick DASH - 27% impairment, LEFS - 80% impairment      RESTRICTIONS/PRECAUTIONS:     Exercises/Interventions:   Therapeutic Ex (95713)  Min: 30 Sets/sec Reps CUES/Notes   Bike 5'           Treadmill 3'  2.0 mph   Bike 5'  Recumbent   Bridge 10 12    SLR      SAQ 5 30 4#, full roll   Clam - side lying 5 10 No resistance   Supine clam - unilateral 10 10 Maroon band, each   BOSU lunge   add Slide lunge - retro at ledge 1 15 Manual cues for L knee   Standing hip abd 2 10 orange   Supine TrA March  1 10    Heel raises 5 10 6\" step   TKE 5 15 3.5pl   Side stepping 2 15 HHA, orange at ankles   Sit to stand with TRX 2 10 1 airex on seat, sink for support   Step up 3 10 8\"   Lateral step down 2 10 4\"   Standing hip 3-way kicks 5 10 Purple band for TKE   On 2\" step   Wobble board A/P, circles 1 10 DL, standing   SLS - modified with plyo box 20 3    Leg press - SL 3 10 40# S7         Manual Intervention  (94504)  Min: 0      Shld /GH Mobs 0'  Gentle inferior glides   Post Cap mobs 0'  Prone LLLD IR   Thoracic/Rib manipualtion      Hip PROM 5'     PROM MT - shoulder 0'  Flex, abd, ER, IR   IASTM 0'  R deltoid, bicep         NMR re-education (18024)  Min: 0      Biodex balance 10'  LS x 2, RC   GH depress/compress      Scap/GH NMR      Body blade      Wall ball roll      Wall Ball bounce      Ball drops      Betty Scap Bio      Floor Snow angels-sliders            Therapeutic Activity (21234)  Min:      UE throwing porgression      Dynamic UE stability      Earthquake Bar      Bodyblade                Therapeutic Exercise and NMR EXR  [x] (20471) Provided verbal/tactile cueing for activities related to strengthening, flexibility, endurance, ROM  for improvements in scapular, scapulothoracic and UE control with self care, reaching, carrying, lifting, house/yardwork, driving/computer work. [x] (05335) Provided verbal/tactile cueing for activities related to improving balance, coordination, kinesthetic sense, posture, motor skill, proprioception  to assist with  scapular, scapulothoracic and UE control with self care, reaching, carrying, lifting, house/yardwork, driving/computer work.     Therapeutic Activities: [] (97737 or 88021) Provided verbal/tactile cueing for activities related to improving balance, coordination, kinesthetic sense, posture, motor skill, proprioception and motor activation to allow for proper function of scapular, scapulothoracic and UE control with self care, carrying, lifting, driving/computer work.      Home Exercise Program:    [x] (67656) Reviewed/Progressed HEP activities related to strengthening, flexibility, endurance, ROM of scapular, scapulothoracic and UE control with self care, reaching, carrying, lifting, house/yardwork, driving/computer work  [] (29376) Reviewed/Progressed HEP activities related to improving balance, coordination, kinesthetic sense, posture, motor skill, proprioception of scapular, scapulothoracic and UE control with self care, reaching, carrying, lifting, house/yardwork, driving/computer work      Manual Treatments:  PROM / STM / Oscillations-Mobs:  G-I, II, III, IV (PA's, Inf., Post.)  [x] (87216) Provided manual therapy to mobilize soft tissue/joints of cervical/CT, scapular GHJ and UE for the purpose of modulating pain, promoting relaxation,  increasing ROM, reducing/eliminating soft tissue swelling/inflammation/restriction, improving soft tissue extensibility and allowing for proper ROM for normal function with self care, reaching, carrying, lifting, house/yardwork, driving/computer work    Modalities:  None    Charges:  Timed Code Treatment Minutes: 40   Total Treatment Minutes: 40       [] EVAL (LOW) 58799 (typically 20 minutes face-to-face)  [] EVAL (MOD) 56592 (typically 30 minutes face-to-face)  [] EVAL (HIGH) 91579 (typically 45 minutes face-to-face)  [] RE-EVAL     [x] DV(51634) x 2     [] DRY NEEDLE 1 OR 2 MUSCLES  [x] NMR (52100) x  1   [] DRY NEEDLE 3+ MUSCLES  [] Manual (63072) x       [] TA (45259) x     [] Mech Traction (22666)  [] ES(attended) (02228)     [] ES (un) (40447):   [] VASO (16387)  [] Other:    If [facility] Please Indicate Time In/Out CPT Code Time in Time out                                   SHOULDER GOALS:  Patient stated goal: pain-free shoulder, return to PLOF, ambulate without cane  [x] Progressing: [] Met: [] Not Met: [] Adjusted    Therapist goals for Patient:   Short Term Goals: To be achieved in: 2 weeks  1. Independent in HEP and progression per patient tolerance, in order to prevent re-injury. [] Progressing: [x] Met: [] Not Met: [] Adjusted  2. Patient will have a decrease in pain to facilitate improvement in movement, function, and ADLs as indicated by Functional Deficits. [] Progressing: [x] Met: [] Not Met: [] Adjusted    Long Term Goals: To be achieved in: 6 weeks  1. Disability index score of 25% or less for the Quick DASH to assist with reaching prior level of function. [x] Progressing: [] Met: [] Not Met: [] Adjusted  2. Patient will demonstrate increased AROM to equal contralateral to allow for proper joint functioning as indicated by Functional Deficits. [x] Progressing: [] Met: [] Not Met: [] Adjusted  3. Patient will demonstrate an increase in NM recruitment/activation and overall GH and scapular strength to within n5lbs HHD or WNL for proper functional mobility as indicated by patients Functional Deficits. [x] Progressing: [] Met: [] Not Met: [] Adjusted  4. Patient will return to reaching, lifting, overhead movements, self care especially after using toilet, dressing, grooming activities without increased symptoms or restriction. [x] Progressing: [] Met: [] Not Met: [] Adjusted       HIP GOALS:  Patient stated goal: strengthen LLE, ambulate without cane for short distances  [] Progressing: [] Met: [] Not Met: [] Adjusted    Therapist goals for Patient:   Short Term Goals: To be achieved in: 2 weeks  1. Independent in HEP and progression per patient tolerance, in order to prevent re-injury.    []?? Progressing: []?? Met: []?? Not Met: []?? Adjusted 2. Patient will have a decrease in pain to facilitate improvement in movement, function, and ADLs as indicated by Functional Deficits. []?? Progressing: []?? Met: []?? Not Met: []?? Adjusted     Long Term Goals: To be achieved in: 4-6 weeks  1. Disability index score of 40% or less for the LEFS to assist with reaching prior level of function.   []?? Progressing: []?? Met: []?? Not Met: []?? Adjusted  2. Patient will demonstrate increased AROM to WFL to allow for proper joint functioning as indicated by patients Functional Deficits. []?? Progressing: []?? Met: []?? Not Met: []?? Adjusted  3. Patient will demonstrate an increase in Strength to good proximal hip strength and control, within 5lb HHD in LE to allow for proper functional mobility as indicated by patients Functional Deficits. []?? Progressing: []?? Met: []?? Not Met: []?? Adjusted  4. Patient will return to standing, ambulation (with AD as needed), transfers, stairs, squatting without increased symptoms or restriction. [] Progressing: [] Met: [] Not Met: [] Adjusted      ASSESSMENT:  Appropriate LLE fatigue at conclusion with no increase in pain. Increased shakiness of LLE as patient fatigues with worsening TKE control, but patient taking a little longer to reach this level of fatigue now with each exercise. Patient continues to demonstrate deficits in proximal hip control and generalized LLE weakness. Patient would benefit from continued physical therapy to right shoulder as needed with greater focus on left hip rehabilitation and balance training to improve patient's overall function and reduce risk of falls.       Return to Play: (if applicable)   []  Stage 1: Intro to Strength   []  Stage 2: Dynamic Strength and Intro to Plyometrics   []  Stage 3: Advanced Plyometrics and Intro to Throwing   []  Stage 4: Sport specific Training/Return to Sport     []  Ready to Return to Play, Meets All Above Stages   []  Not Ready for Return to Sports Comments:      Treatment/Activity Tolerance:  [x] Patient tolerated treatment well [] Patient limited by fatique  [] Patient limited by pain  [] Patient limited by other medical complications  [] Other:     Overall Progression Towards Functional goals/ Treatment Progress Update:  [x] Patient is progressing as expected towards functional goals listed. - R shoulder  [] Progression is slowed due to complexities/Impairments listed. [] Progression has been slowed due to co-morbidities. [] Plan just implemented, too soon to assess goals progression <30days   [] Goals require adjustment due to lack of progress  [] Patient is not progressing as expected and requires additional follow up with physician  [x] Other: Plan just implemented for left hip rehabilitation, goals added    Prognosis for POC: [x] Good [] Fair  [] Poor    Patient requires continued skilled intervention: [x] Yes  [] No      PLAN: Continue PT 2x/week for 4-6 weeks for shoulder strengthening/ROM as needed, with addition of left hip rehabilitation to focus on ROM, proximal hip strengthening, general LLE strength training, and balance training  [x] Continue per plan of care [] Alter current plan (see comments)  [] Plan of care initiated [] Hold pending MD visit [] Discharge    Electronically signed by: Kellie Amaya PT     Note: If patient does not return for scheduled/recommended follow up visits, this note will serve as a discharge from care along with the most recent update on progress.

## 2021-02-24 ENCOUNTER — HOSPITAL ENCOUNTER (OUTPATIENT)
Dept: PHYSICAL THERAPY | Age: 76
Setting detail: THERAPIES SERIES
Discharge: HOME | End: 2021-02-24
Payer: MEDICARE

## 2021-02-24 PROCEDURE — 97110 THERAPEUTIC EXERCISES: CPT

## 2021-02-24 PROCEDURE — 97112 NEUROMUSCULAR REEDUCATION: CPT

## 2021-02-24 NOTE — PLAN OF CARE
[address] [place], [place] [address]Yash  Phone: [phone] Fax: [phone]          Physical Therapy Re-Certification Plan of Care/MD UPDATE      Dear  Osvaldo Benson,    We had the pleasure of treating the following patient for physical therapy services at [address] [place] [address]. A summary of our findings can be found in the updated assessment below. This includes our plan of care. If you have any questions or concerns regarding these findings, please do not hesitate to contact me at the office phone number checked above.   Thank you for the referral.     Physician Signature:________________________________Date:__________________  By signing above (or electronic signature), therapists plan is approved by physician    Date Range Of Visits: 20 - 21  Total Visits to Date: 21  Overall Response to Treatment:   [x]Patient is responding well to treatment and improvement is noted with regards  to goals   []Patient should continue to improve in reasonable time if they continue HEP   []Patient has plateaued and is no longer responding to skilled PT intervention    []Patient is getting worse and would benefit from return to referring MD   []Patient unable to adhere to initial POC   []Other:                   Physical Therapy Treatment Note/ Progress Report:     Date:  2021    Patient Name:  Daisy Turcios    :  1945  MRN: [id number]  Restrictions/Precautions:    Medical/Treatment Diagnosis Information:  · Diagnosis: Right shoulder adhesive capsulitis, left hip pain  · Treatment Diagnosis: M75.01, N23.070  Insurance/Certification information:  PT Insurance Information: Medicare / [facility]  Physician Information:  Referring Practitioner: Celina Michelle signed (Y/N):     Date of Patient follow up with Physician: N/A      Progress Report: [x]  Yes  [x]  No     Date Range for reporting period: Beginnin20  Endin21    Progress report due (10 Rx/or 30 days whichever is less):      Recertification due (POC duration/ or 90 days whichever is less): 21     Visit # Insurance Allowable Auth Needed   20 Medicare []Yes    []No     Pain level:  0/10 currently, c/o weakness     SUBJECTIVE:  Patient states that her right shoulder is basically a non-issue at this point. Only occasionally feels some stiffness when she first wakes up. She feels that she is gradually getting stronger in her LLE but still has a long way to go. Has definitely seen an improvement in her walking stamina and has been more steady on her feet without the use of her cane    OBJECTIVE:   ? Observation: Antalgic gait with standard cane used in RUE  ?  Test measurements:  Quick DASH - 27% impairment, LEFS - 70% impairment            ROM Left Right   Shoulder Flex 118 / 130 114 / 95   Shoulder Abd 100 / 112 100 / 95   Shoulder ER 90 85   Shoulder IR T8 / 50 L2, 45      Hip Flexion 110 WNL   Hip Abduction WNL WNL   Hip ER 20 WNL   Hip IR WNL WNL   Hip Ext Decreased WNL   Strength  Left Right   Shoulder Flex 4/5 3-/5   Shoulder Scap 4/5 3-5   Shoulder ER 4/5 3/5   Shoulder IR 4/5 3/5        Hip Abd 8.0 lb 12.0 lb   Hip Ext NT NT   Knee Ext 31.6 lb 34.5 lb   Knee Flex 29.2 lb  39.4 lb         RESTRICTIONS/PRECAUTIONS:      Exercises/Interventions:   Therapeutic Ex (23717)  Min: 30 Sets/sec Reps CUES/Notes   Bike 5'                                                                                                                                                       Treadmill 3'   2.0 mph   Bike 5'   Recumbent   Bridge 10 12     SLR         SAQ 5 30 4#, full roll   Clam - side lying 5 10 No resistance   Supine clam - unilateral 10 10 Maroon band, each         BOSU lunge     add   Slide lunge - retro at ledge 1 15 Manual cues for L knee   Standing hip abd 2 10 orange   Supine TrA  10     Heel raises 5 10 6\" step TKE 5 15 3.5pl   Side stepping 2 15 HHA, orange at ankles   Sit to stand with TRX 2 10 1 airex on seat, sink for support   Step up 3 10 8\"   Lateral step down 2 10 4\"   Standing hip 3-way kicks 5 10 Purple band for TKE   On 2\" step   Wobble board A/P, circles 1 10 DL, standing   SLS - modified with plyo box 20 3     Leg press - SL 3 10 40# S7             Manual Intervention  (23077)  Min: 0         Shld /GH Mobs 0'   Gentle inferior glides   Post Cap mobs 0'   Prone LLLD IR   Thoracic/Rib manipualtion         Hip PROM 5'       PROM MT - shoulder 0'   Flex, abd, ER, IR   IASTM 0'   R deltoid, bicep             NMR re-education (53213)  Min: 10         Biodex balance 10'   LS x 2, RC   GH depress/compress         Scap/GH NMR         Body blade         Wall ball roll         Wall Ball bounce         Ball drops         Betty Scap Bio         Floor Snow angels-sliders                   Therapeutic Activity (42528)  Min:         UE throwing porgression         Dynamic UE stability         Earthquake Bar         Bodyblade                    Therapeutic Exercise and NMR EXR  [x] (37586) Provided verbal/tactile cueing for activities related to strengthening, flexibility, endurance, ROM  for improvements in scapular, scapulothoracic and UE control with self care, reaching, carrying, lifting, house/yardwork, driving/computer work. [x] (33015) Provided verbal/tactile cueing for activities related to improving balance, coordination, kinesthetic sense, posture, motor skill, proprioception  to assist with  scapular, scapulothoracic and UE control with self care, reaching, carrying, lifting, house/yardwork, driving/computer work.     Therapeutic Activities: [] (07451 or 60789) Provided verbal/tactile cueing for activities related to improving balance, coordination, kinesthetic sense, posture, motor skill, proprioception and motor activation to allow for proper function of scapular, scapulothoracic and UE control with self care, carrying, lifting, driving/computer work.      Home Exercise Program:    [x] (66703) Reviewed/Progressed HEP activities related to strengthening, flexibility, endurance, ROM of scapular, scapulothoracic and UE control with self care, reaching, carrying, lifting, house/yardwork, driving/computer work  [] (20865) Reviewed/Progressed HEP activities related to improving balance, coordination, kinesthetic sense, posture, motor skill, proprioception of scapular, scapulothoracic and UE control with self care, reaching, carrying, lifting, house/yardwork, driving/computer work      Manual Treatments:  PROM / STM / Oscillations-Mobs:  G-I, II, III, IV (PA's, Inf., Post.)  [x] (69698) Provided manual therapy to mobilize soft tissue/joints of cervical/CT, scapular GHJ and UE for the purpose of modulating pain, promoting relaxation,  increasing ROM, reducing/eliminating soft tissue swelling/inflammation/restriction, improving soft tissue extensibility and allowing for proper ROM for normal function with self care, reaching, carrying, lifting, house/yardwork, driving/computer work    Modalities:  None    Charges:  Timed Code Treatment Minutes: 40   Total Treatment Minutes: 40       [] EVAL (LOW) 76174 (typically 20 minutes face-to-face)  [] EVAL (MOD) 83144 (typically 30 minutes face-to-face)  [] EVAL (HIGH) 83526 (typically 45 minutes face-to-face)  [] RE-EVAL     [x] JZ(31123) x 2     [] DRY NEEDLE 1 OR 2 MUSCLES  [x] NMR (56376) x 1    [] DRY NEEDLE 3+ MUSCLES  [] Manual (80122) x       [] TA (85374) x     [] Mech Traction (19986)  [] ES(attended) (62813)     [] ES (un) (91660):   [] VASO (65569)  [] Other:    If BW Please Indicate Time In/Out CPT Code Time in Time out                                   SHOULDER GOALS:  Patient stated goal: pain-free shoulder, return to PLOF, ambulate without cane  [x] Progressing: [] Met: [] Not Met: [] Adjusted    Therapist goals for Patient:   Short Term Goals: To be achieved in: 2 weeks  1. Independent in HEP and progression per patient tolerance, in order to prevent re-injury. [] Progressing: [x] Met: [] Not Met: [] Adjusted  2. Patient will have a decrease in pain to facilitate improvement in movement, function, and ADLs as indicated by Functional Deficits. [] Progressing: [x] Met: [] Not Met: [] Adjusted    Long Term Goals: To be achieved in: 6 weeks  1. Disability index score of 25% or less for the Quick DASH to assist with reaching prior level of function. [x] Progressing: [] Met: [] Not Met: [] Adjusted  2. Patient will demonstrate increased AROM to equal contralateral to allow for proper joint functioning as indicated by Functional Deficits. [x] Progressing: [] Met: [] Not Met: [] Adjusted  3. Patient will demonstrate an increase in NM recruitment/activation and overall GH and scapular strength to within n5lbs HHD or WNL for proper functional mobility as indicated by patients Functional Deficits. [x] Progressing: [] Met: [] Not Met: [] Adjusted  4. Patient will return to reaching, lifting, overhead movements, self care especially after using toilet, dressing, grooming activities without increased symptoms or restriction. [x] Progressing: [] Met: [] Not Met: [] Adjusted       HIP GOALS:  Patient stated goal: strengthen LLE, ambulate without cane for short distances  [x] Progressing: [] Met: [] Not Met: [] Adjusted    Therapist goals for Patient:   Short Term Goals: To be achieved in: 2 weeks  1. Independent in HEP and progression per patient tolerance, in order to prevent re-injury. []?? Progressing: [x]? ? Met: []?? Not Met: []?? Adjusted 2. Patient will have a decrease in pain to facilitate improvement in movement, function, and ADLs as indicated by Functional Deficits. []?? Progressing: [x]? ? Met: []?? Not Met: []?? Adjusted     Long Term Goals: To be achieved in: 4-6 weeks  1. Disability index score of 40% or less for the LEFS to assist with reaching prior level of function. [x]? ? Progressing: []?? Met: []?? Not Met: []?? Adjusted  2. Patient will demonstrate increased AROM to WFL to allow for proper joint functioning as indicated by patients Functional Deficits. [x]? ? Progressing: []?? Met: []?? Not Met: []?? Adjusted  3. Patient will demonstrate an increase in Strength to good proximal hip strength and control, within 5lb HHD in LE to allow for proper functional mobility as indicated by patients Functional Deficits. [x]? ? Progressing: []?? Met: []?? Not Met: []?? Adjusted  4. Patient will return to standing, ambulation (with AD as needed), transfers, stairs, squatting without increased symptoms or restriction. [x] Progressing: [] Met: [] Not Met: [] Adjusted      ASSESSMENT:  Patient has attended 20 physical therapy visits from 12/2/20 through 2/24/21 for treatment of right shoulder adhesive capsulitis and chronic left hip dysfunction with history of left hip hemiarthroplasty in 2017. Patient's right shoulder symptoms have completely resolved at this point. Hip mobility improving but still tight with ER. Making gradual gains in general LLE strength but she continues to present with deficient proximal hip control L>R and poor core stability. Gait is still antalgic with uneven step length and wide stance, though she is needing to use her cane less often for shorter distances. Patient will benefit from continued physical therapy to right shoulder with additional focus on left hip rehabilitation and balance training to improve patient's overall function and reduce risk of falls.       Return to Play: (if applicable) []  Stage 1: Intro to Strength   []  Stage 2: Dynamic Strength and Intro to Plyometrics   []  Stage 3: Advanced Plyometrics and Intro to Throwing   []  Stage 4: Sport specific Training/Return to Sport     []  Ready to Return to Play, [employer]   []  Not Ready for Return to Sports   Comments:      Treatment/Activity Tolerance:  [x] Patient tolerated treatment well [] Patient limited by fatique  [] Patient limited by pain  [] Patient limited by other medical complications  [] Other:     Overall Progression Towards Functional goals/ Treatment Progress Update:  [x] Patient is progressing as expected towards functional goals listed. [] Progression is slowed due to complexities/Impairments listed. [] Progression has been slowed due to co-morbidities. [] Plan just implemented, too soon to assess goals progression <30days   [] Goals require adjustment due to lack of progress  [] Patient is not progressing as expected and requires additional follow up with physician  [] Other:     Prognosis for POC: [x] Good [] Fair  [] Poor    Patient requires continued skilled intervention: [x] Yes  [] No      PLAN: Continue PT 2x/week for 4-6 weeks for shoulder strengthening/ROM as needed, with continued left hip rehabilitation to focus on ROM, proximal hip strengthening, general LLE strength training, and balance training  [x] Continue per plan of care [] Alter current plan (see comments)  [] Plan of care initiated [] Hold pending MD visit [] Discharge    Electronically signed by: Haley Hall PT     Note: If patient does not return for scheduled/recommended follow up visits, this note will serve as a discharge from care along with the most recent update on progress.

## 2021-03-01 ENCOUNTER — HOSPITAL ENCOUNTER (OUTPATIENT)
Dept: PHYSICAL THERAPY | Age: 76
Setting detail: THERAPIES SERIES
Discharge: HOME | End: 2021-03-01
Payer: MEDICARE

## 2021-03-01 PROCEDURE — 97110 THERAPEUTIC EXERCISES: CPT

## 2021-03-01 PROCEDURE — 97112 NEUROMUSCULAR REEDUCATION: CPT

## 2021-03-01 NOTE — FLOWSHEET NOTE
[address] [address] [place] [address]  Phone: [phone] Fax: [phone]          Physical Therapy Treatment Note/ Progress Report:     Date:  3/1/2021    Patient Name:  Daisy Turcios    :  1945  MRN: [id number]  Restrictions/Precautions:    Medical/Treatment Diagnosis Information:  · Diagnosis: Right shoulder adhesive capsulitis, left hip pain  · Treatment Diagnosis: M75.01, U93.791  Insurance/Certification information:  PT Insurance Information: Medicare / [facility]  Physician Information:  Referring Practitioner: Celina Monroy of care signed (Y/N):     Date of Patient follow up with Physician: N/A      Progress Report: [x]  Yes  [x]  No     Date Range for reporting period:  Beginnin21  Ending:  NA    Progress report due (10 Rx/or 30 days whichever is less):      Recertification due (POC duration/ or 90 days whichever is less): 21     Visit # Insurance Allowable Auth Needed   21 Medicare []Yes    []No     Pain level:  0/10 currently, c/o weakness     SUBJECTIVE:  Patient reports that her left hip was sore for about 2 days following her last therapy session. Pain has subsided since then back to her baseline. She is willing to attempt ambulation/exercise in [employer] today. OBJECTIVE:   ? Observation: Antalgic gait with standard cane used in [place]  ?  Test measurements:  Quick DASH - 27% impairment, LEFS - 70% impairment    RESTRICTIONS/PRECAUTIONS:      Exercises/Interventions:   Therapeutic Ex (83553)  Min: 30 Sets/sec Reps CUES/Notes   Bike 5'                                                                                                                                                       [facility] G Treadmill 25'   80% WB, 3XL short   1.2 mph @ 4 min x 2  HR, march, 3-way hip x 20 reps   Bike 5'   Recumbent   Bridge 10 12     SLR         SAQ 5 30 4#, full roll   Clam - side lying 5 10 No resistance Supine clam - unilateral 10 10 Maroon band, each         BOSU lunge     add   Slide lunge - retro at ledge 1 15 Manual cues for L knee   Standing hip abd 2 10 orange   Supine TrA March  1 10     Heel raises 5 10 6\" step   TKE 5 15 3.5pl   Side stepping 2 15 HHA, orange at ankles   Sit to stand with TRX 2 10 1 airex on seat, sink for support   Step up 3 10 8\"   Lateral step down 2 10 4\"   Standing hip 3-way kicks 5 10 Purple band for TKE   On 2\" step   Wobble board A/P, circles 1 10 DL, standing   SLS - modified with plyo box 20 3     Leg press - SL 3 10 40# S7             Manual Intervention  (42850)  Min: 0         Shld /GH Mobs 0'   Gentle inferior glides   Post Cap mobs 0'   Prone LLLD IR   Thoracic/Rib manipualtion         Hip PROM 5'       PROM MT - shoulder 0'   Flex, abd, ER, IR   IASTM 0'   R deltoid, bicep             NMR re-education (56477)  Min: 10         Biodex balance 10'   LS x 2, RC   GH depress/compress         Scap/GH NMR         Body blade         Wall ball roll         Wall Ball bounce         Ball drops         Betty Scap Bio         Floor Snow angels-sliders                   Therapeutic Activity (81805)  Min:         UE throwing porgression         Dynamic UE stability         Earthquake Bar         Bodyblade                    Therapeutic Exercise and NMR EXR  [x] (48493) Provided verbal/tactile cueing for activities related to strengthening, flexibility, endurance, ROM  for improvements in scapular, scapulothoracic and UE control with self care, reaching, carrying, lifting, house/yardwork, driving/computer work. [x] (60603) Provided verbal/tactile cueing for activities related to improving balance, coordination, kinesthetic sense, posture, motor skill, proprioception  to assist with  scapular, scapulothoracic and UE control with self care, reaching, carrying, lifting, house/yardwork, driving/computer work.     Therapeutic Activities: [] (62121 or 57402) Provided verbal/tactile cueing for activities related to improving balance, coordination, kinesthetic sense, posture, motor skill, proprioception and motor activation to allow for proper function of scapular, scapulothoracic and UE control with self care, carrying, lifting, driving/computer work.      Home Exercise Program:    [x] (96826) Reviewed/Progressed HEP activities related to strengthening, flexibility, endurance, ROM of scapular, scapulothoracic and UE control with self care, reaching, carrying, lifting, house/yardwork, driving/computer work  [] (35204) Reviewed/Progressed HEP activities related to improving balance, coordination, kinesthetic sense, posture, motor skill, proprioception of scapular, scapulothoracic and UE control with self care, reaching, carrying, lifting, house/yardwork, driving/computer work      Manual Treatments:  PROM / STM / Oscillations-Mobs:  G-I, II, III, IV (PA's, Inf., Post.)  [x] (33756) Provided manual therapy to mobilize soft tissue/joints of cervical/CT, scapular GHJ and UE for the purpose of modulating pain, promoting relaxation,  increasing ROM, reducing/eliminating soft tissue swelling/inflammation/restriction, improving soft tissue extensibility and allowing for proper ROM for normal function with self care, reaching, carrying, lifting, house/yardwork, driving/computer work    Modalities:  None    Charges:  Timed Code Treatment Minutes: 40   Total Treatment Minutes: 40       [] EVAL (LOW) 02713 (typically 20 minutes face-to-face)  [] EVAL (MOD) 10358 (typically 30 minutes face-to-face)  [] EVAL (HIGH) 70809 (typically 45 minutes face-to-face)  [] RE-EVAL     [x] MK(07981) x 2     [] DRY NEEDLE 1 OR 2 MUSCLES  [x] NMR (77100) x 1    [] DRY NEEDLE 3+ MUSCLES  [] Manual (00631) x       [] TA (76318) x     [] Mech Traction (50263)  [] ES(attended) (29449)     [] ES (un) (83603):   [] VASO (60345)  [] Other:    If [facility] Please Indicate Time In/Out CPT Code Time in Time out                                   SHOULDER GOALS:  Patient stated goal: pain-free shoulder, return to PLOF, ambulate without cane  [x] Progressing: [] Met: [] Not Met: [] Adjusted    Therapist goals for Patient:   Short Term Goals: To be achieved in: 2 weeks  1. Independent in HEP and progression per patient tolerance, in order to prevent re-injury. [] Progressing: [x] Met: [] Not Met: [] Adjusted  2. Patient will have a decrease in pain to facilitate improvement in movement, function, and ADLs as indicated by Functional Deficits. [] Progressing: [x] Met: [] Not Met: [] Adjusted    Long Term Goals: To be achieved in: 6 weeks  1. Disability index score of 25% or less for the Quick DASH to assist with reaching prior level of function. [x] Progressing: [] Met: [] Not Met: [] Adjusted  2. Patient will demonstrate increased AROM to equal contralateral to allow for proper joint functioning as indicated by Functional Deficits. [x] Progressing: [] Met: [] Not Met: [] Adjusted  3. Patient will demonstrate an increase in NM recruitment/activation and overall GH and scapular strength to within n5lbs HHD or WNL for proper functional mobility as indicated by patients Functional Deficits. [x] Progressing: [] Met: [] Not Met: [] Adjusted  4. Patient will return to reaching, lifting, overhead movements, self care especially after using toilet, dressing, grooming activities without increased symptoms or restriction. [x] Progressing: [] Met: [] Not Met: [] Adjusted       HIP GOALS:  Patient stated goal: strengthen LLE, ambulate without cane for short distances  [x] Progressing: [] Met: [] Not Met: [] Adjusted    Therapist goals for Patient:   Short Term Goals: To be achieved in: 2 weeks  1. Independent in HEP and progression per patient tolerance, in order to prevent re-injury. []?? Progressing: [x]? ? Met: []?? Not Met: []?? Adjusted 2. Patient will have a decrease in pain to facilitate improvement in movement, function, and ADLs as indicated by Functional Deficits. []?? Progressing: [x]? ? Met: []?? Not Met: []?? Adjusted     Long Term Goals: To be achieved in: 4-6 weeks  1. Disability index score of 40% or less for the LEFS to assist with reaching prior level of function. [x]? ? Progressing: []?? Met: []?? Not Met: []?? Adjusted  2. Patient will demonstrate increased AROM to WFL to allow for proper joint functioning as indicated by patients Functional Deficits. [x]? ? Progressing: []?? Met: []?? Not Met: []?? Adjusted  3. Patient will demonstrate an increase in Strength to good proximal hip strength and control, within 5lb HHD in LE to allow for proper functional mobility as indicated by patients Functional Deficits. [x]? ? Progressing: []?? Met: []?? Not Met: []?? Adjusted  4. Patient will return to standing, ambulation (with AD as needed), transfers, stairs, squatting without increased symptoms or restriction. [x] Progressing: [] Met: [] Not Met: [] Adjusted      ASSESSMENT:  Good tolerance to Alter G training, appropriate fatigue at conclusion with no increase in pain. Patient able to activate left glute med well with standing exercises in Alter G. Demonstrates decreased stride length on LLE compared to right with poor foot control during late swing phase. Making gradual gains in general LLE strength but she continues to present with deficient proximal hip control L>R and poor core stability. Gait is still antalgic with uneven step length and wide stance, though she is needing to use her cane less often for shorter distances. Patient will benefit from continued physical therapy to right shoulder with additional focus on left hip rehabilitation and balance training to improve patient's overall function and reduce risk of falls.       Return to Play: (if applicable)   []  Stage 1: Intro to Strength []  Stage 2: Dynamic Strength and Intro to Plyometrics   []  Stage 3: Advanced Plyometrics and Intro to Throwing   []  Stage 4: Sport specific Training/Return to Sport     []  Ready to Return to Play, [employer]   []  Not Ready for Return to Sports   Comments:      Treatment/Activity Tolerance:  [x] Patient tolerated treatment well [] Patient limited by fatique  [] Patient limited by pain  [] Patient limited by other medical complications  [] Other:     Overall Progression Towards Functional goals/ Treatment Progress Update:  [x] Patient is progressing as expected towards functional goals listed. [] Progression is slowed due to complexities/Impairments listed. [] Progression has been slowed due to co-morbidities. [] Plan just implemented, too soon to assess goals progression <30days   [] Goals require adjustment due to lack of progress  [] Patient is not progressing as expected and requires additional follow up with physician  [] Other:     Prognosis for POC: [x] Good [] Fair  [] Poor    Patient requires continued skilled intervention: [x] Yes  [] No      PLAN: Continue PT 2x/week for 4-6 weeks for shoulder strengthening/ROM as needed, with continued left hip rehabilitation to focus on ROM, proximal hip strengthening, general LLE strength training, and balance training  [x] Continue per plan of care [] Alter current plan (see comments)  [] Plan of care initiated [] Hold pending MD visit [] Discharge    Electronically signed by: Jenny Landers PT     Note: If patient does not return for scheduled/recommended follow up visits, this note will serve as a discharge from care along with the most recent update on progress.

## 2021-03-03 ENCOUNTER — HOSPITAL ENCOUNTER (OUTPATIENT)
Dept: PHYSICAL THERAPY | Age: 76
Setting detail: THERAPIES SERIES
Discharge: HOME | End: 2021-03-03
Payer: MEDICARE

## 2021-03-03 PROCEDURE — 97110 THERAPEUTIC EXERCISES: CPT

## 2021-03-03 NOTE — FLOWSHEET NOTE
Slide lunge - retro at ledge 1 15 Manual cues for L knee   Standing hip abd 2 10 orange   Supine TrA March  1 10     Heel raises 5 10 6\" step   TKE 5 15 3.5pl   Side stepping 2 15 HHA, orange at ankles   Sit to stand with TRX 2 10 1 airex on seat, sink for support   Step up 3 10 8\"   Lateral step down 2 10 4\"   Standing hip 3-way kicks 5 10 Purple band for TKE   On 2\" step   Wobble board A/P, circles 1 10 DL, standing   SLS - modified with plyo box 20 3     Leg press - SL 3 10 40# S7             Manual Intervention  (69978)  Min: 0         Shld /GH Mobs 0'   Gentle inferior glides   Post Cap mobs 0'   Prone LLLD IR   Thoracic/Rib manipualtion         Hip PROM 5'       PROM MT - shoulder 0'   Flex, abd, ER, IR   IASTM 0'   R deltoid, bicep             NMR re-education (19825)  Min: 0         Biodex balance 10'   LS x 2, RC   GH depress/compress         Scap/GH NMR         Body blade         Wall ball roll         Wall Ball bounce         Ball drops         Betty Scap Bio         Floor Snow angels-sliders                   Therapeutic Activity (78244)  Min:         UE throwing porgression         Dynamic UE stability         Earthquake Bar         Bodyblade                    Therapeutic Exercise and NMR EXR  [x] (29453) Provided verbal/tactile cueing for activities related to strengthening, flexibility, endurance, ROM  for improvements in scapular, scapulothoracic and UE control with self care, reaching, carrying, lifting, house/yardwork, driving/computer work. [x] (37284) Provided verbal/tactile cueing for activities related to improving balance, coordination, kinesthetic sense, posture, motor skill, proprioception  to assist with  scapular, scapulothoracic and UE control with self care, reaching, carrying, lifting, house/yardwork, driving/computer work.     Therapeutic Activities: [] (98077 or 85914) Provided verbal/tactile cueing for activities related to improving balance, coordination, kinesthetic sense, posture, motor skill, proprioception and motor activation to allow for proper function of scapular, scapulothoracic and UE control with self care, carrying, lifting, driving/computer work.      Home Exercise Program:    [x] (80486) Reviewed/Progressed HEP activities related to strengthening, flexibility, endurance, ROM of scapular, scapulothoracic and UE control with self care, reaching, carrying, lifting, house/yardwork, driving/computer work  [] (27065) Reviewed/Progressed HEP activities related to improving balance, coordination, kinesthetic sense, posture, motor skill, proprioception of scapular, scapulothoracic and UE control with self care, reaching, carrying, lifting, house/yardwork, driving/computer work      Manual Treatments:  PROM / STM / Oscillations-Mobs:  G-I, II, III, IV (PA's, Inf., Post.)  [x] (55922) Provided manual therapy to mobilize soft tissue/joints of cervical/CT, scapular GHJ and UE for the purpose of modulating pain, promoting relaxation,  increasing ROM, reducing/eliminating soft tissue swelling/inflammation/restriction, improving soft tissue extensibility and allowing for proper ROM for normal function with self care, reaching, carrying, lifting, house/yardwork, driving/computer work    Modalities:  None    Charges:  Timed Code Treatment Minutes: 40   Total Treatment Minutes: 40       [] EVAL (LOW) 70599 (typically 20 minutes face-to-face)  [] EVAL (MOD) 26216 (typically 30 minutes face-to-face)  [] EVAL (HIGH) 12971 (typically 45 minutes face-to-face)  [] RE-EVAL     [x] UU(32209) x 3     [] DRY NEEDLE 1 OR 2 MUSCLES  [] NMR (35098) x     [] DRY NEEDLE 3+ MUSCLES  [] Manual (91715) x       [] TA (34123) x     [] Mech Traction (31666)  [] ES(attended) (16058)     [] ES (un) (93931):   [] VASO (65838)  [] Other:    If [facility] Please Indicate Time In/Out CPT Code Time in Time out                                   SHOULDER GOALS:  Patient stated goal: pain-free shoulder, return to PLOF, ambulate without cane  [x] Progressing: [] Met: [] Not Met: [] Adjusted    Therapist goals for Patient:   Short Term Goals: To be achieved in: 2 weeks  1. Independent in HEP and progression per patient tolerance, in order to prevent re-injury. [] Progressing: [x] Met: [] Not Met: [] Adjusted  2. Patient will have a decrease in pain to facilitate improvement in movement, function, and ADLs as indicated by Functional Deficits. [] Progressing: [x] Met: [] Not Met: [] Adjusted    Long Term Goals: To be achieved in: 6 weeks  1. Disability index score of 25% or less for the Quick DASH to assist with reaching prior level of function. [x] Progressing: [] Met: [] Not Met: [] Adjusted  2. Patient will demonstrate increased AROM to equal contralateral to allow for proper joint functioning as indicated by Functional Deficits. [x] Progressing: [] Met: [] Not Met: [] Adjusted  3. Patient will demonstrate an increase in NM recruitment/activation and overall GH and scapular strength to within n5lbs HHD or WNL for proper functional mobility as indicated by patients Functional Deficits. [x] Progressing: [] Met: [] Not Met: [] Adjusted  4. Patient will return to reaching, lifting, overhead movements, self care especially after using toilet, dressing, grooming activities without increased symptoms or restriction. [x] Progressing: [] Met: [] Not Met: [] Adjusted       HIP GOALS:  Patient stated goal: strengthen LLE, ambulate without cane for short distances  [x] Progressing: [] Met: [] Not Met: [] Adjusted    Therapist goals for Patient:   Short Term Goals: To be achieved in: 2 weeks  1. Independent in HEP and progression per patient tolerance, in order to prevent re-injury. []?? Progressing: [x]? ? Met: []?? Not Met: []?? Adjusted 2. Patient will have a decrease in pain to facilitate improvement in movement, function, and ADLs as indicated by Functional Deficits. []?? Progressing: [x]? ? Met: []?? Not Met: []?? Adjusted     Long Term Goals: To be achieved in: 4-6 weeks  1. Disability index score of 40% or less for the LEFS to assist with reaching prior level of function. [x]? ? Progressing: []?? Met: []?? Not Met: []?? Adjusted  2. Patient will demonstrate increased AROM to WFL to allow for proper joint functioning as indicated by patients Functional Deficits. [x]? ? Progressing: []?? Met: []?? Not Met: []?? Adjusted  3. Patient will demonstrate an increase in Strength to good proximal hip strength and control, within 5lb HHD in LE to allow for proper functional mobility as indicated by patients Functional Deficits. [x]? ? Progressing: []?? Met: []?? Not Met: []?? Adjusted  4. Patient will return to standing, ambulation (with AD as needed), transfers, stairs, squatting without increased symptoms or restriction. [x] Progressing: [] Met: [] Not Met: [] Adjusted      ASSESSMENT:  Good tolerance to Alter G training, appropriate fatigue at conclusion with no increase in pain. Patient able to activate left glute med well with standing exercises in Alter G. Demonstrates decreased stride length on LLE compared to right with poor foot control during late swing phase. Added theraband ankle strengthening today to address this. Making gradual gains in general LLE strength but she continues to present with deficient proximal hip control L>R and poor core stability. Gait is still antalgic with uneven step length and wide stance, though she is needing to use her cane less often for shorter distances. Patient will benefit from continued physical therapy to right shoulder with additional focus on left hip rehabilitation and balance training to improve patient's overall function and reduce risk of falls.       Return to Play: (if applicable) []  Stage 1: Intro to Strength   []  Stage 2: Dynamic Strength and Intro to Plyometrics   []  Stage 3: Advanced Plyometrics and Intro to Throwing   []  Stage 4: Sport specific Training/Return to Sport     []  Ready to Return to Play, [employer]   []  Not Ready for Return to Sports   Comments:      Treatment/Activity Tolerance:  [x] Patient tolerated treatment well [] Patient limited by fatique  [] Patient limited by pain  [] Patient limited by other medical complications  [] Other:     Overall Progression Towards Functional goals/ Treatment Progress Update:  [x] Patient is progressing as expected towards functional goals listed. [] Progression is slowed due to complexities/Impairments listed. [] Progression has been slowed due to co-morbidities. [] Plan just implemented, too soon to assess goals progression <30days   [] Goals require adjustment due to lack of progress  [] Patient is not progressing as expected and requires additional follow up with physician  [] Other:     Prognosis for POC: [x] Good [] Fair  [] Poor    Patient requires continued skilled intervention: [x] Yes  [] No      PLAN: Continue PT 2x/week for 4-6 weeks for shoulder strengthening/ROM as needed, with continued left hip rehabilitation to focus on ROM, proximal hip strengthening, general LLE strength training, and balance training  [x] Continue per plan of care [] Alter current plan (see comments)  [] Plan of care initiated [] Hold pending MD visit [] Discharge    Electronically signed by: Angelica Reyez PT     Note: If patient does not return for scheduled/recommended follow up visits, this note will serve as a discharge from care along with the most recent update on progress.

## 2021-03-08 ENCOUNTER — HOSPITAL ENCOUNTER (OUTPATIENT)
Dept: PHYSICAL THERAPY | Age: 76
Setting detail: THERAPIES SERIES
Discharge: HOME | End: 2021-03-08
Payer: MEDICARE

## 2021-03-08 PROCEDURE — 97110 THERAPEUTIC EXERCISES: CPT

## 2021-03-08 NOTE — FLOWSHEET NOTE
[address] [place]  Phone: [phone] Fax: [phone]          Physical Therapy Treatment Note/ Progress Report:     Date:  3/8/2021    Patient Name:  Bess Alejandra    :  1945  MRN: [id number]  Restrictions/Precautions:    Medical/Treatment Diagnosis Information:  · Diagnosis: Right shoulder adhesive capsulitis, left hip pain  · Treatment Diagnosis: M75.01, M13.914  Insurance/Certification information:  PT Insurance Information: Medicare / [facility]  Physician Information:  Referring Practitioner: Osmin Lot of care signed (Y/N):     Date of Patient follow up with Physician: N/A      Progress Report: [x]  Yes  [x]  No     Date Range for reporting period:  Beginnin21  Ending:  NA    Progress report due (10 Rx/or 30 days whichever is less):      Recertification due (POC duration/ or 90 days whichever is less): 21     Visit # Insurance Allowable Auth Needed   22 Medicare []Yes    []No     Pain level:  0/10 currently, c/o weakness     SUBJECTIVE:  Patient states that she continues to feel that the [facility] training is helping with overall LLE strength. Frustrated though that she cannot control the foot as she is swinging it forward during gait.      OBJECTIVE:    Observation: Antalgic gait with standard cane used in RUE   Test measurements:  Quick DASH - 27% impairment, LEFS - 70% impairment    RESTRICTIONS/PRECAUTIONS:      Exercises/Interventions:   Therapeutic Ex (73155)  Min: 40 Sets/sec Reps CUES/Notes   Bike 5'       Alter G Treadmill 20'   80% WB, 3XL short   1.2 mph @ 4 min x 2  HR, march, 3-way hip x 20 reps   Ankle theraband 4-way 1 15 green   Cybex Gastroc 2 10 40#   Cybex Soleus 2 10 40#   Wobble board A/P, ecc control  2 10 seated   Bike 5'   Recumbent   Bridge 10 12     SLR         SAQ 5 30 4#, full roll   Clam - side lying 5 10 No resistance   Supine clam - unilateral 10 10 Maroon band, each         BOSU lunge     add   Slide lunge - retro at ledge 1 15 Manual cues for L knee   Standing hip abd 2 10 orange   Supine TrA March  1 10     Heel raises 5 10 6\" step   TKE 5 15 3.5pl   Side stepping 2 15 HHA, orange at ankles   Sit to stand with TRX 2 10 1 airex on seat, sink for support   Step up 3 10 8\"   Lateral step down 2 10 4\"   Standing hip 3-way kicks 5 10 Purple band for TKE   On 2\" step   Wobble board A/P, circles 1 10 DL, standing   SLS - modified with plyo box 20 3     Leg press - SL 3 10 40# S7             Manual Intervention  (05506)  Min: 0         Shld /GH Mobs 0'   Gentle inferior glides   Post Cap mobs 0'   Prone LLLD IR   Thoracic/Rib manipualtion         Hip PROM 5'       PROM MT - shoulder 0'   Flex, abd, ER, IR   IASTM 0'   R deltoid, bicep             NMR re-education (78672)  Min: 0         Biodex balance 10'   LS x 2, RC   GH depress/compress         Scap/GH NMR         Body blade         Wall ball roll         Wall Ball bounce         Ball drops         Betty Scap Bio         Floor Snow angels-sliders                   Therapeutic Activity (69107)  Min:         UE throwing porgression         Dynamic UE stability         Earthquake Bar         Bodyblade                    Therapeutic Exercise and NMR EXR  [x] (35434) Provided verbal/tactile cueing for activities related to strengthening, flexibility, endurance, ROM  for improvements in scapular, scapulothoracic and UE control with self care, reaching, carrying, lifting, house/yardwork, driving/computer work. [x] (34266) Provided verbal/tactile cueing for activities related to improving balance, coordination, kinesthetic sense, posture, motor skill, proprioception  to assist with  scapular, scapulothoracic and UE control with self care, reaching, carrying, lifting, house/yardwork, driving/computer work.     Therapeutic Activities:    [] (54543 or ) Provided verbal/tactile cueing for activities related to improving balance, coordination, kinesthetic sense, posture, motor skill, proprioception and motor activation to allow for proper function of scapular, scapulothoracic and UE control with self care, carrying, lifting, driving/computer work.      Home Exercise Program:    [x] (20861) Reviewed/Progressed HEP activities related to strengthening, flexibility, endurance, ROM of scapular, scapulothoracic and UE control with self care, reaching, carrying, lifting, house/yardwork, driving/computer work  [] (77903) Reviewed/Progressed HEP activities related to improving balance, coordination, kinesthetic sense, posture, motor skill, proprioception of scapular, scapulothoracic and UE control with self care, reaching, carrying, lifting, house/yardwork, driving/computer work      Manual Treatments:  PROM / STM / Oscillations-Mobs:  G-I, II, III, IV (PA's, Inf., Post.)  [x] (19953) Provided manual therapy to mobilize soft tissue/joints of cervical/CT, scapular GHJ and UE for the purpose of modulating pain, promoting relaxation,  increasing ROM, reducing/eliminating soft tissue swelling/inflammation/restriction, improving soft tissue extensibility and allowing for proper ROM for normal function with self care, reaching, carrying, lifting, house/yardwork, driving/computer work    Modalities:  None    Charges:  Timed Code Treatment Minutes: 40   Total Treatment Minutes: 40       [] EVAL (LOW) 85594 (typically 20 minutes face-to-face)  [] EVAL (MOD) 85264 (typically 30 minutes face-to-face)  [] EVAL (HIGH) 19048 (typically 45 minutes face-to-face)  [] RE-EVAL     [x] CE(06964) x 3     [] DRY NEEDLE 1 OR 2 MUSCLES  [] NMR (05382) x     [] DRY NEEDLE 3+ MUSCLES  [] Manual (38567) x       [] TA (52588) x     [] [facility] (59444)  [] ES(attended) (11808)     [] ES (un) (46932):   [] VASO (42311)  [] Other:    If BWC Please Indicate Time In/Out  CPT Code Time in Time out                                   SHOULDER GOALS:  Patient stated goal: pain-free shoulder, return to PLOF, ambulate without cane  [x] Progressing: [] Met: [] Not Met: [] Adjusted    Therapist goals for Patient:   Short Term Goals: To be achieved in: 2 weeks  1. Independent in HEP and progression per patient tolerance, in order to prevent re-injury. [] Progressing: [x] Met: [] Not Met: [] Adjusted  2. Patient will have a decrease in pain to facilitate improvement in movement, function, and ADLs as indicated by Functional Deficits. [] Progressing: [x] Met: [] Not Met: [] Adjusted    Long Term Goals: To be achieved in: 6 weeks  1. Disability index score of 25% or less for the Quick DASH to assist with reaching prior level of function. [x] Progressing: [] Met: [] Not Met: [] Adjusted  2. Patient will demonstrate increased AROM to equal contralateral to allow for proper joint functioning as indicated by Functional Deficits. [x] Progressing: [] Met: [] Not Met: [] Adjusted  3. Patient will demonstrate an increase in NM recruitment/activation and overall GH and scapular strength to within n5lbs HHD or WNL for proper functional mobility as indicated by patients Functional Deficits. [x] Progressing: [] Met: [] Not Met: [] Adjusted  4. Patient will return to reaching, lifting, overhead movements, self care especially after using toilet, dressing, grooming activities without increased symptoms or restriction. [x] Progressing: [] Met: [] Not Met: [] Adjusted       HIP GOALS:  Patient stated goal: strengthen LLE, ambulate without cane for short distances  [x] Progressing: [] Met: [] Not Met: [] Adjusted    Therapist goals for Patient:   Short Term Goals: To be achieved in: 2 weeks  1. Independent in HEP and progression per patient tolerance, in order to prevent re-injury. []?? Progressing: [x]? ? Met: []?? Not Met: []?? Adjusted  2. Patient will have a decrease in pain to facilitate improvement in movement, function, and ADLs as indicated by Functional Deficits. []?? Progressing: [x]? ? Met: []?? Not Met: []?? Adjusted     Long Term Goals: To be achieved in: 4-6 weeks  1. Disability index score of 40% or less for the LEFS to assist with reaching prior level of function. [x]? ? Progressing: []?? Met: []?? Not Met: []?? Adjusted  2. Patient will demonstrate increased AROM to WFL to allow for proper joint functioning as indicated by patients Functional Deficits. [x]? ? Progressing: []?? Met: []?? Not Met: []?? Adjusted  3. Patient will demonstrate an increase in Strength to good proximal hip strength and control, within 5lb HHD in LE to allow for proper functional mobility as indicated by patients Functional Deficits. [x]? ? Progressing: []?? Met: []?? Not Met: []?? Adjusted  4. Patient will return to standing, ambulation (with AD as needed), transfers, stairs, squatting without increased symptoms or restriction. [x] Progressing: [] Met: [] Not Met: [] Adjusted      ASSESSMENT:  Good tolerance to Alter G training, appropriate fatigue at conclusion with no increase in pain. Patient able to activate left glute med well with standing exercises in Alter G. Demonstrates decreased stride length on LLE compared to right with poor foot control during late swing phase. Making gradual gains in general LLE strength but she continues to present with deficient proximal hip control L>R and poor core stability. Gait is still antalgic with uneven step length and wide stance, though she is needing to use her cane less often for shorter distances. Patient will benefit from continued physical therapy to right shoulder with additional focus on left hip rehabilitation and balance training to improve patient's overall function and reduce risk of falls.       Return to Play: (if applicable)   []  Stage 1: Intro to Strength   []  Stage 2: Dynamic Strength and Intro to Plyometrics   []  Stage 3: Advanced Plyometrics and Intro to Throwing   []  Stage 4: Sport specific Training/Return to Sport     [] Ready to Return to Play, Meets All Above Stages   []  Not Ready for Return to Sports   Comments:      Treatment/Activity Tolerance:  [x] Patient tolerated treatment well [] Patient limited by fatique  [] Patient limited by pain  [] Patient limited by other medical complications  [] Other:     Overall Progression Towards Functional goals/ Treatment Progress Update:  [x] Patient is progressing as expected towards functional goals listed. [] Progression is slowed due to complexities/Impairments listed. [] Progression has been slowed due to co-morbidities. [] Plan just implemented, too soon to assess goals progression <30days   [] Goals require adjustment due to lack of progress  [] Patient is not progressing as expected and requires additional follow up with physician  [] Other:     Prognosis for POC: [x] Good [] Fair  [] Poor    Patient requires continued skilled intervention: [x] Yes  [] No      PLAN: Continue PT 2x/week for 4-6 weeks for shoulder strengthening/ROM as needed, with continued left hip rehabilitation to focus on ROM, proximal hip strengthening, general LLE strength training, and balance training  [x] Continue per plan of care [] Alter current plan (see comments)  [] Plan of care initiated [] Hold pending MD visit [] Discharge    Electronically signed by: Liam Champion PT     Note: If patient does not return for scheduled/recommended follow up visits, this note will serve as a discharge from care along with the most recent update on progress.

## 2021-03-10 ENCOUNTER — OFFICE VISIT (OUTPATIENT)
Dept: INTERNAL MEDICINE CLINIC | Age: 76
End: 2021-03-10
Payer: MEDICARE

## 2021-03-10 ENCOUNTER — APPOINTMENT (OUTPATIENT)
Dept: PHYSICAL THERAPY | Age: 76
End: 2021-03-10
Payer: MEDICARE

## 2021-03-10 VITALS
SYSTOLIC BLOOD PRESSURE: 134 MMHG | HEART RATE: 76 BPM | HEIGHT: 67 IN | TEMPERATURE: 98.4 F | BODY MASS INDEX: 33.27 KG/M2 | WEIGHT: 212 LBS | DIASTOLIC BLOOD PRESSURE: 84 MMHG

## 2021-03-10 DIAGNOSIS — E03.8 SUBCLINICAL HYPOTHYROIDISM: ICD-10-CM

## 2021-03-10 DIAGNOSIS — K21.9 GASTROESOPHAGEAL REFLUX DISEASE WITHOUT ESOPHAGITIS: ICD-10-CM

## 2021-03-10 DIAGNOSIS — E78.00 PURE HYPERCHOLESTEROLEMIA: ICD-10-CM

## 2021-03-10 DIAGNOSIS — G25.81 RESTLESS LEGS SYNDROME: ICD-10-CM

## 2021-03-10 DIAGNOSIS — Z91.81 AT HIGH RISK FOR FALLS: ICD-10-CM

## 2021-03-10 DIAGNOSIS — I10 ESSENTIAL HYPERTENSION: ICD-10-CM

## 2021-03-10 DIAGNOSIS — E55.9 VITAMIN D DEFICIENCY: ICD-10-CM

## 2021-03-10 DIAGNOSIS — R60.0 BILATERAL LEG EDEMA: ICD-10-CM

## 2021-03-10 DIAGNOSIS — Z12.11 COLON CANCER SCREENING: ICD-10-CM

## 2021-03-10 DIAGNOSIS — M41.9 ACQUIRED SCOLIOSIS: Primary | ICD-10-CM

## 2021-03-10 LAB
A/G RATIO: 1.7 (ref 1.1–2.2)
ALBUMIN SERPL-MCNC: 4.5 G/DL (ref 3.4–5)
ALP BLD-CCNC: 79 U/L (ref 40–129)
ALT SERPL-CCNC: 10 U/L (ref 10–40)
ANION GAP SERPL CALCULATED.3IONS-SCNC: 11 MMOL/L (ref 3–16)
AST SERPL-CCNC: 15 U/L (ref 15–37)
BASOPHILS ABSOLUTE: 0 K/UL (ref 0–0.2)
BASOPHILS RELATIVE PERCENT: 0.7 %
BILIRUB SERPL-MCNC: 0.5 MG/DL (ref 0–1)
BUN BLDV-MCNC: 12 MG/DL (ref 7–20)
CALCIUM SERPL-MCNC: 9.2 MG/DL (ref 8.3–10.6)
CHLORIDE BLD-SCNC: 95 MMOL/L (ref 99–110)
CHOLESTEROL, TOTAL: 243 MG/DL (ref 0–199)
CO2: 30 MMOL/L (ref 21–32)
CONTROL: NORMAL
CREAT SERPL-MCNC: 0.7 MG/DL (ref 0.6–1.2)
EOSINOPHILS ABSOLUTE: 0.1 K/UL (ref 0–0.6)
EOSINOPHILS RELATIVE PERCENT: 2 %
GFR AFRICAN AMERICAN: >60
GFR NON-AFRICAN AMERICAN: >60
GLOBULIN: 2.7 G/DL
GLUCOSE BLD-MCNC: 104 MG/DL (ref 70–99)
HCT VFR BLD CALC: 43.8 % (ref 36–48)
HDLC SERPL-MCNC: 74 MG/DL (ref 40–60)
HEMOCCULT STL QL: NORMAL
HEMOGLOBIN: 14.6 G/DL (ref 12–16)
LDL CHOLESTEROL CALCULATED: 148 MG/DL
LYMPHOCYTES ABSOLUTE: 1.7 K/UL (ref 1–5.1)
LYMPHOCYTES RELATIVE PERCENT: 29.9 %
MCH RBC QN AUTO: 30.6 PG (ref 26–34)
MCHC RBC AUTO-ENTMCNC: 33.3 G/DL (ref 31–36)
MCV RBC AUTO: 91.6 FL (ref 80–100)
MONOCYTES ABSOLUTE: 0.4 K/UL (ref 0–1.3)
MONOCYTES RELATIVE PERCENT: 6.7 %
NEUTROPHILS ABSOLUTE: 3.4 K/UL (ref 1.7–7.7)
NEUTROPHILS RELATIVE PERCENT: 60.7 %
PDW BLD-RTO: 13.5 % (ref 12.4–15.4)
PLATELET # BLD: 319 K/UL (ref 135–450)
PMV BLD AUTO: 8.8 FL (ref 5–10.5)
POTASSIUM SERPL-SCNC: 4.5 MMOL/L (ref 3.5–5.1)
RBC # BLD: 4.78 M/UL (ref 4–5.2)
SODIUM BLD-SCNC: 136 MMOL/L (ref 136–145)
T4 FREE: 1.5 NG/DL (ref 0.9–1.8)
TOTAL PROTEIN: 7.2 G/DL (ref 6.4–8.2)
TRIGL SERPL-MCNC: 107 MG/DL (ref 0–150)
TSH REFLEX: 6.48 UIU/ML (ref 0.27–4.2)
VITAMIN D 25-HYDROXY: 39.4 NG/ML
VLDLC SERPL CALC-MCNC: 21 MG/DL
WBC # BLD: 5.6 K/UL (ref 4–11)

## 2021-03-10 PROCEDURE — G8417 CALC BMI ABV UP PARAM F/U: HCPCS | Performed by: FAMILY MEDICINE

## 2021-03-10 PROCEDURE — 4040F PNEUMOC VAC/ADMIN/RCVD: CPT | Performed by: FAMILY MEDICINE

## 2021-03-10 PROCEDURE — G8427 DOCREV CUR MEDS BY ELIG CLIN: HCPCS | Performed by: FAMILY MEDICINE

## 2021-03-10 PROCEDURE — 99214 OFFICE O/P EST MOD 30 MIN: CPT | Performed by: FAMILY MEDICINE

## 2021-03-10 PROCEDURE — 82274 ASSAY TEST FOR BLOOD FECAL: CPT | Performed by: FAMILY MEDICINE

## 2021-03-10 PROCEDURE — 1123F ACP DISCUSS/DSCN MKR DOCD: CPT | Performed by: FAMILY MEDICINE

## 2021-03-10 PROCEDURE — G8399 PT W/DXA RESULTS DOCUMENT: HCPCS | Performed by: FAMILY MEDICINE

## 2021-03-10 PROCEDURE — G8484 FLU IMMUNIZE NO ADMIN: HCPCS | Performed by: FAMILY MEDICINE

## 2021-03-10 PROCEDURE — 3017F COLORECTAL CA SCREEN DOC REV: CPT | Performed by: FAMILY MEDICINE

## 2021-03-10 PROCEDURE — 3288F FALL RISK ASSESSMENT DOCD: CPT | Performed by: FAMILY MEDICINE

## 2021-03-10 PROCEDURE — 1036F TOBACCO NON-USER: CPT | Performed by: FAMILY MEDICINE

## 2021-03-10 PROCEDURE — 1090F PRES/ABSN URINE INCON ASSESS: CPT | Performed by: FAMILY MEDICINE

## 2021-03-10 RX ORDER — OMEPRAZOLE 20 MG/1
20 CAPSULE, DELAYED RELEASE ORAL DAILY
Qty: 90 CAPSULE | Refills: 1 | Status: SHIPPED | OUTPATIENT
Start: 2021-03-10 | End: 2021-09-15 | Stop reason: SDUPTHER

## 2021-03-10 RX ORDER — VALSARTAN AND HYDROCHLOROTHIAZIDE 160; 12.5 MG/1; MG/1
TABLET, FILM COATED ORAL
Qty: 90 TABLET | Refills: 3 | Status: SHIPPED | OUTPATIENT
Start: 2021-03-10 | End: 2022-03-16 | Stop reason: SDUPTHER

## 2021-03-10 RX ORDER — ASCORBIC ACID 1000 MG
TABLET ORAL
COMMUNITY

## 2021-03-10 RX ORDER — ERGOCALCIFEROL 1.25 MG/1
CAPSULE ORAL
Qty: 12 CAPSULE | Refills: 2 | Status: SHIPPED | OUTPATIENT
Start: 2021-03-10 | End: 2021-09-15 | Stop reason: SDUPTHER

## 2021-03-10 RX ORDER — ROSUVASTATIN CALCIUM 10 MG/1
TABLET, COATED ORAL
Qty: 45 TABLET | Refills: 2 | Status: SHIPPED | OUTPATIENT
Start: 2021-03-10 | End: 2021-09-15 | Stop reason: SDUPTHER

## 2021-03-10 RX ORDER — FUROSEMIDE 20 MG/1
TABLET ORAL
Qty: 180 TABLET | Refills: 1 | Status: SHIPPED | OUTPATIENT
Start: 2021-03-10 | End: 2021-09-15 | Stop reason: SDUPTHER

## 2021-03-10 RX ORDER — FAMOTIDINE 20 MG/1
20 TABLET, FILM COATED ORAL NIGHTLY
Qty: 90 TABLET | Refills: 2 | Status: SHIPPED | OUTPATIENT
Start: 2021-03-10 | End: 2021-09-15 | Stop reason: SDUPTHER

## 2021-03-10 RX ORDER — ROPINIROLE 0.5 MG/1
TABLET, FILM COATED ORAL
Qty: 270 TABLET | Refills: 1 | Status: SHIPPED | OUTPATIENT
Start: 2021-03-10 | End: 2021-09-15 | Stop reason: SDUPTHER

## 2021-03-10 ASSESSMENT — ENCOUNTER SYMPTOMS
COUGH: 0
CHEST TIGHTNESS: 0
WHEEZING: 0
SHORTNESS OF BREATH: 0

## 2021-03-10 NOTE — PROGRESS NOTES
Subjective:      Patient ID: Baljinder Limon is a 76 y.o. female. HPI   Chief Complaint   Patient presents with    6 Month Follow-Up     fasting        Follow-up of hypertension, hypercholesterolemia, gastroesophageal reflux, vitamin D deficiency, subclinical hypothyroidism. She also has restless leg syndrome. Ropinirole seems to be helping. She fell in August.  She mentioned this at her visit with me in September but it was mostly about her tailbone and back. She saw [facility] [address]  in the office in October about her right shoulder. She injured it with trying to grab onto something to prevent the falling. She has been working with physical therapy for a frozen shoulder and she is making good progress. Now they are starting to work on her leg weakness and she is going twice a week. Her leg weakness has been present pretty much since she fractured her hip/femur in 2017    See Assessment for more detail on conditions addressed today.     Patient Active Problem List   Diagnosis    History of hypertension    Restless legs syndrome    Seasonal affective disorder ([address]    Hypercholesterolemia    Vitamin D deficiency    Localization-related focal epilepsy with simple partial seizures ([address]    Disturbance of skin sensation    Vasomotor rhinitis    Venous angioma of brain ([address]    Atypical facial pain    Macular degeneration (senile) of retina    Pain of both hip joints    Gastroesophageal reflux disease without esophagitis    1/4/17 LEFT hip hemiarthroplasty    Elevated TSH    B12 deficiency    Localized osteoporosis without current pathological fracture    Left leg weakness since hip fracture surgery         Outpatient Medications Marked as Taking for the 3/10/21 encounter (Office Visit) with Diana Bunch MD   Medication Sig Dispense Refill    Coenzyme Q10 (CO Q 10) 10 MG CAPS Take by mouth      rOPINIRole (REQUIP) 0.5 MG tablet TAKE 2-3 TABLETS BY MOUTH AT BEDTIME OR IN EVENING FOR RESTLESS LEGS 270 tablet 1    meloxicam (MOBIC) 15 MG tablet Take 1 tablet by mouth daily 30 tablet 3    valsartan-hydrochlorothiazide (DIOVAN-HCT) 160-12.5 MG per tablet TAKE 1 TABLET BY MOUTH EVERY DAY 90 tablet 3    furosemide (LASIX) 20 MG tablet TAKE 1 OR 2 TABS DAILY AT LUNCH OR IN THE AFTERNOON UNTIL SWELLING DOWN. 180 tablet 1    rosuvastatin (CRESTOR) 10 MG tablet TAKE 1 TABLET EVERY OTHER DAY ON EVEN DAYS OF THE MONTH. 45 tablet 2    famotidine (PEPCID) 20 MG tablet TAKE 1 TABLET BY MOUTH NIGHTLY 90 tablet 2    vitamin D (ERGOCALCIFEROL) 31608 units CAPS capsule TAKE ONE CAPSULE BY MOUTH WEEKLY 12 capsule 2    omeprazole (PRILOSEC) 20 MG delayed release capsule Take 1 capsule by mouth Daily In AM before first bite of food 90 capsule 0    Biotin 5000 MCG CAPS Take 5,000 mcg by mouth      aspirin 81 MG tablet Take 81 mg by mouth daily      divalproex (DEPAKOTE ER) 500 MG ER tablet Take 1 tablet by mouth daily Take 1 tablet by mouth  daily 90 tablet 1    Multiple Vitamins-Minerals (ICAPS) CAPS Take  by mouth daily. Social History     Tobacco Use    Smoking status: Former Smoker     Packs/day: 0.30     Years: 1.00     Pack years: 0.30     Types: Cigarettes     Quit date: 1966     Years since quittin.2    Smokeless tobacco: Never Used    Tobacco comment: quit age 25   Substance Use Topics    Alcohol use: Yes     Alcohol/week: 5.0 standard drinks     Types: 5 Glasses of wine per week     Comment: vodka drink daily    Drug use: No         Review of Systems   Respiratory: Negative for cough, chest tightness, shortness of breath and wheezing. Cardiovascular: Negative for chest pain, palpitations and leg swelling. Skin: Negative for rash and wound. Neurological: Negative for dizziness, syncope, facial asymmetry, speech difficulty, weakness, numbness and headaches.    Psychiatric/Behavioral: Negative for dysphoric mood (Still grieving her 's death, but it is getting a little bit easier). Objective:   Physical Exam  Vitals signs reviewed. Constitutional:       General: She is not in acute distress. Appearance: Normal appearance. She is well-developed. She is not diaphoretic. Eyes:      General: No scleral icterus. Neck:      Musculoskeletal: Neck supple. Thyroid: No thyroid mass or thyromegaly. Vascular: No carotid bruit. Cardiovascular:      Rate and Rhythm: Normal rate and regular rhythm. Heart sounds: Normal heart sounds, S1 normal and S2 normal. No murmur. Pulmonary:      Effort: Pulmonary effort is normal. No respiratory distress. Breath sounds: Normal breath sounds. No decreased breath sounds, wheezing, rhonchi or rales. Abdominal:      General: Bowel sounds are normal. There is no abdominal bruit. Palpations: Abdomen is soft. There is no hepatomegaly or mass. Tenderness: There is no abdominal tenderness. Musculoskeletal:      Thoracic back: She exhibits deformity. Right lower leg: No edema. Left lower leg: No edema. Comments: Mild scoliosis with right scapula more prominent than left. Lymphadenopathy:      Cervical: No cervical adenopathy. Skin:     General: Skin is warm and dry. Coloration: Skin is not pale. Nails: There is no clubbing. Neurological:      Mental Status: She is alert and oriented to person, place, and time. Motor: Weakness (Left hip flexors are weaker than right) present. No tremor or abnormal muscle tone. Coordination: Coordination normal.      Gait: Gait abnormal.      Comments: Wide-based stiff gait   Psychiatric:         Mood and Affect: Mood is not anxious or depressed. Affect is not tearful. Speech: Speech normal.         Behavior: Behavior normal.         Cognition and Memory: Cognition and memory normal.      Comments: Pleasant         Assessment:      1. Acquired scoliosis  Appears to have an acquired thoracolumbar scoliosis. Will monitor.   Does not appear to be severe. Cannot rule out this is postural from leg length difference. 2. Restless legs syndrome  Continue medication  - rOPINIRole (REQUIP) 0.5 MG tablet; TAKE 2-3 TABLETS BY MOUTH AT BEDTIME OR IN EVENING FOR RESTLESS LEGS  Dispense: 270 tablet; Refill: 1    3. Hypercholesterolemia  Continue every other day statin with co-Q10 to prevent statin myalgias  - Coenzyme Q10 (CO Q 10) 10 MG CAPS; Take by mouth  - rosuvastatin (CRESTOR) 10 MG tablet; TAKE 1 TABLET EVERY OTHER DAY ON EVEN DAYS OF THE MONTH. Dispense: 45 tablet; Refill: 2  - Lipid Panel    4. Essential hypertension  BP: 134/84   At goal and meeting medical guidelines. Continue treatment.    - valsartan-hydroCHLOROthiazide (DIOVAN-HCT) 160-12.5 MG per tablet; TAKE 1 TABLET BY MOUTH EVERY DAY  Dispense: 90 tablet; Refill: 3    5. Gastroesophageal reflux disease without esophagitis  Symptoms are controlled. Monitor B12 and magnesium once a year. Last done 6 months ago  - famotidine (PEPCID) 20 MG tablet; Take 1 tablet by mouth nightly  Dispense: 90 tablet; Refill: 2  - omeprazole (PRILOSEC) 20 MG delayed release capsule; Take 1 capsule by mouth Daily In AM before first bite of food  Dispense: 90 capsule; Refill: 1  - Comprehensive Metabolic Panel  - CBC Auto Differential    6. Bilateral leg edema  As needed use. - furosemide (LASIX) 20 MG tablet; TAKE 1 OR 2 TABS DAILY AT LUNCH OR IN THE AFTERNOON UNTIL SWELLING DOWN. Dispense: 180 tablet; Refill: 1    7. Vitamin D deficiency  Level has been normal on high-dose replacement  - vitamin D (ERGOCALCIFEROL) 1.25 MG (53081 UT) CAPS capsule; TAKE ONE CAPSULE BY MOUTH WEEKLY  Dispense: 12 capsule; Refill: 2  - Vitamin D 25 Hydroxy    8. Subclinical hypothyroidism  Monitor for hypothyroidism and treat when appropriate  - TSH with Reflex    9. Colon cancer screening  - POCT Fecal Immunochemical Test (FIT)    10. At high risk for falls  She is doing physical therapy.   Encouraged her to use an ambulatory device when she is by herself          Plan:      See orders. See after visit summary, patient instructions, and reference hand-outs. A PRINTED SUMMARY = AVS GIVEN TO THE PATIENT, (or if Virtual Visit, patient told it is available in My Chart or will be mailed if not active on My Chart.)    Discussed use, benefit, and side effects of prescribed medications. Barriers to medication compliance addressed. All patient questions answered. Dat Virk MD     On the basis of positive falls risk screening, assessment and plan is as follows: home safety tips provided, referral to physical therapy provided for strength and balance training.

## 2021-03-12 ENCOUNTER — HOSPITAL ENCOUNTER (OUTPATIENT)
Dept: PHYSICAL THERAPY | Age: 76
Setting detail: THERAPIES SERIES
Discharge: HOME | End: 2021-03-12
Payer: MEDICARE

## 2021-03-12 NOTE — FLOWSHEET NOTE
[address], [place] Office    Physical Therapy  Cancellation/No-show Note  Patient Name:  Sadie Santillan  :  1945   Date:  3/12/2021  Cancelled visits to date: 10  No-shows to date: 0    For today's appointment patient:  []  Cancelled  [x]  Rescheduled appointment  []  No-show     Reason given by patient:  []  Patient ill  [x]  Conflicting appointment   []  No transportation    []  Conflict with work  []  No reason given  []  Other:     Comments:     Electronically signed by:  Haley Hall PT

## 2021-03-15 ENCOUNTER — HOSPITAL ENCOUNTER (OUTPATIENT)
Dept: PHYSICAL THERAPY | Age: 76
Setting detail: THERAPIES SERIES
Discharge: HOME | End: 2021-03-15
Payer: MEDICARE

## 2021-03-15 PROCEDURE — 97110 THERAPEUTIC EXERCISES: CPT

## 2021-03-15 NOTE — FLOWSHEET NOTE
ecc control  2 10 seated   Bike 5'   Recumbent   Bridge 10 12     SLR         SAQ 5 30 4#, full roll   Clam - side lying 5 10 No resistance   Supine clam - unilateral 10 10 Maroon band, each         BOSU lunge     add   Slide lunge - retro at ledge 1 15 Manual cues for L knee   Standing hip abd 2 10 orange   Supine TrA March  1 10     Heel raises 5 10 6\" step   TKE 5 15 3.5pl   Side stepping 2 15 HHA, orange at ankles   Sit to stand with TRX 2 10 1 airex on seat, sink for support   Step up 3 10 8\"   Lateral step down 2 10 4\"   Standing hip 3-way kicks 5 10 Purple band for TKE   On 2\" step   Wobble board A/P, circles 1 10 DL, standing   SLS - modified with plyo box 20 3     Leg press - SL 3 10 40# S7             Manual Intervention  (05040)  Min: 0         Shld /GH Mobs 0'   Gentle inferior glides   Post Cap mobs 0'   Prone LLLD IR   Thoracic/Rib manipualtion         Hip PROM 5'       PROM MT - shoulder 0'   Flex, abd, ER, IR   IASTM 0'   R deltoid, bicep             NMR re-education (04365)  Min: 0         Biodex balance 10'   LS x 2, RC   GH depress/compress         Scap/GH NMR         Body blade         Wall ball roll         Wall Ball bounce         Ball drops         Betty Scap Bio         Floor Snow angels-sliders                   Therapeutic Activity (75204)  Min:         UE throwing porgression         Dynamic UE stability         Earthquake Bar         Bodyblade                    Therapeutic Exercise and NMR EXR  [x] (20047) Provided verbal/tactile cueing for activities related to strengthening, flexibility, endurance, ROM  for improvements in scapular, scapulothoracic and UE control with self care, reaching, carrying, lifting, house/yardwork, driving/computer work.     [x] (66822) Provided verbal/tactile cueing for activities related to improving balance, coordination, kinesthetic sense, posture, motor skill, proprioception  to assist with  scapular, scapulothoracic and UE control with self care, reaching, carrying, lifting, house/yardwork, driving/computer work. Therapeutic Activities:    [] (39435 or 27235) Provided verbal/tactile cueing for activities related to improving balance, coordination, kinesthetic sense, posture, motor skill, proprioception and motor activation to allow for proper function of scapular, scapulothoracic and UE control with self care, carrying, lifting, driving/computer work.      Home Exercise Program:    [x] (37763) Reviewed/Progressed HEP activities related to strengthening, flexibility, endurance, ROM of scapular, scapulothoracic and UE control with self care, reaching, carrying, lifting, house/yardwork, driving/computer work  [] (83548) Reviewed/Progressed HEP activities related to improving balance, coordination, kinesthetic sense, posture, motor skill, proprioception of scapular, scapulothoracic and UE control with self care, reaching, carrying, lifting, house/yardwork, driving/computer work      Manual Treatments:  PROM / STM / Oscillations-Mobs:  G-I, II, III, IV (PA's, Inf., Post.)  [x] (72100) Provided manual therapy to mobilize soft tissue/joints of cervical/CT, scapular GHJ and UE for the purpose of modulating pain, promoting relaxation,  increasing ROM, reducing/eliminating soft tissue swelling/inflammation/restriction, improving soft tissue extensibility and allowing for proper ROM for normal function with self care, reaching, carrying, lifting, house/yardwork, driving/computer work    Modalities:  None    Charges:  Timed Code Treatment Minutes: 40   Total Treatment Minutes: 40       [] EVAL (LOW) 82865 (typically 20 minutes face-to-face)  [] EVAL (MOD) 49606 (typically 30 minutes face-to-face)  [] EVAL (HIGH) 98561 (typically 45 minutes face-to-face)  [] RE-EVAL     [x] BY(59416) x 3     [] DRY NEEDLE 1 OR 2 MUSCLES  [] NMR (06660) x     [] DRY NEEDLE 3+ MUSCLES  [] Manual (87021) x       [] TA (94186) x     [] Mech Traction (83596)  [] ES(attended) (50857)     [] ES (un) (48079):   [] VASO (20700)  [] Other:    If [facility] Please Indicate Time In/Out  CPT Code Time in Time out                                   SHOULDER GOALS:  Patient stated goal: pain-free shoulder, return to PLOF, ambulate without cane  [x] Progressing: [] Met: [] Not Met: [] Adjusted    Therapist goals for Patient:   Short Term Goals: To be achieved in: 2 weeks  1. Independent in HEP and progression per patient tolerance, in order to prevent re-injury. [] Progressing: [x] Met: [] Not Met: [] Adjusted  2. Patient will have a decrease in pain to facilitate improvement in movement, function, and ADLs as indicated by Functional Deficits. [] Progressing: [x] Met: [] Not Met: [] Adjusted    Long Term Goals: To be achieved in: 6 weeks  1. Disability index score of 25% or less for the Quick DASH to assist with reaching prior level of function. [x] Progressing: [] Met: [] Not Met: [] Adjusted  2. Patient will demonstrate increased AROM to equal contralateral to allow for proper joint functioning as indicated by Functional Deficits. [x] Progressing: [] Met: [] Not Met: [] Adjusted  3. Patient will demonstrate an increase in NM recruitment/activation and overall GH and scapular strength to within n5lbs HHD or WNL for proper functional mobility as indicated by patients Functional Deficits. [x] Progressing: [] Met: [] Not Met: [] Adjusted  4. Patient will return to reaching, lifting, overhead movements, self care especially after using toilet, dressing, grooming activities without increased symptoms or restriction. [x] Progressing: [] Met: [] Not Met: [] Adjusted       HIP GOALS:  Patient stated goal: strengthen LLE, ambulate without cane for short distances  [x] Progressing: [] Met: [] Not Met: [] Adjusted    Therapist goals for Patient:   Short Term Goals: To be achieved in: 2 weeks  1. Independent in HEP and progression per patient tolerance, in order to prevent re-injury. []?? Progressing: [x]? ? Met: []?? Not Met: []?? Adjusted  2. Patient will have a decrease in pain to facilitate improvement in movement, function, and ADLs as indicated by Functional Deficits. []?? Progressing: [x]? ? Met: []?? Not Met: []?? Adjusted     Long Term Goals: To be achieved in: 4-6 weeks  1. Disability index score of 40% or less for the LEFS to assist with reaching prior level of function. [x]? ? Progressing: []?? Met: []?? Not Met: []?? Adjusted  2. Patient will demonstrate increased AROM to WFL to allow for proper joint functioning as indicated by patients Functional Deficits. [x]? ? Progressing: []?? Met: []?? Not Met: []?? Adjusted  3. Patient will demonstrate an increase in Strength to good proximal hip strength and control, within 5lb HHD in LE to allow for proper functional mobility as indicated by patients Functional Deficits. [x]? ? Progressing: []?? Met: []?? Not Met: []?? Adjusted  4. Patient will return to standing, ambulation (with AD as needed), transfers, stairs, squatting without increased symptoms or restriction. [x] Progressing: [] Met: [] Not Met: [] Adjusted      ASSESSMENT:  Good tolerance to Alter G training, appropriate fatigue at conclusion with no increase in pain. Patient able to activate left glute med well with standing exercises in Alter G. Demonstrates decreased stride length on LLE compared to right with poor foot control during late swing phase. Making gradual gains in general LLE strength but she continues to present with deficient proximal hip control L>R and poor core stability. Gait is still antalgic with uneven step length and wide stance, though she is needing to use her cane less often for shorter distances. Patient will benefit from continued physical therapy to right shoulder with additional focus on left hip rehabilitation and balance training to improve patient's overall function and reduce risk of falls.       Return to Play: (if applicable)   []  Stage 1: Intro to Strength   []  Stage 2: Dynamic Strength and Intro to Plyometrics   []  Stage 3: Advanced Plyometrics and Intro to Throwing   []  Stage 4: Sport specific Training/Return to Sport     []  Ready to Return to Play, [employer]   []  Not Ready for Return to Sports   Comments:      Treatment/Activity Tolerance:  [x] Patient tolerated treatment well [] Patient limited by fatique  [] Patient limited by pain  [] Patient limited by other medical complications  [] Other:     Overall Progression Towards Functional goals/ Treatment Progress Update:  [x] Patient is progressing as expected towards functional goals listed. [] Progression is slowed due to complexities/Impairments listed. [] Progression has been slowed due to co-morbidities. [] Plan just implemented, too soon to assess goals progression <30days   [] Goals require adjustment due to lack of progress  [] Patient is not progressing as expected and requires additional follow up with physician  [] Other:     Prognosis for POC: [x] Good [] Fair  [] Poor    Patient requires continued skilled intervention: [x] Yes  [] No      PLAN: Continue PT 2x/week for 4-6 weeks for shoulder strengthening/ROM as needed, with continued left hip rehabilitation to focus on ROM, proximal hip strengthening, general LLE strength training, and balance training  [x] Continue per plan of care [] Alter current plan (see comments)  [] Plan of care initiated [] Hold pending MD visit [] Discharge    Electronically signed by: Apollo Arnett PT     Note: If patient does not return for scheduled/recommended follow up visits, this note will serve as a discharge from care along with the most recent update on progress.

## 2021-03-17 ENCOUNTER — HOSPITAL ENCOUNTER (OUTPATIENT)
Dept: PHYSICAL THERAPY | Age: 76
Setting detail: THERAPIES SERIES
Discharge: HOME | End: 2021-03-17
Payer: MEDICARE

## 2021-03-17 PROCEDURE — 97110 THERAPEUTIC EXERCISES: CPT

## 2021-03-17 NOTE — FLOWSHEET NOTE
[facility] [address] [facility]  Phone: [phone] Fax: [phone]          Physical Therapy Treatment Note/ Progress Report:     Date:  3/17/2021    Patient Name:  Brianda Walters    :  1945  MRN: [id number]  Restrictions/Precautions:    Medical/Treatment Diagnosis Information:  · Diagnosis: Right shoulder adhesive capsulitis, left hip pain  · Treatment Diagnosis: M75.01, C23.110  Insurance/Certification information:  PT Insurance Information: Medicare / [facility]  Physician Information:  Referring Practitioner: Ijeoma Hudson of care signed (Y/N):     Date of Patient follow up with Physician: N/A      Progress Report: [x]  Yes  [x]  No     Date Range for reporting period:  Beginnin21  Ending:  NA    Progress report due (10 Rx/or 30 days whichever is less):      Recertification due (POC duration/ or 90 days whichever is less): 21     Visit # Insurance Allowable Auth Needed   24 Medicare []Yes    []No     Pain level:  0/10 currently, c/o weakness     SUBJECTIVE:  Patient has no new complaints. Was able to go shopping at [employer] yesterday all by herself for the first time in forever, felt that she had the strength and stamina to handle it independently. She reports she was fatigued by the end but was able to complete her shopping without issue. Wants to continue getting stronger.      OBJECTIVE:    Observation: Antalgic gait with standard cane used in RUE   Test measurements:  Quick DASH - 27% impairment, LEFS - 70% impairment    RESTRICTIONS/PRECAUTIONS:      Exercises/Interventions:   Therapeutic Ex (34363)  Min: 40 Sets/sec Reps CUES/Notes   Bike 5'       Ankle theraband 4-way 1 15 green   Cybex Gastroc- SL  2 10 40#   Cybex Soleus - SL 2 10 40#   Leg Press - SL 2 10 50#   Wobble board A/P, ecc control  2 10 seated   Bike 5'   Recumbent   Bridge 10 12     SLR         SAQ 5 30 4#, full roll   Clam - side lying 5 10 No resistance   Supine clam - unilateral 10 10 Maroon band, each         BOSU lunge     add   Slide lunge - retro at ledge 1 15 Manual cues for L knee   Standing hip abd 2 10 orange   Supine TrA March  1 10     Heel raises 5 10 6\" step   TKE 5 15 3.5pl   Side stepping 2 15 HHA, orange at ankles   Sit to stand with TRX 2 10 1 airex on seat, sink for support   Step up 3 10 8\"   Lateral step down 2 10 4\"   Standing hip 3-way kicks 5 10 Purple band for TKE   On 2\" step   Wobble board A/P, circles 1 10 DL, standing   SLS - modified with plyo box 20 3     Corner turns - modified at ledge 1 10 bilat UE support   Leg press - SL 3 10 40# S7             Manual Intervention  (75707)  Min: 0         Shld /GH Mobs 0'   Gentle inferior glides   Post Cap mobs 0'   Prone LLLD IR   Thoracic/Rib manipualtion         Hip PROM 5'       PROM MT - shoulder 0'   Flex, abd, ER, IR   IASTM 0'   R deltoid, bicep             NMR re-education (89094)  Min: 0         Biodex balance 10'   LS x 2, RC   GH depress/compress         Scap/GH NMR         Body blade         Wall ball roll         Wall Ball bounce         Ball drops         Betty Scap Bio         Floor Snow angels-sliders                   Therapeutic Activity (82108)  Min:         UE throwing porgression         Dynamic UE stability         Earthquake Bar         Bodyblade                    Therapeutic Exercise and NMR EXR  [x] (91207) Provided verbal/tactile cueing for activities related to strengthening, flexibility, endurance, ROM  for improvements in scapular, scapulothoracic and UE control with self care, reaching, carrying, lifting, house/yardwork, driving/computer work. [x] (01629) Provided verbal/tactile cueing for activities related to improving balance, coordination, kinesthetic sense, posture, motor skill, proprioception  to assist with  scapular, scapulothoracic and UE control with self care, reaching, carrying, lifting, house/yardwork, driving/computer work. Therapeutic Activities:    [] (42571 or 20101) Provided verbal/tactile cueing for activities related to improving balance, coordination, kinesthetic sense, posture, motor skill, proprioception and motor activation to allow for proper function of scapular, scapulothoracic and UE control with self care, carrying, lifting, driving/computer work.      Home Exercise Program:    [x] (74916) Reviewed/Progressed HEP activities related to strengthening, flexibility, endurance, ROM of scapular, scapulothoracic and UE control with self care, reaching, carrying, lifting, house/yardwork, driving/computer work  [] (71866) Reviewed/Progressed HEP activities related to improving balance, coordination, kinesthetic sense, posture, motor skill, proprioception of scapular, scapulothoracic and UE control with self care, reaching, carrying, lifting, house/yardwork, driving/computer work      Manual Treatments:  PROM / STM / Oscillations-Mobs:  G-I, II, III, IV (PA's, Inf., Post.)  [x] (75993) Provided manual therapy to mobilize soft tissue/joints of cervical/CT, scapular GHJ and UE for the purpose of modulating pain, promoting relaxation,  increasing ROM, reducing/eliminating soft tissue swelling/inflammation/restriction, improving soft tissue extensibility and allowing for proper ROM for normal function with self care, reaching, carrying, lifting, house/yardwork, driving/computer work    Modalities:  None    Charges:  Timed Code Treatment Minutes: 40   Total Treatment Minutes: 40       [] EVAL (LOW) 40916 (typically 20 minutes face-to-face)  [] EVAL (MOD) 42589 (typically 30 minutes face-to-face)  [] EVAL (HIGH) 54763 (typically 45 minutes face-to-face)  [] RE-EVAL     [x] IJ(90354) x 3     [] DRY NEEDLE 1 OR 2 MUSCLES  [] NMR (38583) x     [] DRY NEEDLE 3+ MUSCLES  [] Manual (07277) x       [] TA (90787) x     [] Mech Traction (90796)  [] ES(attended) (10436)     [] ES (un) (34507):   [] VASO (32427)  [] Other:    If [facility] Please Indicate Time In/Out  CPT Code Time in Time out                                   SHOULDER GOALS:  Patient stated goal: pain-free shoulder, return to PLOF, ambulate without cane  [x] Progressing: [] Met: [] Not Met: [] Adjusted    Therapist goals for Patient:   Short Term Goals: To be achieved in: 2 weeks  1. Independent in HEP and progression per patient tolerance, in order to prevent re-injury. [] Progressing: [x] Met: [] Not Met: [] Adjusted  2. Patient will have a decrease in pain to facilitate improvement in movement, function, and ADLs as indicated by Functional Deficits. [] Progressing: [x] Met: [] Not Met: [] Adjusted    Long Term Goals: To be achieved in: 6 weeks  1. Disability index score of 25% or less for the Quick DASH to assist with reaching prior level of function. [x] Progressing: [] Met: [] Not Met: [] Adjusted  2. Patient will demonstrate increased AROM to equal contralateral to allow for proper joint functioning as indicated by Functional Deficits. [x] Progressing: [] Met: [] Not Met: [] Adjusted  3. Patient will demonstrate an increase in NM recruitment/activation and overall GH and scapular strength to within n5lbs HHD or WNL for proper functional mobility as indicated by patients Functional Deficits. [x] Progressing: [] Met: [] Not Met: [] Adjusted  4. Patient will return to reaching, lifting, overhead movements, self care especially after using toilet, dressing, grooming activities without increased symptoms or restriction. [x] Progressing: [] Met: [] Not Met: [] Adjusted       HIP GOALS:  Patient stated goal: strengthen LLE, ambulate without cane for short distances  [x] Progressing: [] Met: [] Not Met: [] Adjusted    Therapist goals for Patient:   Short Term Goals: To be achieved in: 2 weeks  1. Independent in HEP and progression per patient tolerance, in order to prevent re-injury. []?? Progressing: [x]? ? Met: []?? Not Met: []?? Adjusted  2. Patient will have a decrease in pain to facilitate improvement in movement, function, and ADLs as indicated by Functional Deficits. []?? Progressing: [x]? ? Met: []?? Not Met: []?? Adjusted     Long Term Goals: To be achieved in: 4-6 weeks  1. Disability index score of 40% or less for the LEFS to assist with reaching prior level of function. [x]? ? Progressing: []?? Met: []?? Not Met: []?? Adjusted  2. Patient will demonstrate increased AROM to WFL to allow for proper joint functioning as indicated by patients Functional Deficits. [x]? ? Progressing: []?? Met: []?? Not Met: []?? Adjusted  3. Patient will demonstrate an increase in Strength to good proximal hip strength and control, within 5lb HHD in LE to allow for proper functional mobility as indicated by patients Functional Deficits. [x]? ? Progressing: []?? Met: []?? Not Met: []?? Adjusted  4. Patient will return to standing, ambulation (with AD as needed), transfers, stairs, squatting without increased symptoms or restriction. [x] Progressing: [] Met: [] Not Met: [] Adjusted      ASSESSMENT:  Appropriate fatigue at conclusion with no increase in pain. Making gradual gains in general LLE strength but she continues to present with deficient proximal hip control L>R and poor core stability. Gait is still antalgic with uneven step length and wide stance, though she is needing to use her cane less often for shorter distances. Patient will benefit from continued physical therapy to right shoulder with additional focus on left hip rehabilitation and balance training to improve patient's overall function and reduce risk of falls.       Return to Play: (if applicable)   []  Stage 1: Intro to Strength   []  Stage 2: Dynamic Strength and Intro to Plyometrics   []  Stage 3: Advanced Plyometrics and Intro to Throwing   []  Stage 4: Sport specific Training/Return to Sport     []  Ready to Return to Play, [employer]   []  Not Ready for Return to Sports   Comments:      Treatment/Activity Tolerance:  [x] Patient tolerated treatment well [] Patient limited by fatique  [] Patient limited by pain  [] Patient limited by other medical complications  [] Other:     Overall Progression Towards Functional goals/ Treatment Progress Update:  [x] Patient is progressing as expected towards functional goals listed. [] Progression is slowed due to complexities/Impairments listed. [] Progression has been slowed due to co-morbidities. [] Plan just implemented, too soon to assess goals progression <30days   [] Goals require adjustment due to lack of progress  [] Patient is not progressing as expected and requires additional follow up with physician  [] Other:     Prognosis for POC: [x] Good [] Fair  [] Poor    Patient requires continued skilled intervention: [x] Yes  [] No      PLAN: Continue PT 2x/week for 4-6 weeks for shoulder strengthening/ROM as needed, with continued left hip rehabilitation to focus on ROM, proximal hip strengthening, general LLE strength training, and balance training  [x] Continue per plan of care [] Alter current plan (see comments)  [] Plan of care initiated [] Hold pending MD visit [] Discharge    Electronically signed by: Syd Marcum PT     Note: If patient does not return for scheduled/recommended follow up visits, this note will serve as a discharge from care along with the most recent update on progress.

## 2021-03-29 ENCOUNTER — HOSPITAL ENCOUNTER (OUTPATIENT)
Dept: PHYSICAL THERAPY | Age: 76
Setting detail: THERAPIES SERIES
Discharge: HOME | End: 2021-03-29
Payer: MEDICARE

## 2021-03-29 NOTE — FLOWSHEET NOTE
[address] [place] Office    Physical Therapy  Cancellation/No-show Note  Patient Name:  Puja Acosta  :  1945   Date:  3/29/2021  Cancelled visits to date: 7  No-shows to date: 0    For today's appointment patient:  []  Cancelled  [x]  Rescheduled appointment  []  No-show     Reason given by patient:  [x]  Patient ill  []  Conflicting appointment   []  No transportation    []  Conflict with work  []  No reason given  []  Other:     Comments:  Exposed to [employer]    Electronically signed by:  Romy Walker, PT

## 2021-03-31 ENCOUNTER — APPOINTMENT (OUTPATIENT)
Dept: PHYSICAL THERAPY | Age: 76
End: 2021-03-31
Payer: MEDICARE

## 2021-04-05 ENCOUNTER — HOSPITAL ENCOUNTER (OUTPATIENT)
Dept: PHYSICAL THERAPY | Age: 76
Setting detail: THERAPIES SERIES
Discharge: HOME | End: 2021-04-05
Payer: MEDICARE

## 2021-04-05 PROCEDURE — 97140 MANUAL THERAPY 1/> REGIONS: CPT

## 2021-04-05 PROCEDURE — 97110 THERAPEUTIC EXERCISES: CPT

## 2021-04-05 NOTE — FLOWSHEET NOTE
[facility] [address] [facility]  Phone: [phone] Fax: [phone]          Physical Therapy Treatment Note/ Progress Report:     Date:  2021    Patient Name:  Chio Flood    :  1945  MRN: [id number]  Restrictions/Precautions:    Medical/Treatment Diagnosis Information:  · Diagnosis: Right shoulder adhesive capsulitis, left hip pain  · Treatment Diagnosis: M75.01, A25.640  Insurance/Certification information:  PT Insurance Information: Medicare / [facility]  Physician Information:  Referring Practitioner: Hola Hazleton of care signed (Y/N):     Date of Patient follow up with Physician: N/A      Progress Report: [x]  Yes  [x]  No     Date Range for reporting period:  Beginnin21  Ending:  NA    Progress report due (10 Rx/or 30 days whichever is less): 33     Recertification due (POC duration/ or 90 days whichever is less): 21     Visit # Insurance Allowable Auth Needed   25 Medicare []Yes    []No     Pain level:  0/10 currently, c/o weakness     SUBJECTIVE:  Patient reports that she can really tell a decline in her general strength/stability since not being in therapy for 2+ weeks. She tried doing exercises at home but it just isn't the same as doing them in therapy. Also has been feeling soreness in the left posterior hip.      OBJECTIVE:    Observation: Antalgic gait with standard cane used in RUE   Test measurements:  Quick DASH - 27% impairment, LEFS - 70% impairment    RESTRICTIONS/PRECAUTIONS:      Exercises/Interventions:   Therapeutic Ex (28140)  Min: 30 Sets/sec Reps CUES/Notes   Bike 5'       Ankle theraband 4-way 1 15 green   Cybex Gastroc- SL  2 10 40#   Cybex Soleus - SL 2 10 40#   Leg Press - SL 2 10 50#   Wobble board A/P, ecc control  2 10 seated   Bike 5'   Recumbent   Bridge 10 12     SLR         SAQ 5 30 4#, full roll   Clam - side lying 5 10 No resistance   Supine clam - unilateral 10 10 Maroon band, each         BOSU lunge     add   Slide lunge - retro at ledge 1 15 Manual cues for L knee   Standing hip abd 2 10 orange   Supine TrA March  1 10     Heel raises 5 10 6\" step   TKE 5 15 3.5pl   Side stepping 2 15 HHA, orange at ankles   Sit to stand with TRX 2 10 1 airex on seat, sink for support   Step up 3 10 8\"   Lateral step down 2 10 4\"   Standing hip 3-way kicks 5 10 Purple band for TKE   On 2\" step   Wobble board A/P, circles 1 10 DL, standing   SLS - modified with plyo box 20 3     Corner turns - modified at ledge 1 10 bilat UE support   Leg press - SL 3 10 40# S7             Manual Intervention  (54542)  Min: 14         Shld /GH Mobs 0'   Gentle inferior glides   Post Cap mobs 0'   Prone LLLD IR   Thoracic/Rib manipualtion         Hip PROM 7'       PROM MT - shoulder 0'   Flex, abd, ER, IR   Hypervolt percussion massage 7'   L gluteals             NMR re-education (24831)  Min: 0         Biodex balance 10'   LS x 2, RC   GH depress/compress         Scap/GH NMR         Body blade         Wall ball roll         Wall Ball bounce         Ball drops         Betty Scap Bio         Floor Snow angels-sliders                   Therapeutic Activity (29919)  Min:         UE throwing porgression         Dynamic UE stability         Earthquake Bar         Bodyblade                    Therapeutic Exercise and NMR EXR  [x] (01176) Provided verbal/tactile cueing for activities related to strengthening, flexibility, endurance, ROM  for improvements in scapular, scapulothoracic and UE control with self care, reaching, carrying, lifting, house/yardwork, driving/computer work. [x] (40769) Provided verbal/tactile cueing for activities related to improving balance, coordination, kinesthetic sense, posture, motor skill, proprioception  to assist with  scapular, scapulothoracic and UE control with self care, reaching, carrying, lifting, house/yardwork, driving/computer work.     Therapeutic Activities:    [] (69829 or ) Provided verbal/tactile cueing for activities related to improving balance, coordination, kinesthetic sense, posture, motor skill, proprioception and motor activation to allow for proper function of scapular, scapulothoracic and UE control with self care, carrying, lifting, driving/computer work.      Home Exercise Program:    [x] (37788) Reviewed/Progressed HEP activities related to strengthening, flexibility, endurance, ROM of scapular, scapulothoracic and UE control with self care, reaching, carrying, lifting, house/yardwork, driving/computer work  [] (28123) Reviewed/Progressed HEP activities related to improving balance, coordination, kinesthetic sense, posture, motor skill, proprioception of scapular, scapulothoracic and UE control with self care, reaching, carrying, lifting, house/yardwork, driving/computer work      Manual Treatments:  PROM / STM / Oscillations-Mobs:  G-I, II, III, IV (PA's, Inf., Post.)  [x] (97253) Provided manual therapy to mobilize soft tissue/joints of cervical/CT, scapular GHJ and UE for the purpose of modulating pain, promoting relaxation,  increasing ROM, reducing/eliminating soft tissue swelling/inflammation/restriction, improving soft tissue extensibility and allowing for proper ROM for normal function with self care, reaching, carrying, lifting, house/yardwork, driving/computer work    Modalities:  None    Charges:  Timed Code Treatment Minutes: 44   Total Treatment Minutes: 44       [] EVAL (LOW) 31557 (typically 20 minutes face-to-face)  [] EVAL (MOD) 91945 (typically 30 minutes face-to-face)  [] EVAL (HIGH) 94766 (typically 45 minutes face-to-face)  [] RE-EVAL     [x] AO(06231) x 2     [] DRY NEEDLE 1 OR 2 MUSCLES  [] NMR (65094) x     [] DRY NEEDLE 3+ MUSCLES  [x] Manual (95319) x 1      [] TA (61563) x     [] Mech Traction (81286)  [] ES(attended) (26076)     [] ES (un) (43386):   [] VASO (72380)  [] Other:    If [facility] Please Indicate Time In/Out  CPT Code Time in Time out                                   SHOULDER GOALS:  Patient stated goal: pain-free shoulder, return to PLOF, ambulate without cane  [x] Progressing: [] Met: [] Not Met: [] Adjusted    Therapist goals for Patient:   Short Term Goals: To be achieved in: 2 weeks  1. Independent in HEP and progression per patient tolerance, in order to prevent re-injury. [] Progressing: [x] Met: [] Not Met: [] Adjusted  2. Patient will have a decrease in pain to facilitate improvement in movement, function, and ADLs as indicated by Functional Deficits. [] Progressing: [x] Met: [] Not Met: [] Adjusted    Long Term Goals: To be achieved in: 6 weeks  1. Disability index score of 25% or less for the Quick DASH to assist with reaching prior level of function. [x] Progressing: [] Met: [] Not Met: [] Adjusted  2. Patient will demonstrate increased AROM to equal contralateral to allow for proper joint functioning as indicated by Functional Deficits. [x] Progressing: [] Met: [] Not Met: [] Adjusted  3. Patient will demonstrate an increase in NM recruitment/activation and overall GH and scapular strength to within n5lbs HHD or WNL for proper functional mobility as indicated by patients Functional Deficits. [x] Progressing: [] Met: [] Not Met: [] Adjusted  4. Patient will return to reaching, lifting, overhead movements, self care especially after using toilet, dressing, grooming activities without increased symptoms or restriction. [x] Progressing: [] Met: [] Not Met: [] Adjusted       HIP GOALS:  Patient stated goal: strengthen LLE, ambulate without cane for short distances  [x] Progressing: [] Met: [] Not Met: [] Adjusted    Therapist goals for Patient:   Short Term Goals: To be achieved in: 2 weeks  1. Independent in HEP and progression per patient tolerance, in order to prevent re-injury. []?? Progressing: [x]? ? Met: []?? Not Met: []?? Adjusted  2. Patient will have a decrease in pain to facilitate improvement in movement, function, and ADLs as indicated by Functional Deficits. []?? Progressing: [x]? ? Met: []?? Not Met: []?? Adjusted     Long Term Goals: To be achieved in: 4-6 weeks  1. Disability index score of 40% or less for the LEFS to assist with reaching prior level of function. [x]? ? Progressing: []?? Met: []?? Not Met: []?? Adjusted  2. Patient will demonstrate increased AROM to WFL to allow for proper joint functioning as indicated by patients Functional Deficits. [x]? ? Progressing: []?? Met: []?? Not Met: []?? Adjusted  3. Patient will demonstrate an increase in Strength to good proximal hip strength and control, within 5lb HHD in LE to allow for proper functional mobility as indicated by patients Functional Deficits. [x]? ? Progressing: []?? Met: []?? Not Met: []?? Adjusted  4. Patient will return to standing, ambulation (with AD as needed), transfers, stairs, squatting without increased symptoms or restriction. [x] Progressing: [] Met: [] Not Met: [] Adjusted      ASSESSMENT:  Appropriate fatigue at conclusion with no increase in pain, fatigued quicker with more visible LLE shaking at conclusion following 2+ week hiatus from PT. Making gradual gains in general LLE strength but she continues to present with deficient proximal hip control L>R and poor core stability. Gait is still antalgic with uneven step length and wide stance, though she is needing to use her cane less often for shorter distances. Patient will benefit from continued physical therapy to right shoulder with additional focus on left hip rehabilitation and balance training to improve patient's overall function and reduce risk of falls.       Return to Play: (if applicable)   []  Stage 1: Intro to Strength   []  Stage 2: Dynamic Strength and Intro to Plyometrics   []  Stage 3: Advanced Plyometrics and Intro to Throwing   []  Stage 4: Sport specific Training/Return to Sport     []  Ready to Return to Play, Meets All Above Stages   []  Not Ready for Return to Sports   Comments:      Treatment/Activity Tolerance:  [x] Patient tolerated treatment well [] Patient limited by fatique  [] Patient limited by pain  [] Patient limited by other medical complications  [] Other:     Overall Progression Towards Functional goals/ Treatment Progress Update:  [x] Patient is progressing as expected towards functional goals listed. [] Progression is slowed due to complexities/Impairments listed. [] Progression has been slowed due to co-morbidities. [] Plan just implemented, too soon to assess goals progression <30days   [] Goals require adjustment due to lack of progress  [] Patient is not progressing as expected and requires additional follow up with physician  [] Other:     Prognosis for POC: [x] Good [] Fair  [] Poor    Patient requires continued skilled intervention: [x] Yes  [] No      PLAN: Continue PT 2x/week for 4-6 weeks for shoulder strengthening/ROM as needed, with continued left hip rehabilitation to focus on ROM, proximal hip strengthening, general LLE strength training, and balance training  [x] Continue per plan of care [] Alter current plan (see comments)  [] Plan of care initiated [] Hold pending MD visit [] Discharge    Electronically signed by: Cari Moore PT     Note: If patient does not return for scheduled/recommended follow up visits, this note will serve as a discharge from care along with the most recent update on progress.

## 2021-04-07 ENCOUNTER — HOSPITAL ENCOUNTER (OUTPATIENT)
Dept: PHYSICAL THERAPY | Age: 76
Setting detail: THERAPIES SERIES
Discharge: HOME | End: 2021-04-07
Payer: MEDICARE

## 2021-04-07 PROCEDURE — 97110 THERAPEUTIC EXERCISES: CPT

## 2021-04-07 PROCEDURE — 97140 MANUAL THERAPY 1/> REGIONS: CPT

## 2021-04-07 NOTE — FLOWSHEET NOTE
unilateral 10 10 Maroon band, each         BOSU lunge     add   Slide lunge - retro at ledge 1 15 Manual cues for L knee   Standing hip abd 2 10 orange   Supine TrA March  1 10     Heel raises 5 10 2\" step   TKE 5 15 3.5pl   Side stepping 2 15 HHA, orange at ankles   Sit to stand with TRX 2 10 1 airex on seat, sink for support   Step up 3 10 8\"   Lateral step down 2 10 4\"   Standing hip 3-way kicks 5 10 Purple band for TKE   On 2\" step   Wobble board A/P, circles 1 10 DL, standing   SLS - modified with plyo box 20 3     Corner turns - modified at ledge 1 10 bilat UE support   Leg press - SL 3 10 40# S7             Manual Intervention  (70668)  Min: 14         Shld /GH Mobs 0'   Gentle inferior glides   Post Cap mobs 0'   Prone LLLD IR   Thoracic/Rib manipualtion         Hip PROM 7'       IASTM 7'  Gastroc/soleus, L   PROM MT - shoulder 0'   Flex, abd, ER, IR   Hypervolt percussion massage 0'   L gluteals             NMR re-education (07677)  Min: 0         Biodex balance 10'   LS x 2, RC   GH depress/compress         Scap/GH NMR         Body blade         Wall ball roll         Wall Ball bounce         Ball drops         Betty Scap Bio         Floor Snow angels-sliders                   Therapeutic Activity (28608)  Min:         UE throwing porgression         Dynamic UE stability         Earthquake Bar         Bodyblade                    Therapeutic Exercise and NMR EXR  [x] (40552) Provided verbal/tactile cueing for activities related to strengthening, flexibility, endurance, ROM  for improvements in scapular, scapulothoracic and UE control with self care, reaching, carrying, lifting, house/yardwork, driving/computer work.     [x] (59769) Provided verbal/tactile cueing for activities related to improving balance, coordination, kinesthetic sense, posture, motor skill, proprioception  to assist with  scapular, scapulothoracic and UE control with self care, reaching, carrying, lifting, house/yardwork, driving/computer work.    Therapeutic Activities:    [] (31554 or 57476) Provided verbal/tactile cueing for activities related to improving balance, coordination, kinesthetic sense, posture, motor skill, proprioception and motor activation to allow for proper function of scapular, scapulothoracic and UE control with self care, carrying, lifting, driving/computer work.      Home Exercise Program:    [x] (82230) Reviewed/Progressed HEP activities related to strengthening, flexibility, endurance, ROM of scapular, scapulothoracic and UE control with self care, reaching, carrying, lifting, house/yardwork, driving/computer work  [] (13749) Reviewed/Progressed HEP activities related to improving balance, coordination, kinesthetic sense, posture, motor skill, proprioception of scapular, scapulothoracic and UE control with self care, reaching, carrying, lifting, house/yardwork, driving/computer work      Manual Treatments:  PROM / STM / Oscillations-Mobs:  G-I, II, III, IV (PA's, Inf., Post.)  [x] (51720) Provided manual therapy to mobilize soft tissue/joints of cervical/CT, scapular GHJ and UE for the purpose of modulating pain, promoting relaxation,  increasing ROM, reducing/eliminating soft tissue swelling/inflammation/restriction, improving soft tissue extensibility and allowing for proper ROM for normal function with self care, reaching, carrying, lifting, house/yardwork, driving/computer work    Modalities:  None    Charges:  Timed Code Treatment Minutes: 44   Total Treatment Minutes: 44       [] EVAL (LOW) 85706 (typically 20 minutes face-to-face)  [] EVAL (MOD) 93164 (typically 30 minutes face-to-face)  [] EVAL (HIGH) 98691 (typically 45 minutes face-to-face)  [] RE-EVAL     [x] SI(20168) x 2     [] DRY NEEDLE 1 OR 2 MUSCLES  [] NMR (57771) x     [] DRY NEEDLE 3+ MUSCLES  [x] Manual (97543) x 1      [] TA (30273) x     [] Mech Traction (47202)  [] ES(attended) (53170)     [] ES (un) (36758):   [] VASO (72315)  [] Other:    If [facility] in pain to facilitate improvement in movement, function, and ADLs as indicated by Functional Deficits. []?? Progressing: [x]? ? Met: []?? Not Met: []?? Adjusted     Long Term Goals: To be achieved in: 4-6 weeks  1. Disability index score of 40% or less for the LEFS to assist with reaching prior level of function. [x]? ? Progressing: []?? Met: []?? Not Met: []?? Adjusted  2. Patient will demonstrate increased AROM to WFL to allow for proper joint functioning as indicated by patients Functional Deficits. [x]? ? Progressing: []?? Met: []?? Not Met: []?? Adjusted  3. Patient will demonstrate an increase in Strength to good proximal hip strength and control, within 5lb HHD in LE to allow for proper functional mobility as indicated by patients Functional Deficits. [x]? ? Progressing: []?? Met: []?? Not Met: []?? Adjusted  4. Patient will return to standing, ambulation (with AD as needed), transfers, stairs, squatting without increased symptoms or restriction. [x] Progressing: [] Met: [] Not Met: [] Adjusted      ASSESSMENT:  Appropriate fatigue at conclusion with no increase in pain. Improvement in calf symptoms at conclusion, low tolerance to IASTM in that region at first but improved with time. Making gradual gains in general LLE strength but she continues to present with deficient proximal hip control L>R and poor core stability, as well as limited plantarflexor strength. Gait is still antalgic with uneven step length and wide stance, though she is needing to use her cane less often for shorter distances. Patient will benefit from continued physical therapy to right shoulder with additional focus on left hip rehabilitation and balance training to improve patient's overall function and reduce risk of falls.       Return to Play: (if applicable)   []  Stage 1: Intro to Strength   []  Stage 2: Dynamic Strength and Intro to Plyometrics   []  Stage 3: Advanced Plyometrics and Intro to Throwing   []  Stage 4: Sport specific Training/Return to Sport     []  Ready to Return to Play, Meets All Above Stages   []  Not Ready for Return to Sports   Comments:      Treatment/Activity Tolerance:  [x] Patient tolerated treatment well [] Patient limited by fatique  [] Patient limited by pain  [] Patient limited by other medical complications  [] Other:     Overall Progression Towards Functional goals/ Treatment Progress Update:  [x] Patient is progressing as expected towards functional goals listed. [] Progression is slowed due to complexities/Impairments listed. [] Progression has been slowed due to co-morbidities. [] Plan just implemented, too soon to assess goals progression <30days   [] Goals require adjustment due to lack of progress  [] Patient is not progressing as expected and requires additional follow up with physician  [] Other:     Prognosis for POC: [x] Good [] Fair  [] Poor    Patient requires continued skilled intervention: [x] Yes  [] No      PLAN: Continue PT 2x/week for 4-6 weeks for shoulder strengthening/ROM as needed, with continued left hip rehabilitation to focus on ROM, proximal hip strengthening, general LLE strength training, and balance training  [x] Continue per plan of care [] Alter current plan (see comments)  [] Plan of care initiated [] Hold pending MD visit [] Discharge    Electronically signed by: Gordon Mark PT     Note: If patient does not return for scheduled/recommended follow up visits, this note will serve as a discharge from care along with the most recent update on progress.

## 2021-04-14 ENCOUNTER — HOSPITAL ENCOUNTER (OUTPATIENT)
Dept: PHYSICAL THERAPY | Age: 76
Setting detail: THERAPIES SERIES
Discharge: HOME | End: 2021-04-14
Payer: MEDICARE

## 2021-04-14 PROCEDURE — 97110 THERAPEUTIC EXERCISES: CPT

## 2021-04-14 NOTE — FLOWSHEET NOTE
[address] [address] [place] [address]  Phone: [phone] Fax: [phone]          Physical Therapy Treatment Note/ Progress Report:     Date:  2021    Patient Name:  Olivia Cardenas    :  1945  MRN: [id number]  Restrictions/Precautions:    Medical/Treatment Diagnosis Information:  · Diagnosis: Right shoulder adhesive capsulitis, left hip pain  · Treatment Diagnosis: M75.01, M54.168  Insurance/Certification information:  PT Insurance Information: Medicare / [facility]  Physician Information:  Referring Practitioner: Manjinder Russo signed (Y/N):     Date of Patient follow up with Physician: N/A      Progress Report: [x]  Yes  [x]  No     Date Range for reporting period:  Beginnin21  Ending:  NA    Progress report due (10 Rx/or 30 days whichever is less): 83     Recertification due (POC duration/ or 90 days whichever is less): 21     Visit # Insurance Allowable Auth Needed   27 Medicare []Yes    []No     Pain level:  0/10 currently, c/o weakness     SUBJECTIVE:  Patient reports that her calf has felt noticeably better since last session, has not been having the tightness/cramping or the pain at night. Ready to refocus on LLE strengthening.      OBJECTIVE:    Observation: Antalgic gait with standard cane used in RUE   Test measurements:  Quick DASH - 27% impairment, LEFS - 70% impairment    RESTRICTIONS/PRECAUTIONS:      Exercises/Interventions:   Therapeutic Ex (84068)  Min: 40 Sets/sec Reps CUES/Notes   Bike 5'       Ankle theraband 4-way 1 15 green   Cybex Gastroc- SL  2 10 45#   Cybex Soleus - SL 2 10 50#   Leg Press - SL 2 10 45#   Cybex Leg Ext - SL 3 10 15#   Wobble board A/P, ecc control  2 10 seated   Bridge 10 12     SLR         SAQ 5 30 4#, full roll   Clam - side lying 5 10 No resistance   Supine clam - unilateral 10 10 Maroon band, each         BOSU lunge     add   Slide lunge - retro at ledge 1 15 Manual cues for L knee   Standing hip abd 2 10 orange   Supine TrA March  1 10     Heel raises 5 10 2\" step   TKE 5 15 3.5pl   Side stepping 2 15 HHA, orange at ankles   Sit to stand with TRX 2 10 1 airex on seat, sink for support   Step up 3 10 8\"   Lateral step down 2 10 4\"   Standing hip 3-way kicks 5 10 Purple band for TKE   On 2\" step   Wobble board A/P, circles 1 10 DL, standing   SLS - modified with plyo box 20 3     Corner turns - modified at ledge 1 10 bilat UE support   Leg press - SL 3 10 40# S7             Manual Intervention  (63845)  Min: 0         Shld /GH Mobs 0'   Gentle inferior glides   Post Cap mobs 0'   Prone LLLD IR   Thoracic/Rib manipualtion         Hip PROM 7'       IASTM 7'  Gastroc/soleus, L   PROM MT - shoulder 0'   Flex, abd, ER, IR   Hypervolt percussion massage 0'   L gluteals             NMR re-education (52045)  Min: 0         Biodex balance 10'   LS x 2, RC   GH depress/compress         Scap/GH NMR         Body blade         Wall ball roll         Wall Ball bounce         Ball drops         Betty Scap Bio         Floor Snow angels-sliders                   Therapeutic Activity (09544)  Min:         UE throwing porgression         Dynamic UE stability         Earthquake Bar         Bodyblade                    Therapeutic Exercise and NMR EXR  [x] (94999) Provided verbal/tactile cueing for activities related to strengthening, flexibility, endurance, ROM  for improvements in scapular, scapulothoracic and UE control with self care, reaching, carrying, lifting, house/yardwork, driving/computer work. [x] (09050) Provided verbal/tactile cueing for activities related to improving balance, coordination, kinesthetic sense, posture, motor skill, proprioception  to assist with  scapular, scapulothoracic and UE control with self care, reaching, carrying, lifting, house/yardwork, driving/computer work.     Therapeutic Activities:    [] (05333 or 52434) Provided verbal/tactile cueing for SHOULDER GOALS:  Patient stated goal: pain-free shoulder, return to PLOF, ambulate without cane  [x] Progressing: [] Met: [] Not Met: [] Adjusted    Therapist goals for Patient:   Short Term Goals: To be achieved in: 2 weeks  1. Independent in HEP and progression per patient tolerance, in order to prevent re-injury. [] Progressing: [x] Met: [] Not Met: [] Adjusted  2. Patient will have a decrease in pain to facilitate improvement in movement, function, and ADLs as indicated by Functional Deficits. [] Progressing: [x] Met: [] Not Met: [] Adjusted    Long Term Goals: To be achieved in: 6 weeks  1. Disability index score of 25% or less for the Quick DASH to assist with reaching prior level of function. [x] Progressing: [] Met: [] Not Met: [] Adjusted  2. Patient will demonstrate increased AROM to equal contralateral to allow for proper joint functioning as indicated by Functional Deficits. [x] Progressing: [] Met: [] Not Met: [] Adjusted  3. Patient will demonstrate an increase in NM recruitment/activation and overall GH and scapular strength to within n5lbs HHD or WNL for proper functional mobility as indicated by patients Functional Deficits. [x] Progressing: [] Met: [] Not Met: [] Adjusted  4. Patient will return to reaching, lifting, overhead movements, self care especially after using toilet, dressing, grooming activities without increased symptoms or restriction. [x] Progressing: [] Met: [] Not Met: [] Adjusted       HIP GOALS:  Patient stated goal: strengthen LLE, ambulate without cane for short distances  [x] Progressing: [] Met: [] Not Met: [] Adjusted    Therapist goals for Patient:   Short Term Goals: To be achieved in: 2 weeks  1. Independent in HEP and progression per patient tolerance, in order to prevent re-injury. []?? Progressing: [x]? ? Met: []?? Not Met: []?? Adjusted  2. Patient will have a decrease in pain to facilitate improvement in movement, function, and ADLs as indicated by Functional Deficits. []?? Progressing: [x]? ? Met: []?? Not Met: []?? Adjusted     Long Term Goals: To be achieved in: 4-6 weeks  1. Disability index score of 40% or less for the LEFS to assist with reaching prior level of function. [x]? ? Progressing: []?? Met: []?? Not Met: []?? Adjusted  2. Patient will demonstrate increased AROM to WFL to allow for proper joint functioning as indicated by patients Functional Deficits. [x]? ? Progressing: []?? Met: []?? Not Met: []?? Adjusted  3. Patient will demonstrate an increase in Strength to good proximal hip strength and control, within 5lb HHD in LE to allow for proper functional mobility as indicated by patients Functional Deficits. [x]? ? Progressing: []?? Met: []?? Not Met: []?? Adjusted  4. Patient will return to standing, ambulation (with AD as needed), transfers, stairs, squatting without increased symptoms or restriction. [x] Progressing: [] Met: [] Not Met: [] Adjusted      ASSESSMENT:  Appropriate fatigue at conclusion with no increase in pain, LLE very shaky by end of session. Making gradual gains in general LLE strength but she continues to present with deficient proximal hip control L>R and poor core stability, as well as limited plantarflexor strength. Gait is still antalgic with uneven step length and wide stance, though she is needing to use her cane less often for shorter distances. Patient will benefit from continued physical therapy to right shoulder with additional focus on left hip rehabilitation and balance training to improve patient's overall function and reduce risk of falls.       Return to Play: (if applicable)   []  Stage 1: Intro to Strength   []  Stage 2: Dynamic Strength and Intro to Plyometrics   []  Stage 3: Advanced Plyometrics and Intro to Throwing   []  Stage 4: Sport specific Training/Return to Sport     []  Ready to Return to Play, [employer] [employer] Stages   []  Not Ready for Return to Sports   Comments:      Treatment/Activity

## 2021-04-15 ENCOUNTER — TELEPHONE (OUTPATIENT)
Dept: INTERNAL MEDICINE CLINIC | Age: 76
End: 2021-04-15

## 2021-04-15 NOTE — TELEPHONE ENCOUNTER
Patient rec'd the 1st pfizer and she did fine and is scheduled to have her 2nd one on the 24th. 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

## 2021-04-16 ENCOUNTER — HOSPITAL ENCOUNTER (OUTPATIENT)
Dept: PHYSICAL THERAPY | Age: 76
Setting detail: THERAPIES SERIES
Discharge: HOME | End: 2021-04-16
Payer: MEDICARE

## 2021-04-16 PROCEDURE — 97110 THERAPEUTIC EXERCISES: CPT

## 2021-04-16 NOTE — FLOWSHEET NOTE
[facility] [address] [facility]  Phone: [phone] Fax: [phone]          Physical Therapy Treatment Note/ Progress Report:     Date:  2021    Patient Name:  Xu Gillette    :  1945  MRN: [id number]  Restrictions/Precautions:    Medical/Treatment Diagnosis Information:  · Diagnosis: Right shoulder adhesive capsulitis, left hip pain  · Treatment Diagnosis: M75.01, J06.232  Insurance/Certification information:  PT Insurance Information: Medicare / [facility]  Physician Information:  Referring Practitioner: Aleja Brothers of care signed (Y/N):     Date of Patient follow up with Physician: N/A      Progress Report: [x]  Yes  [x]  No     Date Range for reporting period:  Beginnin21  Ending:  NA    Progress report due (10 Rx/or 30 days whichever is less):      Recertification due (POC duration/ or 90 days whichever is less): 21     Visit # Insurance Allowable Auth Needed   28 Medicare []Yes    []No     Pain level:  0/10 currently, c/o weakness     SUBJECTIVE:  Patient states that she was very fatigued following last session, had to go to the grocery store and states it took all she had to walk from the parking lot to a cart. However, since that time she has felt that the LLE is a little more stable. Willing to continue working on machines to improve LLE strength. No issues with hip or calf pain since last session.     OBJECTIVE:    Observation: Antalgic gait with standard cane used in RUE   Test measurements:  Quick DASH - 27% impairment, LEFS - 70% impairment    RESTRICTIONS/PRECAUTIONS:      Exercises/Interventions:   Therapeutic Ex (80040)  Min: 40 Sets/sec Reps CUES/Notes   Bike 5'       Ankle theraband 4-way 1 15 green   Cybex Gastroc- SL  2 10 45#   Cybex Soleus - SL 2 10 50#   Leg Press - SL 2 10 45#   Cybex Leg Ext - SL 3 10 15#   Cybex HS curl 2 10 35#   Wobble board A/P, ecc control  2 10 seated   Bridge 10 12     SLR         SAQ 5 30 4#, full roll   Clam - side lying 5 10 No resistance   Supine clam - unilateral 10 10 Maroon band, each         BOSU lunge     add   Slide lunge - retro at ledge 1 15 Manual cues for L knee   Standing hip abd 2 10 orange   Supine TrA March  1 10     Heel raises 5 10 2\" step   TKE 5 15 3.5pl   Side stepping 2 15 HHA, orange at ankles   Sit to stand with TRX 2 10 1 airex on seat, sink for support   Step up 3 10 8\"   Lateral step down 2 10 4\"   Standing hip 3-way kicks 5 10 Purple band for TKE   On 2\" step   Wobble board A/P, circles 1 10 DL, standing   SLS - modified with plyo box 20 3     Corner turns - modified at ledge 1 10 bilat UE support   Leg press - SL 3 10 40# S7             Manual Intervention  (01841)  Min: 0         Shld /GH Mobs 0'   Gentle inferior glides   Post Cap mobs 0'   Prone LLLD IR   Thoracic/Rib manipualtion         Hip PROM 7'       IASTM 7'  Gastroc/soleus, L   PROM MT - shoulder 0'   Flex, abd, ER, IR   Hypervolt percussion massage 0'   L gluteals             NMR re-education (02802)  Min: 0         Biodex balance 10'   LS x 2, RC   GH depress/compress         Scap/GH NMR         Body blade         Wall ball roll         Wall Ball bounce         Ball drops         Betty Scap Bio         Floor Snow angels-sliders                   Therapeutic Activity (48384)  Min:         UE throwing porgression         Dynamic UE stability         Earthquake Bar         Bodyblade                    Therapeutic Exercise and NMR EXR  [x] (31598) Provided verbal/tactile cueing for activities related to strengthening, flexibility, endurance, ROM  for improvements in scapular, scapulothoracic and UE control with self care, reaching, carrying, lifting, house/yardwork, driving/computer work.     [x] (50182) Provided verbal/tactile cueing for activities related to improving balance, coordination, kinesthetic sense, posture, motor skill, proprioception  to assist with to prevent re-injury. []?? Progressing: [x]? ? Met: []?? Not Met: []?? Adjusted  2. Patient will have a decrease in pain to facilitate improvement in movement, function, and ADLs as indicated by Functional Deficits. []?? Progressing: [x]? ? Met: []?? Not Met: []?? Adjusted     Long Term Goals: To be achieved in: 4-6 weeks  1. Disability index score of 40% or less for the LEFS to assist with reaching prior level of function. [x]? ? Progressing: []?? Met: []?? Not Met: []?? Adjusted  2. Patient will demonstrate increased AROM to WFL to allow for proper joint functioning as indicated by patients Functional Deficits. [x]? ? Progressing: []?? Met: []?? Not Met: []?? Adjusted  3. Patient will demonstrate an increase in Strength to good proximal hip strength and control, within 5lb HHD in LE to allow for proper functional mobility as indicated by patients Functional Deficits. [x]? ? Progressing: []?? Met: []?? Not Met: []?? Adjusted  4. Patient will return to standing, ambulation (with AD as needed), transfers, stairs, squatting without increased symptoms or restriction. [x] Progressing: [] Met: [] Not Met: [] Adjusted      ASSESSMENT:  Appropriate fatigue at conclusion with no increase in pain, LLE very shaky by end of session. Making gradual gains in general LLE strength but she continues to present with deficient proximal hip control L>R and poor core stability, as well as limited plantarflexor strength. Gait is still antalgic with uneven step length and wide stance, though she is needing to use her cane less often for shorter distances. Patient will benefit from continued physical therapy to right shoulder with additional focus on left hip rehabilitation and balance training to improve patient's overall function and reduce risk of falls.       Return to Play: (if applicable)   []  Stage 1: Intro to Strength   []  Stage 2: Dynamic Strength and Intro to Plyometrics   []  Stage 3: Advanced Plyometrics and Intro to Throwing   []  Stage 4: Sport specific Training/Return to Sport     []  Ready to Return to Play, [employer]   []  Not Ready for Return to Sports   Comments:      Treatment/Activity Tolerance:  [x] Patient tolerated treatment well [] Patient limited by fatique  [] Patient limited by pain  [] Patient limited by other medical complications  [] Other:     Overall Progression Towards Functional goals/ Treatment Progress Update:  [x] Patient is progressing as expected towards functional goals listed. [] Progression is slowed due to complexities/Impairments listed. [] Progression has been slowed due to co-morbidities. [] Plan just implemented, too soon to assess goals progression <30days   [] Goals require adjustment due to lack of progress  [] Patient is not progressing as expected and requires additional follow up with physician  [] Other:     Prognosis for POC: [x] Good [] Fair  [] Poor    Patient requires continued skilled intervention: [x] Yes  [] No      PLAN: Continue PT 2x/week for 4-6 weeks for shoulder strengthening/ROM as needed, with continued left hip rehabilitation to focus on ROM, proximal hip strengthening, general LLE strength training, and balance training  [x] Continue per plan of care [] Alter current plan (see comments)  [] Plan of care initiated [] Hold pending MD visit [] Discharge    Electronically signed by: Lavern Baum PT     Note: If patient does not return for scheduled/recommended follow up visits, this note will serve as a discharge from care along with the most recent update on progress.

## 2021-04-19 ENCOUNTER — HOSPITAL ENCOUNTER (OUTPATIENT)
Dept: PHYSICAL THERAPY | Age: 76
Setting detail: THERAPIES SERIES
Discharge: HOME | End: 2021-04-19
Payer: MEDICARE

## 2021-04-19 PROCEDURE — 97110 THERAPEUTIC EXERCISES: CPT

## 2021-04-19 NOTE — FLOWSHEET NOTE
Cybex HS curl 3 10 35#   Wobble board A/P, ecc control  2 10 seated   Bridge 10 12     SLR         SAQ 5 30 4#, full roll   Clam - side lying 5 10 No resistance   Supine clam - unilateral 10 10 Maroon band, each         BOSU lunge     add   Slide lunge - retro at ledge 1 15 Manual cues for L knee   Standing hip abd 2 10 orange   Supine TrA March  1 10     Heel raises 5 10 2\" step   TKE 5 15 3.5pl   Side stepping 2 15 HHA, orange at ankles   Sit to stand with TRX 2 10 1 airex on seat, sink for support   Step up 3 10 8\"   Lateral step down 2 10 4\"   Standing hip 3-way kicks 5 10 Purple band for TKE   On 2\" step   Wobble board A/P, circles 1 10 DL, standing   SLS - modified with plyo box 20 3     Corner turns - modified at ledge 1 10 bilat UE support   Leg press - SL 3 10 40# S7             Manual Intervention  (68346)  Min: 0         Shld /GH Mobs 0'   Gentle inferior glides   Post Cap mobs 0'   Prone LLLD IR   Thoracic/Rib manipualtion         Hip PROM 7'       IASTM 7'  Gastroc/soleus, L   PROM MT - shoulder 0'   Flex, abd, ER, IR   Hypervolt percussion massage 0'   L gluteals             NMR re-education (83249)  Min: 0         Biodex balance 10'   LS x 2, RC   GH depress/compress         Scap/GH NMR         Body blade         Wall ball roll         Wall Ball bounce         Ball drops         Betty Scap Bio         Floor Snow angels-sliders                   Therapeutic Activity (34261)  Min:         UE throwing porgression         Dynamic UE stability         Earthquake Bar         Bodyblade                    Therapeutic Exercise and NMR EXR  [x] (30094) Provided verbal/tactile cueing for activities related to strengthening, flexibility, endurance, ROM  for improvements in scapular, scapulothoracic and UE control with self care, reaching, carrying, lifting, house/yardwork, driving/computer work.     [x] (04083) Provided verbal/tactile cueing for activities related to improving balance, coordination, kinesthetic sense, posture, motor skill, proprioception  to assist with  scapular, scapulothoracic and UE control with self care, reaching, carrying, lifting, house/yardwork, driving/computer work. Therapeutic Activities:    [] (03408 or 55837) Provided verbal/tactile cueing for activities related to improving balance, coordination, kinesthetic sense, posture, motor skill, proprioception and motor activation to allow for proper function of scapular, scapulothoracic and UE control with self care, carrying, lifting, driving/computer work.      Home Exercise Program:    [x] (55015) Reviewed/Progressed HEP activities related to strengthening, flexibility, endurance, ROM of scapular, scapulothoracic and UE control with self care, reaching, carrying, lifting, house/yardwork, driving/computer work  [] (49279) Reviewed/Progressed HEP activities related to improving balance, coordination, kinesthetic sense, posture, motor skill, proprioception of scapular, scapulothoracic and UE control with self care, reaching, carrying, lifting, house/yardwork, driving/computer work      Manual Treatments:  PROM / STM / Oscillations-Mobs:  G-I, II, III, IV (PA's, Inf., Post.)  [x] (83510) Provided manual therapy to mobilize soft tissue/joints of cervical/CT, scapular GHJ and UE for the purpose of modulating pain, promoting relaxation,  increasing ROM, reducing/eliminating soft tissue swelling/inflammation/restriction, improving soft tissue extensibility and allowing for proper ROM for normal function with self care, reaching, carrying, lifting, house/yardwork, driving/computer work    Modalities:  None    Charges:  Timed Code Treatment Minutes: 40   Total Treatment Minutes: 40       [] EVAL (LOW) 56076 (typically 20 minutes face-to-face)  [] EVAL (MOD) 53831 (typically 30 minutes face-to-face)  [] EVAL (HIGH) 75887 (typically 45 minutes face-to-face)  [] RE-EVAL     [x] YB(61609) x 3     [] DRY NEEDLE 1 OR 2 MUSCLES  [] NMR (10763) x     [] DRY NEEDLE 3+ MUSCLES  [] Manual (00919) x       [] TA (79861) x     [] Mech Traction (22639)  [] ES(attended) (72764)     [] ES (un) (64016):   [] VASO (79184)  [] Other:    If BWC Please Indicate Time In/Out  CPT Code Time in Time out                                   SHOULDER GOALS:  Patient stated goal: pain-free shoulder, return to PLOF, ambulate without cane  [x] Progressing: [] Met: [] Not Met: [] Adjusted    Therapist goals for Patient:   Short Term Goals: To be achieved in: 2 weeks  1. Independent in HEP and progression per patient tolerance, in order to prevent re-injury. [] Progressing: [x] Met: [] Not Met: [] Adjusted  2. Patient will have a decrease in pain to facilitate improvement in movement, function, and ADLs as indicated by Functional Deficits. [] Progressing: [x] Met: [] Not Met: [] Adjusted    Long Term Goals: To be achieved in: 6 weeks  1. Disability index score of 25% or less for the Quick DASH to assist with reaching prior level of function. [x] Progressing: [] Met: [] Not Met: [] Adjusted  2. Patient will demonstrate increased AROM to equal contralateral to allow for proper joint functioning as indicated by Functional Deficits. [x] Progressing: [] Met: [] Not Met: [] Adjusted  3. Patient will demonstrate an increase in NM recruitment/activation and overall GH and scapular strength to within n5lbs HHD or WNL for proper functional mobility as indicated by patients Functional Deficits. [x] Progressing: [] Met: [] Not Met: [] Adjusted  4. Patient will return to reaching, lifting, overhead movements, self care especially after using toilet, dressing, grooming activities without increased symptoms or restriction. [x] Progressing: [] Met: [] Not Met: [] Adjusted       HIP GOALS:  Patient stated goal: strengthen LLE, ambulate without cane for short distances  [x] Progressing: [] Met: [] Not Met: [] Adjusted    Therapist goals for Patient:   Short Term Goals: To be achieved in: 2 weeks  1. Independent in HEP and progression per patient tolerance, in order to prevent re-injury. []?? Progressing: [x]? ? Met: []?? Not Met: []?? Adjusted  2. Patient will have a decrease in pain to facilitate improvement in movement, function, and ADLs as indicated by Functional Deficits. []?? Progressing: [x]? ? Met: []?? Not Met: []?? Adjusted     Long Term Goals: To be achieved in: 4-6 weeks  1. Disability index score of 40% or less for the LEFS to assist with reaching prior level of function. [x]? ? Progressing: []?? Met: []?? Not Met: []?? Adjusted  2. Patient will demonstrate increased AROM to WFL to allow for proper joint functioning as indicated by patients Functional Deficits. [x]? ? Progressing: []?? Met: []?? Not Met: []?? Adjusted  3. Patient will demonstrate an increase in Strength to good proximal hip strength and control, within 5lb HHD in LE to allow for proper functional mobility as indicated by patients Functional Deficits. [x]? ? Progressing: []?? Met: []?? Not Met: []?? Adjusted  4. Patient will return to standing, ambulation (with AD as needed), transfers, stairs, squatting without increased symptoms or restriction. [x] Progressing: [] Met: [] Not Met: [] Adjusted      ASSESSMENT:  Appropriate fatigue at conclusion with no increase in pain, LLE very shaky by end of session. Making gradual gains in general LLE strength but she continues to present with deficient proximal hip control L>R and poor core stability, as well as limited plantarflexor strength. Gait is still antalgic with uneven step length and wide stance, though she is needing to use her cane less often for shorter distances. Patient will benefit from continued physical therapy to right shoulder with additional focus on left hip rehabilitation and balance training to improve patient's overall function and reduce risk of falls.       Return to Play: (if applicable)   []  Stage 1: Intro to Strength   []  Stage 2: Dynamic Strength and Intro to Plyometrics   []  Stage 3: Advanced Plyometrics and Intro to Throwing   []  Stage 4: Sport specific Training/Return to Sport     []  Ready to Return to Play, [employer]   []  Not Ready for Return to Sports   Comments:      Treatment/Activity Tolerance:  [x] Patient tolerated treatment well [] Patient limited by fatique  [] Patient limited by pain  [] Patient limited by other medical complications  [] Other:     Overall Progression Towards Functional goals/ Treatment Progress Update:  [x] Patient is progressing as expected towards functional goals listed. [] Progression is slowed due to complexities/Impairments listed. [] Progression has been slowed due to co-morbidities. [] Plan just implemented, too soon to assess goals progression <30days   [] Goals require adjustment due to lack of progress  [] Patient is not progressing as expected and requires additional follow up with physician  [] Other:     Prognosis for POC: [x] Good [] Fair  [] Poor    Patient requires continued skilled intervention: [x] Yes  [] No      PLAN: Continue PT 2x/week for 4-6 weeks for shoulder strengthening/ROM as needed, with continued left hip rehabilitation to focus on ROM, proximal hip strengthening, general LLE strength training, and balance training  [x] Continue per plan of care [] Alter current plan (see comments)  [] Plan of care initiated [] Hold pending MD visit [] Discharge    Electronically signed by: Faith Huynh PT     Note: If patient does not return for scheduled/recommended follow up visits, this note will serve as a discharge from care along with the most recent update on progress.

## 2021-04-21 ENCOUNTER — HOSPITAL ENCOUNTER (OUTPATIENT)
Dept: PHYSICAL THERAPY | Age: 76
Setting detail: THERAPIES SERIES
Discharge: HOME | End: 2021-04-21
Payer: MEDICARE

## 2021-04-21 PROCEDURE — 97110 THERAPEUTIC EXERCISES: CPT

## 2021-04-21 NOTE — PLAN OF CARE
[facility] [address] [place]  Phone: [phone] Fax: [phone]          Physical Therapy Re-Certification Plan of Care/MD UPDATE      Dear  Liz Lepe,    We had the pleasure of treating the following patient for physical therapy services at [address] [place] [address]. A summary of our findings can be found in the updated assessment below. This includes our plan of care. If you have any questions or concerns regarding these findings, please do not hesitate to contact me at the office phone number checked above.   Thank you for the referral.     Physician Signature:________________________________Date:__________________  By signing above (or electronic signature), therapists plan is approved by physician    Date Range Of Visits: 20 - 21  Total Visits to Date: 27  Overall Response to Treatment:   [x]Patient is responding well to treatment and improvement is noted with regards  to goals   []Patient should continue to improve in reasonable time if they continue HEP   []Patient has plateaued and is no longer responding to skilled PT intervention    []Patient is getting worse and would benefit from return to referring MD   []Patient unable to adhere to initial POC   []Other:                   Physical Therapy Treatment Note/ Progress Report:     Date:  2021    Patient Name:  Jj Gerber    :  1945  MRN: [id number]  Restrictions/Precautions:    Medical/Treatment Diagnosis Information:  · Diagnosis: Right shoulder adhesive capsulitis, left hip pain  · Treatment Diagnosis: M75.01, R02.517  Insurance/Certification information:  PT Insurance Information: Medicare / [facility]  Physician Information:  Referring Practitioner: Julio César Mary of care signed (Y/N):     Date of Patient follow up with Physician: N/A      Progress Report: [x]  Yes  [x]  No     Date Range for reporting period:  Beginnin20  Endin21    Progress report due (10 Rx/or 30 days whichever is less):      Recertification due (POC duration/ or 90 days whichever is less): 21     Visit # Insurance Allowable Auth Needed   30 Medicare []Yes    [x]No     Pain level:  0/10 currently, c/o weakness     SUBJECTIVE:  Patient reports overall continuing to improve in LLE strength with improved balance and less hip pain. Still needs cane for ambulation for longer distances but can go short bouts without AD. LLE still feels shaky at times and she still feels less stable on the LLE with single leg activities like stairs. Shoulder still doing well, a non-issue at this point.      OBJECTIVE:    Observation: Antalgic gait with standard cane used in RUE   Test measurements:  LEFS - 74% impairment            ROM Left Right   Hip Flexion 110 WNL   Hip Abduction WNL WNL   Hip ER 20 WNL   Hip IR WNL WNL   Hip Ext Decreased WNL   Strength  Left Right   Hip Abd 8.0 lb 12.0 lb   Hip Ext NT NT   Knee Ext 43.6 lb 42.1 lb   Knee Flex 32.6 lb  40.2 lb         RESTRICTIONS/PRECAUTIONS:      Exercises/Interventions:   Therapeutic Ex (71892)  Min: 40 Sets/sec Reps CUES/Notes   Bike 5'                 Ankle theraband 4-way 1 15 green   Cybex Gastroc- SL  3 10 50#   Cybex Soleus - SL 2 10 50#   Leg Press - SL 3 10 50#   Cybex Leg Ext - SL 3 10 15#   Cybex HS curl 3 10 35#   Wobble board A/P, ecc control  2 10 seated   Bridge 10 12     SLR         SAQ 5 30 4#, full roll   Clam - side lying 5 10 No resistance   Supine clam - unilateral 10 10 Maroon band, each         BOSU lunge     add   Slide lunge - retro at ledge 1 15 Manual cues for L knee   Standing hip abd 2 10 orange   Supine TrA  10     Heel raises 5 10 2\" step   TKE 5 15 3.5pl   Side stepping 2 15 HHA, orange at ankles   Sit to stand with TRX 2 10 1 airex on seat, sink for support   Step up 3 10 8\"   Lateral step down 2 10 4\"   Standing hip 3-way kicks 5 10 Purple band for TKE   On 2\" step   Wobble board A/P, circles 1 10 DL, standing   SLS - modified with plyo box 20 3     Corner turns - modified at ledge 1 10 bilat UE support   Leg press - SL 3 10 40# S7             Manual Intervention  (83141)  Min: 0         Shld /GH Mobs 0'   Gentle inferior glides   Post Cap mobs 0'   Prone LLLD IR   Thoracic/Rib manipualtion         Hip PROM 7'       IASTM 7'   Gastroc/soleus, L   PROM MT - shoulder 0'   Flex, abd, ER, IR   Hypervolt percussion massage 0'   L gluteals             NMR re-education (38867)  Min: 0         Biodex balance 10'   LS x 2, RC   GH depress/compress         Scap/GH NMR         Body blade         Wall ball roll         Wall Ball bounce         Ball drops         Betty Scap Bio         Floor Snow angels-sliders                   Therapeutic Activity (90616)  Min:         UE throwing porgression         Dynamic UE stability         Earthquake Bar         Bodyblade                        Therapeutic Exercise and NMR EXR  [x] (70215) Provided verbal/tactile cueing for activities related to strengthening, flexibility, endurance, ROM  for improvements in scapular, scapulothoracic and UE control with self care, reaching, carrying, lifting, house/yardwork, driving/computer work. [x] (05563) Provided verbal/tactile cueing for activities related to improving balance, coordination, kinesthetic sense, posture, motor skill, proprioception  to assist with  scapular, scapulothoracic and UE control with self care, reaching, carrying, lifting, house/yardwork, driving/computer work. Therapeutic Activities:    [] (45624 or 97039) Provided verbal/tactile cueing for activities related to improving balance, coordination, kinesthetic sense, posture, motor skill, proprioception and motor activation to allow for proper function of scapular, scapulothoracic and UE control with self care, carrying, lifting, driving/computer work.      Home Exercise Program:    [x] (96388) Reviewed/Progressed HEP activities related to strengthening, flexibility, endurance, ROM of scapular, scapulothoracic and UE control with self care, reaching, carrying, lifting, house/yardwork, driving/computer work  [] (37826) Reviewed/Progressed HEP activities related to improving balance, coordination, kinesthetic sense, posture, motor skill, proprioception of scapular, scapulothoracic and UE control with self care, reaching, carrying, lifting, house/yardwork, driving/computer work      Manual Treatments:  PROM / STM / Oscillations-Mobs:  G-I, II, III, IV (PA's, Inf., Post.)  [x] (40840) Provided manual therapy to mobilize soft tissue/joints of cervical/CT, scapular GHJ and UE for the purpose of modulating pain, promoting relaxation,  increasing ROM, reducing/eliminating soft tissue swelling/inflammation/restriction, improving soft tissue extensibility and allowing for proper ROM for normal function with self care, reaching, carrying, lifting, house/yardwork, driving/computer work    Modalities:  None    Charges:  Timed Code Treatment Minutes: 40   Total Treatment Minutes: 40       [] EVAL (LOW) 67253 (typically 20 minutes face-to-face)  [] EVAL (MOD) 53050 (typically 30 minutes face-to-face)  [] EVAL (HIGH) 86568 (typically 45 minutes face-to-face)  [] RE-EVAL     [x] YG(88127) x 3     [] DRY NEEDLE 1 OR 2 MUSCLES  [] NMR (98457) x     [] DRY NEEDLE 3+ MUSCLES  [] Manual (97808) x       [] TA (17233) x     [] Mech Traction (96946)  [] ES(attended) (36888)     [] ES (un) (53172):   [] VASO (61751)  [] Other:    If BWC Please Indicate Time In/Out  CPT Code Time in Time out                                   SHOULDER GOALS:  Patient stated goal: pain-free shoulder, return to PLOF  [] Progressing: [x] Met: [] Not Met: [] Adjusted    Therapist goals for Patient:   Short Term Goals: To be achieved in: 2 weeks  1. Independent in HEP and progression per patient tolerance, in order to prevent re-injury.    [] Progressing: [x] Met: [] Not Met: [] Adjusted  2. Patient will have a decrease in pain to facilitate improvement in movement, function, and ADLs as indicated by Functional Deficits. [] Progressing: [x] Met: [] Not Met: [] Adjusted    Long Term Goals: To be achieved in: 6 weeks  1. Disability index score of 25% or less for the Quick DASH to assist with reaching prior level of function. [] Progressing: [x] Met: [] Not Met: [] Adjusted  2. Patient will demonstrate increased AROM to equal contralateral to allow for proper joint functioning as indicated by Functional Deficits. [] Progressing: [x] Met: [] Not Met: [] Adjusted  3. Patient will demonstrate an increase in NM recruitment/activation and overall GH and scapular strength to within n5lbs HHD or WNL for proper functional mobility as indicated by patients Functional Deficits. [] Progressing: [x] Met: [] Not Met: [] Adjusted  4. Patient will return to reaching, lifting, overhead movements, self care especially after using toilet, dressing, grooming activities without increased symptoms or restriction. [] Progressing: [x] Met: [] Not Met: [] Adjusted       HIP GOALS:  Patient stated goal: strengthen LLE, ambulate without cane for short distances  [] Progressing: [x] Met: [] Not Met: [] Adjusted    Therapist goals for Patient:   Short Term Goals: To be achieved in: 2 weeks  1. Independent in HEP and progression per patient tolerance, in order to prevent re-injury. []?? Progressing: [x]? ? Met: []?? Not Met: []?? Adjusted  2. Patient will have a decrease in pain to facilitate improvement in movement, function, and ADLs as indicated by Functional Deficits. []?? Progressing: [x]? ? Met: []?? Not Met: []?? Adjusted     Long Term Goals: To be achieved in: 4-6 weeks  1. Disability index score of 40% or less for the LEFS to assist with reaching prior level of function. [x]? ? Progressing: []?? Met: []?? Not Met: []?? Adjusted  2.  Patient will demonstrate increased AROM to WFL to allow for proper joint functioning as indicated by patients Functional Deficits. []?? Progressing: [x]? ? Met: []?? Not Met: []?? Adjusted  3. Patient will demonstrate an increase in Strength to good proximal hip strength and control, within 5lb HHD in LE to allow for proper functional mobility as indicated by patients Functional Deficits. [x]? ? Progressing: []?? Met: []?? Not Met: []?? Adjusted  4. Patient will return to standing, ambulation (with AD as needed), transfers, stairs, squatting without increased symptoms or restriction. [x] Progressing: [] Met: [] Not Met: [] Adjusted      ASSESSMENT:  Patient has attended 30 physical therapy visits from 12/2/20 through 4/21/21 for treatment of right shoulder adhesive capsulitis and chronic left hip dysfunction with history of left hip hemiarthroplasty in 2017. Patient's right shoulder symptoms continue to be completely resolved. Hip mobility now [facility]. Making gradual gains in general LLE strength but she continues to present with deficient proximal hip control L>R and poor core stability. Gait is still antalgic with uneven step length and wide stance, though she is needing to use her cane less often for shorter distances. Noticeable gains made in LLE quad and hamstring strength since last POC. Patient will benefit from continued physical therapy to right shoulder with additional focus on left hip rehabilitation and balance training to improve patient's overall function and reduce risk of falls.       Return to Play: (if applicable)   []  Stage 1: Intro to Strength   []  Stage 2: Dynamic Strength and Intro to Plyometrics   []  Stage 3: Advanced Plyometrics and Intro to Throwing   []  Stage 4: Sport specific Training/Return to Sport     []  Ready to Return to Play, [employer]   []  Not Ready for Return to Sports   Comments:      Treatment/Activity Tolerance:  [x] Patient tolerated treatment well [] Patient limited by dian  [] Patient limited by pain  [] Patient limited by other medical complications  [] Other:     Overall Progression Towards Functional goals/ Treatment Progress Update:  [x] Patient is progressing as expected towards functional goals listed. [] Progression is slowed due to complexities/Impairments listed. [] Progression has been slowed due to co-morbidities. [] Plan just implemented, too soon to assess goals progression <30days   [] Goals require adjustment due to lack of progress  [] Patient is not progressing as expected and requires additional follow up with physician  [] Other:     Prognosis for POC: [x] Good [] Fair  [] Poor    Patient requires continued skilled intervention: [x] Yes  [] No      PLAN: Continue PT 2x/week for 4-6 weeks for shoulder strengthening/ROM if needed, with continued left hip rehabilitation to focus on ROM, proximal hip strengthening, general LLE strength training, and balance training to lessen risk of falls  [x] Continue per plan of care [] Alter current plan (see comments)  [] Plan of care initiated [] Hold pending MD visit [] Discharge    Electronically signed by: Nicole Iverson PT     Note: If patient does not return for scheduled/recommended follow up visits, this note will serve as a discharge from care along with the most recent update on progress.

## 2021-04-26 ENCOUNTER — APPOINTMENT (OUTPATIENT)
Dept: PHYSICAL THERAPY | Age: 76
End: 2021-04-26
Payer: MEDICARE

## 2021-04-28 ENCOUNTER — HOSPITAL ENCOUNTER (OUTPATIENT)
Dept: PHYSICAL THERAPY | Age: 76
Setting detail: THERAPIES SERIES
Discharge: HOME | End: 2021-04-28
Payer: MEDICARE

## 2021-04-28 PROCEDURE — 97110 THERAPEUTIC EXERCISES: CPT

## 2021-04-28 NOTE — FLOWSHEET NOTE
Exercises/Interventions:   Therapeutic Ex (25835)  Min: 40 Sets/sec Reps CUES/Notes   Bike 5'                 Ankle theraband 4-way 1 15 green   Cybex Gastroc- SL  3 10 50#   Cybex Soleus - SL 2 10 50#   Leg Press - SL 3 10 50#   Cybex Leg Ext - SL 3 10 15#   Cybex HS curl 3 10 35#   Wobble board A/P, ecc control  2 10 seated   Bridge 10 12     SLR         SAQ 5 30 4#, full roll   Clam - side lying 5 10 No resistance   Supine clam - unilateral 10 10 Maroon band, each         BOSU lunge     add   Slide lunge - retro at ledge 1 15 Manual cues for L knee   Standing hip abd 2 10 orange   Supine TrA March  1 10     Heel raises 5 10 2\" step   TKE 5 15 3.5pl   Side stepping 2 15 HHA, orange at ankles   Sit to stand with TRX 2 10 1 airex on seat, sink for support   Step up 3 10 8\"   Lateral step down 2 10 4\"   Standing hip 3-way kicks 5 10 Purple band for TKE   On 2\" step   Wobble board A/P, circles 1 10 DL, standing   SLS - modified with plyo box 20 3     Corner turns - modified at ledge 1 10 bilat UE support   Leg press - SL 3 10 40# S7             Manual Intervention  (69250)  Min: 0         Shld /GH Mobs 0'   Gentle inferior glides   Post Cap mobs 0'   Prone LLLD IR   Thoracic/Rib manipualtion         Hip PROM 7'       IASTM 7'   Gastroc/soleus, L   PROM MT - shoulder 0'   Flex, abd, ER, IR   Hypervolt percussion massage 0'   L gluteals             NMR re-education (13935)  Min: 0         Biodex balance 10'   LS x 2, RC   GH depress/compress         Scap/GH NMR         Body blade         Wall ball roll         Wall Ball bounce         Ball drops         Betty Scap Bio         Floor Snow angels-sliders                   Therapeutic Activity (25085)  Min:         UE throwing porgression         Dynamic UE stability         Earthquake Bar         Bodyblade                        Therapeutic Exercise and NMR EXR  [x] (95248) Provided verbal/tactile cueing for activities related to strengthening, flexibility, endurance, ROM  for improvements in scapular, scapulothoracic and UE control with self care, reaching, carrying, lifting, house/yardwork, driving/computer work. [x] (88133) Provided verbal/tactile cueing for activities related to improving balance, coordination, kinesthetic sense, posture, motor skill, proprioception  to assist with  scapular, scapulothoracic and UE control with self care, reaching, carrying, lifting, house/yardwork, driving/computer work. Therapeutic Activities:    [] (84995 or 40715) Provided verbal/tactile cueing for activities related to improving balance, coordination, kinesthetic sense, posture, motor skill, proprioception and motor activation to allow for proper function of scapular, scapulothoracic and UE control with self care, carrying, lifting, driving/computer work.      Home Exercise Program:    [x] (86323) Reviewed/Progressed HEP activities related to strengthening, flexibility, endurance, ROM of scapular, scapulothoracic and UE control with self care, reaching, carrying, lifting, house/yardwork, driving/computer work  [] (16954) Reviewed/Progressed HEP activities related to improving balance, coordination, kinesthetic sense, posture, motor skill, proprioception of scapular, scapulothoracic and UE control with self care, reaching, carrying, lifting, house/yardwork, driving/computer work      Manual Treatments:  PROM / STM / Oscillations-Mobs:  G-I, II, III, IV (PA's, Inf., Post.)  [x] (30237) Provided manual therapy to mobilize soft tissue/joints of cervical/CT, scapular GHJ and UE for the purpose of modulating pain, promoting relaxation,  increasing ROM, reducing/eliminating soft tissue swelling/inflammation/restriction, improving soft tissue extensibility and allowing for proper ROM for normal function with self care, reaching, carrying, lifting, house/yardwork, driving/computer work    Modalities:  None    Charges:  Timed Code Treatment Minutes: 40   Total Treatment Minutes: 40       [] EVAL (LOW) 67603 (typically 20 minutes face-to-face)  [] EVAL (MOD) 18662 (typically 30 minutes face-to-face)  [] EVAL (HIGH) 91782 (typically 45 minutes face-to-face)  [] RE-EVAL     [x] OS(21762) x 3     [] DRY NEEDLE 1 OR 2 MUSCLES  [] NMR (81967) x     [] DRY NEEDLE 3+ MUSCLES  [] Manual (26875) x       [] TA (40405) x     [] Mech Traction (19976)  [] ES(attended) (84718)     [] ES (un) (82498):   [] VASO (76178)  [] Other:    If BWC Please Indicate Time In/Out  CPT Code Time in Time out                                   SHOULDER GOALS:  Patient stated goal: pain-free shoulder, return to PLOF  [] Progressing: [x] Met: [] Not Met: [] Adjusted    Therapist goals for Patient:   Short Term Goals: To be achieved in: 2 weeks  1. Independent in HEP and progression per patient tolerance, in order to prevent re-injury. [] Progressing: [x] Met: [] Not Met: [] Adjusted  2. Patient will have a decrease in pain to facilitate improvement in movement, function, and ADLs as indicated by Functional Deficits. [] Progressing: [x] Met: [] Not Met: [] Adjusted    Long Term Goals: To be achieved in: 6 weeks  1. Disability index score of 25% or less for the Quick DASH to assist with reaching prior level of function. [] Progressing: [x] Met: [] Not Met: [] Adjusted  2. Patient will demonstrate increased AROM to equal contralateral to allow for proper joint functioning as indicated by Functional Deficits. [] Progressing: [x] Met: [] Not Met: [] Adjusted  3. Patient will demonstrate an increase in NM recruitment/activation and overall GH and scapular strength to within n5lbs HHD or WNL for proper functional mobility as indicated by patients Functional Deficits. [] Progressing: [x] Met: [] Not Met: [] Adjusted  4. Patient will return to reaching, lifting, overhead movements, self care especially after using toilet, dressing, grooming activities without increased symptoms or restriction.    [] Progressing: [x] Met: [] Not Met: [] benefit from continued physical therapy to right shoulder with additional focus on left hip rehabilitation and balance training to improve patient's overall function and reduce risk of falls. Return to Play: (if applicable)   []  Stage 1: Intro to Strength   []  Stage 2: Dynamic Strength and Intro to Plyometrics   []  Stage 3: Advanced Plyometrics and Intro to Throwing   []  Stage 4: Sport specific Training/Return to Sport     []  Ready to Return to Play, [employer]   []  Not Ready for Return to Sports   Comments:      Treatment/Activity Tolerance:  [x] Patient tolerated treatment well [] Patient limited by fatique  [] Patient limited by pain  [] Patient limited by other medical complications  [] Other:     Overall Progression Towards Functional goals/ Treatment Progress Update:  [x] Patient is progressing as expected towards functional goals listed. [] Progression is slowed due to complexities/Impairments listed. [] Progression has been slowed due to co-morbidities. [] Plan just implemented, too soon to assess goals progression <30days   [] Goals require adjustment due to lack of progress  [] Patient is not progressing as expected and requires additional follow up with physician  [] Other:     Prognosis for POC: [x] Good [] Fair  [] Poor    Patient requires continued skilled intervention: [x] Yes  [] No      PLAN: Continue PT 2x/week for 4-6 weeks for shoulder strengthening/ROM if needed, with continued left hip rehabilitation to focus on ROM, proximal hip strengthening, general LLE strength training, and balance training to lessen risk of falls  [x] Continue per plan of care [] Alter current plan (see comments)  [] Plan of care initiated [] Hold pending MD visit [] Discharge    Electronically signed by: Guanako Boyd PT     Note: If patient does not return for scheduled/recommended follow up visits, this note will serve as a discharge from care along with the most recent update on progress.

## 2021-04-30 ENCOUNTER — HOSPITAL ENCOUNTER (OUTPATIENT)
Dept: PHYSICAL THERAPY | Age: 76
Setting detail: THERAPIES SERIES
Discharge: HOME | End: 2021-04-30
Payer: MEDICARE

## 2021-04-30 PROCEDURE — 97110 THERAPEUTIC EXERCISES: CPT

## 2021-04-30 PROCEDURE — 97112 NEUROMUSCULAR REEDUCATION: CPT

## 2021-04-30 NOTE — FLOWSHEET NOTE
Wobble board A/P, ecc control  2 10 seated   Bridge 10 12     SLR         SAQ 5 30 4#, full roll   Clam - side lying 5 10 No resistance   Supine clam - unilateral 10 10 Maroon band, each         BOSU lunge     add   Slide lunge - retro at ledge 1 15 Manual cues for L knee   Standing hip abd 2 10 orange   Supine TrA March  1 10     Heel raises 5 10 2\" step   TKE 5 15 3.5pl   Side stepping 2 15 HHA, orange at ankles   Sit to stand with TRX 2 10 1 airex on seat, sink for support   Step up 3 10 8\"   Lateral step down 2 10 4\"   Standing hip 3-way kicks 5 10 Purple band for TKE   On 2\" step   Wobble board A/P, circles 1 10 DL, standing   SLS - modified with plyo box 20 3     Corner turns - modified at ledge 1 10 bilat UE support   Leg press - SL 3 10 40# S7             Manual Intervention  (78283)  Min: 0         Shld /GH Mobs 0'   Gentle inferior glides   Post Cap mobs 0'   Prone LLLD IR   Thoracic/Rib manipualtion         Hip PROM 7'       IASTM 7'   Gastroc/soleus, L   PROM MT - shoulder 0'   Flex, abd, ER, IR   Hypervolt percussion massage 0'   L gluteals             NMR re-education (11047)  Min: 10         Biodex balance 0'   LS x 2, RC   Small ERIC stance 30' 3 airex   Staggered stance 30' 3 airex   SLS with UE support 30' 3 Cues for neutral foot position   Wall ball roll         Wall Ball bounce         Ball drops         Betty Scap Bio         Floor Snow angels-sliders                   Therapeutic Activity (54421)  Min:         UE throwing porgression         Dynamic UE stability         Earthquake Bar         Bodyblade                        Therapeutic Exercise and NMR EXR  [x] (25753) Provided verbal/tactile cueing for activities related to strengthening, flexibility, endurance, ROM  for improvements in scapular, scapulothoracic and UE control with self care, reaching, carrying, lifting, house/yardwork, driving/computer work.     [x] (82337) Provided verbal/tactile cueing for activities related to improving balance, coordination, kinesthetic sense, posture, motor skill, proprioception  to assist with  scapular, scapulothoracic and UE control with self care, reaching, carrying, lifting, house/yardwork, driving/computer work. Therapeutic Activities:    [] (42151 or 88176) Provided verbal/tactile cueing for activities related to improving balance, coordination, kinesthetic sense, posture, motor skill, proprioception and motor activation to allow for proper function of scapular, scapulothoracic and UE control with self care, carrying, lifting, driving/computer work.      Home Exercise Program:    [x] (53026) Reviewed/Progressed HEP activities related to strengthening, flexibility, endurance, ROM of scapular, scapulothoracic and UE control with self care, reaching, carrying, lifting, house/yardwork, driving/computer work  [] (24549) Reviewed/Progressed HEP activities related to improving balance, coordination, kinesthetic sense, posture, motor skill, proprioception of scapular, scapulothoracic and UE control with self care, reaching, carrying, lifting, house/yardwork, driving/computer work      Manual Treatments:  PROM / STM / Oscillations-Mobs:  G-I, II, III, IV (PA's, Inf., Post.)  [x] (75096) Provided manual therapy to mobilize soft tissue/joints of cervical/CT, scapular GHJ and UE for the purpose of modulating pain, promoting relaxation,  increasing ROM, reducing/eliminating soft tissue swelling/inflammation/restriction, improving soft tissue extensibility and allowing for proper ROM for normal function with self care, reaching, carrying, lifting, house/yardwork, driving/computer work    Modalities:  None    Charges:  Timed Code Treatment Minutes: 40   Total Treatment Minutes: 40       [] EVAL (LOW) 14053 (typically 20 minutes face-to-face)  [] EVAL (MOD) 22061 (typically 30 minutes face-to-face)  [] EVAL (HIGH) 58562 (typically 45 minutes face-to-face)  [] RE-EVAL     [x] XP(49774) x 2     [] DRY NEEDLE 1 OR 2 and Intro to Throwing   []  Stage 4: Sport specific Training/Return to Sport     []  Ready to Return to Play, [employer]   []  Not Ready for Return to Sports   Comments:      Treatment/Activity Tolerance:  [x] Patient tolerated treatment well [] Patient limited by fatique  [] Patient limited by pain  [] Patient limited by other medical complications  [] Other:     Overall Progression Towards Functional goals/ Treatment Progress Update:  [x] Patient is progressing as expected towards functional goals listed. [] Progression is slowed due to complexities/Impairments listed. [] Progression has been slowed due to co-morbidities. [] Plan just implemented, too soon to assess goals progression <30days   [] Goals require adjustment due to lack of progress  [] Patient is not progressing as expected and requires additional follow up with physician  [] Other:     Prognosis for POC: [x] Good [] Fair  [] Poor    Patient requires continued skilled intervention: [x] Yes  [] No      PLAN: Continue PT 2x/week for 4-6 weeks for shoulder strengthening/ROM if needed, with continued left hip rehabilitation to focus on ROM, proximal hip strengthening, general LLE strength training, and balance training to lessen risk of falls  [x] Continue per plan of care [] Alter current plan (see comments)  [] Plan of care initiated [] Hold pending MD visit [] Discharge    Electronically signed by: Linde Bamberger, PT     Note: If patient does not return for scheduled/recommended follow up visits, this note will serve as a discharge from care along with the most recent update on progress.

## 2021-05-05 ENCOUNTER — HOSPITAL ENCOUNTER (OUTPATIENT)
Dept: PHYSICAL THERAPY | Age: 76
Setting detail: THERAPIES SERIES
Discharge: HOME | End: 2021-05-05
Payer: MEDICARE

## 2021-05-05 PROCEDURE — 97112 NEUROMUSCULAR REEDUCATION: CPT

## 2021-05-05 PROCEDURE — 97110 THERAPEUTIC EXERCISES: CPT

## 2021-05-05 NOTE — FLOWSHEET NOTE
[place] [address] [address]  Phone: [phone] Fax: [phone]        Physical Therapy Treatment Note/ Progress Report:     Date:  2021    Patient Name:  Irving Horner    :  1945  MRN: [id number]  Restrictions/Precautions:    Medical/Treatment Diagnosis Information:  · Diagnosis: Right shoulder adhesive capsulitis, left hip pain  · Treatment Diagnosis: M75.01, H34.557  Insurance/Certification information:  PT Insurance Information: Medicare / [facility]  Physician Information:  Referring Practitioner: Rosio Acuna of care signed (Y/N):     Date of Patient follow up with Physician: N/A      Progress Report: []  Yes  [x]  No     Date Range for reporting period:  Beginnin20  Ending:  NA    Progress report due (10 Rx/or 30 days whichever is less):      Recertification due (POC duration/ or 90 days whichever is less): 21     Visit # Insurance Allowable Auth Needed   35 ([address] [place] 22) Medicare []Yes    [x]No     Pain level:  0/10 currently, c/o weakness     SUBJECTIVE:  Patient reports she has been attempting to work on the modified SLS and evening her weight distribution on the LLE, but it feels a little awkward. Wants to continue working on improving strength and balance. OBJECTIVE:    Observation: Antalgic gait with standard cane used in [place]. Balance is noticeably worse today compared to last several weeks, patient with 2 LOB (while walking back to treatment area and then when walking away from recumbent bike, patient able to self correct with assist from furniture.     Test measurements:  LEFS - 74% impairment      RESTRICTIONS/PRECAUTIONS:      Exercises/Interventions:   Therapeutic Ex (37453)  Min: 30 Sets/sec Reps CUES/Notes   Bike 5'                 Ankle theraband 4-way 1 15 green   Cybex Gastroc- SL  3 10 50#   Cybex Soleus - SL 2 10 50#   Leg Press - SL 3 10 50#   Cybex Leg Ext - SL 3 10 15# Cybex HS curl 3 10 35#   Wobble board A/P, ecc control  2 10 seated   Bridge 10 12     SLR         SAQ 5 30 4#, full roll   Clam - side lying 5 10 No resistance   Supine clam - unilateral 10 10 Maroon band, each         BOSU lunge     add   Slide lunge - retro at ledge 1 15 Manual cues for L knee   Standing hip abd 2 10 orange   Supine TrA March  1 10     Heel raises 5 10 2\" step   TKE 5 15 3.5pl   Side stepping 2 15 HHA, orange at ankles   Sit to stand with TRX 2 10 1 airex on seat, sink for support   Step up 3 10 8\"   Lateral step down 2 10 4\"   Standing hip 3-way kicks 5 10 Purple band for TKE   On 2\" step   Wobble board A/P, circles 1 10 DL, standing   SLS - modified with plyo box 20 3     Corner turns - modified at ledge 1 10 bilat UE support   Leg press - SL 3 10 40# S7             Manual Intervention  (26141)  Min: 0         Shld /GH Mobs 0'   Gentle inferior glides   Post Cap mobs 0'   Prone LLLD IR   Thoracic/Rib manipualtion         Hip PROM 7'       IASTM 7'   Gastroc/soleus, L   PROM MT - shoulder 0'   Flex, abd, ER, IR   Hypervolt percussion massage 0'   L gluteals             NMR re-education (42821)  Min: 15         Biodex balance 0'   LS x 2, RC   Small ERIC stance 30' 3 airex   Staggered stance 30' 3 airex   SLS with UE support 30' 3 Cues for neutral foot position   Wobble board - WS to LLE 10 10 RLE on 2\" step, manual perts   Modified SLS - LLE on airex 10 5 RLE on 6\" step   Ball drops         Betty Scap Bio         Floor Snow angels-sliders                   Therapeutic Activity (33754)  Min:         UE throwing porgression         Dynamic UE stability         Earthquake Bar         Bodyblade                        Therapeutic Exercise and NMR EXR  [x] (96741) Provided verbal/tactile cueing for activities related to strengthening, flexibility, endurance, ROM  for improvements in scapular, scapulothoracic and UE control with self care, reaching, carrying, lifting, house/yardwork, driving/computer work.    [x] (87699) Provided verbal/tactile cueing for activities related to improving balance, coordination, kinesthetic sense, posture, motor skill, proprioception  to assist with  scapular, scapulothoracic and UE control with self care, reaching, carrying, lifting, house/yardwork, driving/computer work. Therapeutic Activities:    [] (53008 or 90694) Provided verbal/tactile cueing for activities related to improving balance, coordination, kinesthetic sense, posture, motor skill, proprioception and motor activation to allow for proper function of scapular, scapulothoracic and UE control with self care, carrying, lifting, driving/computer work.      Home Exercise Program:    [x] (06504) Reviewed/Progressed HEP activities related to strengthening, flexibility, endurance, ROM of scapular, scapulothoracic and UE control with self care, reaching, carrying, lifting, house/yardwork, driving/computer work  [] (64544) Reviewed/Progressed HEP activities related to improving balance, coordination, kinesthetic sense, posture, motor skill, proprioception of scapular, scapulothoracic and UE control with self care, reaching, carrying, lifting, house/yardwork, driving/computer work      Manual Treatments:  PROM / STM / Oscillations-Mobs:  G-I, II, III, IV (PA's, Inf., Post.)  [x] (35704) Provided manual therapy to mobilize soft tissue/joints of cervical/CT, scapular GHJ and UE for the purpose of modulating pain, promoting relaxation,  increasing ROM, reducing/eliminating soft tissue swelling/inflammation/restriction, improving soft tissue extensibility and allowing for proper ROM for normal function with self care, reaching, carrying, lifting, house/yardwork, driving/computer work    Modalities:  None    Charges:  Timed Code Treatment Minutes: 45   Total Treatment Minutes: 45       [] EVAL (LOW) 60453 (typically 20 minutes face-to-face)  [] EVAL (MOD) 06728 (typically 30 minutes face-to-face)  [] EVAL (HIGH) 73744 (typically 45 minutes face-to-face)  [] RE-EVAL     [x] IA(99060) x 2     [] DRY NEEDLE 1 OR 2 MUSCLES  [x] NMR (17169) x 1    [] DRY NEEDLE 3+ MUSCLES  [] Manual (89049) x       [] TA (67822) x     [] Mech Traction (62133)  [] ES(attended) (18576)     [] ES (un) (72762):   [] VASO (47150)  [] Other:    If BWC Please Indicate Time In/Out  CPT Code Time in Time out                                   SHOULDER GOALS:  Patient stated goal: pain-free shoulder, return to PLOF  [] Progressing: [x] Met: [] Not Met: [] Adjusted    Therapist goals for Patient:   Short Term Goals: To be achieved in: 2 weeks  1. Independent in HEP and progression per patient tolerance, in order to prevent re-injury. [] Progressing: [x] Met: [] Not Met: [] Adjusted  2. Patient will have a decrease in pain to facilitate improvement in movement, function, and ADLs as indicated by Functional Deficits. [] Progressing: [x] Met: [] Not Met: [] Adjusted    Long Term Goals: To be achieved in: 6 weeks  1. Disability index score of 25% or less for the Quick DASH to assist with reaching prior level of function. [] Progressing: [x] Met: [] Not Met: [] Adjusted  2. Patient will demonstrate increased AROM to equal contralateral to allow for proper joint functioning as indicated by Functional Deficits. [] Progressing: [x] Met: [] Not Met: [] Adjusted  3. Patient will demonstrate an increase in NM recruitment/activation and overall GH and scapular strength to within n5lbs HHD or WNL for proper functional mobility as indicated by patients Functional Deficits. [] Progressing: [x] Met: [] Not Met: [] Adjusted  4. Patient will return to reaching, lifting, overhead movements, self care especially after using toilet, dressing, grooming activities without increased symptoms or restriction.    [] Progressing: [x] Met: [] Not Met: [] Adjusted       HIP GOALS:  Patient stated goal: strengthen LLE, ambulate without cane for short distances  [] Progressing: [x] Met: [] Not Met: [] Adjusted    Therapist goals for Patient:   Short Term Goals: To be achieved in: 2 weeks  1. Independent in HEP and progression per patient tolerance, in order to prevent re-injury. []?? Progressing: [x]? ? Met: []?? Not Met: []?? Adjusted  2. Patient will have a decrease in pain to facilitate improvement in movement, function, and ADLs as indicated by Functional Deficits. []?? Progressing: [x]? ? Met: []?? Not Met: []?? Adjusted     Long Term Goals: To be achieved in: 4-6 weeks  1. Disability index score of 40% or less for the LEFS to assist with reaching prior level of function. [x]? ? Progressing: []?? Met: []?? Not Met: []?? Adjusted  2. Patient will demonstrate increased AROM to WFL to allow for proper joint functioning as indicated by patients Functional Deficits. []?? Progressing: [x]? ? Met: []?? Not Met: []?? Adjusted  3. Patient will demonstrate an increase in Strength to good proximal hip strength and control, within 5lb HHD in LE to allow for proper functional mobility as indicated by patients Functional Deficits. [x]? ? Progressing: []?? Met: []?? Not Met: []?? Adjusted  4. Patient will return to standing, ambulation (with AD as needed), transfers, stairs, squatting without increased symptoms or restriction. [x] Progressing: [] Met: [] Not Met: [] Adjusted      ASSESSMENT:  Appropriate LLE fatigue at conclusion. Making gradual gains in general LLE strength but she continues to present with deficient proximal hip control L>R and poor core stability. Left foot/ankle proprioception is also dysfunctional, patient with tendency to vickie left ankle with increased weight on lateral aspect of foot. Gait is still antalgic with uneven step length and wide stance, though she is needing to use her cane less often for shorter distances.  Patient will benefit from continued physical therapy to right shoulder with additional focus on left hip rehabilitation and balance training to improve patient's overall function and reduce risk of falls. Return to Play: (if applicable)   []  Stage 1: Intro to Strength   []  Stage 2: Dynamic Strength and Intro to Plyometrics   []  Stage 3: Advanced Plyometrics and Intro to Throwing   []  Stage 4: Sport specific Training/Return to Sport     []  Ready to Return to Play, [employer]   []  Not Ready for Return to Sports   Comments:      Treatment/Activity Tolerance:  [x] Patient tolerated treatment well [] Patient limited by fatique  [] Patient limited by pain  [] Patient limited by other medical complications  [] Other:     Overall Progression Towards Functional goals/ Treatment Progress Update:  [x] Patient is progressing as expected towards functional goals listed. [] Progression is slowed due to complexities/Impairments listed. [] Progression has been slowed due to co-morbidities. [] Plan just implemented, too soon to assess goals progression <30days   [] Goals require adjustment due to lack of progress  [] Patient is not progressing as expected and requires additional follow up with physician  [] Other:     Prognosis for POC: [x] Good [] Fair  [] Poor    Patient requires continued skilled intervention: [x] Yes  [] No      PLAN: Continue PT 2x/week for 4-6 weeks for shoulder strengthening/ROM if needed, with continued left hip rehabilitation to focus on ROM, proximal hip strengthening, general LLE strength training, and balance training to lessen risk of falls  [x] Continue per plan of care [] Alter current plan (see comments)  [] Plan of care initiated [] Hold pending MD visit [] Discharge    Electronically signed by: Shannan Waggoner PT     Note: If patient does not return for scheduled/recommended follow up visits, this note will serve as a discharge from care along with the most recent update on progress.

## 2021-05-07 ENCOUNTER — HOSPITAL ENCOUNTER (OUTPATIENT)
Dept: PHYSICAL THERAPY | Age: 76
Setting detail: THERAPIES SERIES
Discharge: HOME | End: 2021-05-07
Payer: MEDICARE

## 2021-05-07 PROCEDURE — 97112 NEUROMUSCULAR REEDUCATION: CPT

## 2021-05-07 PROCEDURE — 97110 THERAPEUTIC EXERCISES: CPT

## 2021-05-07 NOTE — FLOWSHEET NOTE
[place] [address] [place] [address]  Phone: [phone] Fax: [phone]        Physical Therapy Treatment Note/ Progress Report:     Date:  2021    Patient Name:  Olivia Cardenas    :  1945  MRN: [id number]  Restrictions/Precautions:    Medical/Treatment Diagnosis Information:  · Diagnosis: Right shoulder adhesive capsulitis, left hip pain  · Treatment Diagnosis: M75.01, L82.821  Insurance/Certification information:  PT Insurance Information: Medicare / [facility]  Physician Information:  Referring Practitioner: Manjinder Dorsey  pedro signed (Y/N):     Date of Patient follow up with Physician: N/A      Progress Report: []  Yes  [x]  No     Date Range for reporting period:  Beginnin20  Ending:  NA    Progress report due (10 Rx/or 30 days whichever is less): 89     Recertification due (POC duration/ or 90 days whichever is less): 21     Visit # Insurance Allowable Auth Needed   29 ([address] [place] 22) Medicare []Yes    [x]No     Pain level:  0/10 currently, c/o weakness     SUBJECTIVE:  Patient states that the new exercises from last session were good, her hip was really feeling it the next day which surprised her. Not sore today at all. Wants to continue working on her balance. OBJECTIVE:    Observation: Antalgic gait with standard cane used in [facility]. Balance is noticeably worse today compared to last several weeks, patient with 2 LOB (while walking back to treatment area and then when walking away from recumbent bike, patient able to self correct with assist from furniture.     Test measurements:  LEFS - 74% impairment      RESTRICTIONS/PRECAUTIONS:      Exercises/Interventions:   Therapeutic Ex (02642)  Min: 20 Sets/sec Reps CUES/Notes   Bike 5'                 Ankle theraband 4-way 1 15 green   Cybex Gastroc- SL  3 10 50#   Cybex Soleus - SL 2 10 50#   Leg Press - SL 3 10 50#   Cybex Leg Ext - SL 3 10 15#   Cybex HS curl 3 10 35#   Bridge 10 12     SLR         SAQ 5 30 4#, full roll   Clam - side lying 5 10 No resistance   Supine clam - unilateral 10 10 Maroon band, each         BOSU lunge     add   Slide lunge - retro at ledge 1 15 Manual cues for L knee   Standing hip abd 2 10 orange   Supine TrA March  1 10     Heel raises 5 10 2\" step   TKE 5 15 3.5pl   Side stepping 2 15 HHA, maroon above knees   Sit to stand with TRX 2 10 1 airex on seat, sink for support   Step up 3 10 8\"   Lateral step down 2 10 4\"   Standing hip 3-way kicks 5 10 Purple band for TKE   On 2\" step   Wobble board A/P, circles 1 10 DL, standing   SLS - modified with plyo box 20 3     Corner turns - modified at ledge 1 10 bilat UE support   Leg press - SL 3 10 40# S7             Manual Intervention  (72035)  Min: 0         Shld /GH Mobs 0'   Gentle inferior glides   Post Cap mobs 0'   Prone LLLD IR   Thoracic/Rib manipualtion         Hip PROM 7'       IASTM 7'   Gastroc/soleus, L   PROM MT - shoulder 0'   Flex, abd, ER, IR   Hypervolt percussion massage 0'   L gluteals             NMR re-education (05994)  Min: 25         Biodex balance 0'   LS x 2, RC   Small ERIC stance 30' 3 airex   Staggered stance 30' 3 airex   SLS with UE support 30' 3 Cues for neutral foot position   Wobble board - WS to LLE 10 10 RLE on 2\" step, manual perts   Modified SLS - LLE on airex 10 5 RLE on 6\" step   1/2 foam roll SLS - inv/ev focus 20 3 bilat UE support   Betty Scap Bio         Floor Snow angels-sliders                   Therapeutic Activity (25627)  Min:         UE throwing porgression         Dynamic UE stability         Earthquake Bar         Bodyblade                        Therapeutic Exercise and NMR EXR  [x] (58908) Provided verbal/tactile cueing for activities related to strengthening, flexibility, endurance, ROM  for improvements in scapular, scapulothoracic and UE control with self care, reaching, carrying, lifting, house/yardwork, driving/computer work.     [x] (08882) Provided verbal/tactile cueing for activities related to improving balance, coordination, kinesthetic sense, posture, motor skill, proprioception  to assist with  scapular, scapulothoracic and UE control with self care, reaching, carrying, lifting, house/yardwork, driving/computer work. Therapeutic Activities:    [] (82691 or 93743) Provided verbal/tactile cueing for activities related to improving balance, coordination, kinesthetic sense, posture, motor skill, proprioception and motor activation to allow for proper function of scapular, scapulothoracic and UE control with self care, carrying, lifting, driving/computer work.      Home Exercise Program:    [x] (03520) Reviewed/Progressed HEP activities related to strengthening, flexibility, endurance, ROM of scapular, scapulothoracic and UE control with self care, reaching, carrying, lifting, house/yardwork, driving/computer work  [] (37120) Reviewed/Progressed HEP activities related to improving balance, coordination, kinesthetic sense, posture, motor skill, proprioception of scapular, scapulothoracic and UE control with self care, reaching, carrying, lifting, house/yardwork, driving/computer work      Manual Treatments:  PROM / STM / Oscillations-Mobs:  G-I, II, III, IV (PA's, Inf., Post.)  [x] (95481) Provided manual therapy to mobilize soft tissue/joints of cervical/CT, scapular GHJ and UE for the purpose of modulating pain, promoting relaxation,  increasing ROM, reducing/eliminating soft tissue swelling/inflammation/restriction, improving soft tissue extensibility and allowing for proper ROM for normal function with self care, reaching, carrying, lifting, house/yardwork, driving/computer work    Modalities:  None    Charges:  Timed Code Treatment Minutes: 45   Total Treatment Minutes: 45       [] EVAL (LOW) 38305 (typically 20 minutes face-to-face)  [] EVAL (MOD) 98061 (typically 30 minutes face-to-face)  [] EVAL (HIGH) 28383 (typically 45 minutes face-to-face)  [] RE-EVAL     [x] WC(91891) x 1     [] DRY NEEDLE 1 OR 2 MUSCLES  [x] NMR (52561) x 2    [] DRY NEEDLE 3+ MUSCLES  [] Manual (50726) x       [] TA (04897) x     [] Mech Traction (27819)  [] ES(attended) (96998)     [] ES (un) (62092):   [] VASO (53030)  [] Other:    If BWC Please Indicate Time In/Out  CPT Code Time in Time out                                   SHOULDER GOALS:  Patient stated goal: pain-free shoulder, return to PLOF  [] Progressing: [x] Met: [] Not Met: [] Adjusted    Therapist goals for Patient:   Short Term Goals: To be achieved in: 2 weeks  1. Independent in HEP and progression per patient tolerance, in order to prevent re-injury. [] Progressing: [x] Met: [] Not Met: [] Adjusted  2. Patient will have a decrease in pain to facilitate improvement in movement, function, and ADLs as indicated by Functional Deficits. [] Progressing: [x] Met: [] Not Met: [] Adjusted    Long Term Goals: To be achieved in: 6 weeks  1. Disability index score of 25% or less for the Quick DASH to assist with reaching prior level of function. [] Progressing: [x] Met: [] Not Met: [] Adjusted  2. Patient will demonstrate increased AROM to equal contralateral to allow for proper joint functioning as indicated by Functional Deficits. [] Progressing: [x] Met: [] Not Met: [] Adjusted  3. Patient will demonstrate an increase in NM recruitment/activation and overall GH and scapular strength to within n5lbs HHD or WNL for proper functional mobility as indicated by patients Functional Deficits. [] Progressing: [x] Met: [] Not Met: [] Adjusted  4. Patient will return to reaching, lifting, overhead movements, self care especially after using toilet, dressing, grooming activities without increased symptoms or restriction.    [] Progressing: [x] Met: [] Not Met: [] Adjusted       HIP GOALS:  Patient stated goal: strengthen LLE, ambulate without cane for short distances  [] Progressing: [x] Met: [] Not Met: [] Adjusted    Therapist goals for Patient:   Short Term Goals: To be achieved in: 2 weeks  1. Independent in HEP and progression per patient tolerance, in order to prevent re-injury. []?? Progressing: [x]? ? Met: []?? Not Met: []?? Adjusted  2. Patient will have a decrease in pain to facilitate improvement in movement, function, and ADLs as indicated by Functional Deficits. []?? Progressing: [x]? ? Met: []?? Not Met: []?? Adjusted     Long Term Goals: To be achieved in: 4-6 weeks  1. Disability index score of 40% or less for the LEFS to assist with reaching prior level of function. [x]? ? Progressing: []?? Met: []?? Not Met: []?? Adjusted  2. Patient will demonstrate increased AROM to WFL to allow for proper joint functioning as indicated by patients Functional Deficits. []?? Progressing: [x]? ? Met: []?? Not Met: []?? Adjusted  3. Patient will demonstrate an increase in Strength to good proximal hip strength and control, within 5lb HHD in LE to allow for proper functional mobility as indicated by patients Functional Deficits. [x]? ? Progressing: []?? Met: []?? Not Met: []?? Adjusted  4. Patient will return to standing, ambulation (with AD as needed), transfers, stairs, squatting without increased symptoms or restriction. [x] Progressing: [] Met: [] Not Met: [] Adjusted      ASSESSMENT:  Appropriate LLE fatigue at conclusion, very shaky by end of session. Making gradual gains in general LLE strength but she continues to present with deficient proximal hip control L>R and poor core stability. Left foot/ankle proprioception is also dysfunctional, patient with tendency to vickie left ankle with increased weight on lateral aspect of foot. Gait is still antalgic with uneven step length and wide stance, though she is needing to use her cane less often for shorter distances.  Patient will benefit from continued physical therapy to right shoulder with additional focus on left hip rehabilitation and balance training to improve patient's overall function and reduce risk of falls. Return to Play: (if applicable)   []  Stage 1: Intro to Strength   []  Stage 2: Dynamic Strength and Intro to Plyometrics   []  Stage 3: Advanced Plyometrics and Intro to Throwing   []  Stage 4: Sport specific Training/Return to Sport     []  Ready to Return to Play, [employer]   []  Not Ready for Return to Sports   Comments:      Treatment/Activity Tolerance:  [x] Patient tolerated treatment well [] Patient limited by fatique  [] Patient limited by pain  [] Patient limited by other medical complications  [] Other:     Overall Progression Towards Functional goals/ Treatment Progress Update:  [x] Patient is progressing as expected towards functional goals listed. [] Progression is slowed due to complexities/Impairments listed. [] Progression has been slowed due to co-morbidities. [] Plan just implemented, too soon to assess goals progression <30days   [] Goals require adjustment due to lack of progress  [] Patient is not progressing as expected and requires additional follow up with physician  [] Other:     Prognosis for POC: [x] Good [] Fair  [] Poor    Patient requires continued skilled intervention: [x] Yes  [] No      PLAN: Continue PT 2x/week for 4-6 weeks for shoulder strengthening/ROM if needed, with continued left hip rehabilitation to focus on ROM, proximal hip strengthening, general LLE strength training, and balance training to lessen risk of falls  [x] Continue per plan of care [] Alter current plan (see comments)  [] Plan of care initiated [] Hold pending MD visit [] Discharge    Electronically signed by: Meg Magallanes PT     Note: If patient does not return for scheduled/recommended follow up visits, this note will serve as a discharge from care along with the most recent update on progress.

## 2021-05-10 ENCOUNTER — HOSPITAL ENCOUNTER (OUTPATIENT)
Dept: PHYSICAL THERAPY | Age: 76
Setting detail: THERAPIES SERIES
Discharge: HOME | End: 2021-05-10
Payer: MEDICARE

## 2021-05-10 PROCEDURE — 97112 NEUROMUSCULAR REEDUCATION: CPT

## 2021-05-10 PROCEDURE — 97140 MANUAL THERAPY 1/> REGIONS: CPT

## 2021-05-10 NOTE — FLOWSHEET NOTE
[place] [address] [facility]  Phone: [phone] Fax: [phone]        Physical Therapy Treatment Note/ Progress Report:     Date:  5/10/2021    Patient Name:  Anuja Ferrera    :  1945  MRN: [id number]  Restrictions/Precautions:    Medical/Treatment Diagnosis Information:  · Diagnosis: Right shoulder adhesive capsulitis, left hip pain  · Treatment Diagnosis: M75.01, J96.655  Insurance/Certification information:  PT Insurance Information: Medicare / [facility]  Physician Information:  Referring Practitioner: Darleen Christopher of care signed (Y/N):     Date of Patient follow up with Physician: N/A      Progress Report: []  Yes  [x]  No     Date Range for reporting period:  Beginnin20  Ending:  NA    Progress report due (10 Rx/or 30 days whichever is less): 27     Recertification due (POC duration/ or 90 days whichever is less): 21     Visit # Insurance Allowable Auth Needed   28 ([address] [place] [address]) Medicare []Yes    [x]No     Pain level:  0/10 currently, c/o weakness     SUBJECTIVE:  Patient reports that she had a fall yesterday while in the shower, states that she does have a shower chair but she decided to change the order in which she washes herself and states that she washed her feet, and then tried to stand up and slipped. She did have hold of her grab bars so it was a slow fall, but she was unable to get back up so she called the EMS to help her. She states that her hip was a little sore yesterday but has since felt much better. The right side of her neck is also tight and sore. OBJECTIVE:    Observation: Antalgic gait with standard cane used in RUE. Balance is noticeably worse today compared to last several weeks, patient with 2 LOB (while walking back to treatment area and then when walking away from recumbent bike, patient able to self correct with assist from furniture.     Test measurements:  LEFS - 74% impairment      RESTRICTIONS/PRECAUTIONS:      Exercises/Interventions:   Therapeutic Ex (47415)  Min: 0 Sets/sec Reps CUES/Notes   Bike 5'                 Ankle theraband 4-way 1 15 green   Cybex Gastroc- SL  3 10 50#   Cybex Soleus - SL 2 10 50#   Leg Press - SL 3 10 50#   Cybex Leg Ext - SL 3 10 15#   Cybex HS curl 3 10 35#   Bridge 10 12     SLR         SAQ 5 30 4#, full roll   Clam - side lying 5 10 No resistance   Supine clam - unilateral 10 10 Maroon band, each         BOSU lunge     add   Slide lunge - retro at ledge 1 15 Manual cues for L knee   Standing hip abd 2 10 orange   Supine TrA March  1 10     Heel raises 5 10 2\" step   TKE 5 15 3.5pl   Side stepping 2 15 HHA, maroon above knees   Sit to stand with TRX 2 10 1 airex on seat, sink for support   Step up 3 10 8\"   Lateral step down 2 10 4\"   Standing hip 3-way kicks 5 10 Purple band for TKE   On 2\" step   Wobble board A/P, circles 1 10 DL, standing   SLS - modified with plyo box 20 3     Corner turns - modified at ledge 1 10 bilat UE support   Leg press - SL 3 10 40# S7             Manual Intervention  (32673)  Min: 25         Shld /GH Mobs 0'   Gentle inferior glides   Post Cap mobs 0'   Prone LLLD IR   Cervical STM 7'       Manual cervical traction 4'     Hip PROM 7'       IASTM 7'   Gastroc/soleus, L   PROM MT - shoulder 0'   Flex, abd, ER, IR   Hypervolt percussion massage 0'   L gluteals             NMR re-education (27753)  Min: 20         Biodex balance 0'   LS x 2, RC   Small ERIC stance 30' 3 airex   Staggered stance 30' 3 airex   SLS with UE support 30' 3 Cues for neutral foot position   Wobble board - WS to LLE 10 10 RLE on 2\" step, manual perts   Modified SLS - LLE on airex 10 5 RLE on 6\" step   1/2 foam roll SLS - inv/ev focus 20 3 bilat UE support   Betty Scap Bio         Floor Snow angels-sliders                   Therapeutic Activity (35128)  Min:         UE throwing porgression         Dynamic UE stability         Earthquake Bar       Bodyblade                        Therapeutic Exercise and NMR EXR  [x] (92841) Provided verbal/tactile cueing for activities related to strengthening, flexibility, endurance, ROM  for improvements in scapular, scapulothoracic and UE control with self care, reaching, carrying, lifting, house/yardwork, driving/computer work. [x] (36593) Provided verbal/tactile cueing for activities related to improving balance, coordination, kinesthetic sense, posture, motor skill, proprioception  to assist with  scapular, scapulothoracic and UE control with self care, reaching, carrying, lifting, house/yardwork, driving/computer work. Therapeutic Activities:    [] (14562 or 79560) Provided verbal/tactile cueing for activities related to improving balance, coordination, kinesthetic sense, posture, motor skill, proprioception and motor activation to allow for proper function of scapular, scapulothoracic and UE control with self care, carrying, lifting, driving/computer work.      Home Exercise Program:    [x] (84654) Reviewed/Progressed HEP activities related to strengthening, flexibility, endurance, ROM of scapular, scapulothoracic and UE control with self care, reaching, carrying, lifting, house/yardwork, driving/computer work  [] (33229) Reviewed/Progressed HEP activities related to improving balance, coordination, kinesthetic sense, posture, motor skill, proprioception of scapular, scapulothoracic and UE control with self care, reaching, carrying, lifting, house/yardwork, driving/computer work      Manual Treatments:  PROM / STM / Oscillations-Mobs:  G-I, II, III, IV (PA's, Inf., Post.)  [x] (37392) Provided manual therapy to mobilize soft tissue/joints of cervical/CT, scapular GHJ and UE for the purpose of modulating pain, promoting relaxation,  increasing ROM, reducing/eliminating soft tissue swelling/inflammation/restriction, improving soft tissue extensibility and allowing for proper ROM for normal function with self care, reaching, carrying, lifting, house/yardwork, driving/computer work    Modalities:  None    Charges:  Timed Code Treatment Minutes: 45   Total Treatment Minutes: 45       [] EVAL (LOW) 35460 (typically 20 minutes face-to-face)  [] EVAL (MOD) 82932 (typically 30 minutes face-to-face)  [] EVAL (HIGH) 78825 (typically 45 minutes face-to-face)  [] RE-EVAL     [] IN(09429) x      [] DRY NEEDLE 1 OR 2 MUSCLES  [x] NMR (68467) x 1    [] DRY NEEDLE 3+ MUSCLES  [x] Manual (61759) x 2      [] TA (22268) x     [] Mech Traction (14465)  [] ES(attended) (74514)     [] ES (un) (06437):   [] VASO (04748)  [] Other:    If BWC Please Indicate Time In/Out  CPT Code Time in Time out                                   SHOULDER GOALS:  Patient stated goal: pain-free shoulder, return to PLOF  [] Progressing: [x] Met: [] Not Met: [] Adjusted    Therapist goals for Patient:   Short Term Goals: To be achieved in: 2 weeks  1. Independent in HEP and progression per patient tolerance, in order to prevent re-injury. [] Progressing: [x] Met: [] Not Met: [] Adjusted  2. Patient will have a decrease in pain to facilitate improvement in movement, function, and ADLs as indicated by Functional Deficits. [] Progressing: [x] Met: [] Not Met: [] Adjusted    Long Term Goals: To be achieved in: 6 weeks  1. Disability index score of 25% or less for the Quick DASH to assist with reaching prior level of function. [] Progressing: [x] Met: [] Not Met: [] Adjusted  2. Patient will demonstrate increased AROM to equal contralateral to allow for proper joint functioning as indicated by Functional Deficits. [] Progressing: [x] Met: [] Not Met: [] Adjusted  3. Patient will demonstrate an increase in NM recruitment/activation and overall GH and scapular strength to within n5lbs HHD or WNL for proper functional mobility as indicated by patients Functional Deficits. [] Progressing: [x] Met: [] Not Met: [] Adjusted  4.  Patient will return to reaching, lifting, overhead movements, self care especially after using toilet, dressing, grooming activities without increased symptoms or restriction. [] Progressing: [x] Met: [] Not Met: [] Adjusted       HIP GOALS:  Patient stated goal: strengthen LLE, ambulate without cane for short distances  [] Progressing: [x] Met: [] Not Met: [] Adjusted    Therapist goals for Patient:   Short Term Goals: To be achieved in: 2 weeks  1. Independent in HEP and progression per patient tolerance, in order to prevent re-injury. []?? Progressing: [x]? ? Met: []?? Not Met: []?? Adjusted  2. Patient will have a decrease in pain to facilitate improvement in movement, function, and ADLs as indicated by Functional Deficits. []?? Progressing: [x]? ? Met: []?? Not Met: []?? Adjusted     Long Term Goals: To be achieved in: 4-6 weeks  1. Disability index score of 40% or less for the LEFS to assist with reaching prior level of function. [x]? ? Progressing: []?? Met: []?? Not Met: []?? Adjusted  2. Patient will demonstrate increased AROM to WFL to allow for proper joint functioning as indicated by patients Functional Deficits. []?? Progressing: [x]? ? Met: []?? Not Met: []?? Adjusted  3. Patient will demonstrate an increase in Strength to good proximal hip strength and control, within 5lb HHD in LE to allow for proper functional mobility as indicated by patients Functional Deficits. [x]? ? Progressing: []?? Met: []?? Not Met: []?? Adjusted  4. Patient will return to standing, ambulation (with AD as needed), transfers, stairs, squatting without increased symptoms or restriction. [x] Progressing: [] Met: [] Not Met: [] Adjusted      ASSESSMENT:  Improvement in symptoms following manual. Appropriate LLE fatigue at conclusion, very shaky by end of session. Making gradual gains in general LLE strength but she continues to present with deficient proximal hip control L>R and poor core stability.  Left foot/ankle proprioception is also dysfunctional, patient with tendency to vickie left ankle with increased weight on lateral aspect of foot. Gait is still antalgic with uneven step length and wide stance, though she is needing to use her cane less often for shorter distances. Patient will benefit from continued physical therapy to right shoulder with additional focus on left hip rehabilitation and balance training to improve patient's overall function and reduce risk of falls. Return to Play: (if applicable)   []  Stage 1: Intro to Strength   []  Stage 2: Dynamic Strength and Intro to Plyometrics   []  Stage 3: Advanced Plyometrics and Intro to Throwing   []  Stage 4: Sport specific Training/Return to Sport     []  Ready to Return to Play, [employer]   []  Not Ready for Return to Sports   Comments:      Treatment/Activity Tolerance:  [x] Patient tolerated treatment well [] Patient limited by fatique  [] Patient limited by pain  [] Patient limited by other medical complications  [] Other:     Overall Progression Towards Functional goals/ Treatment Progress Update:  [x] Patient is progressing as expected towards functional goals listed. [] Progression is slowed due to complexities/Impairments listed. [] Progression has been slowed due to co-morbidities.   [] Plan just implemented, too soon to assess goals progression <30days   [] Goals require adjustment due to lack of progress  [] Patient is not progressing as expected and requires additional follow up with physician  [] Other:     Prognosis for POC: [x] Good [] Fair  [] Poor    Patient requires continued skilled intervention: [x] Yes  [] No      PLAN: Continue PT 2x/week for 4-6 weeks for shoulder strengthening/ROM if needed, with continued left hip rehabilitation to focus on ROM, proximal hip strengthening, general LLE strength training, and balance training to lessen risk of falls  [x] Continue per plan of care [] Alter current plan (see comments)  [] Plan of care initiated [] Hold pending MD visit [] Discharge    Electronically signed by: Sylwia Acevedo PT     Note: If patient does not return for scheduled/recommended follow up visits, this note will serve as a discharge from care along with the most recent update on progress.

## 2021-05-12 ENCOUNTER — APPOINTMENT (OUTPATIENT)
Dept: PHYSICAL THERAPY | Age: 76
End: 2021-05-12
Payer: MEDICARE

## 2021-05-14 ENCOUNTER — HOSPITAL ENCOUNTER (OUTPATIENT)
Dept: PHYSICAL THERAPY | Age: 76
Setting detail: THERAPIES SERIES
Discharge: HOME | End: 2021-05-14
Payer: MEDICARE

## 2021-05-14 PROCEDURE — 97112 NEUROMUSCULAR REEDUCATION: CPT

## 2021-05-14 PROCEDURE — 97110 THERAPEUTIC EXERCISES: CPT

## 2021-05-14 NOTE — FLOWSHEET NOTE
[place] [address] [facility]  Phone: [phone] Fax: [phone]        Physical Therapy Treatment Note/ Progress Report:     Date:  2021    Patient Name:  Trisha Jorgensen    :  1945  MRN: [id number]  Restrictions/Precautions:    Medical/Treatment Diagnosis Information:  · Diagnosis: Right shoulder adhesive capsulitis, left hip pain  · Treatment Diagnosis: M75.01, L90.749  Insurance/Certification information:  PT Insurance Information: Medicare / [facility]  Physician Information:  Referring Practitioner: Jazzmine Stack  care signed (Y/N):     Date of Patient follow up with Physician: N/A      Progress Report: []  Yes  [x]  No     Date Range for reporting period:  Beginnin20  Ending:  NA    Progress report due (10 Rx/or 30 days whichever is less):      Recertification due (POC duration/ or 90 days whichever is less): 21     Visit # Insurance Allowable Auth Needed   39 ([address] [place] 22) Medicare []Yes    [x]No     Pain level:  0/10 currently, c/o weakness     SUBJECTIVE:  Patient states that she really is no longer feeling any soreness as a result of her fall over the weekend. She does feel that the LLE seems weaker and shakier since the fall. OBJECTIVE:    Observation: Antalgic gait with standard cane used in RUE. Balance is noticeably worse today compared to last several weeks, patient with 2 LOB (while walking back to treatment area and then when walking away from recumbent bike, patient able to self correct with assist from furniture.     Test measurements:  LEFS - 74% impairment      RESTRICTIONS/PRECAUTIONS:      Exercises/Interventions:   Therapeutic Ex (65597)  Min: 25 Sets/sec Reps CUES/Notes   Bike 5'                 Ankle theraband 4-way 1 15 green   Cybex Gastroc- SL  3 10 55#   Cybex Soleus - SL 2 10 50#   Leg Press - SL 3 10 55#   Cybex Leg Ext - SL 3 10 15#   Cybex HS curl 3 10 35#   Bridge 10 12     SLR         SAQ 5 30 4#, full roll   Clam - side lying 5 10 No resistance   Supine clam - unilateral 10 10 Maroon band, each         BOSU lunge     add   Slide lunge - retro at ledge 1 15 Manual cues for L knee   Standing hip abd 2 10 orange   SC March  1 10 Cane for support, SBA   Heel raises - DL 5 10 yellow ball between heels   TKE 5 15 3.5pl   Side stepping 2 15 HHA, maroon above knees   Sit to stand with TRX 2 10 1 airex on seat, sink for support   Step up 3 10 8\"   Lateral step down 2 10 4\"   Standing hip 3-way kicks 5 10 Purple band for TKE   On 2\" step   Wobble board A/P, circles 1 10 DL, standing   SLS - modified with plyo box 20 3     Corner turns - modified at ledge 1 10 bilat UE support   Leg press - SL 3 10 40# S7             Manual Intervention  (68145)  Min: 0         Shld /GH Mobs 0'   Gentle inferior glides   Post Cap mobs 0'   Prone LLLD IR   Cervical STM 7'       Manual cervical traction 4'     Hip PROM 7'       IASTM 7'   Gastroc/soleus, L   PROM MT - shoulder 0'   Flex, abd, ER, IR   Hypervolt percussion massage 0'   L gluteals             NMR re-education (52961)  Min: 20         Biodex balance 0'   LS x 2, RC   Small EIRC stance 30' 3 airex   Staggered stance 30' 3 airex   SLS with UE support 30' 3 Cues for neutral foot position   Wobble board - WS to LLE 10 10 RLE on 2\" step, manual perts   Modified SLS - LLE on airex 10 5 RLE on 6\" step   1/2 foam roll SLS - inv/ev focus 20 3 bilat UE support   Betty Scap Bio         Floor Snow angels-sliders                   Therapeutic Activity (39926)  Min:         UE throwing porgression         Dynamic UE stability         Earthquake Bar         Bodyblade                        Therapeutic Exercise and NMR EXR  [x] (54249) Provided verbal/tactile cueing for activities related to strengthening, flexibility, endurance, ROM  for improvements in scapular, scapulothoracic and UE control with self care, reaching, carrying, lifting, house/yardwork, driving/computer work. [x] (68224) Provided verbal/tactile cueing for activities related to improving balance, coordination, kinesthetic sense, posture, motor skill, proprioception  to assist with  scapular, scapulothoracic and UE control with self care, reaching, carrying, lifting, house/yardwork, driving/computer work. Therapeutic Activities:    [] (96250 or 31150) Provided verbal/tactile cueing for activities related to improving balance, coordination, kinesthetic sense, posture, motor skill, proprioception and motor activation to allow for proper function of scapular, scapulothoracic and UE control with self care, carrying, lifting, driving/computer work.      Home Exercise Program:    [x] (16057) Reviewed/Progressed HEP activities related to strengthening, flexibility, endurance, ROM of scapular, scapulothoracic and UE control with self care, reaching, carrying, lifting, house/yardwork, driving/computer work  [] (05300) Reviewed/Progressed HEP activities related to improving balance, coordination, kinesthetic sense, posture, motor skill, proprioception of scapular, scapulothoracic and UE control with self care, reaching, carrying, lifting, house/yardwork, driving/computer work      Manual Treatments:  PROM / STM / Oscillations-Mobs:  G-I, II, III, IV (PA's, Inf., Post.)  [x] (47596) Provided manual therapy to mobilize soft tissue/joints of cervical/CT, scapular GHJ and UE for the purpose of modulating pain, promoting relaxation,  increasing ROM, reducing/eliminating soft tissue swelling/inflammation/restriction, improving soft tissue extensibility and allowing for proper ROM for normal function with self care, reaching, carrying, lifting, house/yardwork, driving/computer work    Modalities:  None    Charges:  Timed Code Treatment Minutes: 45   Total Treatment Minutes: 45       [] EVAL (LOW) 21640 (typically 20 minutes face-to-face)  [] EVAL (MOD) 07246 (typically 30 minutes face-to-face)  [] EVAL (HIGH) 44475 (typically 45 minutes face-to-face)  [] RE-EVAL     [x] DA(88228) x 2      [] DRY NEEDLE 1 OR 2 MUSCLES  [x] NMR (78901) x 1    [] DRY NEEDLE 3+ MUSCLES  [] Manual (87005) x       [] TA (91747) x     [] Mech Traction (49590)  [] ES(attended) (37345)     [] ES (un) (14012):   [] VASO (23570)  [] Other:    If BWC Please Indicate Time In/Out  CPT Code Time in Time out                                   SHOULDER GOALS:  Patient stated goal: pain-free shoulder, return to PLOF  [] Progressing: [x] Met: [] Not Met: [] Adjusted    Therapist goals for Patient:   Short Term Goals: To be achieved in: 2 weeks  1. Independent in HEP and progression per patient tolerance, in order to prevent re-injury. [] Progressing: [x] Met: [] Not Met: [] Adjusted  2. Patient will have a decrease in pain to facilitate improvement in movement, function, and ADLs as indicated by Functional Deficits. [] Progressing: [x] Met: [] Not Met: [] Adjusted    Long Term Goals: To be achieved in: 6 weeks  1. Disability index score of 25% or less for the Quick DASH to assist with reaching prior level of function. [] Progressing: [x] Met: [] Not Met: [] Adjusted  2. Patient will demonstrate increased AROM to equal contralateral to allow for proper joint functioning as indicated by Functional Deficits. [] Progressing: [x] Met: [] Not Met: [] Adjusted  3. Patient will demonstrate an increase in NM recruitment/activation and overall GH and scapular strength to within n5lbs HHD or WNL for proper functional mobility as indicated by patients Functional Deficits. [] Progressing: [x] Met: [] Not Met: [] Adjusted  4. Patient will return to reaching, lifting, overhead movements, self care especially after using toilet, dressing, grooming activities without increased symptoms or restriction.    [] Progressing: [x] Met: [] Not Met: [] Adjusted       HIP GOALS:  Patient stated goal: strengthen LLE, ambulate without cane for short distances  [] Progressing: from continued physical therapy to right shoulder with additional focus on left hip rehabilitation and balance training to improve patient's overall function and reduce risk of falls. Return to Play: (if applicable)   []  Stage 1: Intro to Strength   []  Stage 2: Dynamic Strength and Intro to Plyometrics   []  Stage 3: Advanced Plyometrics and Intro to Throwing   []  Stage 4: Sport specific Training/Return to Sport     []  Ready to Return to Play, [employer]   []  Not Ready for Return to Sports   Comments:      Treatment/Activity Tolerance:  [x] Patient tolerated treatment well [] Patient limited by fatique  [] Patient limited by pain  [] Patient limited by other medical complications  [] Other:     Overall Progression Towards Functional goals/ Treatment Progress Update:  [x] Patient is progressing as expected towards functional goals listed. [] Progression is slowed due to complexities/Impairments listed. [] Progression has been slowed due to co-morbidities. [] Plan just implemented, too soon to assess goals progression <30days   [] Goals require adjustment due to lack of progress  [] Patient is not progressing as expected and requires additional follow up with physician  [] Other:     Prognosis for POC: [x] Good [] Fair  [] Poor    Patient requires continued skilled intervention: [x] Yes  [] No      PLAN: Continue PT 2x/week for 4-6 weeks for shoulder strengthening/ROM if needed, with continued left hip rehabilitation to focus on ROM, proximal hip strengthening, general LLE strength training, and balance training to lessen risk of falls  [x] Continue per plan of care [] Alter current plan (see comments)  [] Plan of care initiated [] Hold pending MD visit [] Discharge    Electronically signed by: Gustavo Babinski, PT     Note: If patient does not return for scheduled/recommended follow up visits, this note will serve as a discharge from care along with the most recent update on progress.

## 2021-05-17 ENCOUNTER — HOSPITAL ENCOUNTER (OUTPATIENT)
Dept: PHYSICAL THERAPY | Age: 76
Setting detail: THERAPIES SERIES
Discharge: HOME | End: 2021-05-17
Payer: MEDICARE

## 2021-05-17 PROCEDURE — 97110 THERAPEUTIC EXERCISES: CPT

## 2021-05-17 NOTE — FLOWSHEET NOTE
[facility] [address] [address]  Phone: [phone] Fax: [phone]        Physical Therapy Treatment Note/ Progress Report:     Date:  2021    Patient Name:  Olivia Cardenas    :  1945  MRN: [id number]  Restrictions/Precautions:    Medical/Treatment Diagnosis Information:  · Diagnosis: Right shoulder adhesive capsulitis, left hip pain  · Treatment Diagnosis: M75.01, L23.230  Insurance/Certification information:  PT Insurance Information: Medicare / [facility]  Physician Information:  Referring Practitioner: Manjinder Russo signed (Y/N):     Date of Patient follow up with Physician: N/A      Progress Report: []  Yes  [x]  No     Date Range for reporting period:  Beginnin20  Ending:  NA    Progress report due (10 Rx/or 30 days whichever is less):      Recertification due (POC duration/ or 90 days whichever is less): 21     Visit # Insurance Allowable Auth Needed   40 ([address] [place] 22) Medicare []Yes    [x]No     Pain level:  0/10 currently, c/o weakness     SUBJECTIVE:  Patient reports she was fatigued after last session but had no increase in pain. Did not realize just how difficult it was for her to perform marching. OBJECTIVE:    Observation: Antalgic gait with standard cane used in RUE. Balance is noticeably worse today compared to last several weeks, patient with 2 LOB (while walking back to treatment area and then when walking away from recumbent bike, patient able to self correct with assist from furniture.     Test measurements:  LEFS - 74% impairment      RESTRICTIONS/PRECAUTIONS:      Exercises/Interventions:   Therapeutic Ex (60055)  Min: 40 Sets/sec Reps CUES/Notes   Bike 5'       Ankle theraband 4-way 1 15 green   Cybex Gastroc- SL  3 10 55#   Leg Press - SL 3 10 45#   Cybex Leg Ext - SL 3 10 15#   Cybex HS curl 3 10 35#   Bridge 10 12     SLR         Hook lying LLE Ext 1 10 Cues for form driving/computer work. [x] (26939) Provided verbal/tactile cueing for activities related to improving balance, coordination, kinesthetic sense, posture, motor skill, proprioception  to assist with  scapular, scapulothoracic and UE control with self care, reaching, carrying, lifting, house/yardwork, driving/computer work. Therapeutic Activities:    [] (56639 or 51693) Provided verbal/tactile cueing for activities related to improving balance, coordination, kinesthetic sense, posture, motor skill, proprioception and motor activation to allow for proper function of scapular, scapulothoracic and UE control with self care, carrying, lifting, driving/computer work.      Home Exercise Program:    [x] (52193) Reviewed/Progressed HEP activities related to strengthening, flexibility, endurance, ROM of scapular, scapulothoracic and UE control with self care, reaching, carrying, lifting, house/yardwork, driving/computer work  [] (83981) Reviewed/Progressed HEP activities related to improving balance, coordination, kinesthetic sense, posture, motor skill, proprioception of scapular, scapulothoracic and UE control with self care, reaching, carrying, lifting, house/yardwork, driving/computer work      Manual Treatments:  PROM / STM / Oscillations-Mobs:  G-I, II, III, IV (PA's, Inf., Post.)  [x] (69997) Provided manual therapy to mobilize soft tissue/joints of cervical/CT, scapular GHJ and UE for the purpose of modulating pain, promoting relaxation,  increasing ROM, reducing/eliminating soft tissue swelling/inflammation/restriction, improving soft tissue extensibility and allowing for proper ROM for normal function with self care, reaching, carrying, lifting, house/yardwork, driving/computer work    Modalities:  None    Charges:  Timed Code Treatment Minutes: 40   Total Treatment Minutes: 40       [] EVAL (LOW) 93363 (typically 20 minutes face-to-face)  [] EVAL (MOD) 26503 (typically 30 minutes face-to-face)  [] EVAL (HIGH) 15004 (typically 45 minutes face-to-face)  [] RE-EVAL     [x] WK(79524) x 2      [] DRY NEEDLE 1 OR 2 MUSCLES  [x] NMR (24474) x 1    [] DRY NEEDLE 3+ MUSCLES  [] Manual (20672) x       [] TA (98917) x     [] Mech Traction (19854)  [] ES(attended) (76341)     [] ES (un) (59107):   [] VASO (83458)  [] Other:    If BWC Please Indicate Time In/Out  CPT Code Time in Time out                                   SHOULDER GOALS:  Patient stated goal: pain-free shoulder, return to PLOF  [] Progressing: [x] Met: [] Not Met: [] Adjusted    Therapist goals for Patient:   Short Term Goals: To be achieved in: 2 weeks  1. Independent in HEP and progression per patient tolerance, in order to prevent re-injury. [] Progressing: [x] Met: [] Not Met: [] Adjusted  2. Patient will have a decrease in pain to facilitate improvement in movement, function, and ADLs as indicated by Functional Deficits. [] Progressing: [x] Met: [] Not Met: [] Adjusted    Long Term Goals: To be achieved in: 6 weeks  1. Disability index score of 25% or less for the Quick DASH to assist with reaching prior level of function. [] Progressing: [x] Met: [] Not Met: [] Adjusted  2. Patient will demonstrate increased AROM to equal contralateral to allow for proper joint functioning as indicated by Functional Deficits. [] Progressing: [x] Met: [] Not Met: [] Adjusted  3. Patient will demonstrate an increase in NM recruitment/activation and overall GH and scapular strength to within n5lbs HHD or WNL for proper functional mobility as indicated by patients Functional Deficits. [] Progressing: [x] Met: [] Not Met: [] Adjusted  4. Patient will return to reaching, lifting, overhead movements, self care especially after using toilet, dressing, grooming activities without increased symptoms or restriction.    [] Progressing: [x] Met: [] Not Met: [] Adjusted       HIP GOALS:  Patient stated goal: strengthen LLE, ambulate without cane for short distances  [] Progressing: risk of falls. Return to Play: (if applicable)   []  Stage 1: Intro to Strength   []  Stage 2: Dynamic Strength and Intro to Plyometrics   []  Stage 3: Advanced Plyometrics and Intro to Throwing   []  Stage 4: Sport specific Training/Return to Sport     []  Ready to Return to Play, [employer]   []  Not Ready for Return to Sports   Comments:      Treatment/Activity Tolerance:  [x] Patient tolerated treatment well [] Patient limited by fatique  [] Patient limited by pain  [] Patient limited by other medical complications  [] Other:     Overall Progression Towards Functional goals/ Treatment Progress Update:  [x] Patient is progressing as expected towards functional goals listed. [] Progression is slowed due to complexities/Impairments listed. [] Progression has been slowed due to co-morbidities. [] Plan just implemented, too soon to assess goals progression <30days   [] Goals require adjustment due to lack of progress  [] Patient is not progressing as expected and requires additional follow up with physician  [] Other:     Prognosis for POC: [x] Good [] Fair  [] Poor    Patient requires continued skilled intervention: [x] Yes  [] No      PLAN: Continue PT 2x/week for 4-6 weeks for shoulder strengthening/ROM if needed, with continued left hip rehabilitation to focus on ROM, proximal hip strengthening, general LLE strength training, and balance training to lessen risk of falls  [x] Continue per plan of care [] Alter current plan (see comments)  [] Plan of care initiated [] Hold pending MD visit [] Discharge    Electronically signed by: Ashley Tadeo PT     Note: If patient does not return for scheduled/recommended follow up visits, this note will serve as a discharge from care along with the most recent update on progress.

## 2021-05-19 ENCOUNTER — HOSPITAL ENCOUNTER (OUTPATIENT)
Dept: PHYSICAL THERAPY | Age: 76
Setting detail: THERAPIES SERIES
Discharge: HOME | End: 2021-05-19
Payer: MEDICARE

## 2021-05-19 PROCEDURE — 97110 THERAPEUTIC EXERCISES: CPT

## 2021-05-19 NOTE — FLOWSHEET NOTE
[facility] [address] [address] [place]  Phone: [phone] Fax: [phone]        Physical Therapy Treatment Note/ Progress Report:     Date:  2021    Patient Name:  Irvnig Horner    :  1945  MRN: [id number]  Restrictions/Precautions:    Medical/Treatment Diagnosis Information:  · Diagnosis: Right shoulder adhesive capsulitis, left hip pain  · Treatment Diagnosis: M75.01, V70.301  Insurance/Certification information:  PT Insurance Information: Medicare / [facility]  Physician Information:  Referring Practitioner: Rosio Acuna of care signed (Y/N):     Date of Patient follow up with Physician: N/A      Progress Report: []  Yes  [x]  No     Date Range for reporting period:  Beginnin20  Ending:  NA    Progress report due (10 Rx/or 30 days whichever is less):      Recertification due (POC duration/ or 90 days whichever is less): 21     Visit # Insurance Allowable Auth Needed   45 ([address] [place] 22) Medicare []Yes    [x]No     Pain level:  0/10 currently, c/o weakness     SUBJECTIVE:  Patient states that she had some soreness around the left hip after last session but nothing major. She has been working on the supine marching exercise at home. Has been feeling a little more stable with ambulation since last session and has been able to maneuver without her cane a little more frequently. OBJECTIVE:    Observation: Antalgic gait with standard cane used in RUE.     Test measurements:  LEFS - 74% impairment      RESTRICTIONS/PRECAUTIONS:      Exercises/Interventions:   Therapeutic Ex (04750)  Min: 40 Sets/sec Reps CUES/Notes   Bike 5'       Ankle theraband 4-way 1 15 green   Cybex Gastroc- SL  3 10 55#   Leg Press - SL 3 10 45#   Cybex Leg Ext - SL 3 10 15#   Cybex HS curl 3 10 35#   Bridge 10 12     3-way SLR 1 10    Hook lying LLE Ext 1 10 Cues for form   Supine heel slides +  12 Cues for form   Hook lying bilat hip add iso 5 10 Ball squeeze   Clam - side lying 5 10 No resistance   Supine clam - unilateral 10 10 Maroon band, each         BOSU lunge     add   Slide lunge - retro at ledge 1 15 Manual cues for L knee   Standing hip abd 2 10 orange   SC March  1 10 Fwd/bkwd  Bilat Cane for support, SBA   Heel raises - DL 5 10 yellow ball between heels   TKE 5 15 3.5pl   Sit to stand with TRX 2 10 1 airex on seat, sink for support   Step up 3 10 8\"   Lateral step down 2 10 4\"   Standing hip 3-way kicks 5 10 Purple band for TKE   On 2\" step   Wobble board A/P, circles 1 10 DL, standing   SLS - modified with plyo box 20 3     Corner turns - modified at ledge 1 10 bilat UE support             Manual Intervention  (60417)  Min: 0         Shld /GH Mobs 0'   Gentle inferior glides   Post Cap mobs 0'   Prone LLLD IR   Cervical STM 7'       Manual cervical traction 4'     Hip PROM 7'       IASTM 7'   Gastroc/soleus, L   PROM MT - shoulder 0'   Flex, abd, ER, IR   Hypervolt percussion massage 0'   L gluteals             NMR re-education (78828)  Min: 0         Biodex balance 0'   LS x 2, RC   Small ERIC stance 30' 3 airex   Staggered stance 30' 3 airex   SLS with UE support 30' 3 Cues for neutral foot position   Wobble board - WS to LLE 10 10 RLE on 2\" step, manual perts   Modified SLS - LLE on airex 10 5 RLE on 6\" step   1/2 foam roll SLS - inv/ev focus 20 3 bilat UE support   Betty Scap Bio         Floor Snow angels-sliders                   Therapeutic Activity (98421)  Min:         UE throwing porgression         Dynamic UE stability         Earthquake Bar         Bodyblade                        Therapeutic Exercise and NMR EXR  [x] (10928) Provided verbal/tactile cueing for activities related to strengthening, flexibility, endurance, ROM  for improvements in scapular, scapulothoracic and UE control with self care, reaching, carrying, lifting, house/yardwork, driving/computer work.     [x] (71269) Provided verbal/tactile cueing for activities related to improving balance, coordination, kinesthetic sense, posture, motor skill, proprioception  to assist with  scapular, scapulothoracic and UE control with self care, reaching, carrying, lifting, house/yardwork, driving/computer work. Therapeutic Activities:    [] (58188 or 67433) Provided verbal/tactile cueing for activities related to improving balance, coordination, kinesthetic sense, posture, motor skill, proprioception and motor activation to allow for proper function of scapular, scapulothoracic and UE control with self care, carrying, lifting, driving/computer work.      Home Exercise Program:    [x] (84866) Reviewed/Progressed HEP activities related to strengthening, flexibility, endurance, ROM of scapular, scapulothoracic and UE control with self care, reaching, carrying, lifting, house/yardwork, driving/computer work  [] (12660) Reviewed/Progressed HEP activities related to improving balance, coordination, kinesthetic sense, posture, motor skill, proprioception of scapular, scapulothoracic and UE control with self care, reaching, carrying, lifting, house/yardwork, driving/computer work      Manual Treatments:  PROM / STM / Oscillations-Mobs:  G-I, II, III, IV (PA's, Inf., Post.)  [x] (56571) Provided manual therapy to mobilize soft tissue/joints of cervical/CT, scapular GHJ and UE for the purpose of modulating pain, promoting relaxation,  increasing ROM, reducing/eliminating soft tissue swelling/inflammation/restriction, improving soft tissue extensibility and allowing for proper ROM for normal function with self care, reaching, carrying, lifting, house/yardwork, driving/computer work    Modalities:  None    Charges:  Timed Code Treatment Minutes: 40   Total Treatment Minutes: 40       [] EVAL (LOW) 64028 (typically 20 minutes face-to-face)  [] EVAL (MOD) 20629 (typically 30 minutes face-to-face)  [] EVAL (HIGH) 51451 (typically 45 minutes face-to-face)  [] RE-EVAL     [x] GM(58966) x 3 [] DRY NEEDLE 1 OR 2 MUSCLES  [] NMR (34139) x     [] DRY NEEDLE 3+ MUSCLES  [] Manual (87234) x       [] TA (50230) x     [] Mech Traction (91553)  [] ES(attended) (85359)     [] ES (un) (15679):   [] VASO (58869)  [] Other:    If BWC Please Indicate Time In/Out  CPT Code Time in Time out                                   SHOULDER GOALS:  Patient stated goal: pain-free shoulder, return to PLOF  [] Progressing: [x] Met: [] Not Met: [] Adjusted    Therapist goals for Patient:   Short Term Goals: To be achieved in: 2 weeks  1. Independent in HEP and progression per patient tolerance, in order to prevent re-injury. [] Progressing: [x] Met: [] Not Met: [] Adjusted  2. Patient will have a decrease in pain to facilitate improvement in movement, function, and ADLs as indicated by Functional Deficits. [] Progressing: [x] Met: [] Not Met: [] Adjusted    Long Term Goals: To be achieved in: 6 weeks  1. Disability index score of 25% or less for the Quick DASH to assist with reaching prior level of function. [] Progressing: [x] Met: [] Not Met: [] Adjusted  2. Patient will demonstrate increased AROM to equal contralateral to allow for proper joint functioning as indicated by Functional Deficits. [] Progressing: [x] Met: [] Not Met: [] Adjusted  3. Patient will demonstrate an increase in NM recruitment/activation and overall GH and scapular strength to within n5lbs HHD or WNL for proper functional mobility as indicated by patients Functional Deficits. [] Progressing: [x] Met: [] Not Met: [] Adjusted  4. Patient will return to reaching, lifting, overhead movements, self care especially after using toilet, dressing, grooming activities without increased symptoms or restriction.    [] Progressing: [x] Met: [] Not Met: [] Adjusted       HIP GOALS:  Patient stated goal: strengthen LLE, ambulate without cane for short distances  [] Progressing: [x] Met: [] Not Met: [] Adjusted    Therapist goals for Patient:   Short Term Goals: To be achieved in: 2 weeks  1. Independent in HEP and progression per patient tolerance, in order to prevent re-injury. []?? Progressing: [x]? ? Met: []?? Not Met: []?? Adjusted  2. Patient will have a decrease in pain to facilitate improvement in movement, function, and ADLs as indicated by Functional Deficits. []?? Progressing: [x]? ? Met: []?? Not Met: []?? Adjusted     Long Term Goals: To be achieved in: 4-6 weeks  1. Disability index score of 40% or less for the LEFS to assist with reaching prior level of function. [x]? ? Progressing: []?? Met: []?? Not Met: []?? Adjusted  2. Patient will demonstrate increased AROM to WFL to allow for proper joint functioning as indicated by patients Functional Deficits. []?? Progressing: [x]? ? Met: []?? Not Met: []?? Adjusted  3. Patient will demonstrate an increase in Strength to good proximal hip strength and control, within 5lb HHD in LE to allow for proper functional mobility as indicated by patients Functional Deficits. [x]? ? Progressing: []?? Met: []?? Not Met: []?? Adjusted  4. Patient will return to standing, ambulation (with AD as needed), transfers, stairs, squatting without increased symptoms or restriction. [x] Progressing: [] Met: [] Not Met: [] Adjusted      ASSESSMENT:  Appropriate LLE fatigue at conclusion. Challenged by supine hip control exercises, especially hip flexion. Making gradual gains in general LLE strength but she continues to present with deficient proximal hip control L>R and poor core stability. Left foot/ankle proprioception is also dysfunctional, patient with tendency to vickie left ankle with increased weight on lateral aspect of foot. Patient will benefit from continued physical therapy to right shoulder with additional focus on left hip rehabilitation and balance training to improve patient's overall function and reduce risk of falls.       Return to Play: (if applicable)   []  Stage 1: Intro to Strength   []  Stage 2: Dynamic Strength and Intro to Plyometrics   []  Stage 3: Advanced Plyometrics and Intro to Throwing   []  Stage 4: Sport specific Training/Return to Sport     []  Ready to Return to Play, [employer]   []  Not Ready for Return to Sports   Comments:      Treatment/Activity Tolerance:  [x] Patient tolerated treatment well [] Patient limited by fatique  [] Patient limited by pain  [] Patient limited by other medical complications  [] Other:     Overall Progression Towards Functional goals/ Treatment Progress Update:  [x] Patient is progressing as expected towards functional goals listed. [] Progression is slowed due to complexities/Impairments listed. [] Progression has been slowed due to co-morbidities. [] Plan just implemented, too soon to assess goals progression <30days   [] Goals require adjustment due to lack of progress  [] Patient is not progressing as expected and requires additional follow up with physician  [] Other:     Prognosis for POC: [x] Good [] Fair  [] Poor    Patient requires continued skilled intervention: [x] Yes  [] No      PLAN: Continue PT 2x/week for 4-6 weeks for shoulder strengthening/ROM if needed, with continued left hip rehabilitation to focus on ROM, proximal hip strengthening, general LLE strength training, and balance training to lessen risk of falls  [x] Continue per plan of care [] Alter current plan (see comments)  [] Plan of care initiated [] Hold pending MD visit [] Discharge    Electronically signed by: Jan Olivera PT     Note: If patient does not return for scheduled/recommended follow up visits, this note will serve as a discharge from care along with the most recent update on progress.

## 2021-05-24 ENCOUNTER — HOSPITAL ENCOUNTER (OUTPATIENT)
Dept: PHYSICAL THERAPY | Age: 76
Setting detail: THERAPIES SERIES
Discharge: HOME | End: 2021-05-24
Payer: MEDICARE

## 2021-05-24 PROCEDURE — 97110 THERAPEUTIC EXERCISES: CPT

## 2021-05-24 NOTE — FLOWSHEET NOTE
[place] [address] [address]  Phone: [phone] Fax: [phone]        Physical Therapy Treatment Note/ Progress Report:     Date:  2021    Patient Name:  Jj Gerber    :  1945  MRN: [id number]  Restrictions/Precautions:    Medical/Treatment Diagnosis Information:  · Diagnosis: Right shoulder adhesive capsulitis, left hip pain  · Treatment Diagnosis: M75.01, I35.414  Insurance/Certification information:  PT Insurance Information: Medicare / [facility]  Physician Information:  Referring Practitioner: Julio César Mary of care signed (Y/N):     Date of Patient follow up with Physician: N/A      Progress Report: []  Yes  [x]  No     Date Range for reporting period:  Beginnin20  Ending:  NA    Progress report due (10 Rx/or 30 days whichever is less):      Recertification due (POC duration/ or 90 days whichever is less): 21     Visit # Insurance Allowable Auth Needed   44 ([address] [place] 22) Medicare []Yes    [x]No     Pain level:  0/10 currently, c/o weakness     SUBJECTIVE:  Patient how no new complaints. Appropriately fatigued following last session. Has been working on a lot of the table exercises at home and feels that she is getting a little stronger. OBJECTIVE:    Observation: Antalgic gait with standard cane used in RUE.     Test measurements:  LEFS - 74% impairment      RESTRICTIONS/PRECAUTIONS:      Exercises/Interventions:   Therapeutic Ex (19562)  Min: 40 Sets/sec Reps CUES/Notes   Bike 5'       Ankle theraband 4-way 1 15 green   Cybex Gastroc- SL  3 10 55#   Leg Press - SL 3 10 50#   Cybex Leg Ext - SL 3 10 15#   Cybex HS curl 3 10 35#   Bridge 10 12     3-way SLR 1 10    Hook lying LLE Ext 1 10 Cues for form   Supine heel slides +  12 Cues for form   Hook lying bilat hip add iso 5 10 Ball squeeze   Clam - side lying 5 10 No resistance   Supine clam - unilateral 10 10 Maroon band, each BOSU lunge     add   Slide lunge - retro at ledge 1 15 Manual cues for L knee   Standing hip abd 2 10 orange   SC March  1 10 Fwd/bkwd  Bilat Cane for support, SBA   Heel raises - DL 5 10 yellow ball between heels   TKE 5 15 3.5pl   Sit to stand with TRX 2 10 1 airex on seat, sink for support   Step up 3 10 8\"   Lateral step down 2 10 4\"   Standing hip 3-way kicks 5 10 Purple band for TKE   On 2\" step   Wobble board A/P, circles 1 10 DL, standing   SLS - modified with plyo box 20 3     Corner turns - modified at ledge 1 10 bilat UE support             Manual Intervention  (49752)  Min: 0         Shld /GH Mobs 0'   Gentle inferior glides   Post Cap mobs 0'   Prone LLLD IR   Cervical STM 7'       Manual cervical traction 4'     Hip PROM 7'       IASTM 7'   Gastroc/soleus, L   PROM MT - shoulder 0'   Flex, abd, ER, IR   Hypervolt percussion massage 0'   L gluteals             NMR re-education (56636)  Min: 0         Biodex balance 0'   LS x 2, RC   Small ERIC stance 30' 3 airex   Staggered stance 30' 3 airex   SLS with UE support 30' 3 Cues for neutral foot position   Wobble board - WS to LLE 10 10 RLE on 2\" step, manual perts   Modified SLS - LLE on airex 10 5 RLE on 6\" step   1/2 foam roll SLS - inv/ev focus 20 3 bilat UE support   Betty Scap Bio         Floor Snow angels-sliders                   Therapeutic Activity (22114)  Min:         UE throwing porgression         Dynamic UE stability         Earthquake Bar         Bodyblade                        Therapeutic Exercise and NMR EXR  [x] (19763) Provided verbal/tactile cueing for activities related to strengthening, flexibility, endurance, ROM  for improvements in scapular, scapulothoracic and UE control with self care, reaching, carrying, lifting, house/yardwork, driving/computer work.     [x] (64234) Provided verbal/tactile cueing for activities related to improving balance, coordination, kinesthetic sense, posture, motor skill, proprioception  to assist with scapular, scapulothoracic and UE control with self care, reaching, carrying, lifting, house/yardwork, driving/computer work. Therapeutic Activities:    [] (13173 or 78981) Provided verbal/tactile cueing for activities related to improving balance, coordination, kinesthetic sense, posture, motor skill, proprioception and motor activation to allow for proper function of scapular, scapulothoracic and UE control with self care, carrying, lifting, driving/computer work.      Home Exercise Program:    [x] (81433) Reviewed/Progressed HEP activities related to strengthening, flexibility, endurance, ROM of scapular, scapulothoracic and UE control with self care, reaching, carrying, lifting, house/yardwork, driving/computer work  [] (37764) Reviewed/Progressed HEP activities related to improving balance, coordination, kinesthetic sense, posture, motor skill, proprioception of scapular, scapulothoracic and UE control with self care, reaching, carrying, lifting, house/yardwork, driving/computer work      Manual Treatments:  PROM / STM / Oscillations-Mobs:  G-I, II, III, IV (PA's, Inf., Post.)  [x] (03564) Provided manual therapy to mobilize soft tissue/joints of cervical/CT, scapular GHJ and UE for the purpose of modulating pain, promoting relaxation,  increasing ROM, reducing/eliminating soft tissue swelling/inflammation/restriction, improving soft tissue extensibility and allowing for proper ROM for normal function with self care, reaching, carrying, lifting, house/yardwork, driving/computer work    Modalities:  None    Charges:  Timed Code Treatment Minutes: 40   Total Treatment Minutes: 40       [] EVAL (LOW) 75936 (typically 20 minutes face-to-face)  [] EVAL (MOD) 19957 (typically 30 minutes face-to-face)  [] EVAL (HIGH) 49552 (typically 45 minutes face-to-face)  [] RE-EVAL     [x] HW(49265) x 3      [] DRY NEEDLE 1 OR 2 MUSCLES  [] NMR (59434) x     [] DRY NEEDLE 3+ MUSCLES  [] Manual (28514) x       [] TA (34945) x [] [facility] (84757[facility]  [] ES(attended) (37828)     [] ES (un) (49957):   [] VASO (16901)  [] Other:    If BWC Please Indicate Time In/Out  CPT Code Time in Time out                                   SHOULDER GOALS:  Patient stated goal: pain-free shoulder, return to PLOF  [] Progressing: [x] Met: [] Not Met: [] Adjusted    Therapist goals for Patient:   Short Term Goals: To be achieved in: 2 weeks  1. Independent in HEP and progression per patient tolerance, in order to prevent re-injury. [] Progressing: [x] Met: [] Not Met: [] Adjusted  2. Patient will have a decrease in pain to facilitate improvement in movement, function, and ADLs as indicated by Functional Deficits. [] Progressing: [x] Met: [] Not Met: [] Adjusted    Long Term Goals: To be achieved in: 6 weeks  1. Disability index score of 25% or less for the Quick DASH to assist with reaching prior level of function. [] Progressing: [x] Met: [] Not Met: [] Adjusted  2. Patient will demonstrate increased AROM to equal contralateral to allow for proper joint functioning as indicated by Functional Deficits. [] Progressing: [x] Met: [] Not Met: [] Adjusted  3. Patient will demonstrate an increase in NM recruitment/activation and overall GH and scapular strength to within n5lbs HHD or WNL for proper functional mobility as indicated by patients Functional Deficits. [] Progressing: [x] Met: [] Not Met: [] Adjusted  4. Patient will return to reaching, lifting, overhead movements, self care especially after using toilet, dressing, grooming activities without increased symptoms or restriction. [] Progressing: [x] Met: [] Not Met: [] Adjusted       HIP GOALS:  Patient stated goal: strengthen LLE, ambulate without cane for short distances  [] Progressing: [x] Met: [] Not Met: [] Adjusted    Therapist goals for Patient:   Short Term Goals: To be achieved in: 2 weeks  1. Independent in HEP and progression per patient tolerance, in order to prevent re-injury. []?? Progressing: [x]? ? Met: []?? Not Met: []?? Adjusted  2. Patient will have a decrease in pain to facilitate improvement in movement, function, and ADLs as indicated by Functional Deficits. []?? Progressing: [x]? ? Met: []?? Not Met: []?? Adjusted     Long Term Goals: To be achieved in: 4-6 weeks  1. Disability index score of 40% or less for the LEFS to assist with reaching prior level of function. [x]? ? Progressing: []?? Met: []?? Not Met: []?? Adjusted  2. Patient will demonstrate increased AROM to WFL to allow for proper joint functioning as indicated by patients Functional Deficits. []?? Progressing: [x]? ? Met: []?? Not Met: []?? Adjusted  3. Patient will demonstrate an increase in Strength to good proximal hip strength and control, within 5lb HHD in LE to allow for proper functional mobility as indicated by patients Functional Deficits. [x]? ? Progressing: []?? Met: []?? Not Met: []?? Adjusted  4. Patient will return to standing, ambulation (with AD as needed), transfers, stairs, squatting without increased symptoms or restriction. [x] Progressing: [] Met: [] Not Met: [] Adjusted      ASSESSMENT:  Appropriate LLE fatigue at conclusion. Better control with hip flexion exercises today. Making gradual gains in general LLE strength but she continues to present with deficient proximal hip control L>R and poor core stability. Left foot/ankle proprioception is also dysfunctional, patient with tendency to vickie left ankle with increased weight on lateral aspect of foot. Patient will benefit from continued physical therapy to right shoulder with additional focus on left hip rehabilitation and balance training to improve patient's overall function and reduce risk of falls.       Return to Play: (if applicable)   []  Stage 1: Intro to Strength   []  Stage 2: Dynamic Strength and Intro to Plyometrics   []  Stage 3: Advanced Plyometrics and Intro to Throwing   []  Stage 4: Sport specific Training/Return to Sport     []  Ready to Return to Play, Meets All Above Stages   []  Not Ready for Return to Sports   Comments:      Treatment/Activity Tolerance:  [x] Patient tolerated treatment well [] Patient limited by fatique  [] Patient limited by pain  [] Patient limited by other medical complications  [] Other:     Overall Progression Towards Functional goals/ Treatment Progress Update:  [x] Patient is progressing as expected towards functional goals listed. [] Progression is slowed due to complexities/Impairments listed. [] Progression has been slowed due to co-morbidities. [] Plan just implemented, too soon to assess goals progression <30days   [] Goals require adjustment due to lack of progress  [] Patient is not progressing as expected and requires additional follow up with physician  [] Other:     Prognosis for POC: [x] Good [] Fair  [] Poor    Patient requires continued skilled intervention: [x] Yes  [] No      PLAN: Continue PT 2x/week for 4-6 weeks for shoulder strengthening/ROM if needed, with continued left hip rehabilitation to focus on ROM, proximal hip strengthening, general LLE strength training, and balance training to lessen risk of falls  [x] Continue per plan of care [] Alter current plan (see comments)  [] Plan of care initiated [] Hold pending MD visit [] Discharge    Electronically signed by: Guanako Boyd PT     Note: If patient does not return for scheduled/recommended follow up visits, this note will serve as a discharge from care along with the most recent update on progress.

## 2021-05-26 ENCOUNTER — APPOINTMENT (OUTPATIENT)
Dept: PHYSICAL THERAPY | Age: 76
End: 2021-05-26
Payer: MEDICARE

## 2021-05-27 ENCOUNTER — HOSPITAL ENCOUNTER (OUTPATIENT)
Dept: PHYSICAL THERAPY | Age: 76
Setting detail: THERAPIES SERIES
Discharge: HOME | End: 2021-05-27
Payer: MEDICARE

## 2021-05-27 PROCEDURE — 97110 THERAPEUTIC EXERCISES: CPT

## 2021-05-27 NOTE — PLAN OF CARE
[place] [address] [address]  Phone: [phone] Fax: [phone]          Physical Therapy Re-Certification Plan of Care/MD UPDATE      Dear  Evelina Ricci,    We had the pleasure of treating the following patient for physical therapy services at [address] [place] [address]. A summary of our findings can be found in the updated assessment below. This includes our plan of care. If you have any questions or concerns regarding these findings, please do not hesitate to contact me at the office phone number checked above.   Thank you for the referral.     Physician Signature:________________________________Date:__________________  By signing above (or electronic signature), therapists plan is approved by physician    Date Range Of Visits: 20 - 21  Total Visits to Date: 36  Overall Response to Treatment:   [x]Patient is responding well to treatment and improvement is noted with regards  to goals   []Patient should continue to improve in reasonable time if they continue HEP   []Patient has plateaued and is no longer responding to skilled PT intervention    []Patient is getting worse and would benefit from return to referring MD   []Patient unable to adhere to initial POC   []Other:                   Physical Therapy Treatment Note/ Progress Report:     Date:  2021    Patient Name:  Stephy Leiva    :  1945  MRN: [id number]  Restrictions/Precautions:    Medical/Treatment Diagnosis Information:  · Diagnosis: Right shoulder adhesive capsulitis, left hip pain  · Treatment Diagnosis: M75.01, U83.561  Insurance/Certification information:  PT Insurance Information: Medicare / [facility]  Physician Information:  Referring Practitioner: Cyn Toribio signed (Y/N):     Date of Patient follow up with Physician: N/A      Progress Report: [x]  Yes  [x]  No     Date Range for reporting period:  Beginnin21  Endin21    Progress report due (10 Rx/or 30 days whichever is less):      Recertification due (POC duration/ or 90 days whichever is less): 21     Visit # Insurance Allowable Auth Needed   40 ([address] [place] 22) Medicare []Yes    [x]No     Pain level:  0/10 currently, c/o weakness     SUBJECTIVE:  Patient reports that PT continue to help her in gaining strength. LLE still quite shaky when she gets tired, though. Does not need her cane much for short distances in her house but always feels the need to bring it with her because her LLE is so tired and shaky when she leaves her PT appointments. Has been really working hard on the table hip exercises at home, feels that it is helping. However, the back of her hip is starting to be more sore recently.      OBJECTIVE:    Observation: Antalgic gait with standard cane used in RUE   Test measurements:  LEFS - 74% impairment            ROM Left Right   Hip Flexion 110 WNL   Hip Abduction WNL WNL   Hip ER 30 WNL   Hip IR WNL WNL   Hip Ext Decreased WNL   Strength  Left Right   Hip Abd 8.0 lb 12.0 lb   Hip Ext NT NT   Knee Ext 51.9 lb 51.5 lb   Knee Flex 32.6 lb  43.5 lb         RESTRICTIONS/PRECAUTIONS:      Exercises/Interventions:     Therapeutic Ex (93252)  Min: 40 Sets/sec Reps CUES/Notes   Bike 5'       Ankle theraband 4-way 1 15 green   Cybex Gastroc- SL  3 10 55#   Leg Press - SL 3 10 50#   Cybex Leg Ext - SL 3 10 15#   Cybex HS curl 3 10 35#   Bridge 10 12     3-way SLR 1 10     Hook lying LLE Ext 1 10 Cues for form   Supine heel slides + march 1 12 Cues for form   Hook lying bilat hip add iso 5 10 Ball squeeze   Clam - side lying 5 10 No resistance   Supine clam - unilateral 10 10 Maroon band, each         BOSU lunge     add   Slide lunge - retro at ledge 1 15 Manual cues for L knee   Standing hip abd 2 10 orange   Marching 1 10 Fwd/bkwd  Bilat Cane for support, SBA   Heel raises - DL 5 10 yellow ball between heels   TKE 5 15 3.5pl Sit to stand with TRX 2 10 1 airex on seat, sink for support   Step up 3 10 8\"   Lateral step down 2 10 4\"   Standing hip 3-way kicks 5 10 Purple band for TKE   On 2\" step   Seated HS curl - theraband 2 10 green   Prone HS curl 2 10 3#   Stool scoots                Manual Intervention  (32633)  Min: 0         Shld /GH Mobs 0'   Gentle inferior glides   Post Cap mobs 0'   Prone LLLD IR   Cervical STM 7'       Manual cervical traction 4'       Hip PROM 7'       IASTM 7'   Gastroc/soleus, L   PROM MT - shoulder 0'   Flex, abd, ER, IR   Hypervolt percussion massage 0'   L gluteals             NMR re-education (82968)  Min: 0         Biodex balance 0'   LS x 2, RC   Small ERIC stance 30' 3 airex   Staggered stance 30' 3 airex   SLS with UE support 30' 3 Cues for neutral foot position   Wobble board - WS to LLE 10 10 RLE on 2\" step, manual perts   Modified SLS - LLE on airex 10 5 RLE on 6\" step   1/2 foam roll SLS - inv/ev focus 20 3 bilat UE support   Betty Scap Bio         Floor Snow angels-sliders                   Therapeutic Activity (70792)  Min:         UE throwing porgression         Dynamic UE stability         Earthquake Bar         Bodyblade                        Therapeutic Exercise and NMR EXR  [x] (04570) Provided verbal/tactile cueing for activities related to strengthening, flexibility, endurance, ROM  for improvements in scapular, scapulothoracic and UE control with self care, reaching, carrying, lifting, house/yardwork, driving/computer work. [x] (68581) Provided verbal/tactile cueing for activities related to improving balance, coordination, kinesthetic sense, posture, motor skill, proprioception  to assist with  scapular, scapulothoracic and UE control with self care, reaching, carrying, lifting, house/yardwork, driving/computer work.     Therapeutic Activities:    [] (57966 or 12884) Provided verbal/tactile cueing for activities related to improving balance, coordination, kinesthetic sense, posture, motor skill, proprioception and motor activation to allow for proper function of scapular, scapulothoracic and UE control with self care, carrying, lifting, driving/computer work.      Home Exercise Program:    [x] (61634) Reviewed/Progressed HEP activities related to strengthening, flexibility, endurance, ROM of scapular, scapulothoracic and UE control with self care, reaching, carrying, lifting, house/yardwork, driving/computer work  [] (10969) Reviewed/Progressed HEP activities related to improving balance, coordination, kinesthetic sense, posture, motor skill, proprioception of scapular, scapulothoracic and UE control with self care, reaching, carrying, lifting, house/yardwork, driving/computer work      Manual Treatments:  PROM / STM / Oscillations-Mobs:  G-I, II, III, IV (PA's, Inf., Post.)  [x] (30993) Provided manual therapy to mobilize soft tissue/joints of cervical/CT, scapular GHJ and UE for the purpose of modulating pain, promoting relaxation,  increasing ROM, reducing/eliminating soft tissue swelling/inflammation/restriction, improving soft tissue extensibility and allowing for proper ROM for normal function with self care, reaching, carrying, lifting, house/yardwork, driving/computer work    Modalities:  None    Charges:  Timed Code Treatment Minutes: 40   Total Treatment Minutes: 40       [] EVAL (LOW) 46335 (typically 20 minutes face-to-face)  [] EVAL (MOD) 52033 (typically 30 minutes face-to-face)  [] EVAL (HIGH) 95662 (typically 45 minutes face-to-face)  [] RE-EVAL     [x] EM(15921) x 3     [] DRY NEEDLE 1 OR 2 MUSCLES  [] NMR (67342) x     [] DRY NEEDLE 3+ MUSCLES  [] Manual (02986) x       [] TA (63384) x     [] Mech Traction (72081)  [] ES(attended) (77890)     [] ES (un) (61476):   [] VASO (61265)  [] Other:    If BWC Please Indicate Time In/Out  CPT Code Time in Time out                                   SHOULDER GOALS:  Patient stated goal: pain-free shoulder, return to PLOF  [] achieved in: 4-6 weeks  1. Disability index score of 40% or less for the LEFS to assist with reaching prior level of function. [x]? ? Progressing: []?? Met: []?? Not Met: []?? Adjusted  2. Patient will demonstrate increased AROM to WFL to allow for proper joint functioning as indicated by patients Functional Deficits. []?? Progressing: [x]? ? Met: []?? Not Met: []?? Adjusted  3. Patient will demonstrate an increase in Strength to good proximal hip strength and control, within 5lb HHD in LE to allow for proper functional mobility as indicated by patients Functional Deficits. [x]? ? Progressing: []?? Met: []?? Not Met: []?? Adjusted  4. Patient will return to standing, ambulation (with AD as needed), transfers, stairs, squatting without increased symptoms or restriction. [x] Progressing: [] Met: [] Not Met: [] Adjusted      ASSESSMENT:  Patient has attended 40 physical therapy visits from 12/2/20 through 5/27/21 for treatment of right shoulder adhesive capsulitis and chronic left hip dysfunction with history of left hip hemiarthroplasty in 2017. Patient's right shoulder symptoms continue to be completely resolved. Making gradual gains in general LLE strength but she continues to present with deficient proximal hip control L>R and poor core stability. Gait is still antalgic with uneven step length and wide stance, though making some improvement. Significant gains made in bilateral quad strength since last progress update, but left hamstring strength remains deficient. Patient will benefit from continued physical therapy with focus on left hip rehabilitation and LE strength/balance training to improve patient's overall function and reduce risk of falls.       Return to Play: (if applicable)   []  Stage 1: Intro to Strength   []  Stage 2: Dynamic Strength and Intro to Plyometrics   []  Stage 3: Advanced Plyometrics and Intro to Throwing   []  Stage 4: Sport specific Training/Return to Sport     []  Ready to Return to Play, Meets All Above Stages   []  Not Ready for Return to Sports   Comments:      Treatment/Activity Tolerance:  [x] Patient tolerated treatment well [] Patient limited by fatique  [] Patient limited by pain  [] Patient limited by other medical complications  [] Other:     Overall Progression Towards Functional goals/ Treatment Progress Update:  [x] Patient is progressing as expected towards functional goals listed. [] Progression is slowed due to complexities/Impairments listed. [] Progression has been slowed due to co-morbidities. [] Plan just implemented, too soon to assess goals progression <30days   [] Goals require adjustment due to lack of progress  [] Patient is not progressing as expected and requires additional follow up with physician  [] Other:     Prognosis for POC: [x] Good [] Fair  [] Poor    Patient requires continued skilled intervention: [x] Yes  [] No      PLAN: Continue PT 2x/week for 4-6 weeks for shoulder strengthening/ROM if needed, with continued left hip rehabilitation to focus on ROM, proximal hip strengthening, general LLE strength training, and balance training to lessen risk of falls  [x] Continue per plan of care [] Alter current plan (see comments)  [] Plan of care initiated [] Hold pending MD visit [] Discharge    Electronically signed by: Cari Moore PT     Note: If patient does not return for scheduled/recommended follow up visits, this note will serve as a discharge from care along with the most recent update on progress.

## 2021-06-02 ENCOUNTER — HOSPITAL ENCOUNTER (OUTPATIENT)
Dept: PHYSICAL THERAPY | Age: 76
Setting detail: THERAPIES SERIES
Discharge: HOME | End: 2021-06-02
Payer: MEDICARE

## 2021-06-02 PROCEDURE — 97110 THERAPEUTIC EXERCISES: CPT

## 2021-06-02 NOTE — FLOWSHEET NOTE
at ledge 1 15 Manual cues for L knee   Standing hip abd 2 10 orange   Marching 1 10 Fwd/bkwd  Bilat Cane for support, SBA   Heel raises - DL 5 10 yellow ball between heels   TKE 5 15 3.5pl   Side stepping 2 15 HHA, maroon above knees   Sit to stand with TRX 2 10 1 airex on seat, sink for support   Step up 3 10 8\"   Lateral step down 2 10 4\"   Standing hip 3-way kicks 5 10 Purple band for TKE   On 2\" step             Manual Intervention  (82638)  Min: 0         Shld /GH Mobs 0'   Gentle inferior glides   Post Cap mobs 0'   Prone LLLD IR   Cervical STM 7'       Manual cervical traction 4'       Hip PROM 7'       IASTM 7'   Gastroc/soleus, L   PROM MT - shoulder 0'   Flex, abd, ER, IR   Hypervolt percussion massage 0'   L gluteals             NMR re-education (15390)  Min: 0         Biodex balance 0'   LS x 2, RC   Small ERIC stance 30' 3 airex   Staggered stance 30' 3 airex   SLS with UE support 30' 3 Cues for neutral foot position   Wobble board - WS to LLE 10 10 RLE on 2\" step, manual perts   Modified SLS - LLE on airex 10 5 RLE on 6\" step   1/2 foam roll SLS - inv/ev focus 20 3 bilat UE support   Betty Scap Bio         Floor Snow angels-sliders                   Therapeutic Activity (95807)  Min:         UE throwing porgression         Dynamic UE stability         Earthquake Bar         Bodyblade                        Therapeutic Exercise and NMR EXR  [x] (29970) Provided verbal/tactile cueing for activities related to strengthening, flexibility, endurance, ROM  for improvements in scapular, scapulothoracic and UE control with self care, reaching, carrying, lifting, house/yardwork, driving/computer work. [x] (90302) Provided verbal/tactile cueing for activities related to improving balance, coordination, kinesthetic sense, posture, motor skill, proprioception  to assist with  scapular, scapulothoracic and UE control with self care, reaching, carrying, lifting, house/yardwork, driving/computer work.     Therapeutic In/Out  CPT Code Time in Time out                                   SHOULDER GOALS:  Patient stated goal: pain-free shoulder, return to PLOF  [] Progressing: [x] Met: [] Not Met: [] Adjusted    Therapist goals for Patient:   Short Term Goals: To be achieved in: 2 weeks  1. Independent in HEP and progression per patient tolerance, in order to prevent re-injury. [] Progressing: [x] Met: [] Not Met: [] Adjusted  2. Patient will have a decrease in pain to facilitate improvement in movement, function, and ADLs as indicated by Functional Deficits. [] Progressing: [x] Met: [] Not Met: [] Adjusted    Long Term Goals: To be achieved in: 6 weeks  1. Disability index score of 25% or less for the Quick DASH to assist with reaching prior level of function. [] Progressing: [x] Met: [] Not Met: [] Adjusted  2. Patient will demonstrate increased AROM to equal contralateral to allow for proper joint functioning as indicated by Functional Deficits. [] Progressing: [x] Met: [] Not Met: [] Adjusted  3. Patient will demonstrate an increase in NM recruitment/activation and overall GH and scapular strength to within n5lbs HHD or WNL for proper functional mobility as indicated by patients Functional Deficits. [] Progressing: [x] Met: [] Not Met: [] Adjusted  4. Patient will return to reaching, lifting, overhead movements, self care especially after using toilet, dressing, grooming activities without increased symptoms or restriction. [] Progressing: [x] Met: [] Not Met: [] Adjusted       HIP GOALS:  Patient stated goal: strengthen LLE, ambulate without cane for short distances  [] Progressing: [x] Met: [] Not Met: [] Adjusted    Therapist goals for Patient:   Short Term Goals: To be achieved in: 2 weeks  1. Independent in HEP and progression per patient tolerance, in order to prevent re-injury. []?? Progressing: [x]? ? Met: []?? Not Met: []?? Adjusted  2. Patient will have a decrease in pain to facilitate improvement in movement, function, and ADLs as indicated by Functional Deficits. []?? Progressing: [x]? ? Met: []?? Not Met: []?? Adjusted     Long Term Goals: To be achieved in: 4-6 weeks  1. Disability index score of 40% or less for the LEFS to assist with reaching prior level of function. [x]? ? Progressing: []?? Met: []?? Not Met: []?? Adjusted  2. Patient will demonstrate increased AROM to WFL to allow for proper joint functioning as indicated by patients Functional Deficits. []?? Progressing: [x]? ? Met: []?? Not Met: []?? Adjusted  3. Patient will demonstrate an increase in Strength to good proximal hip strength and control, within 5lb HHD in LE to allow for proper functional mobility as indicated by patients Functional Deficits. [x]? ? Progressing: []?? Met: []?? Not Met: []?? Adjusted  4. Patient will return to standing, ambulation (with AD as needed), transfers, stairs, squatting without increased symptoms or restriction. [x] Progressing: [] Met: [] Not Met: [] Adjusted      ASSESSMENT:  Good tolerance to today's session. Appropriate fatigue at conclusion. Making gradual gains in general LLE strength but she continues to present with deficient proximal hip control L>R and poor core stability. Gait is still antalgic with uneven step length and wide stance, though making some improvement. Left hamstring strength remains deficient. Patient will benefit from continued physical therapy with focus on left hip rehabilitation and LE strength/balance training to improve patient's overall function and reduce risk of falls.       Return to Play: (if applicable)   []  Stage 1: Intro to Strength   []  Stage 2: Dynamic Strength and Intro to Plyometrics   []  Stage 3: Advanced Plyometrics and Intro to Throwing   []  Stage 4: Sport specific Training/Return to Sport     []  Ready to Return to Play, [employer] All Above Stages   []  Not Ready for Return to Sports   Comments:      Treatment/Activity Tolerance:  [x] Patient tolerated treatment well [] Patient limited by fatique  [] Patient limited by pain  [] Patient limited by other medical complications  [] Other:     Overall Progression Towards Functional goals/ Treatment Progress Update:  [x] Patient is progressing as expected towards functional goals listed. [] Progression is slowed due to complexities/Impairments listed. [] Progression has been slowed due to co-morbidities. [] Plan just implemented, too soon to assess goals progression <30days   [] Goals require adjustment due to lack of progress  [] Patient is not progressing as expected and requires additional follow up with physician  [] Other:     Prognosis for POC: [x] Good [] Fair  [] Poor    Patient requires continued skilled intervention: [x] Yes  [] No      PLAN: Continue PT 2x/week for 4-6 weeks for shoulder strengthening/ROM if needed, with continued left hip rehabilitation to focus on ROM, proximal hip strengthening, general LLE strength training, and balance training to lessen risk of falls  [x] Continue per plan of care [] Alter current plan (see comments)  [] Plan of care initiated [] Hold pending MD visit [] Discharge    Electronically signed by: Guanako Boyd PT     Note: If patient does not return for scheduled/recommended follow up visits, this note will serve as a discharge from care along with the most recent update on progress.

## 2021-06-04 ENCOUNTER — HOSPITAL ENCOUNTER (OUTPATIENT)
Dept: PHYSICAL THERAPY | Age: 76
Setting detail: THERAPIES SERIES
Discharge: HOME | End: 2021-06-04
Payer: MEDICARE

## 2021-06-04 PROCEDURE — 97110 THERAPEUTIC EXERCISES: CPT

## 2021-06-04 NOTE — FLOWSHEET NOTE
[facility] [address] [facility]  Phone: [phone] Fax: [phone]          Physical Therapy Treatment Note/ Progress Report:     Date:  2021    Patient Name:  Irving Horner    :  1945  MRN: [id number]  Restrictions/Precautions:    Medical/Treatment Diagnosis Information:  · Diagnosis: Right shoulder adhesive capsulitis, left hip pain  · Treatment Diagnosis: M75.01, T77.825  Insurance/Certification information:  PT Insurance Information: Medicare / [facility]  Physician Information:  Referring Practitioner: Rosio Acuna of care signed (Y/N):     Date of Patient follow up with Physician: N/A      Progress Report: [x]  Yes  [x]  No     Date Range for reporting period:  Beginnin21  Ending:  NA    Progress report due (10 Rx/or 30 days whichever is less):      Recertification due (POC duration/ or 90 days whichever is less): 21     Visit # Insurance Allowable Auth Needed   43 ([address] [place] 22) Medicare []Yes    [x]No     Pain level:  0/10 currently, c/o weakness     SUBJECTIVE:  Patient reports good, appropriate fatigue following last session. No new complaints. Ready to keep progressing strength.      OBJECTIVE:    Observation: Antalgic gait with standard cane used in RUE   Test measurements:  LEFS - 74% impairment      RESTRICTIONS/PRECAUTIONS:      Exercises/Interventions:     Therapeutic Ex (46412)  Min: 40 Sets/sec Reps CUES/Notes   Bike 5'       Ankle theraband 4-way 1 15 green   Cybex Gastroc- SL  3 10 55#   Leg Press - SL 3 10 55#   Cybex Leg Ext - SL 3 10 15#   Cybex HS curl - SL 3 10 20#   Bridge 10 12     3-way SLR 1 10     Hook lying LLE Ext 1 10 Cues for form   Supine heel slides +  12 Cues for form   Hook lying bilat hip add iso 5 10 Ball squeeze   Clam - side lying 5 10 No resistance   Supine clam - unilateral 10 10 Maroon band, each         BOSU lunge     add   Slide lunge - retro at ledge 1 15 Standing hip abd 2 10 orange   Marching 1 10 Fwd/bkwd  Bilat Cane for support, SBA   Heel raises - DL 5 10 yellow ball between heels   TKE 5 15 3.5pl   Side stepping 2 15 HHA, maroon above knees   Sit to stand with TRX 2 10 1 airex on seat, sink for support   Step up 3 10 8\"   Lateral step down 2 10 4\"   Standing hip 3-way kicks 5 10 Purple band for TKE   On 2\" step             Manual Intervention  (13636)  Min: 0         Shld /GH Mobs 0'   Gentle inferior glides   Post Cap mobs 0'   Prone LLLD IR   Cervical STM 7'       Manual cervical traction 4'       Hip PROM 7'       IASTM 7'   Gastroc/soleus, L   PROM MT - shoulder 0'   Flex, abd, ER, IR   Hypervolt percussion massage 0'   L gluteals             NMR re-education (22269)  Min: 0         Biodex balance 0'   LS x 2, RC   Small ERIC stance 30' 3 airex   Staggered stance 30' 3 airex   SLS with UE support 30' 3 Cues for neutral foot position   Wobble board - WS to LLE 10 10 RLE on 2\" step, manual perts   Modified SLS - LLE on airex 10 5 RLE on 6\" step   1/2 foam roll SLS - inv/ev focus 20 3 bilat UE support   Betty Scap Bio         Floor Snow angels-sliders                   Therapeutic Activity (76436)  Min:         UE throwing porgression         Dynamic UE stability         Earthquake Bar         Bodyblade                        Therapeutic Exercise and NMR EXR  [x] (33171) Provided verbal/tactile cueing for activities related to strengthening, flexibility, endurance, ROM  for improvements in scapular, scapulothoracic and UE control with self care, reaching, carrying, lifting, house/yardwork, driving/computer work. [x] (23033) Provided verbal/tactile cueing for activities related to improving balance, coordination, kinesthetic sense, posture, motor skill, proprioception  to assist with  scapular, scapulothoracic and UE control with self care, reaching, carrying, lifting, house/yardwork, driving/computer work.     Therapeutic Activities:    [] (16404 or 33225) Provided verbal/tactile cueing for activities related to improving balance, coordination, kinesthetic sense, posture, motor skill, proprioception and motor activation to allow for proper function of scapular, scapulothoracic and UE control with self care, carrying, lifting, driving/computer work.      Home Exercise Program:    [x] (25149) Reviewed/Progressed HEP activities related to strengthening, flexibility, endurance, ROM of scapular, scapulothoracic and UE control with self care, reaching, carrying, lifting, house/yardwork, driving/computer work  [] (21098) Reviewed/Progressed HEP activities related to improving balance, coordination, kinesthetic sense, posture, motor skill, proprioception of scapular, scapulothoracic and UE control with self care, reaching, carrying, lifting, house/yardwork, driving/computer work      Manual Treatments:  PROM / STM / Oscillations-Mobs:  G-I, II, III, IV (PA's, Inf., Post.)  [x] (23170) Provided manual therapy to mobilize soft tissue/joints of cervical/CT, scapular GHJ and UE for the purpose of modulating pain, promoting relaxation,  increasing ROM, reducing/eliminating soft tissue swelling/inflammation/restriction, improving soft tissue extensibility and allowing for proper ROM for normal function with self care, reaching, carrying, lifting, house/yardwork, driving/computer work    Modalities:  None    Charges:  Timed Code Treatment Minutes: 40   Total Treatment Minutes: 40       [] EVAL (LOW) 17881 (typically 20 minutes face-to-face)  [] EVAL (MOD) 46838 (typically 30 minutes face-to-face)  [] EVAL (HIGH) 58365 (typically 45 minutes face-to-face)  [] RE-EVAL     [x] RX(10603) x 3     [] DRY NEEDLE 1 OR 2 MUSCLES  [] NMR (65015) x     [] DRY NEEDLE 3+ MUSCLES  [] Manual (40464) x       [] TA (90693) x     [] Mech Traction (37805)  [] ES(attended) (68426)     [] ES (un) (43779):   [] VASO (94048)  [] Other:    If BW Please Indicate Time In/Out  CPT Code Time in Time out SHOULDER GOALS:  Patient stated goal: pain-free shoulder, return to PLOF  [] Progressing: [x] Met: [] Not Met: [] Adjusted    Therapist goals for Patient:   Short Term Goals: To be achieved in: 2 weeks  1. Independent in HEP and progression per patient tolerance, in order to prevent re-injury. [] Progressing: [x] Met: [] Not Met: [] Adjusted  2. Patient will have a decrease in pain to facilitate improvement in movement, function, and ADLs as indicated by Functional Deficits. [] Progressing: [x] Met: [] Not Met: [] Adjusted    Long Term Goals: To be achieved in: 6 weeks  1. Disability index score of 25% or less for the Quick DASH to assist with reaching prior level of function. [] Progressing: [x] Met: [] Not Met: [] Adjusted  2. Patient will demonstrate increased AROM to equal contralateral to allow for proper joint functioning as indicated by Functional Deficits. [] Progressing: [x] Met: [] Not Met: [] Adjusted  3. Patient will demonstrate an increase in NM recruitment/activation and overall GH and scapular strength to within n5lbs HHD or WNL for proper functional mobility as indicated by patients Functional Deficits. [] Progressing: [x] Met: [] Not Met: [] Adjusted  4. Patient will return to reaching, lifting, overhead movements, self care especially after using toilet, dressing, grooming activities without increased symptoms or restriction. [] Progressing: [x] Met: [] Not Met: [] Adjusted       HIP GOALS:  Patient stated goal: strengthen LLE, ambulate without cane for short distances  [] Progressing: [x] Met: [] Not Met: [] Adjusted    Therapist goals for Patient:   Short Term Goals: To be achieved in: 2 weeks  1. Independent in HEP and progression per patient tolerance, in order to prevent re-injury. []?? Progressing: [x]? ? Met: []?? Not Met: []?? Adjusted  2. Patient will have a decrease in pain to facilitate improvement in movement, function, and ADLs as indicated by Functional Deficits. []?? Progressing: [x]? ? Met: []?? Not Met: []?? Adjusted     Long Term Goals: To be achieved in: 4-6 weeks  1. Disability index score of 40% or less for the LEFS to assist with reaching prior level of function. [x]? ? Progressing: []?? Met: []?? Not Met: []?? Adjusted  2. Patient will demonstrate increased AROM to WFL to allow for proper joint functioning as indicated by patients Functional Deficits. []?? Progressing: [x]? ? Met: []?? Not Met: []?? Adjusted  3. Patient will demonstrate an increase in Strength to good proximal hip strength and control, within 5lb HHD in LE to allow for proper functional mobility as indicated by patients Functional Deficits. [x]? ? Progressing: []?? Met: []?? Not Met: []?? Adjusted  4. Patient will return to standing, ambulation (with AD as needed), transfers, stairs, squatting without increased symptoms or restriction. [x] Progressing: [] Met: [] Not Met: [] Adjusted      ASSESSMENT:  Good tolerance to today's session. Appropriate fatigue at conclusion. Making gradual gains in general LLE strength but she continues to present with deficient proximal hip control L>R and poor core stability. Better LE control with slide lunges and marching today. Gait is still antalgic with uneven step length and wide stance, though making some improvement. Left hamstring strength remains deficient. Patient will benefit from continued physical therapy with focus on left hip rehabilitation and LE strength/balance training to improve patient's overall function and reduce risk of falls.       Return to Play: (if applicable)   []  Stage 1: Intro to Strength   []  Stage 2: Dynamic Strength and Intro to Plyometrics   []  Stage 3: Advanced Plyometrics and Intro to Throwing   []  Stage 4: Sport specific Training/Return to Sport     []  Ready to Return to Play, Meets All Above Stages   []  Not Ready for Return to Sports   Comments:      Treatment/Activity Tolerance:  [x] Patient

## 2021-06-07 ENCOUNTER — HOSPITAL ENCOUNTER (OUTPATIENT)
Dept: PHYSICAL THERAPY | Age: 76
Setting detail: THERAPIES SERIES
Discharge: HOME | End: 2021-06-07
Payer: MEDICARE

## 2021-06-07 PROCEDURE — 97110 THERAPEUTIC EXERCISES: CPT

## 2021-06-07 NOTE — FLOWSHEET NOTE
[facility] [address] [facility] [address]  Phone: [phone] Fax: [phone]          Physical Therapy Treatment Note/ Progress Report:     Date:  2021    Patient Name:  Ann Gonzalez    :  1945  MRN: [id number]  Restrictions/Precautions:    Medical/Treatment Diagnosis Information:  · Diagnosis: Right shoulder adhesive capsulitis, left hip pain  · Treatment Diagnosis: M75.01, F93.779  Insurance/Certification information:  PT Insurance Information: Medicare / [facility]  Physician Information:  Referring Practitioner: Roxann Staples of care signed (Y/N):     Date of Patient follow up with Physician: N/A      Progress Report: [x]  Yes  [x]  No     Date Range for reporting period:  Beginnin21  Ending:  NA    Progress report due (10 Rx/or 30 days whichever is less):      Recertification due (POC duration/ or 90 days whichever is less): 21     Visit # Insurance Allowable Auth Needed   37 ([address] [place] [address]) Medicare []Yes    [x]No     Pain level:  0/10 currently, c/o weakness     SUBJECTIVE:  Patient with no new complaints. Strength continuing to progress.      OBJECTIVE:    Observation: Antalgic gait with standard cane used in RUE   Test measurements:  LEFS - 74% impairment      RESTRICTIONS/PRECAUTIONS:      Exercises/Interventions:     Therapeutic Ex (84001)  Min: 40 Sets/sec Reps CUES/Notes   Bike 5'       Ankle theraband 4-way 1 15 green   Cybex Gastroc- SL  3 10 55#   Leg Press - SL 3 10 55#   Cybex Leg Ext - SL 3 10 15#   Cybex HS curl - SL 3 10 25#   Bridge 10 12     3-way SLR 1 10     Hook lying LLE Ext 1 10 Cues for form   Supine heel slides + march 1 12 Cues for form   Hook lying bilat hip add iso 5 10 Ball squeeze   Clam - side lying 5 10 No resistance   Supine clam - unilateral 10 10 Maroon band, each         BOSU lunge     add   Slide lunge - retro at ledge 1 15    Standing hip abd 2 10 orange   Marching 1 10 Fwd/bkwd  Bilat Cane for support, SBA   Heel raises - DL 5 10 yellow ball between heels   TKE 5 15 3.5pl   Side stepping 2 15 HHA, maroon above knees   Sit to stand with TRX 2 10 1 airex on seat, sink for support   Step up 3 10 8\"   Lateral step down 2 10 4\"   Standing hip 3-way kicks 5 10 Purple band for TKE   On 2\" step             Manual Intervention  (94582)  Min: 0         Shld /GH Mobs 0'   Gentle inferior glides   Post Cap mobs 0'   Prone LLLD IR   Cervical STM 7'       Manual cervical traction 4'       Hip PROM 7'       IASTM 7'   Gastroc/soleus, L   PROM MT - shoulder 0'   Flex, abd, ER, IR   Hypervolt percussion massage 0'   L gluteals             NMR re-education (66999)  Min: 0         Biodex balance 0'   LS x 2, RC   Small ERIC stance 30' 3 airex   Staggered stance 30' 3 airex   SLS with UE support 30' 3 Cues for neutral foot position   Wobble board - WS to LLE 10 10 RLE on 2\" step, manual perts   Modified SLS - LLE on airex 10 5 RLE on 6\" step   1/2 foam roll SLS - inv/ev focus 20 3 bilat UE support   Betty Scap Bio         Floor Snow angels-sliders                   Therapeutic Activity (20843)  Min:         UE throwing porgression         Dynamic UE stability         Earthquake Bar         Bodyblade                        Therapeutic Exercise and NMR EXR  [x] (49456) Provided verbal/tactile cueing for activities related to strengthening, flexibility, endurance, ROM  for improvements in scapular, scapulothoracic and UE control with self care, reaching, carrying, lifting, house/yardwork, driving/computer work. [x] (50342) Provided verbal/tactile cueing for activities related to improving balance, coordination, kinesthetic sense, posture, motor skill, proprioception  to assist with  scapular, scapulothoracic and UE control with self care, reaching, carrying, lifting, house/yardwork, driving/computer work.     Therapeutic Activities:    [] (27629 or ) Provided verbal/tactile cueing for activities related to improving balance, coordination, kinesthetic sense, posture, motor skill, proprioception and motor activation to allow for proper function of scapular, scapulothoracic and UE control with self care, carrying, lifting, driving/computer work.      Home Exercise Program:    [x] (53559) Reviewed/Progressed HEP activities related to strengthening, flexibility, endurance, ROM of scapular, scapulothoracic and UE control with self care, reaching, carrying, lifting, house/yardwork, driving/computer work  [] (33386) Reviewed/Progressed HEP activities related to improving balance, coordination, kinesthetic sense, posture, motor skill, proprioception of scapular, scapulothoracic and UE control with self care, reaching, carrying, lifting, house/yardwork, driving/computer work      Manual Treatments:  PROM / STM / Oscillations-Mobs:  G-I, II, III, IV (PA's, Inf., Post.)  [x] (05166) Provided manual therapy to mobilize soft tissue/joints of cervical/CT, scapular GHJ and UE for the purpose of modulating pain, promoting relaxation,  increasing ROM, reducing/eliminating soft tissue swelling/inflammation/restriction, improving soft tissue extensibility and allowing for proper ROM for normal function with self care, reaching, carrying, lifting, house/yardwork, driving/computer work    Modalities:  None    Charges:  Timed Code Treatment Minutes: 40   Total Treatment Minutes: 40       [] EVAL (LOW) 39961 (typically 20 minutes face-to-face)  [] EVAL (MOD) 98715 (typically 30 minutes face-to-face)  [] EVAL (HIGH) 90690 (typically 45 minutes face-to-face)  [] RE-EVAL     [x] OW(85678) x 3     [] DRY NEEDLE 1 OR 2 MUSCLES  [] NMR (11714) x     [] DRY NEEDLE 3+ MUSCLES  [] Manual (20787) x       [] TA (93767) x     [] Mech Traction (94122)  [] ES(attended) (35781)     [] ES (un) (65597):   [] VASO (93252)  [] Other:    If [facility] Please Indicate Time In/Out  CPT Code Time in Time out                                   SHOULDER GOALS:  Patient stated goal: pain-free shoulder, return to PLOF  [] Progressing: [x] Met: [] Not Met: [] Adjusted    Therapist goals for Patient:   Short Term Goals: To be achieved in: 2 weeks  1. Independent in HEP and progression per patient tolerance, in order to prevent re-injury. [] Progressing: [x] Met: [] Not Met: [] Adjusted  2. Patient will have a decrease in pain to facilitate improvement in movement, function, and ADLs as indicated by Functional Deficits. [] Progressing: [x] Met: [] Not Met: [] Adjusted    Long Term Goals: To be achieved in: 6 weeks  1. Disability index score of 25% or less for the Quick DASH to assist with reaching prior level of function. [] Progressing: [x] Met: [] Not Met: [] Adjusted  2. Patient will demonstrate increased AROM to equal contralateral to allow for proper joint functioning as indicated by Functional Deficits. [] Progressing: [x] Met: [] Not Met: [] Adjusted  3. Patient will demonstrate an increase in NM recruitment/activation and overall GH and scapular strength to within n5lbs HHD or WNL for proper functional mobility as indicated by patients Functional Deficits. [] Progressing: [x] Met: [] Not Met: [] Adjusted  4. Patient will return to reaching, lifting, overhead movements, self care especially after using toilet, dressing, grooming activities without increased symptoms or restriction. [] Progressing: [x] Met: [] Not Met: [] Adjusted       HIP GOALS:  Patient stated goal: strengthen LLE, ambulate without cane for short distances  [] Progressing: [x] Met: [] Not Met: [] Adjusted    Therapist goals for Patient:   Short Term Goals: To be achieved in: 2 weeks  1. Independent in HEP and progression per patient tolerance, in order to prevent re-injury. []?? Progressing: [x]? ? Met: []?? Not Met: []?? Adjusted  2. Patient will have a decrease in pain to facilitate improvement in movement, function, and ADLs as indicated by Functional Deficits.   []?? Progressing: [x]?? Met: []?? Not Met: []?? Adjusted     Long Term Goals: To be achieved in: 4-6 weeks  1. Disability index score of 40% or less for the LEFS to assist with reaching prior level of function. [x]? ? Progressing: []?? Met: []?? Not Met: []?? Adjusted  2. Patient will demonstrate increased AROM to WFL to allow for proper joint functioning as indicated by patients Functional Deficits. []?? Progressing: [x]? ? Met: []?? Not Met: []?? Adjusted  3. Patient will demonstrate an increase in Strength to good proximal hip strength and control, within 5lb HHD in LE to allow for proper functional mobility as indicated by patients Functional Deficits. [x]? ? Progressing: []?? Met: []?? Not Met: []?? Adjusted  4. Patient will return to standing, ambulation (with AD as needed), transfers, stairs, squatting without increased symptoms or restriction. [x] Progressing: [] Met: [] Not Met: [] Adjusted      ASSESSMENT:  Good tolerance to today's session. Appropriate fatigue at conclusion. Making gradual gains in general LLE strength but she continues to present with deficient proximal hip control L>R and poor core stability. Gait is still antalgic with uneven step length and wide stance, though making some improvement. Left hamstring strength remains deficient. Patient will benefit from continued physical therapy with focus on left hip rehabilitation and LE strength/balance training to improve patient's overall function and reduce risk of falls.       Return to Play: (if applicable)   []  Stage 1: Intro to Strength   []  Stage 2: Dynamic Strength and Intro to Plyometrics   []  Stage 3: Advanced Plyometrics and Intro to Throwing   []  Stage 4: Sport specific Training/Return to Sport     []  Ready to Return to Play, [employer]   []  Not Ready for Return to Sports   Comments:      Treatment/Activity Tolerance:  [x] Patient tolerated treatment well [] Patient limited by fatique  [] Patient limited by pain  [] Patient limited by other medical complications  [] Other:     Overall Progression Towards Functional goals/ Treatment Progress Update:  [x] Patient is progressing as expected towards functional goals listed. [] Progression is slowed due to complexities/Impairments listed. [] Progression has been slowed due to co-morbidities. [] Plan just implemented, too soon to assess goals progression <30days   [] Goals require adjustment due to lack of progress  [] Patient is not progressing as expected and requires additional follow up with physician  [] Other:     Prognosis for POC: [x] Good [] Fair  [] Poor    Patient requires continued skilled intervention: [x] Yes  [] No      PLAN: Continue PT 2x/week for 4-6 weeks for shoulder strengthening/ROM if needed, with continued left hip rehabilitation to focus on ROM, proximal hip strengthening, general LLE strength training, and balance training to lessen risk of falls  [x] Continue per plan of care [] Alter current plan (see comments)  [] Plan of care initiated [] Hold pending MD visit [] Discharge    Electronically signed by: Clay Kidd PT     Note: If patient does not return for scheduled/recommended follow up visits, this note will serve as a discharge from care along with the most recent update on progress.

## 2021-06-09 ENCOUNTER — HOSPITAL ENCOUNTER (OUTPATIENT)
Dept: PHYSICAL THERAPY | Age: 76
Setting detail: THERAPIES SERIES
Discharge: HOME | End: 2021-06-09
Payer: MEDICARE

## 2021-06-09 PROCEDURE — 97110 THERAPEUTIC EXERCISES: CPT

## 2021-06-09 NOTE — FLOWSHEET NOTE
[place] [address] [facility]  Phone: [phone] Fax: [phone]          Physical Therapy Treatment Note/ Progress Report:     Date:  2021    Patient Name:  Stephanie Diaz    :  1945  MRN: [id number]  Restrictions/Precautions:    Medical/Treatment Diagnosis Information:  · Diagnosis: Right shoulder adhesive capsulitis, left hip pain  · Treatment Diagnosis: M75.01, Y14.358  Insurance/Certification information:  PT Insurance Information: Medicare / [facility]  Physician Information:  Referring Practitioner: Zane Garcia of care signed (Y/N):     Date of Patient follow up with Physician: N/A      Progress Report: []  Yes  [x]  No     Date Range for reporting period:  Beginnin21  Ending:  NA    Progress report due (10 Rx/or 30 days whichever is less):      Recertification due (POC duration/ or 90 days whichever is less): 21     Visit # Insurance Allowable Auth Needed   40 ([address] [place] 22) Medicare []Yes    [x]No     Pain level:  0/10 currently, c/o weakness     SUBJECTIVE:  Patient reports that her left distal hamstring felt sore/tight the day following the last session, so she must have worked it with the exercises. Today it feels fine.      OBJECTIVE:    Observation: Antalgic gait with standard cane used in RUE   Test measurements:  LEFS - 74% impairment      RESTRICTIONS/PRECAUTIONS:      Exercises/Interventions:     Therapeutic Ex (27214)  Min: 40 Sets/sec Reps CUES/Notes   Bike 5'       Ankle theraband 4-way 1 15 green   Cybex Gastroc- SL  3 10 55#   Leg Press - SL 3 10 55#   Cybex Leg Ext - SL 3 10 15#   Cybex HS curl - SL 3 10 20#   Bridge 10 12     3-way SLR 1 10     Hook lying LLE Ext 1 10 Cues for form   Supine heel slides +  12 Cues for form   Hook lying bilat hip add iso 5 10 Ball squeeze   Clam - side lying 5 10 No resistance   Supine clam - unilateral 10 10 Maroon band, each         BOSU lunge     add   Slide lunge - retro at ledge 1 15    Standing hip abd 2 10 orange   Marching 1 10 Fwd/bkwd  Bilat Cane for support, SBA   Heel raises - DL 5 10 yellow ball between heels   TKE 5 15 3.5pl   Side stepping 2 15 HHA, maroon above knees   Sit to stand with TRX 2 10 1 airex on seat, sink for support   Step up 3 10 8\"   Lateral step down 2 10 4\"   Standing hip 3-way kicks 5 10 Purple band for TKE   On 2\" step             Manual Intervention  (24269)  Min: 0         Shld /GH Mobs 0'   Gentle inferior glides   Post Cap mobs 0'   Prone LLLD IR   Cervical STM 7'       Manual cervical traction 4'       Hip PROM 7'       IASTM 7'   Gastroc/soleus, L   PROM MT - shoulder 0'   Flex, abd, ER, IR   Hypervolt percussion massage 0'   L gluteals             NMR re-education (17632)  Min: 0         Biodex balance 0'   LS x 2, RC   Small ERIC stance 30' 3 airex   Staggered stance 30' 3 airex   SLS with UE support 30' 3 Cues for neutral foot position   Wobble board - WS to LLE 10 10 RLE on 2\" step, manual perts   Modified SLS - LLE on airex 10 5 RLE on 6\" step   1/2 foam roll SLS - inv/ev focus 20 3 bilat UE support   Betty Scap Bio         Floor Snow angels-sliders                   Therapeutic Activity (10159)  Min:         UE throwing porgression         Dynamic UE stability         Earthquake Bar         Bodyblade                        Therapeutic Exercise and NMR EXR  [x] (06573) Provided verbal/tactile cueing for activities related to strengthening, flexibility, endurance, ROM  for improvements in scapular, scapulothoracic and UE control with self care, reaching, carrying, lifting, house/yardwork, driving/computer work.     [x] (55546) Provided verbal/tactile cueing for activities related to improving balance, coordination, kinesthetic sense, posture, motor skill, proprioception  to assist with  scapular, scapulothoracic and UE control with self care, reaching, carrying, lifting, house/yardwork, driving/computer work.    Therapeutic Activities:    [] (18463 or 76879) Provided verbal/tactile cueing for activities related to improving balance, coordination, kinesthetic sense, posture, motor skill, proprioception and motor activation to allow for proper function of scapular, scapulothoracic and UE control with self care, carrying, lifting, driving/computer work.      Home Exercise Program:    [x] (12041) Reviewed/Progressed HEP activities related to strengthening, flexibility, endurance, ROM of scapular, scapulothoracic and UE control with self care, reaching, carrying, lifting, house/yardwork, driving/computer work  [] (53007) Reviewed/Progressed HEP activities related to improving balance, coordination, kinesthetic sense, posture, motor skill, proprioception of scapular, scapulothoracic and UE control with self care, reaching, carrying, lifting, house/yardwork, driving/computer work      Manual Treatments:  PROM / STM / Oscillations-Mobs:  G-I, II, III, IV (PA's, Inf., Post.)  [x] (49859) Provided manual therapy to mobilize soft tissue/joints of cervical/CT, scapular GHJ and UE for the purpose of modulating pain, promoting relaxation,  increasing ROM, reducing/eliminating soft tissue swelling/inflammation/restriction, improving soft tissue extensibility and allowing for proper ROM for normal function with self care, reaching, carrying, lifting, house/yardwork, driving/computer work    Modalities:  None    Charges:  Timed Code Treatment Minutes: 40   Total Treatment Minutes: 40       [] EVAL (LOW) 79945 (typically 20 minutes face-to-face)  [] EVAL (MOD) 96457 (typically 30 minutes face-to-face)  [] EVAL (HIGH) 15621 (typically 45 minutes face-to-face)  [] RE-EVAL     [x] CR(55074) x 3     [] DRY NEEDLE 1 OR 2 MUSCLES  [] NMR (01245) x     [] DRY NEEDLE 3+ MUSCLES  [] Manual (30410) x       [] TA (50533) x     [] Mech Traction (28109)  [] ES(attended) (63524)     [] ES (un) (81864):   [] VASO (06632)  [] Other:    If [facility] Please Indicate Time In/Out  CPT Code Time in Time out                                   SHOULDER GOALS:  Patient stated goal: pain-free shoulder, return to PLOF  [] Progressing: [x] Met: [] Not Met: [] Adjusted    Therapist goals for Patient:   Short Term Goals: To be achieved in: 2 weeks  1. Independent in HEP and progression per patient tolerance, in order to prevent re-injury. [] Progressing: [x] Met: [] Not Met: [] Adjusted  2. Patient will have a decrease in pain to facilitate improvement in movement, function, and ADLs as indicated by Functional Deficits. [] Progressing: [x] Met: [] Not Met: [] Adjusted    Long Term Goals: To be achieved in: 6 weeks  1. Disability index score of 25% or less for the Quick DASH to assist with reaching prior level of function. [] Progressing: [x] Met: [] Not Met: [] Adjusted  2. Patient will demonstrate increased AROM to equal contralateral to allow for proper joint functioning as indicated by Functional Deficits. [] Progressing: [x] Met: [] Not Met: [] Adjusted  3. Patient will demonstrate an increase in NM recruitment/activation and overall GH and scapular strength to within n5lbs HHD or WNL for proper functional mobility as indicated by patients Functional Deficits. [] Progressing: [x] Met: [] Not Met: [] Adjusted  4. Patient will return to reaching, lifting, overhead movements, self care especially after using toilet, dressing, grooming activities without increased symptoms or restriction. [] Progressing: [x] Met: [] Not Met: [] Adjusted       HIP GOALS:  Patient stated goal: strengthen LLE, ambulate without cane for short distances  [] Progressing: [x] Met: [] Not Met: [] Adjusted    Therapist goals for Patient:   Short Term Goals: To be achieved in: 2 weeks  1. Independent in HEP and progression per patient tolerance, in order to prevent re-injury. []?? Progressing: [x]? ? Met: []?? Not Met: []?? Adjusted  2. Patient will have a decrease in pain to facilitate improvement in movement, function, and ADLs as indicated by Functional Deficits. []?? Progressing: [x]? ? Met: []?? Not Met: []?? Adjusted     Long Term Goals: To be achieved in: 4-6 weeks  1. Disability index score of 40% or less for the LEFS to assist with reaching prior level of function. [x]? ? Progressing: []?? Met: []?? Not Met: []?? Adjusted  2. Patient will demonstrate increased AROM to WFL to allow for proper joint functioning as indicated by patients Functional Deficits. []?? Progressing: [x]? ? Met: []?? Not Met: []?? Adjusted  3. Patient will demonstrate an increase in Strength to good proximal hip strength and control, within 5lb HHD in LE to allow for proper functional mobility as indicated by patients Functional Deficits. [x]? ? Progressing: []?? Met: []?? Not Met: []?? Adjusted  4. Patient will return to standing, ambulation (with AD as needed), transfers, stairs, squatting without increased symptoms or restriction. [x] Progressing: [] Met: [] Not Met: [] Adjusted      ASSESSMENT:  Good tolerance to today's session. Appropriate fatigue at conclusion. Making gradual gains in general LLE strength but she continues to present with deficient proximal hip control L>R and poor core stability. Gait is still antalgic with uneven step length and wide stance, though making some improvement. Left hamstring strength remains deficient. Patient will benefit from continued physical therapy with focus on left hip rehabilitation and LE strength/balance training to improve patient's overall function and reduce risk of falls.       Return to Play: (if applicable)   []  Stage 1: Intro to Strength   []  Stage 2: Dynamic Strength and Intro to Plyometrics   []  Stage 3: Advanced Plyometrics and Intro to Throwing   []  Stage 4: Sport specific Training/Return to Sport     []  Ready to Return to Play, Meets All Above Stages   []  Not Ready for Return to Sports   Comments:      Treatment/Activity Tolerance:  [x] Patient tolerated treatment well [] Patient limited by fatique  [] Patient limited by pain  [] Patient limited by other medical complications  [] Other:     Overall Progression Towards Functional goals/ Treatment Progress Update:  [x] Patient is progressing as expected towards functional goals listed. [] Progression is slowed due to complexities/Impairments listed. [] Progression has been slowed due to co-morbidities. [] Plan just implemented, too soon to assess goals progression <30days   [] Goals require adjustment due to lack of progress  [] Patient is not progressing as expected and requires additional follow up with physician  [] Other:     Prognosis for POC: [x] Good [] Fair  [] Poor    Patient requires continued skilled intervention: [x] Yes  [] No      PLAN: Continue PT 2x/week for 4-6 weeks for shoulder strengthening/ROM if needed, with continued left hip rehabilitation to focus on ROM, proximal hip strengthening, general LLE strength training, and balance training to lessen risk of falls  [x] Continue per plan of care [] Alter current plan (see comments)  [] Plan of care initiated [] Hold pending MD visit [] Discharge    Electronically signed by: Cari Moore PT     Note: If patient does not return for scheduled/recommended follow up visits, this note will serve as a discharge from care along with the most recent update on progress.

## 2021-06-14 ENCOUNTER — HOSPITAL ENCOUNTER (OUTPATIENT)
Dept: PHYSICAL THERAPY | Age: 76
Setting detail: THERAPIES SERIES
Discharge: HOME | End: 2021-06-14
Payer: MEDICARE

## 2021-06-14 PROCEDURE — 97110 THERAPEUTIC EXERCISES: CPT

## 2021-06-14 NOTE — FLOWSHEET NOTE
clam - unilateral 10 10 Maroon band, each         BOSU lunge     add   Slide lunge - retro at ledge 1 15    Standing hip abd 2 10 orange   Marching 1 10 Fwd/bkwd  Bilat Cane for support, SBA   Heel raises - DL 5 10 yellow ball between heels   TKE 5 15 3.5pl   Side stepping 2 15 HHA, maroon above knees   Sit to stand with TRX 2 10 1 airex on seat, sink for support   Step up 3 10 8\"   Lateral step down 2 10 4\"   Standing hip 3-way kicks 5 10 Purple band for TKE   On 2\" step             Manual Intervention  (53721)  Min: 0         Shld /GH Mobs 0'   Gentle inferior glides   Post Cap mobs 0'   Prone LLLD IR   Cervical STM 7'       Manual cervical traction 4'       Hip PROM 7'       IASTM 7'   Gastroc/soleus, L   PROM MT - shoulder 0'   Flex, abd, ER, IR   Hypervolt percussion massage 0'   L gluteals             NMR re-education (64404)  Min: 0         Biodex balance 0'   LS x 2, RC   Small ERIC stance 30' 3 airex   Staggered stance 30' 3 airex   SLS with UE support 30' 3 Cues for neutral foot position   Wobble board - WS to LLE 10 10 RLE on 2\" step, manual perts   Modified SLS - LLE on airex 10 5 RLE on 6\" step   1/2 foam roll SLS - inv/ev focus 20 3 bilat UE support   Betty Scap Bio         Floor Snow angels-sliders                   Therapeutic Activity (27353)  Min:         UE throwing porgression         Dynamic UE stability         Earthquake Bar         Bodyblade                        Therapeutic Exercise and NMR EXR  [x] (62896) Provided verbal/tactile cueing for activities related to strengthening, flexibility, endurance, ROM  for improvements in scapular, scapulothoracic and UE control with self care, reaching, carrying, lifting, house/yardwork, driving/computer work.     [x] (18405) Provided verbal/tactile cueing for activities related to improving balance, coordination, kinesthetic sense, posture, motor skill, proprioception  to assist with  scapular, scapulothoracic and UE control with self care, reaching, carrying, lifting, house/yardwork, driving/computer work. Therapeutic Activities:    [] (40967 or 07574) Provided verbal/tactile cueing for activities related to improving balance, coordination, kinesthetic sense, posture, motor skill, proprioception and motor activation to allow for proper function of scapular, scapulothoracic and UE control with self care, carrying, lifting, driving/computer work.      Home Exercise Program:    [x] (15291) Reviewed/Progressed HEP activities related to strengthening, flexibility, endurance, ROM of scapular, scapulothoracic and UE control with self care, reaching, carrying, lifting, house/yardwork, driving/computer work  [] (06807) Reviewed/Progressed HEP activities related to improving balance, coordination, kinesthetic sense, posture, motor skill, proprioception of scapular, scapulothoracic and UE control with self care, reaching, carrying, lifting, house/yardwork, driving/computer work      Manual Treatments:  PROM / STM / Oscillations-Mobs:  G-I, II, III, IV (PA's, Inf., Post.)  [x] (75348) Provided manual therapy to mobilize soft tissue/joints of cervical/CT, scapular GHJ and UE for the purpose of modulating pain, promoting relaxation,  increasing ROM, reducing/eliminating soft tissue swelling/inflammation/restriction, improving soft tissue extensibility and allowing for proper ROM for normal function with self care, reaching, carrying, lifting, house/yardwork, driving/computer work    Modalities:  None    Charges:  Timed Code Treatment Minutes: 40   Total Treatment Minutes: 40       [] EVAL (LOW) 18761 (typically 20 minutes face-to-face)  [] EVAL (MOD) 57367 (typically 30 minutes face-to-face)  [] EVAL (HIGH) 30366 (typically 45 minutes face-to-face)  [] RE-EVAL     [x] VM(06837) x 3     [] DRY NEEDLE 1 OR 2 MUSCLES  [] NMR (85029) x     [] DRY NEEDLE 3+ MUSCLES  [] Manual (23408) x       [] TA (94287) x     [] Mech Traction (90429)  [] ES(attended) (93691)     [] ES (un) (61635):   [] VASO (44029)  [] Other:    If BWC Please Indicate Time In/Out  CPT Code Time in Time out                                   SHOULDER GOALS:  Patient stated goal: pain-free shoulder, return to PLOF  [] Progressing: [x] Met: [] Not Met: [] Adjusted    Therapist goals for Patient:   Short Term Goals: To be achieved in: 2 weeks  1. Independent in HEP and progression per patient tolerance, in order to prevent re-injury. [] Progressing: [x] Met: [] Not Met: [] Adjusted  2. Patient will have a decrease in pain to facilitate improvement in movement, function, and ADLs as indicated by Functional Deficits. [] Progressing: [x] Met: [] Not Met: [] Adjusted    Long Term Goals: To be achieved in: 6 weeks  1. Disability index score of 25% or less for the Quick DASH to assist with reaching prior level of function. [] Progressing: [x] Met: [] Not Met: [] Adjusted  2. Patient will demonstrate increased AROM to equal contralateral to allow for proper joint functioning as indicated by Functional Deficits. [] Progressing: [x] Met: [] Not Met: [] Adjusted  3. Patient will demonstrate an increase in NM recruitment/activation and overall GH and scapular strength to within n5lbs HHD or WNL for proper functional mobility as indicated by patients Functional Deficits. [] Progressing: [x] Met: [] Not Met: [] Adjusted  4. Patient will return to reaching, lifting, overhead movements, self care especially after using toilet, dressing, grooming activities without increased symptoms or restriction. [] Progressing: [x] Met: [] Not Met: [] Adjusted       HIP GOALS:  Patient stated goal: strengthen LLE, ambulate without cane for short distances  [] Progressing: [x] Met: [] Not Met: [] Adjusted    Therapist goals for Patient:   Short Term Goals: To be achieved in: 2 weeks  1. Independent in HEP and progression per patient tolerance, in order to prevent re-injury. []?? Progressing: [x]? ? Met: []?? Not Met: []?? Adjusted  2. Patient will have a decrease in pain to facilitate improvement in movement, function, and ADLs as indicated by Functional Deficits. []?? Progressing: [x]? ? Met: []?? Not Met: []?? Adjusted     Long Term Goals: To be achieved in: 4-6 weeks  1. Disability index score of 40% or less for the LEFS to assist with reaching prior level of function. [x]? ? Progressing: []?? Met: []?? Not Met: []?? Adjusted  2. Patient will demonstrate increased AROM to WFL to allow for proper joint functioning as indicated by patients Functional Deficits. []?? Progressing: [x]? ? Met: []?? Not Met: []?? Adjusted  3. Patient will demonstrate an increase in Strength to good proximal hip strength and control, within 5lb HHD in LE to allow for proper functional mobility as indicated by patients Functional Deficits. [x]? ? Progressing: []?? Met: []?? Not Met: []?? Adjusted  4. Patient will return to standing, ambulation (with AD as needed), transfers, stairs, squatting without increased symptoms or restriction. [x] Progressing: [] Met: [] Not Met: [] Adjusted      ASSESSMENT:  Good tolerance to today's session. Appropriate fatigue at conclusion. Making gradual gains in general LLE strength but she continues to present with deficient proximal hip control L>R and poor core stability. Right leg is also demonstrating weakness in quad and gluteal strength, will begin working on bilateral LE strength from this point forward. Gait is still antalgic with uneven step length and wide stance, though making some improvement. Left hamstring strength remains deficient. Patient will benefit from continued physical therapy with focus on left hip rehabilitation and LE strength/balance training to improve patient's overall function and reduce risk of falls.       Return to Play: (if applicable)   []  Stage 1: Intro to Strength   []  Stage 2: Dynamic Strength and Intro to Plyometrics   []  Stage 3: Advanced Plyometrics and Intro to

## 2021-06-16 ENCOUNTER — HOSPITAL ENCOUNTER (OUTPATIENT)
Dept: PHYSICAL THERAPY | Age: 76
Setting detail: THERAPIES SERIES
Discharge: HOME | End: 2021-06-16
Payer: MEDICARE

## 2021-06-16 PROCEDURE — 97110 THERAPEUTIC EXERCISES: CPT

## 2021-06-16 NOTE — FLOWSHEET NOTE
[facility] [address] [address]  Phone: [phone] Fax: [phone]          Physical Therapy Treatment Note/ Progress Report:     Date:  2021    Patient Name:  Trevor Frausto    :  1945  MRN: [id number]  Restrictions/Precautions:    Medical/Treatment Diagnosis Information:  · Diagnosis: Right shoulder adhesive capsulitis, left hip pain  · Treatment Diagnosis: M75.01, D08.593  Insurance/Certification information:  PT Insurance Information: Medicare / [facility]  Physician Information:  Referring Practitioner: Jocelyne Ends of care signed (Y/N):     Date of Patient follow up with Physician: N/A      Progress Report: []  Yes  [x]  No     Date Range for reporting period:  Beginnin21  Ending:  NA    Progress report due (10 Rx/or 30 days whichever is less):      Recertification due (POC duration/ or 90 days whichever is less): 21     Visit # Insurance Allowable Auth Needed   55 ([address] [place] [address]) Medicare []Yes    [x]No     Pain level:  0/10 currently, c/o weakness     SUBJECTIVE:  Patient reports that her right knee was a little sore after last session but it didn't last much more than that next day. She tried doing the SLS kicks yesterday and feels that she is getting worse at them for some reason.     OBJECTIVE:    Observation: Antalgic gait with standard cane used in RUE   Test measurements:  LEFS - 74% impairment      RESTRICTIONS/PRECAUTIONS:      Exercises/Interventions:     Therapeutic Ex (48618)  Min: 40 Sets/sec Reps CUES/Notes   Bike 5'       Ankle theraband 4-way 1 15 green   Cybex Gastroc- SL  3 10 60#   Leg Press - SL 3 10 60# L / 45# R   Cybex Leg Ext - SL 3 10 15#   Cybex HS curl - SL 3 10 20#   Bridge 10 12     3-way SLR 1 10     Hook lying LLE Ext 1 10 Cues for form   Supine heel slides +  12 Cues for form   Hook lying bilat hip add iso 5 10 Ball squeeze   Clam - side lying 5 10 No resistance Supine clam - unilateral 10 10 Maroon band, each         BOSU lunge     add   Slide lunge - retro at ledge 1 15    Standing hip abd 2 10 orange   Marching 1 10 Fwd/bkwd  Bilat Cane for support, SBA   Heel raises - DL 5 10 yellow ball between heels   TKE 5 15 3.5pl   Side stepping 2 15 HHA, maroon above knees   Sit to stand with TRX 2 10 1 airex on seat, sink for support   Step up 3 10 8\"   Lateral step down 2 10 4\"   Standing hip 3-way kicks 5 10 Purple band for TKE   On 2\" step             Manual Intervention  (67631)  Min: 0         Shld /GH Mobs 0'   Gentle inferior glides   Post Cap mobs 0'   Prone LLLD IR   Cervical STM 7'       Manual cervical traction 4'       Hip PROM 7'       IASTM 7'   Gastroc/soleus, L   PROM MT - shoulder 0'   Flex, abd, ER, IR   Hypervolt percussion massage 0'   L gluteals             NMR re-education (32616)  Min: 0         Biodex balance 0'   LS x 2, RC   Small ERIC stance 30' 3 airex   Staggered stance 30' 3 airex   SLS with UE support 30' 3 Cues for neutral foot position   Wobble board - WS to LLE 10 10 RLE on 2\" step, manual perts   Modified SLS - LLE on airex 10 5 RLE on 6\" step   1/2 foam roll SLS - inv/ev focus 20 3 bilat UE support   Betty Scap Bio         Floor Snow angels-sliders                   Therapeutic Activity (97804)  Min:         UE throwing porgression         Dynamic UE stability         Earthquake Bar         Bodyblade                        Therapeutic Exercise and NMR EXR  [x] (11454) Provided verbal/tactile cueing for activities related to strengthening, flexibility, endurance, ROM  for improvements in scapular, scapulothoracic and UE control with self care, reaching, carrying, lifting, house/yardwork, driving/computer work.     [x] (86962) Provided verbal/tactile cueing for activities related to improving balance, coordination, kinesthetic sense, posture, motor skill, proprioception  to assist with  scapular, scapulothoracic and UE control with self care, reaching, carrying, lifting, house/yardwork, driving/computer work. Therapeutic Activities:    [] (51025 or 47062) Provided verbal/tactile cueing for activities related to improving balance, coordination, kinesthetic sense, posture, motor skill, proprioception and motor activation to allow for proper function of scapular, scapulothoracic and UE control with self care, carrying, lifting, driving/computer work.      Home Exercise Program:    [x] (37023) Reviewed/Progressed HEP activities related to strengthening, flexibility, endurance, ROM of scapular, scapulothoracic and UE control with self care, reaching, carrying, lifting, house/yardwork, driving/computer work  [] (64115) Reviewed/Progressed HEP activities related to improving balance, coordination, kinesthetic sense, posture, motor skill, proprioception of scapular, scapulothoracic and UE control with self care, reaching, carrying, lifting, house/yardwork, driving/computer work      Manual Treatments:  PROM / STM / Oscillations-Mobs:  G-I, II, III, IV (PA's, Inf., Post.)  [x] (98548) Provided manual therapy to mobilize soft tissue/joints of cervical/CT, scapular GHJ and UE for the purpose of modulating pain, promoting relaxation,  increasing ROM, reducing/eliminating soft tissue swelling/inflammation/restriction, improving soft tissue extensibility and allowing for proper ROM for normal function with self care, reaching, carrying, lifting, house/yardwork, driving/computer work    Modalities:  None    Charges:  Timed Code Treatment Minutes: 40   Total Treatment Minutes: 40       [] EVAL (LOW) 94175 (typically 20 minutes face-to-face)  [] EVAL (MOD) 32656 (typically 30 minutes face-to-face)  [] EVAL (HIGH) 90609 (typically 45 minutes face-to-face)  [] RE-EVAL     [x] LT(44614) x 3     [] DRY NEEDLE 1 OR 2 MUSCLES  [] NMR (19344) x     [] DRY NEEDLE 3+ MUSCLES  [] Manual (91643) x       [] TA (48648) x     [] Mech Traction (40290)  [] ES(attended) (44368)     [] []?? Adjusted  2. Patient will have a decrease in pain to facilitate improvement in movement, function, and ADLs as indicated by Functional Deficits. []?? Progressing: [x]? ? Met: []?? Not Met: []?? Adjusted     Long Term Goals: To be achieved in: 4-6 weeks  1. Disability index score of 40% or less for the LEFS to assist with reaching prior level of function. [x]? ? Progressing: []?? Met: []?? Not Met: []?? Adjusted  2. Patient will demonstrate increased AROM to WFL to allow for proper joint functioning as indicated by patients Functional Deficits. []?? Progressing: [x]? ? Met: []?? Not Met: []?? Adjusted  3. Patient will demonstrate an increase in Strength to good proximal hip strength and control, within 5lb HHD in LE to allow for proper functional mobility as indicated by patients Functional Deficits. [x]? ? Progressing: []?? Met: []?? Not Met: []?? Adjusted  4. Patient will return to standing, ambulation (with AD as needed), transfers, stairs, squatting without increased symptoms or restriction. [x] Progressing: [] Met: [] Not Met: [] Adjusted      ASSESSMENT:  Good tolerance to today's session. Appropriate fatigue at conclusion. Making gradual gains in general LLE strength but she continues to present with deficient proximal hip control L>R and poor core stability. Right leg is also demonstrating weakness in quad and gluteal strength, will work on bilateral LE strength from this point forward. Gait is still antalgic with uneven step length and wide stance, though making some improvement. Left hamstring strength remains deficient. Patient will benefit from continued physical therapy with focus on left hip rehabilitation and LE strength/balance training to improve patient's overall function and reduce risk of falls.       Return to Play: (if applicable)   []  Stage 1: Intro to Strength   []  Stage 2: Dynamic Strength and Intro to Plyometrics   []  Stage 3: Advanced Plyometrics and Intro to Throwing   [] Stage 4: Sport specific Training/Return to Sport     []  Ready to Return to Play, [employer]   []  Not Ready for Return to Sports   Comments:      Treatment/Activity Tolerance:  [x] Patient tolerated treatment well [] Patient limited by fatique  [] Patient limited by pain  [] Patient limited by other medical complications  [] Other:     Overall Progression Towards Functional goals/ Treatment Progress Update:  [x] Patient is progressing as expected towards functional goals listed. [] Progression is slowed due to complexities/Impairments listed. [] Progression has been slowed due to co-morbidities. [] Plan just implemented, too soon to assess goals progression <30days   [] Goals require adjustment due to lack of progress  [] Patient is not progressing as expected and requires additional follow up with physician  [] Other:     Prognosis for POC: [x] Good [] Fair  [] Poor    Patient requires continued skilled intervention: [x] Yes  [] No      PLAN: Continue PT 2x/week for 4-6 weeks for shoulder strengthening/ROM if needed, with continued left hip rehabilitation to focus on ROM, proximal hip strengthening, general LLE strength training, and balance training to lessen risk of falls  [x] Continue per plan of care [] Alter current plan (see comments)  [] Plan of care initiated [] Hold pending MD visit [] Discharge    Electronically signed by: Theresa Baptiste PT     Note: If patient does not return for scheduled/recommended follow up visits, this note will serve as a discharge from care along with the most recent update on progress.

## 2021-06-21 ENCOUNTER — HOSPITAL ENCOUNTER (OUTPATIENT)
Dept: PHYSICAL THERAPY | Age: 76
Setting detail: THERAPIES SERIES
Discharge: HOME | End: 2021-06-21
Payer: MEDICARE

## 2021-06-21 PROCEDURE — 97110 THERAPEUTIC EXERCISES: CPT

## 2021-06-21 NOTE — FLOWSHEET NOTE
[place] [address] [address] [place]  Phone: [phone] Fax: [phone]          Physical Therapy Treatment Note/ Progress Report:     Date:  2021    Patient Name:  Jj Gerber    :  1945  MRN: [id number]  Restrictions/Precautions:    Medical/Treatment Diagnosis Information:  · Diagnosis: Right shoulder adhesive capsulitis, left hip pain  · Treatment Diagnosis: M75.01, I30.763  Insurance/Certification information:  PT Insurance Information: Medicare / [facility]  Physician Information:  Referring Practitioner: Julio César Mary of care signed (Y/N):     Date of Patient follow up with Physician: N/A      Progress Report: []  Yes  [x]  No     Date Range for reporting period:  Beginnin21  Ending:  NA    Progress report due (10 Rx/or 30 days whichever is less):      Recertification due (POC duration/ or 90 days whichever is less): 21     Visit # Insurance Allowable Auth Needed   52 ([address] 22) Medicare []Yes    [x]No     Pain level:  0/10 currently, c/o weakness     SUBJECTIVE:  No new complaints. Right knee feels like it's getting stronger.      OBJECTIVE:    Observation: Antalgic gait with standard cane used in RUE   Test measurements:  LEFS - 74% impairment      RESTRICTIONS/PRECAUTIONS:      Exercises/Interventions:     Therapeutic Ex (79927)  Min: 40 Sets/sec Reps CUES/Notes   Bike 5'       Ankle theraband 4-way 1 15 green   Cybex Gastroc- SL  3 10 60#   Leg Press - SL 3 10 60# L / 45# R   Cybex Leg Ext - SL 3 10 15#   Cybex HS curl - SL 3 10 20#   Bridge 10 12     3-way SLR 1 10     Hook lying LLE Ext 1 10 Cues for form   Supine heel slides +  12 Cues for form   Hook lying bilat hip add iso 5 10 Ball squeeze   Clam - side lying 5 10 No resistance   Supine clam - unilateral 10 10 Maroon band, each         BOSU lunge     add   Slide lunge - retro at ledge 1 15    Standing hip abd 2 10 orange    10 Fwd/bkwd  Bilat Cane for support, SBA   Heel raises - DL 5 10 yellow ball between heels   TKE 5 15 3.5pl   Side stepping 2 15 HHA, maroon above knees   Sit to stand with TRX 2 10 1 airex on seat, sink for support   Step up 3 10 8\"   Lateral step down 2 10 4\"   Standing hip 3-way kicks 5 10 Purple band for TKE   On 2\" step             Manual Intervention  (44973)  Min: 0         Shld /GH Mobs 0'   Gentle inferior glides   Post Cap mobs 0'   Prone LLLD IR   Cervical STM 7'       Manual cervical traction 4'       Hip PROM 7'       IASTM 7'   Gastroc/soleus, L   PROM MT - shoulder 0'   Flex, abd, ER, IR   Hypervolt percussion massage 0'   L gluteals             NMR re-education (89370)  Min: 0         Biodex balance 0'   LS x 2, RC   Small ERIC stance 30' 3 airex   Staggered stance 30' 3 airex   SLS with UE support 30' 3 Cues for neutral foot position   Wobble board - WS to LLE 10 10 RLE on 2\" step, manual perts   Modified SLS - LLE on airex 10 5 RLE on 6\" step   1/2 foam roll SLS - inv/ev focus 20 3 bilat UE support   Betty Scap Bio         Floor Snow angels-sliders                   Therapeutic Activity (72440)  Min:         UE throwing porgression         Dynamic UE stability         Earthquake Bar         Bodyblade                        Therapeutic Exercise and NMR EXR  [x] (62192) Provided verbal/tactile cueing for activities related to strengthening, flexibility, endurance, ROM  for improvements in scapular, scapulothoracic and UE control with self care, reaching, carrying, lifting, house/yardwork, driving/computer work. [x] (78553) Provided verbal/tactile cueing for activities related to improving balance, coordination, kinesthetic sense, posture, motor skill, proprioception  to assist with  scapular, scapulothoracic and UE control with self care, reaching, carrying, lifting, house/yardwork, driving/computer work.     Therapeutic Activities:    [] (96957 or ) Provided verbal/tactile cueing for activities related to improving balance, coordination, kinesthetic sense, posture, motor skill, proprioception and motor activation to allow for proper function of scapular, scapulothoracic and UE control with self care, carrying, lifting, driving/computer work.      Home Exercise Program:    [x] (36860) Reviewed/Progressed HEP activities related to strengthening, flexibility, endurance, ROM of scapular, scapulothoracic and UE control with self care, reaching, carrying, lifting, house/yardwork, driving/computer work  [] (00386) Reviewed/Progressed HEP activities related to improving balance, coordination, kinesthetic sense, posture, motor skill, proprioception of scapular, scapulothoracic and UE control with self care, reaching, carrying, lifting, house/yardwork, driving/computer work      Manual Treatments:  PROM / STM / Oscillations-Mobs:  G-I, II, III, IV (PA's, Inf., Post.)  [x] (41351) Provided manual therapy to mobilize soft tissue/joints of cervical/CT, scapular GHJ and UE for the purpose of modulating pain, promoting relaxation,  increasing ROM, reducing/eliminating soft tissue swelling/inflammation/restriction, improving soft tissue extensibility and allowing for proper ROM for normal function with self care, reaching, carrying, lifting, house/yardwork, driving/computer work    Modalities:  None    Charges:  Timed Code Treatment Minutes: 40   Total Treatment Minutes: 40       [] EVAL (LOW) 08021 (typically 20 minutes face-to-face)  [] EVAL (MOD) 07442 (typically 30 minutes face-to-face)  [] EVAL (HIGH) 54867 (typically 45 minutes face-to-face)  [] RE-EVAL     [x] LW(72455) x 3     [] DRY NEEDLE 1 OR 2 MUSCLES  [] NMR (06154) x     [] DRY NEEDLE 3+ MUSCLES  [] Manual (01659) x       [] TA (97579) x     [] Mech Traction (50875)  [] ES(attended) (05599)     [] ES (un) (80436):   [] VASO (20174)  [] Other:    If [facility] Please Indicate Time In/Out  CPT Code Time in Time out                                   SHOULDER GOALS:  Patient stated goal: pain-free shoulder, return to PLOF  [] Progressing: [x] Met: [] Not Met: [] Adjusted    Therapist goals for Patient:   Short Term Goals: To be achieved in: 2 weeks  1. Independent in HEP and progression per patient tolerance, in order to prevent re-injury. [] Progressing: [x] Met: [] Not Met: [] Adjusted  2. Patient will have a decrease in pain to facilitate improvement in movement, function, and ADLs as indicated by Functional Deficits. [] Progressing: [x] Met: [] Not Met: [] Adjusted    Long Term Goals: To be achieved in: 6 weeks  1. Disability index score of 25% or less for the Quick DASH to assist with reaching prior level of function. [] Progressing: [x] Met: [] Not Met: [] Adjusted  2. Patient will demonstrate increased AROM to equal contralateral to allow for proper joint functioning as indicated by Functional Deficits. [] Progressing: [x] Met: [] Not Met: [] Adjusted  3. Patient will demonstrate an increase in NM recruitment/activation and overall GH and scapular strength to within n5lbs HHD or WNL for proper functional mobility as indicated by patients Functional Deficits. [] Progressing: [x] Met: [] Not Met: [] Adjusted  4. Patient will return to reaching, lifting, overhead movements, self care especially after using toilet, dressing, grooming activities without increased symptoms or restriction. [] Progressing: [x] Met: [] Not Met: [] Adjusted       HIP GOALS:  Patient stated goal: strengthen LLE, ambulate without cane for short distances  [] Progressing: [x] Met: [] Not Met: [] Adjusted    Therapist goals for Patient:   Short Term Goals: To be achieved in: 2 weeks  1. Independent in HEP and progression per patient tolerance, in order to prevent re-injury. []?? Progressing: [x]? ? Met: []?? Not Met: []?? Adjusted  2. Patient will have a decrease in pain to facilitate improvement in movement, function, and ADLs as indicated by Functional Deficits.   []?? Progressing: [x]?? Met: []?? Not Met: []?? Adjusted     Long Term Goals: To be achieved in: 4-6 weeks  1. Disability index score of 40% or less for the LEFS to assist with reaching prior level of function. [x]? ? Progressing: []?? Met: []?? Not Met: []?? Adjusted  2. Patient will demonstrate increased AROM to WFL to allow for proper joint functioning as indicated by patients Functional Deficits. []?? Progressing: [x]? ? Met: []?? Not Met: []?? Adjusted  3. Patient will demonstrate an increase in Strength to good proximal hip strength and control, within 5lb HHD in LE to allow for proper functional mobility as indicated by patients Functional Deficits. [x]? ? Progressing: []?? Met: []?? Not Met: []?? Adjusted  4. Patient will return to standing, ambulation (with AD as needed), transfers, stairs, squatting without increased symptoms or restriction. [x] Progressing: [] Met: [] Not Met: [] Adjusted      ASSESSMENT:  Good tolerance to today's session. Appropriate fatigue at conclusion. Making gradual gains in general LLE strength but she continues to present with deficient proximal hip control L>R and poor core stability. Right leg is also demonstrating weakness in quad and gluteal strength, will work on bilateral LE strength from this point forward. Gait is still antalgic with uneven step length and wide stance, though making some improvement. Left hamstring strength remains deficient. Patient will benefit from continued physical therapy with focus on left hip rehabilitation and LE strength/balance training to improve patient's overall function and reduce risk of falls.       Return to Play: (if applicable)   []  Stage 1: Intro to Strength   []  Stage 2: Dynamic Strength and Intro to Plyometrics   []  Stage 3: Advanced Plyometrics and Intro to Throwing   []  Stage 4: Sport specific Training/Return to Sport     []  Ready to Return to Play, Meets All Above Stages   []  Not Ready for Return to Sports   Comments: Treatment/Activity Tolerance:  [x] Patient tolerated treatment well [] Patient limited by fatique  [] Patient limited by pain  [] Patient limited by other medical complications  [] Other:     Overall Progression Towards Functional goals/ Treatment Progress Update:  [x] Patient is progressing as expected towards functional goals listed. [] Progression is slowed due to complexities/Impairments listed. [] Progression has been slowed due to co-morbidities. [] Plan just implemented, too soon to assess goals progression <30days   [] Goals require adjustment due to lack of progress  [] Patient is not progressing as expected and requires additional follow up with physician  [] Other:     Prognosis for POC: [x] Good [] Fair  [] Poor    Patient requires continued skilled intervention: [x] Yes  [] No      PLAN: Continue PT 2x/week for 4-6 weeks for shoulder strengthening/ROM if needed, with continued left hip rehabilitation to focus on ROM, proximal hip strengthening, general LLE strength training, and balance training to lessen risk of falls  [x] Continue per plan of care [] Alter current plan (see comments)  [] Plan of care initiated [] Hold pending MD visit [] Discharge    Electronically signed by: Samanta Kim PT     Note: If patient does not return for scheduled/recommended follow up visits, this note will serve as a discharge from care along with the most recent update on progress.

## 2021-06-23 ENCOUNTER — HOSPITAL ENCOUNTER (OUTPATIENT)
Dept: PHYSICAL THERAPY | Age: 76
Setting detail: THERAPIES SERIES
Discharge: HOME | End: 2021-06-23
Payer: MEDICARE

## 2021-06-23 PROCEDURE — 97110 THERAPEUTIC EXERCISES: CPT

## 2021-06-23 NOTE — FLOWSHEET NOTE
[facility] [address] [address] [address]  Phone: [phone] Fax: [phone]          Physical Therapy Treatment Note/ Progress Report:     Date:  2021    Patient Name:  Jose Enrique Velasquez    :  1945  MRN: [id number]  Restrictions/Precautions:    Medical/Treatment Diagnosis Information:  · Diagnosis: Right shoulder adhesive capsulitis, left hip pain  · Treatment Diagnosis: M75.01, L55.090  Insurance/Certification information:  PT Insurance Information: Medicare / [facility]  Physician Information:  Referring Practitioner: Gilson Gibbs of care signed (Y/N):     Date of Patient follow up with Physician: N/A      Progress Report: []  Yes  [x]  No     Date Range for reporting period:  Beginnin21  Ending:  NA    Progress report due (10 Rx/or 30 days whichever is less): 71     Recertification due (POC duration/ or 90 days whichever is less): 21     Visit # Insurance Allowable Auth Needed   50 ([address] [place] 22) Medicare []Yes    [x]No     Pain level:  0/10 currently, c/o weakness     SUBJECTIVE:  No complaints following last session. Felt pretty energetic following last session and was able to go home and clean house. States that she still does not have as much energy as she would like to have, but it is definitely getting better. States that after about 4 hours of an outing (shopping, etc) she gets very tired.      OBJECTIVE:    Observation: Antalgic gait with standard cane used in RUE   Test measurements:  LEFS - 74% impairment      RESTRICTIONS/PRECAUTIONS:      Exercises/Interventions:     Therapeutic Ex (41098)  Min: 40 Sets/sec Reps CUES/Notes   Bike 5'       Ankle theraband 4-way 1 15 green   Cybex Gastroc- SL  3 10 60#   Leg Press - SL 3 10 60# L / 50# R   Cybex Leg Ext - SL 3 10 15#   Cybex HS curl - SL 3 10 25#   Bridge 10 12     3-way SLR 1 10     Hook lying LLE Ext 1 10 Cues for form   Supine heel slides +  12 Cues for form   Hook lying bilat hip add iso 5 10 Ball squeeze   Clam - side lying 5 10 No resistance   Supine clam - unilateral 10 10 Maroon band, each         BOSU lunge     add   Slide lunge - retro at ledge 1 15    Standing hip abd 2 10 orange   Marching 1 10 Fwd/bkwd  Bilat Cane for support, SBA   Heel raises - DL 5 10 yellow ball between heels   TKE 5 15 3.5pl   Side stepping 2 15 HHA, maroon above knees   Sit to stand with TRX 2 10 1 airex on seat, sink for support   Step up 3 10 8\"   Lateral step down 2 10 4\"   Standing hip 3-way kicks 5 10 Purple band for TKE   On 2\" step             Manual Intervention  (60074)  Min: 0         Shld /GH Mobs 0'   Gentle inferior glides   Post Cap mobs 0'   Prone LLLD IR   Cervical STM 7'       Manual cervical traction 4'       Hip PROM 7'       IASTM 7'   Gastroc/soleus, L   PROM MT - shoulder 0'   Flex, abd, ER, IR   Hypervolt percussion massage 0'   L gluteals             NMR re-education (29627)  Min: 0         Biodex balance 0'   LS x 2, RC   Small ERIC stance 30' 3 airex   Staggered stance 30' 3 airex   SLS with UE support 30' 3 Cues for neutral foot position   Wobble board - WS to LLE 10 10 RLE on 2\" step, manual perts   Modified SLS - LLE on airex 10 5 RLE on 6\" step   1/2 foam roll SLS - inv/ev focus 20 3 bilat UE support   Betty Scap Bio         Floor Snow angels-sliders                   Therapeutic Activity (87335)  Min:         UE throwing porgression         Dynamic UE stability         Earthquake Bar         Bodyblade                        Therapeutic Exercise and NMR EXR  [x] (51720) Provided verbal/tactile cueing for activities related to strengthening, flexibility, endurance, ROM  for improvements in scapular, scapulothoracic and UE control with self care, reaching, carrying, lifting, house/yardwork, driving/computer work.     [x] (37325) Provided verbal/tactile cueing for activities related to improving balance, coordination, kinesthetic sense, posture, motor skill, proprioception  to assist with  scapular, scapulothoracic and UE control with self care, reaching, carrying, lifting, house/yardwork, driving/computer work. Therapeutic Activities:    [] (91862 or 93261) Provided verbal/tactile cueing for activities related to improving balance, coordination, kinesthetic sense, posture, motor skill, proprioception and motor activation to allow for proper function of scapular, scapulothoracic and UE control with self care, carrying, lifting, driving/computer work.      Home Exercise Program:    [x] (07710) Reviewed/Progressed HEP activities related to strengthening, flexibility, endurance, ROM of scapular, scapulothoracic and UE control with self care, reaching, carrying, lifting, house/yardwork, driving/computer work  [] (30789) Reviewed/Progressed HEP activities related to improving balance, coordination, kinesthetic sense, posture, motor skill, proprioception of scapular, scapulothoracic and UE control with self care, reaching, carrying, lifting, house/yardwork, driving/computer work      Manual Treatments:  PROM / STM / Oscillations-Mobs:  G-I, II, III, IV (PA's, Inf., Post.)  [x] (05345) Provided manual therapy to mobilize soft tissue/joints of cervical/CT, scapular GHJ and UE for the purpose of modulating pain, promoting relaxation,  increasing ROM, reducing/eliminating soft tissue swelling/inflammation/restriction, improving soft tissue extensibility and allowing for proper ROM for normal function with self care, reaching, carrying, lifting, house/yardwork, driving/computer work    Modalities:  None    Charges:  Timed Code Treatment Minutes: 40   Total Treatment Minutes: 40       [] EVAL (LOW) 36752 (typically 20 minutes face-to-face)  [] EVAL (MOD) 19777 (typically 30 minutes face-to-face)  [] EVAL (HIGH) 53949 (typically 45 minutes face-to-face)  [] RE-EVAL     [x] NL(27687) x 3     [] DRY NEEDLE 1 OR 2 MUSCLES  [] NMR (03761) x     [] DRY NEEDLE 3+ MUSCLES  [] and Intro to Throwing   []  Stage 4: Sport specific Training/Return to Sport     []  Ready to Return to Play, [employer]   []  Not Ready for Return to Sports   Comments:      Treatment/Activity Tolerance:  [x] Patient tolerated treatment well [] Patient limited by fatique  [] Patient limited by pain  [] Patient limited by other medical complications  [] Other:     Overall Progression Towards Functional goals/ Treatment Progress Update:  [x] Patient is progressing as expected towards functional goals listed. [] Progression is slowed due to complexities/Impairments listed. [] Progression has been slowed due to co-morbidities. [] Plan just implemented, too soon to assess goals progression <30days   [] Goals require adjustment due to lack of progress  [] Patient is not progressing as expected and requires additional follow up with physician  [] Other:     Prognosis for POC: [x] Good [] Fair  [] Poor    Patient requires continued skilled intervention: [x] Yes  [] No      PLAN: Continue PT 2x/week for 4-6 weeks for shoulder strengthening/ROM if needed, with continued left hip rehabilitation to focus on ROM, proximal hip strengthening, general LLE strength training, and balance training to lessen risk of falls  [x] Continue per plan of care [] Alter current plan (see comments)  [] Plan of care initiated [] Hold pending MD visit [] Discharge    Electronically signed by: Diego Childress PT     Note: If patient does not return for scheduled/recommended follow up visits, this note will serve as a discharge from care along with the most recent update on progress.

## 2021-06-28 ENCOUNTER — HOSPITAL ENCOUNTER (OUTPATIENT)
Dept: PHYSICAL THERAPY | Age: 76
Setting detail: THERAPIES SERIES
Discharge: HOME | End: 2021-06-28
Payer: MEDICARE

## 2021-06-28 PROCEDURE — 97110 THERAPEUTIC EXERCISES: CPT

## 2021-06-28 NOTE — FLOWSHEET NOTE
unilateral 10 10 Maroon band, each         BOSU lunge 10  10 each   Slide lunge - retro at ledge 1 15    Standing hip abd 2 10 orange   Marching 1 10 Fwd/bkwd  Bilat Cane for support, SBA   Heel raises - DL 5 10 yellow ball between heels   TKE 5 15 3.5pl   Side stepping 2 15 HHA, maroon above knees   Sit to stand with TRX 2 10 1 airex on seat, sink for support   Step up 3 10 8\"   Lateral step down 2 10 4\"   Standing hip 3-way kicks 5 10 Purple band for TKE   On 2\" step             Manual Intervention  (45764)  Min: 0         Shld /GH Mobs 0'   Gentle inferior glides   Post Cap mobs 0'   Prone LLLD IR   Cervical STM 7'       Manual cervical traction 4'       Hip PROM 7'       IASTM 7'   Gastroc/soleus, L   PROM MT - shoulder 0'   Flex, abd, ER, IR   Hypervolt percussion massage 0'   L gluteals             NMR re-education (15662)  Min: 0         Biodex balance 0'   LS x 2, RC   Small ERIC stance 30' 3 airex   Staggered stance 30' 3 airex   SLS with UE support 30' 3 Cues for neutral foot position   Wobble board - WS to LLE 10 10 RLE on 2\" step, manual perts   Modified SLS - LLE on airex 10 5 RLE on 6\" step   1/2 foam roll SLS - inv/ev focus 20 3 bilat UE support   Betty Scap Bio         Floor Snow angels-sliders                   Therapeutic Activity (93674)  Min:         UE throwing porgression         Dynamic UE stability         Earthquake Bar         Bodyblade                        Therapeutic Exercise and NMR EXR  [x] (67280) Provided verbal/tactile cueing for activities related to strengthening, flexibility, endurance, ROM  for improvements in scapular, scapulothoracic and UE control with self care, reaching, carrying, lifting, house/yardwork, driving/computer work.     [x] (14017) Provided verbal/tactile cueing for activities related to improving balance, coordination, kinesthetic sense, posture, motor skill, proprioception  to assist with  scapular, scapulothoracic and UE control with self care, reaching, carrying, lifting, house/yardwork, driving/computer work. Therapeutic Activities:    [] (10879 or 52926) Provided verbal/tactile cueing for activities related to improving balance, coordination, kinesthetic sense, posture, motor skill, proprioception and motor activation to allow for proper function of scapular, scapulothoracic and UE control with self care, carrying, lifting, driving/computer work.      Home Exercise Program:    [x] (64869) Reviewed/Progressed HEP activities related to strengthening, flexibility, endurance, ROM of scapular, scapulothoracic and UE control with self care, reaching, carrying, lifting, house/yardwork, driving/computer work  [] (34560) Reviewed/Progressed HEP activities related to improving balance, coordination, kinesthetic sense, posture, motor skill, proprioception of scapular, scapulothoracic and UE control with self care, reaching, carrying, lifting, house/yardwork, driving/computer work      Manual Treatments:  PROM / STM / Oscillations-Mobs:  G-I, II, III, IV (PA's, Inf., Post.)  [x] (76868) Provided manual therapy to mobilize soft tissue/joints of cervical/CT, scapular GHJ and UE for the purpose of modulating pain, promoting relaxation,  increasing ROM, reducing/eliminating soft tissue swelling/inflammation/restriction, improving soft tissue extensibility and allowing for proper ROM for normal function with self care, reaching, carrying, lifting, house/yardwork, driving/computer work    Modalities:  None    Charges:  Timed Code Treatment Minutes: 40   Total Treatment Minutes: 40       [] EVAL (LOW) 77403 (typically 20 minutes face-to-face)  [] EVAL (MOD) 96157 (typically 30 minutes face-to-face)  [] EVAL (HIGH) 60034 (typically 45 minutes face-to-face)  [] RE-EVAL     [x] AG(48148) x 3     [] DRY NEEDLE 1 OR 2 MUSCLES  [] NMR (74139) x     [] DRY NEEDLE 3+ MUSCLES  [] Manual (39372) x       [] TA (85805) x     [] Mech Traction (36676)  [] ES(attended) (82338)     [] ES (un) (90643):   [] VASO (06870)  [] Other:    If BW Please Indicate Time In/Out  CPT Code Time in Time out                                   SHOULDER GOALS:  Patient stated goal: pain-free shoulder, return to PLOF  [] Progressing: [x] Met: [] Not Met: [] Adjusted    Therapist goals for Patient:   Short Term Goals: To be achieved in: 2 weeks  1. Independent in HEP and progression per patient tolerance, in order to prevent re-injury. [] Progressing: [x] Met: [] Not Met: [] Adjusted  2. Patient will have a decrease in pain to facilitate improvement in movement, function, and ADLs as indicated by Functional Deficits. [] Progressing: [x] Met: [] Not Met: [] Adjusted    Long Term Goals: To be achieved in: 6 weeks  1. Disability index score of 25% or less for the Quick DASH to assist with reaching prior level of function. [] Progressing: [x] Met: [] Not Met: [] Adjusted  2. Patient will demonstrate increased AROM to equal contralateral to allow for proper joint functioning as indicated by Functional Deficits. [] Progressing: [x] Met: [] Not Met: [] Adjusted  3. Patient will demonstrate an increase in NM recruitment/activation and overall GH and scapular strength to within n5lbs HHD or WNL for proper functional mobility as indicated by patients Functional Deficits. [] Progressing: [x] Met: [] Not Met: [] Adjusted  4. Patient will return to reaching, lifting, overhead movements, self care especially after using toilet, dressing, grooming activities without increased symptoms or restriction. [] Progressing: [x] Met: [] Not Met: [] Adjusted       HIP GOALS:  Patient stated goal: strengthen LLE, ambulate without cane for short distances  [] Progressing: [x] Met: [] Not Met: [] Adjusted    Therapist goals for Patient:   Short Term Goals: To be achieved in: 2 weeks  1. Independent in HEP and progression per patient tolerance, in order to prevent re-injury. []?? Progressing: [x]? ? Met: []?? Not Met: []?? Adjusted  2. Patient will have a decrease in pain to facilitate improvement in movement, function, and ADLs as indicated by Functional Deficits. []?? Progressing: [x]? ? Met: []?? Not Met: []?? Adjusted     Long Term Goals: To be achieved in: 4-6 weeks  1. Disability index score of 40% or less for the LEFS to assist with reaching prior level of function. [x]? ? Progressing: []?? Met: []?? Not Met: []?? Adjusted  2. Patient will demonstrate increased AROM to WFL to allow for proper joint functioning as indicated by patients Functional Deficits. []?? Progressing: [x]? ? Met: []?? Not Met: []?? Adjusted  3. Patient will demonstrate an increase in Strength to good proximal hip strength and control, within 5lb HHD in LE to allow for proper functional mobility as indicated by patients Functional Deficits. [x]? ? Progressing: []?? Met: []?? Not Met: []?? Adjusted  4. Patient will return to standing, ambulation (with AD as needed), transfers, stairs, squatting without increased symptoms or restriction. [x] Progressing: [] Met: [] Not Met: [] Adjusted      ASSESSMENT:  Good tolerance to today's session. Appropriate fatigue at conclusion. Making gradual gains in general LLE strength but she continues to present with deficient proximal hip control L>R and poor core stability. Right leg starting to catch up to left with regard to weight on leg press. Gait is still antalgic with uneven step length and wide stance, though making some improvement. Left hamstring strength remains deficient. Patient will benefit from continued physical therapy with focus on left hip rehabilitation and LE strength/balance training to improve patient's overall function and reduce risk of falls.       Return to Play: (if applicable)   []  Stage 1: Intro to Strength   []  Stage 2: Dynamic Strength and Intro to Plyometrics   []  Stage 3: Advanced Plyometrics and Intro to Throwing   []  Stage 4: Sport specific Training/Return to Sport     [] Ready to Return to Play, Meets All Above Stages   []  Not Ready for Return to Sports   Comments:      Treatment/Activity Tolerance:  [x] Patient tolerated treatment well [] Patient limited by fatique  [] Patient limited by pain  [] Patient limited by other medical complications  [] Other:     Overall Progression Towards Functional goals/ Treatment Progress Update:  [x] Patient is progressing as expected towards functional goals listed. [] Progression is slowed due to complexities/Impairments listed. [] Progression has been slowed due to co-morbidities. [] Plan just implemented, too soon to assess goals progression <30days   [] Goals require adjustment due to lack of progress  [] Patient is not progressing as expected and requires additional follow up with physician  [] Other:     Prognosis for POC: [x] Good [] Fair  [] Poor    Patient requires continued skilled intervention: [x] Yes  [] No      PLAN: Continue PT 2x/week for 4-6 weeks for shoulder strengthening/ROM if needed, with continued left hip rehabilitation to focus on ROM, proximal hip strengthening, general LLE strength training, and balance training to lessen risk of falls  [x] Continue per plan of care [] Alter current plan (see comments)  [] Plan of care initiated [] Hold pending MD visit [] Discharge    Electronically signed by: Asher Barbour PT     Note: If patient does not return for scheduled/recommended follow up visits, this note will serve as a discharge from care along with the most recent update on progress.

## 2021-06-30 ENCOUNTER — HOSPITAL ENCOUNTER (OUTPATIENT)
Dept: PHYSICAL THERAPY | Age: 76
Setting detail: THERAPIES SERIES
Discharge: HOME | End: 2021-06-30
Payer: MEDICARE

## 2021-06-30 PROCEDURE — 97140 MANUAL THERAPY 1/> REGIONS: CPT

## 2021-06-30 PROCEDURE — 97110 THERAPEUTIC EXERCISES: CPT

## 2021-06-30 NOTE — PLAN OF CARE
[facility] [address]  Phone: [phone] Fax: [phone]          Physical Therapy Re-Certification Plan of Care/MD UPDATE      Dear  Rhonda Partida,    We had the pleasure of treating the following patient for physical therapy services at [address] [place] [address]. A summary of our findings can be found in the updated assessment below. This includes our plan of care. If you have any questions or concerns regarding these findings, please do not hesitate to contact me at the office phone number checked above.   Thank you for the referral.     Physician Signature:________________________________Date:__________________  By signing above (or electronic signature), therapists plan is approved by physician    Date Range Of Visits: 20 - 21  Total Visits to Date: 48  Overall Response to Treatment:   [x]Patient is responding well to treatment and improvement is noted with regards  to goals   []Patient should continue to improve in reasonable time if they continue HEP   []Patient has plateaued and is no longer responding to skilled PT intervention    []Patient is getting worse and would benefit from return to referring MD   []Patient unable to adhere to initial POC   []Other:                   Physical Therapy Treatment Note/ Progress Report:     Date:  2021    Patient Name:  Abdias Coelho    :  1945  MRN: [id number]  Restrictions/Precautions:    Medical/Treatment Diagnosis Information:  · Diagnosis: Right shoulder adhesive capsulitis, left hip pain  · Treatment Diagnosis: M75.01, X52.181  Insurance/Certification information:  PT Insurance Information: Medicare / [facility]  Physician Information:  Referring Practitioner: Alize Lara signed (Y/N):     Date of Patient follow up with Physician: N/A      Progress Report: [x]  Yes  []  No     Date Range for reporting period:  Beginning: 21  Endin21    Progress report due (10 Rx/or 30 days whichever is less): 5/87/10     Recertification due (POC duration/ or 90 days whichever is less): 21     Visit # Insurance Allowable Auth Needed   50 ([address] [place] ) Medicare []Yes    [x]No     Pain level:  0/10 currently, c/o weakness     SUBJECTIVE:  Patient states that her regular PT visits are definitely helping to maintain her strength, as well as make small gains overall in strength and stamina. She has not felt that she has ever made this much progress with any physical therapy or exercise class in the past, mostly because of the personalized and one-on-one time she is getting in PT. Feels that it is really helping her to learn how to do the exercises properly.  States that she struggles with walking up inclines, feels that she is going backward most of the time.        OBJECTIVE:    Observation: Antalgic gait with standard cane used in RUE   Test measurements:  LEFS - 74% impairment            ROM Left Right   Hip Flexion 110 WNL   Hip Abduction WNL WNL   Hip ER 30 WNL   Hip IR WNL WNL   Hip Ext Decreased WNL   Strength  Left Right   Hip Abd 8.0 lb 12.0 lb   Hip Ext NT NT   Knee Ext 51.9 lb 51.5 lb   Knee Flex 32.6 lb  43.5 lb         RESTRICTIONS/PRECAUTIONS:      Exercises/Interventions:      Therapeutic Ex (87079)  Min: 40 Sets/sec Reps CUES/Notes   Bike 5'       Ankle theraband 4-way 1 15 green   Cybex Gastroc- SL  3 10 60#   Leg Press - SL 3 10 60#i   Cybex Leg Ext - SL 3 10 15#   Cybex HS curl - SL 3 10 25#   Bridge 10 12     3-way SLR 1 10     Hook lying LLE Ext 1 10 Cues for form   Supine heel slides +  12 Cues for form   Hook lying bilat hip add iso 5 10 Ball squeeze   Clam - side lying 5 10 No resistance   Supine clam - unilateral 10 10 Maroon band, each         BOSU lunge 10  10 each   Slide lunge - retro at ledge 1 15     Standing hip abd 2 10 orange   Marching 1 10 Fwd/bkwd  Bilat Cane for support, SBA   Heel raises - DL 5 10 yellow ball between heels   TKE 5 15 3.5pl   Side stepping 2 15 HHA, maroon above knees   Sit to stand with TRX 2 10 1 airex on seat, sink for support   Step up 3 10 8\"   Lateral step down 2 10 4\"   Standing hip 3-way kicks 5 10 Purple band for TKE   On 2\" step             Manual Intervention  (27632)  Min: 0         Shld /GH Mobs 0'   Gentle inferior glides   Post Cap mobs 0'   Prone LLLD IR   Cervical STM 7'       Manual cervical traction 4'       Hip PROM 7'       IASTM 7'   Gastroc/soleus, L   PROM MT - shoulder 0'   Flex, abd, ER, IR   Hypervolt percussion massage 0'   L gluteals             NMR re-education (02113)  Min: 0         Biodex balance 0'   LS x 2, RC   Small ERIC stance 30' 3 airex   Staggered stance 30' 3 airex   SLS with UE support 30' 3 Cues for neutral foot position   Wobble board - WS to LLE 10 10 RLE on 2\" step, manual perts   Modified SLS - LLE on airex 10 5 RLE on 6\" step   1/2 foam roll SLS - inv/ev focus 20 3 bilat UE support   Betty Scap Bio         Floor Snow angels-sliders                   Therapeutic Activity (31811)  Min:         UE throwing porgression         Dynamic UE stability         Earthquake Bar         Bodyblade                        Therapeutic Exercise and NMR EXR  [x] (78471) Provided verbal/tactile cueing for activities related to strengthening, flexibility, endurance, ROM  for improvements in scapular, scapulothoracic and UE control with self care, reaching, carrying, lifting, house/yardwork, driving/computer work. [x] (36143) Provided verbal/tactile cueing for activities related to improving balance, coordination, kinesthetic sense, posture, motor skill, proprioception  to assist with  scapular, scapulothoracic and UE control with self care, reaching, carrying, lifting, house/yardwork, driving/computer work.     Therapeutic Activities:    [] (89077 or 96873) Provided verbal/tactile cueing for activities related to improving balance, coordination, kinesthetic sense, Progressing: [x] Met: [] Not Met: [] Adjusted    Therapist goals for Patient:   Short Term Goals: To be achieved in: 2 weeks  1. Independent in HEP and progression per patient tolerance, in order to prevent re-injury. [] Progressing: [x] Met: [] Not Met: [] Adjusted  2. Patient will have a decrease in pain to facilitate improvement in movement, function, and ADLs as indicated by Functional Deficits. [] Progressing: [x] Met: [] Not Met: [] Adjusted    Long Term Goals: To be achieved in: 6 weeks  1. Disability index score of 25% or less for the Quick DASH to assist with reaching prior level of function. [] Progressing: [x] Met: [] Not Met: [] Adjusted  2. Patient will demonstrate increased AROM to equal contralateral to allow for proper joint functioning as indicated by Functional Deficits. [] Progressing: [x] Met: [] Not Met: [] Adjusted  3. Patient will demonstrate an increase in NM recruitment/activation and overall GH and scapular strength to within n5lbs HHD or WNL for proper functional mobility as indicated by patients Functional Deficits. [] Progressing: [x] Met: [] Not Met: [] Adjusted  4. Patient will return to reaching, lifting, overhead movements, self care especially after using toilet, dressing, grooming activities without increased symptoms or restriction. [] Progressing: [x] Met: [] Not Met: [] Adjusted       HIP GOALS:  Patient stated goal: strengthen LLE, ambulate without cane for short distances  [] Progressing: [x] Met: [] Not Met: [] Adjusted    Therapist goals for Patient:   Short Term Goals: To be achieved in: 2 weeks  1. Independent in HEP and progression per patient tolerance, in order to prevent re-injury. []?? Progressing: [x]? ? Met: []?? Not Met: []?? Adjusted  2. Patient will have a decrease in pain to facilitate improvement in movement, function, and ADLs as indicated by Functional Deficits. []?? Progressing: [x]? ? Met: []?? Not Met: []?? Adjusted     Long Term Goals:  To be achieved in: 4-6 weeks  1. Disability index score of 40% or less for the LEFS to assist with reaching prior level of function. [x]? ? Progressing: []?? Met: []?? Not Met: []?? Adjusted  2. Patient will demonstrate increased AROM to WFL to allow for proper joint functioning as indicated by patients Functional Deficits. []?? Progressing: [x]? ? Met: []?? Not Met: []?? Adjusted  3. Patient will demonstrate an increase in Strength to good proximal hip strength and control, within 5lb HHD in LE to allow for proper functional mobility as indicated by patients Functional Deficits. [x]? ? Progressing: []?? Met: []?? Not Met: []?? Adjusted  4. Patient will return to standing, ambulation (with AD as needed), transfers, stairs, squatting without increased symptoms or restriction. [x] Progressing: [] Met: [] Not Met: [] Adjusted      ASSESSMENT:  Patient has attended 48 physical therapy visits from 12/2/20 through 6/30/21 for treatment of right shoulder adhesive capsulitis and chronic left hip dysfunction with history of left hip hemiarthroplasty in 2017. Patient's right shoulder symptoms continue to be completely resolved. Making gradual gains in general LLE strength but she continues to present with deficient proximal hip control and poor core stability. Gait is still antalgic with uneven step length and wide stance, though making some improvement. Patient will benefit from continued physical therapy with focus on left hip rehabilitation and LE strength/balance training to improve patient's overall function and reduce risk of falls.       Return to Play: (if applicable)   []  Stage 1: Intro to Strength   []  Stage 2: Dynamic Strength and Intro to Plyometrics   []  Stage 3: Advanced Plyometrics and Intro to Throwing   []  Stage 4: Sport specific Training/Return to Sport     []  Ready to Return to Play, Meets All Above Stages   []  Not Ready for Return to Sports   Comments:      Treatment/Activity Tolerance:  [x] Patient tolerated treatment well [] Patient limited by fatique  [] Patient limited by pain  [] Patient limited by other medical complications  [] Other:     Overall Progression Towards Functional goals/ Treatment Progress Update:  [x] Patient is progressing as expected towards functional goals listed. [] Progression is slowed due to complexities/Impairments listed. [] Progression has been slowed due to co-morbidities. [] Plan just implemented, too soon to assess goals progression <30days   [] Goals require adjustment due to lack of progress  [] Patient is not progressing as expected and requires additional follow up with physician  [] Other:     Prognosis for POC: [x] Good [] Fair  [] Poor    Patient requires continued skilled intervention: [x] Yes  [] No      PLAN: Continue PT 2x/week for 8 weeks for shoulder strengthening/ROM if needed, with continued left hip rehabilitation to focus on ROM, proximal hip strengthening, general LLE strength training, and balance training to lessen risk of falls  [x] Continue per plan of care [] Alter current plan (see comments)  [] Plan of care initiated [] Hold pending MD visit [] Discharge    Electronically signed by: Lubna Mejía PT     Note: If patient does not return for scheduled/recommended follow up visits, this note will serve as a discharge from care along with the most recent update on progress.

## 2021-07-07 ENCOUNTER — HOSPITAL ENCOUNTER (OUTPATIENT)
Dept: PHYSICAL THERAPY | Age: 76
Setting detail: THERAPIES SERIES
Discharge: HOME | End: 2021-07-07
Payer: MEDICARE

## 2021-07-07 PROCEDURE — 97110 THERAPEUTIC EXERCISES: CPT

## 2021-07-07 NOTE — FLOWSHEET NOTE
[place] [address] [facility]  Phone: [phone] Fax: [phone]          Physical Therapy Treatment Note/ Progress Report:     Date:  2021    Patient Name:  Krishan Tanner    :  1945  MRN: [id number]  Restrictions/Precautions:    Medical/Treatment Diagnosis Information:  · Diagnosis: Right shoulder adhesive capsulitis, left hip pain  · Treatment Diagnosis: M75.01, B00.409  Insurance/Certification information:  [facility] Information: [facility]  Physician Information:  Referring Practitioner: Anastasia Pal of care signed (Y/N):     Date of Patient follow up with Physician: N/A      Progress Report: []  Yes  [x]  No     Date Range for reporting period:  Beginnin21  Ending:  NA    Progress report due (10 Rx/or 30 days whichever is less):      Recertification due (POC duration/ or 90 days whichever is less): 21     Visit # Insurance Allowable Auth Needed   46 ([address] [place] [address]) Medicare []Yes    [x]No     Pain level:  0/10 currently, c/o weakness     SUBJECTIVE:  Patient states that her left posterior hip was a little sore over the weekend, thinks she laid on that side for too long.  Otherwise no new complaints.        OBJECTIVE:    Observation: Antalgic gait with standard cane used in RUE   Test measurements:  LEFS - 74% impairment      RESTRICTIONS/PRECAUTIONS:      Exercises/Interventions:      Therapeutic Ex (75735)  Min: 40 Sets/sec Reps CUES/Notes   Bike 5'       Ankle theraband 4-way 1 15 green   Cybex Gastroc- SL  3 10 60#   Leg Press - SL 3 10 60#   Cybex Leg Ext - SL 3 10 15#   Cybex HS curl - SL 3 10 25#   Bridge 10 12     3-way SLR 1 10     Hook lying LLE Ext 1 10 Cues for form   Supine heel slides +  12 Cues for form   Hook lying bilat hip add iso 5 10 Ball squeeze   Clam - side lying 5 10 No resistance   Supine clam - unilateral 10 10 Maroon band, each         BOSU lunge 10  10 each   Slide lunge - retro at ledge 1 15     Standing hip abd 2 10 orange   Marching 1 10 Fwd/bkwd  Bilat Cane for support, SBA   Heel raises - DL 5 10 yellow ball between heels   TKE 5 15 3.5pl   Side stepping 2 15 HHA, maroon above knees   Sit to stand with TRX 2 10 1 airex on seat, sink for support   Step up 3 10 8\"   Lateral step down 2 10 4\"   Standing hip 3-way kicks 5 10 Purple band for TKE   On 2\" step             Manual Intervention  (66429)  Min: 0         Shld /GH Mobs 0'   Gentle inferior glides   Post Cap mobs 0'   Prone LLLD IR   Cervical STM 7'       Manual cervical traction 4'       Hip PROM 7'       IASTM 7'   Gastroc/soleus, L   PROM MT - shoulder 0'   Flex, abd, ER, IR   Hypervolt percussion massage 0'   L gluteals             NMR re-education (13152)  Min: 0         Biodex balance 0'   LS x 2, RC   Small ERIC stance 30' 3 airex   Staggered stance 30' 3 airex   SLS with UE support 30' 3 Cues for neutral foot position   Wobble board - WS to LLE 10 10 RLE on 2\" step, manual perts   Modified SLS - LLE on airex 10 5 RLE on 6\" step   1/2 foam roll SLS - inv/ev focus 20 3 bilat UE support   Betty Scap Bio         Floor Snow angels-sliders                   Therapeutic Activity (75951)  Min:         UE throwing porgression         Dynamic UE stability         Earthquake Bar         Bodyblade                        Therapeutic Exercise and NMR EXR  [x] (51722) Provided verbal/tactile cueing for activities related to strengthening, flexibility, endurance, ROM  for improvements in scapular, scapulothoracic and UE control with self care, reaching, carrying, lifting, house/yardwork, driving/computer work.     [x] (72852) Provided verbal/tactile cueing for activities related to improving balance, coordination, kinesthetic sense, posture, motor skill, proprioception  to assist with  scapular, scapulothoracic and UE control with self care, reaching, carrying, lifting, house/yardwork, driving/computer work.    Therapeutic Activities:    [] (68885 or 53031) Provided verbal/tactile cueing for activities related to improving balance, coordination, kinesthetic sense, posture, motor skill, proprioception and motor activation to allow for proper function of scapular, scapulothoracic and UE control with self care, carrying, lifting, driving/computer work.      Home Exercise Program:    [x] (11963) Reviewed/Progressed HEP activities related to strengthening, flexibility, endurance, ROM of scapular, scapulothoracic and UE control with self care, reaching, carrying, lifting, house/yardwork, driving/computer work  [] (63792) Reviewed/Progressed HEP activities related to improving balance, coordination, kinesthetic sense, posture, motor skill, proprioception of scapular, scapulothoracic and UE control with self care, reaching, carrying, lifting, house/yardwork, driving/computer work      Manual Treatments:  PROM / STM / Oscillations-Mobs:  G-I, II, III, IV (PA's, Inf., Post.)  [x] (71969) Provided manual therapy to mobilize soft tissue/joints of cervical/CT, scapular GHJ and UE for the purpose of modulating pain, promoting relaxation,  increasing ROM, reducing/eliminating soft tissue swelling/inflammation/restriction, improving soft tissue extensibility and allowing for proper ROM for normal function with self care, reaching, carrying, lifting, house/yardwork, driving/computer work    Modalities:  None    Charges:  Timed Code Treatment Minutes: 40   Total Treatment Minutes: 40       [] EVAL (LOW) 07164 (typically 20 minutes face-to-face)  [] EVAL (MOD) 89387 (typically 30 minutes face-to-face)  [] EVAL (HIGH) 71195 (typically 45 minutes face-to-face)  [] RE-EVAL     [x] VN(89948) x 3     [] DRY NEEDLE 1 OR 2 MUSCLES  [] NMR (47379) x     [] DRY NEEDLE 3+ MUSCLES  [] Manual (59784) x       [] TA (54654) x     [] Mech Traction (25979)  [] ES(attended) (11776)     [] ES (un) (56418):   [] VASO (03854)  [] Other:    If [facility] Please Indicate Time In/Out  CPT Code Time in Time out                                   SHOULDER GOALS:  Patient stated goal: pain-free shoulder, return to PLOF  [] Progressing: [x] Met: [] Not Met: [] Adjusted    Therapist goals for Patient:   Short Term Goals: To be achieved in: 2 weeks  1. Independent in HEP and progression per patient tolerance, in order to prevent re-injury. [] Progressing: [x] Met: [] Not Met: [] Adjusted  2. Patient will have a decrease in pain to facilitate improvement in movement, function, and ADLs as indicated by Functional Deficits. [] Progressing: [x] Met: [] Not Met: [] Adjusted    Long Term Goals: To be achieved in: 6 weeks  1. Disability index score of 25% or less for the Quick DASH to assist with reaching prior level of function. [] Progressing: [x] Met: [] Not Met: [] Adjusted  2. Patient will demonstrate increased AROM to equal contralateral to allow for proper joint functioning as indicated by Functional Deficits. [] Progressing: [x] Met: [] Not Met: [] Adjusted  3. Patient will demonstrate an increase in NM recruitment/activation and overall GH and scapular strength to within n5lbs HHD or WNL for proper functional mobility as indicated by patients Functional Deficits. [] Progressing: [x] Met: [] Not Met: [] Adjusted  4. Patient will return to reaching, lifting, overhead movements, self care especially after using toilet, dressing, grooming activities without increased symptoms or restriction. [] Progressing: [x] Met: [] Not Met: [] Adjusted       HIP GOALS:  Patient stated goal: strengthen LLE, ambulate without cane for short distances  [] Progressing: [x] Met: [] Not Met: [] Adjusted    Therapist goals for Patient:   Short Term Goals: To be achieved in: 2 weeks  1. Independent in HEP and progression per patient tolerance, in order to prevent re-injury. []?? Progressing: [x]? ? Met: []?? Not Met: []?? Adjusted  2. Patient will have a decrease in pain to facilitate improvement in movement, function, and ADLs as indicated by Functional Deficits. []?? Progressing: [x]? ? Met: []?? Not Met: []?? Adjusted     Long Term Goals: To be achieved in: 4-6 weeks  1. Disability index score of 40% or less for the LEFS to assist with reaching prior level of function. [x]? ? Progressing: []?? Met: []?? Not Met: []?? Adjusted  2. Patient will demonstrate increased AROM to WFL to allow for proper joint functioning as indicated by patients Functional Deficits. []?? Progressing: [x]? ? Met: []?? Not Met: []?? Adjusted  3. Patient will demonstrate an increase in Strength to good proximal hip strength and control, within 5lb HHD in LE to allow for proper functional mobility as indicated by patients Functional Deficits. [x]? ? Progressing: []?? Met: []?? Not Met: []?? Adjusted  4. Patient will return to standing, ambulation (with AD as needed), transfers, stairs, squatting without increased symptoms or restriction. [x] Progressing: [] Met: [] Not Met: [] Adjusted      ASSESSMENT:  Good tolerance to today's session. Appropriate fatigue at conclusion. Making gradual gains in general LLE strength but she continues to present with deficient proximal hip control L>R and poor core stability. Gait is still impaired but more ataxia at this time than antalgia, step length more symmetrical but still wide stance and short step length. Fatigues quickly with introduction to new exercises. Patient will benefit from continued physical therapy with focus on left hip rehabilitation and LE strength and balance training to improve patient's overall function and reduce risk of falls.       Return to Play: (if applicable)   []  Stage 1: Intro to Strength   []  Stage 2: Dynamic Strength and Intro to Plyometrics   []  Stage 3: Advanced Plyometrics and Intro to Throwing   []  Stage 4: Sport specific Training/Return to Sport     []  Ready to Return to Play, Meets All Above Stages   []  Not Ready for Return to Sports   Comments:      Treatment/Activity Tolerance:  [x] Patient tolerated treatment well [] Patient limited by fatique  [] Patient limited by pain  [] Patient limited by other medical complications  [] Other:     Overall Progression Towards Functional goals/ Treatment Progress Update:  [x] Patient is progressing as expected towards functional goals listed. [] Progression is slowed due to complexities/Impairments listed. [] Progression has been slowed due to co-morbidities. [] Plan just implemented, too soon to assess goals progression <30days   [] Goals require adjustment due to lack of progress  [] Patient is not progressing as expected and requires additional follow up with physician  [] Other:     Prognosis for POC: [x] Good [] Fair  [] Poor    Patient requires continued skilled intervention: [x] Yes  [] No      PLAN: Continue PT 2x/week for 8 weeks for shoulder strengthening/ROM if needed, with continued left hip rehabilitation to focus on ROM, proximal hip strengthening, general LLE strength training, and balance training to lessen risk of falls  [x] Continue per plan of care [] Alter current plan (see comments)  [] Plan of care initiated [] Hold pending MD visit [] Discharge    Electronically signed by: Emma Owens PT     Note: If patient does not return for scheduled/recommended follow up visits, this note will serve as a discharge from care along with the most recent update on progress.

## 2021-07-09 ENCOUNTER — HOSPITAL ENCOUNTER (OUTPATIENT)
Dept: PHYSICAL THERAPY | Age: 76
Setting detail: THERAPIES SERIES
Discharge: HOME | End: 2021-07-09
Payer: MEDICARE

## 2021-07-09 PROCEDURE — 97110 THERAPEUTIC EXERCISES: CPT

## 2021-07-09 NOTE — FLOWSHEET NOTE
[facility] [address] [facility]  Phone: [phone] Fax: [phone]          Physical Therapy Treatment Note/ Progress Report:     Date:  2021    Patient Name:  Sonja Pantoja    :  1945  MRN: [id number]  Restrictions/Precautions:    Medical/Treatment Diagnosis Information:  · Diagnosis: Right shoulder adhesive capsulitis, left hip pain  · Treatment Diagnosis: M75.01, K14.686  Insurance/Certification information:  PT Insurance Information: Medicare / [facility]  Physician Information:  Referring Practitioner: Amalia Roa of care signed (Y/N):     Date of Patient follow up with Physician: N/A      Progress Report: []  Yes  [x]  No     Date Range for reporting period:  Beginnin21  Ending:  NA    Progress report due (10 Rx/or 30 days whichever is less): 77     Recertification due (POC duration/ or 90 days whichever is less): 21     Visit # Insurance Allowable Auth Needed   46 ([address] [place] 22) Medicare []Yes    [x]No     Pain level:  0/10 currently, c/o weakness     SUBJECTIVE:  Patient reports that she was just noticing how much less she needs her cane while ambulating during the day.  Only time she really needs it now is when she is coming out of therapy due to fatigue, and even that is starting to get better.        OBJECTIVE:    Observation: Antalgic gait with standard cane used in RUE   Test measurements:  LEFS - 74% impairment      RESTRICTIONS/PRECAUTIONS:      Exercises/Interventions:      Therapeutic Ex (14877)  Min: 45 Sets/sec Reps CUES/Notes   Bike 5'       Treadmill 3'  1.4 mph amb, bilat UE support   Cybex Gastroc- SL  3 10 60#   Leg Press - SL 3 10 65#   Cybex Leg Ext - SL 3 10 15#   Cybex HS curl - SL 3 10 25#   Bridge 10 12     3-way SLR 1 10     Hook lying LLE Ext 1 10 Cues for form   Supine heel slides +  12 Cues for form   Hook lying bilat hip add iso 5 10 Ball squeeze   Clam - side lying 5 10 No resistance   Supine clam - unilateral 10 10 Maroon band, each         BOSU lunge 10  10 each   Slide lunge - retro at ledge 1 15     Standing hip abd 2 10 orange   Marching 1 10 Fwd/bkwd  Bilat Cane for support, SBA   Heel raises - DL 5 10 yellow ball between heels   TKE 5 15 3.5pl   Side stepping 2 15 HHA, maroon above knees   Sit to stand with TRX 2 10 1 airex on seat, sink for support   Step up 3 10 8\"   Lateral step down 2 10 4\"   Standing hip 3-way kicks 5 10 Purple band for TKE   On 2\" step             Manual Intervention  (08381)  Min: 0         Shld /GH Mobs 0'   Gentle inferior glides   Post Cap mobs 0'   Prone LLLD IR   Cervical STM 7'       Manual cervical traction 4'       Hip PROM 7'       IASTM 7'   Gastroc/soleus, L   PROM MT - shoulder 0'   Flex, abd, ER, IR   Hypervolt percussion massage 0'   L gluteals             NMR re-education (14742)  Min: 0         Biodex balance 0'   LS x 2, RC   Small ERIC stance 30' 3 airex   Staggered stance 30' 3 airex   SLS with UE support 30' 3 Cues for neutral foot position   Wobble board - WS to LLE 10 10 RLE on 2\" step, manual perts   Modified SLS - LLE on airex 10 5 RLE on 6\" step   1/2 foam roll SLS - inv/ev focus 20 3 bilat UE support   Betty Scap Bio         Floor Snow angels-sliders                   Therapeutic Activity (04568)  Min:         UE throwing porgression         Dynamic UE stability         Earthquake Bar         Bodyblade                        Therapeutic Exercise and NMR EXR  [x] (31593) Provided verbal/tactile cueing for activities related to strengthening, flexibility, endurance, ROM  for improvements in scapular, scapulothoracic and UE control with self care, reaching, carrying, lifting, house/yardwork, driving/computer work.     [x] (85109) Provided verbal/tactile cueing for activities related to improving balance, coordination, kinesthetic sense, posture, motor skill, proprioception  to assist with  scapular, scapulothoracic and UE control with self care, reaching, carrying, lifting, house/yardwork, driving/computer work. Therapeutic Activities:    [] (60971 or 56773) Provided verbal/tactile cueing for activities related to improving balance, coordination, kinesthetic sense, posture, motor skill, proprioception and motor activation to allow for proper function of scapular, scapulothoracic and UE control with self care, carrying, lifting, driving/computer work.      Home Exercise Program:    [x] (99503) Reviewed/Progressed HEP activities related to strengthening, flexibility, endurance, ROM of scapular, scapulothoracic and UE control with self care, reaching, carrying, lifting, house/yardwork, driving/computer work  [] (44856) Reviewed/Progressed HEP activities related to improving balance, coordination, kinesthetic sense, posture, motor skill, proprioception of scapular, scapulothoracic and UE control with self care, reaching, carrying, lifting, house/yardwork, driving/computer work      Manual Treatments:  PROM / STM / Oscillations-Mobs:  G-I, II, III, IV (PA's, Inf., Post.)  [x] (13286) Provided manual therapy to mobilize soft tissue/joints of cervical/CT, scapular GHJ and UE for the purpose of modulating pain, promoting relaxation,  increasing ROM, reducing/eliminating soft tissue swelling/inflammation/restriction, improving soft tissue extensibility and allowing for proper ROM for normal function with self care, reaching, carrying, lifting, house/yardwork, driving/computer work    Modalities:  None    Charges:  Timed Code Treatment Minutes: 45   Total Treatment Minutes: 45       [] EVAL (LOW) 56096 (typically 20 minutes face-to-face)  [] EVAL (MOD) 97991 (typically 30 minutes face-to-face)  [] EVAL (HIGH) 16291 (typically 45 minutes face-to-face)  [] RE-EVAL     [x] XT(85987) x 3     [] DRY NEEDLE 1 OR 2 MUSCLES  [] NMR (24071) x     [] DRY NEEDLE 3+ MUSCLES  [] Manual (31726) x       [] TA (29487) x     [] Mech Traction (99805)  [] ES(attended) (61891)     [] ES (un) (60713):   [] VASO (75849)  [] Other:    If [facility] Please Indicate Time In/Out  CPT Code Time in Time out                                   SHOULDER GOALS:  Patient stated goal: pain-free shoulder, return to PLOF  [] Progressing: [x] Met: [] Not Met: [] Adjusted    Therapist goals for Patient:   Short Term Goals: To be achieved in: 2 weeks  1. Independent in HEP and progression per patient tolerance, in order to prevent re-injury. [] Progressing: [x] Met: [] Not Met: [] Adjusted  2. Patient will have a decrease in pain to facilitate improvement in movement, function, and ADLs as indicated by Functional Deficits. [] Progressing: [x] Met: [] Not Met: [] Adjusted    Long Term Goals: To be achieved in: 6 weeks  1. Disability index score of 25% or less for the Quick DASH to assist with reaching prior level of function. [] Progressing: [x] Met: [] Not Met: [] Adjusted  2. Patient will demonstrate increased AROM to equal contralateral to allow for proper joint functioning as indicated by Functional Deficits. [] Progressing: [x] Met: [] Not Met: [] Adjusted  3. Patient will demonstrate an increase in NM recruitment/activation and overall GH and scapular strength to within n5lbs HHD or WNL for proper functional mobility as indicated by patients Functional Deficits. [] Progressing: [x] Met: [] Not Met: [] Adjusted  4. Patient will return to reaching, lifting, overhead movements, self care especially after using toilet, dressing, grooming activities without increased symptoms or restriction. [] Progressing: [x] Met: [] Not Met: [] Adjusted       HIP GOALS:  Patient stated goal: strengthen LLE, ambulate without cane for short distances  [] Progressing: [x] Met: [] Not Met: [] Adjusted    Therapist goals for Patient:   Short Term Goals: To be achieved in: 2 weeks  1. Independent in HEP and progression per patient tolerance, in order to prevent re-injury. []?? Progressing: [x]? ? Met: []?? Not Met: []?? Adjusted  2. Patient will have a decrease in pain to facilitate improvement in movement, function, and ADLs as indicated by Functional Deficits. []?? Progressing: [x]? ? Met: []?? Not Met: []?? Adjusted     Long Term Goals: To be achieved in: 4-6 weeks  1. Disability index score of 40% or less for the LEFS to assist with reaching prior level of function. [x]? ? Progressing: []?? Met: []?? Not Met: []?? Adjusted  2. Patient will demonstrate increased AROM to WFL to allow for proper joint functioning as indicated by patients Functional Deficits. []?? Progressing: [x]? ? Met: []?? Not Met: []?? Adjusted  3. Patient will demonstrate an increase in Strength to good proximal hip strength and control, within 5lb HHD in LE to allow for proper functional mobility as indicated by patients Functional Deficits. [x]? ? Progressing: []?? Met: []?? Not Met: []?? Adjusted  4. Patient will return to standing, ambulation (with AD as needed), transfers, stairs, squatting without increased symptoms or restriction. [x] Progressing: [] Met: [] Not Met: [] Adjusted      ASSESSMENT:  Good tolerance to today's session. Appropriate fatigue at conclusion. Greater steadiness while ambulating on the treadmill today with more normal ERIC and better left foot control. Making gradual gains in general LLE strength but she continues to present with deficient proximal hip control L>R and poor core stability. Gait is still impaired but more ataxia at this time than antalgia, step length more symmetrical but still wide stance and short step length. Fatigues quickly with introduction to new exercises. Patient will benefit from continued physical therapy with focus on left hip rehabilitation and LE strength and balance training to improve patient's overall function and reduce risk of falls.       Return to Play: (if applicable)   []  Stage 1: Intro to Strength   []  Stage 2: Dynamic Strength and Intro to Plyometrics   []  Stage 3: Advanced Plyometrics and Intro to Throwing   []  Stage 4: Sport specific Training/Return to Sport     []  Ready to Return to Play, [employer]   []  Not Ready for Return to Sports   Comments:      Treatment/Activity Tolerance:  [x] Patient tolerated treatment well [] Patient limited by fatique  [] Patient limited by pain  [] Patient limited by other medical complications  [] Other:     Overall Progression Towards Functional goals/ Treatment Progress Update:  [x] Patient is progressing as expected towards functional goals listed. [] Progression is slowed due to complexities/Impairments listed. [] Progression has been slowed due to co-morbidities. [] Plan just implemented, too soon to assess goals progression <30days   [] Goals require adjustment due to lack of progress  [] Patient is not progressing as expected and requires additional follow up with physician  [] Other:     Prognosis for POC: [x] Good [] Fair  [] Poor    Patient requires continued skilled intervention: [x] Yes  [] No      PLAN: Continue PT 2x/week for 8 weeks for shoulder strengthening/ROM if needed, with continued left hip rehabilitation to focus on ROM, proximal hip strengthening, general LLE strength training, and balance training to lessen risk of falls  [x] Continue per plan of care [] Alter current plan (see comments)  [] Plan of care initiated [] Hold pending MD visit [] Discharge    Electronically signed by: Regina Oleary PT     Note: If patient does not return for scheduled/recommended follow up visits, this note will serve as a discharge from care along with the most recent update on progress.

## 2021-07-12 ENCOUNTER — HOSPITAL ENCOUNTER (OUTPATIENT)
Dept: PHYSICAL THERAPY | Age: 76
Setting detail: THERAPIES SERIES
Discharge: HOME | End: 2021-07-12
Payer: MEDICARE

## 2021-07-12 PROCEDURE — 97110 THERAPEUTIC EXERCISES: CPT

## 2021-07-12 NOTE — FLOWSHEET NOTE
[place] [address]  Phone: [phone] Fax: [phone]          Physical Therapy Treatment Note/ Progress Report:     Date:  2021    Patient Name:  Yue Che    :  1945  MRN: [id number]  Restrictions/Precautions:    Medical/Treatment Diagnosis Information:  · Diagnosis: Right shoulder adhesive capsulitis, left hip pain  · Treatment Diagnosis: M75.01, U80.745  Insurance/Certification information:  PT Insurance Information: Medicare / [facility]  Physician Information:  Referring Practitioner: Milana Rodríguez of care signed (Y/N):     Date of Patient follow up with Physician: N/A      Progress Report: []  Yes  [x]  No     Date Range for reporting period:  Beginnin21  Ending:  NA    Progress report due (10 Rx/or 30 days whichever is less):      Recertification due (POC duration/ or 90 days whichever is less): 21     Visit # Insurance Allowable Auth Needed   48 ([address] [place] [address]) Medicare []Yes    [x]No     Pain level:  0/10 currently, c/o weakness     SUBJECTIVE:  Patient reports that she has been doing well. Feeling steady without use of cane.  Only brought cane in just in case of fatigue at conclusion       OBJECTIVE:    Observation: Antalgic gait with standard cane used in RUE   Test measurements:  LEFS - 74% impairment      RESTRICTIONS/PRECAUTIONS:      Exercises/Interventions:      Therapeutic Ex (79762)  Min: 45 Sets/sec Reps CUES/Notes   Bike 5'       Treadmill 3'  1.4 mph amb, bilat UE support   Cybex Gastroc- SL  3 10 60#   Leg Press - SL 3 10 65#   Cybex Leg Ext - SL 3 10 15#   Cybex HS curl - SL 3 10 25#   Bridge 10 12     3-way SLR 1 10     Hook lying LLE Ext 1 10 Cues for form   Supine heel slides +  12 Cues for form   Hook lying bilat hip add iso 5 10 Ball squeeze   Clam - side lying 5 10 No resistance   Supine clam - unilateral 10 10 Maroon band, each         BOSU lunge 10  10 each   Slide lunge - retro at ledge 1 15     Standing hip abd 2 10 orange   Marching 1 10 Fwd/bkwd  Bilat Cane for support, SBA   Heel raises - DL 5 10 yellow ball between heels   TKE 5 15 3.5pl   Side stepping 2 15 HHA, maroon above knees   Sit to stand with TRX 2 10 1 airex on seat, sink for support   Step up 3 10 8\"   Lateral step down 2 10 4\"   Standing hip 3-way kicks 5 10 Purple band for TKE   On 2\" step             Manual Intervention  (89334)  Min: 0         Shld /GH Mobs 0'   Gentle inferior glides   Post Cap mobs 0'   Prone LLLD IR   Cervical STM 7'       Manual cervical traction 4'       Hip PROM 7'       IASTM 7'   Gastroc/soleus, L   PROM MT - shoulder 0'   Flex, abd, ER, IR   Hypervolt percussion massage 0'   L gluteals             NMR re-education (87033)  Min: 0         Biodex balance 0'   LS x 2, RC   Small ERIC stance 30' 3 airex   Staggered stance 30' 3 airex   SLS with UE support 30' 3 Cues for neutral foot position   Wobble board - WS to LLE 10 10 RLE on 2\" step, manual perts   Modified SLS - LLE on airex 10 5 RLE on 6\" step   1/2 foam roll SLS - inv/ev focus 20 3 bilat UE support   Betty Scap Bio         Floor Snow angels-sliders                   Therapeutic Activity (64614)  Min:         UE throwing porgression         Dynamic UE stability         Earthquake Bar         Bodyblade                        Therapeutic Exercise and NMR EXR  [x] (27756) Provided verbal/tactile cueing for activities related to strengthening, flexibility, endurance, ROM  for improvements in scapular, scapulothoracic and UE control with self care, reaching, carrying, lifting, house/yardwork, driving/computer work.     [x] (40421) Provided verbal/tactile cueing for activities related to improving balance, coordination, kinesthetic sense, posture, motor skill, proprioception  to assist with  scapular, scapulothoracic and UE control with self care, reaching, carrying, lifting, house/yardwork, driving/computer work.    Therapeutic Activities:    [] (55650 or 80814) Provided verbal/tactile cueing for activities related to improving balance, coordination, kinesthetic sense, posture, motor skill, proprioception and motor activation to allow for proper function of scapular, scapulothoracic and UE control with self care, carrying, lifting, driving/computer work.      Home Exercise Program:    [x] (99824) Reviewed/Progressed HEP activities related to strengthening, flexibility, endurance, ROM of scapular, scapulothoracic and UE control with self care, reaching, carrying, lifting, house/yardwork, driving/computer work  [] (97435) Reviewed/Progressed HEP activities related to improving balance, coordination, kinesthetic sense, posture, motor skill, proprioception of scapular, scapulothoracic and UE control with self care, reaching, carrying, lifting, house/yardwork, driving/computer work      Manual Treatments:  PROM / STM / Oscillations-Mobs:  G-I, II, III, IV (PA's, Inf., Post.)  [x] (01693) Provided manual therapy to mobilize soft tissue/joints of cervical/CT, scapular GHJ and UE for the purpose of modulating pain, promoting relaxation,  increasing ROM, reducing/eliminating soft tissue swelling/inflammation/restriction, improving soft tissue extensibility and allowing for proper ROM for normal function with self care, reaching, carrying, lifting, house/yardwork, driving/computer work    Modalities:  None    Charges:  Timed Code Treatment Minutes: 45   Total Treatment Minutes: 45       [] EVAL (LOW) 01856 (typically 20 minutes face-to-face)  [] EVAL (MOD) 24807 (typically 30 minutes face-to-face)  [] EVAL (HIGH) 31398 (typically 45 minutes face-to-face)  [] RE-EVAL     [x] LD(45855) x 3     [] DRY NEEDLE 1 OR 2 MUSCLES  [] NMR (87699) x     [] DRY NEEDLE 3+ MUSCLES  [] Manual (19754) x       [] TA (20131) x     [] Mech Traction (81460)  [] ES(attended) (44558)     [] ES (un) (93735):   [] VASO (86525)  [] Other:    If [facility] Please Indicate Time In/Out  CPT Code Time in Time out                                   SHOULDER GOALS:  Patient stated goal: pain-free shoulder, return to PLOF  [] Progressing: [x] Met: [] Not Met: [] Adjusted    Therapist goals for Patient:   Short Term Goals: To be achieved in: 2 weeks  1. Independent in HEP and progression per patient tolerance, in order to prevent re-injury. [] Progressing: [x] Met: [] Not Met: [] Adjusted  2. Patient will have a decrease in pain to facilitate improvement in movement, function, and ADLs as indicated by Functional Deficits. [] Progressing: [x] Met: [] Not Met: [] Adjusted    Long Term Goals: To be achieved in: 6 weeks  1. Disability index score of 25% or less for the Quick DASH to assist with reaching prior level of function. [] Progressing: [x] Met: [] Not Met: [] Adjusted  2. Patient will demonstrate increased AROM to equal contralateral to allow for proper joint functioning as indicated by Functional Deficits. [] Progressing: [x] Met: [] Not Met: [] Adjusted  3. Patient will demonstrate an increase in NM recruitment/activation and overall GH and scapular strength to within n5lbs HHD or WNL for proper functional mobility as indicated by patients Functional Deficits. [] Progressing: [x] Met: [] Not Met: [] Adjusted  4. Patient will return to reaching, lifting, overhead movements, self care especially after using toilet, dressing, grooming activities without increased symptoms or restriction. [] Progressing: [x] Met: [] Not Met: [] Adjusted       HIP GOALS:  Patient stated goal: strengthen LLE, ambulate without cane for short distances  [] Progressing: [x] Met: [] Not Met: [] Adjusted    Therapist goals for Patient:   Short Term Goals: To be achieved in: 2 weeks  1. Independent in HEP and progression per patient tolerance, in order to prevent re-injury. []?? Progressing: [x]? ? Met: []?? Not Met: []?? Adjusted  2. Patient will have a decrease in pain to facilitate improvement in movement, function, and ADLs as indicated by Functional Deficits. []?? Progressing: [x]? ? Met: []?? Not Met: []?? Adjusted     Long Term Goals: To be achieved in: 4-6 weeks  1. Disability index score of 40% or less for the LEFS to assist with reaching prior level of function. [x]? ? Progressing: []?? Met: []?? Not Met: []?? Adjusted  2. Patient will demonstrate increased AROM to WFL to allow for proper joint functioning as indicated by patients Functional Deficits. []?? Progressing: [x]? ? Met: []?? Not Met: []?? Adjusted  3. Patient will demonstrate an increase in Strength to good proximal hip strength and control, within 5lb HHD in LE to allow for proper functional mobility as indicated by patients Functional Deficits. [x]? ? Progressing: []?? Met: []?? Not Met: []?? Adjusted  4. Patient will return to standing, ambulation (with AD as needed), transfers, stairs, squatting without increased symptoms or restriction. [x] Progressing: [] Met: [] Not Met: [] Adjusted      ASSESSMENT:  Good tolerance to today's session. Appropriate fatigue at conclusion. Fatigues quickly with LLE SLS. Making gradual gains in general LLE strength but she continues to present with deficient proximal hip control L>R and poor core stability. Step length more symmetrical but still wide stance and short step length. .Patient will benefit from continued physical therapy with focus on left hip rehabilitation and LE strength and balance training to improve patient's overall function and reduce risk of falls.       Return to Play: (if applicable)   []  Stage 1: Intro to Strength   []  Stage 2: Dynamic Strength and Intro to Plyometrics   []  Stage 3: Advanced Plyometrics and Intro to Throwing   []  Stage 4: Sport specific Training/Return to Sport     []  Ready to Return to Play, [employer]   []  Not Ready for Return to Sports   Comments:      Treatment/Activity Tolerance:  [x] Patient tolerated treatment well [] Patient limited by fatique  [] Patient limited by pain  [] Patient limited by other medical complications  [] Other:     Overall Progression Towards Functional goals/ Treatment Progress Update:  [x] Patient is progressing as expected towards functional goals listed. [] Progression is slowed due to complexities/Impairments listed. [] Progression has been slowed due to co-morbidities. [] Plan just implemented, too soon to assess goals progression <30days   [] Goals require adjustment due to lack of progress  [] Patient is not progressing as expected and requires additional follow up with physician  [] Other:     Prognosis for POC: [x] Good [] Fair  [] Poor    Patient requires continued skilled intervention: [x] Yes  [] No      PLAN: Continue PT 2x/week for 8 weeks for shoulder strengthening/ROM if needed, with continued left hip rehabilitation to focus on ROM, proximal hip strengthening, general LLE strength training, and balance training to lessen risk of falls  [x] Continue per plan of care [] Alter current plan (see comments)  [] Plan of care initiated [] Hold pending MD visit [] Discharge    Electronically signed by: Chris Marte PTA     Note: If patient does not return for scheduled/recommended follow up visits, this note will serve as a discharge from care along with the most recent update on progress.

## 2021-07-14 ENCOUNTER — HOSPITAL ENCOUNTER (OUTPATIENT)
Dept: PHYSICAL THERAPY | Age: 76
Setting detail: THERAPIES SERIES
Discharge: HOME | End: 2021-07-14
Payer: MEDICARE

## 2021-07-14 PROCEDURE — 97110 THERAPEUTIC EXERCISES: CPT

## 2021-07-14 NOTE — FLOWSHEET NOTE
Supine clam - unilateral 10 10 Maroon band, each         BOSU lunge 10  10 each   Slide lunge - retro at ledge 1 15     Standing hip abd 2 10 orange   Marching 1 10 Fwd/bkwd  Bilat Cane for support, SBA   Heel raises - DL 5 10 yellow ball between heels   TKE 5 15 3.5pl   Side stepping 2 15 HHA, maroon above knees   Sit to stand with TRX 2 10 1 airex on seat, sink for support   Step up 3 10 8\"   Lateral step down 2 10 4\"   Standing hip 3-way kicks 5 10 Purple band for TKE   On 2\" step             Manual Intervention  (98986)  Min: 0         Shld /GH Mobs 0'   Gentle inferior glides   Post Cap mobs 0'   Prone LLLD IR   Cervical STM 7'       Manual cervical traction 4'       Hip PROM 7'       IASTM 7'   Gastroc/soleus, L   PROM MT - shoulder 0'   Flex, abd, ER, IR   Hypervolt percussion massage 0'   L gluteals             NMR re-education (29955)  Min: 0         Biodex balance 0'   LS x 2, RC   Small ERIC stance 30' 3 airex   Staggered stance 30' 3 airex   SLS with UE support 30' 3 Cues for neutral foot position   Wobble board - WS to LLE 10 10 RLE on 2\" step, manual perts   Modified SLS - LLE on airex 10 5 RLE on 6\" step   1/2 foam roll SLS - inv/ev focus 20 3 bilat UE support   Betty Scap Bio         Floor Snow angels-sliders                   Therapeutic Activity (10438)  Min:         UE throwing porgression         Dynamic UE stability         Earthquake Bar         Bodyblade                        Therapeutic Exercise and NMR EXR  [x] (35576) Provided verbal/tactile cueing for activities related to strengthening, flexibility, endurance, ROM  for improvements in scapular, scapulothoracic and UE control with self care, reaching, carrying, lifting, house/yardwork, driving/computer work.     [x] (85524) Provided verbal/tactile cueing for activities related to improving balance, coordination, kinesthetic sense, posture, motor skill, proprioception  to assist with  scapular, scapulothoracic and UE control with self care, reaching, carrying, lifting, house/yardwork, driving/computer work. Therapeutic Activities:    [] (66634 or 86353) Provided verbal/tactile cueing for activities related to improving balance, coordination, kinesthetic sense, posture, motor skill, proprioception and motor activation to allow for proper function of scapular, scapulothoracic and UE control with self care, carrying, lifting, driving/computer work.      Home Exercise Program:    [x] (57275) Reviewed/Progressed HEP activities related to strengthening, flexibility, endurance, ROM of scapular, scapulothoracic and UE control with self care, reaching, carrying, lifting, house/yardwork, driving/computer work  [] (39108) Reviewed/Progressed HEP activities related to improving balance, coordination, kinesthetic sense, posture, motor skill, proprioception of scapular, scapulothoracic and UE control with self care, reaching, carrying, lifting, house/yardwork, driving/computer work      Manual Treatments:  PROM / STM / Oscillations-Mobs:  G-I, II, III, IV (PA's, Inf., Post.)  [x] (31925) Provided manual therapy to mobilize soft tissue/joints of cervical/CT, scapular GHJ and UE for the purpose of modulating pain, promoting relaxation,  increasing ROM, reducing/eliminating soft tissue swelling/inflammation/restriction, improving soft tissue extensibility and allowing for proper ROM for normal function with self care, reaching, carrying, lifting, house/yardwork, driving/computer work    Modalities:  None    Charges:  Timed Code Treatment Minutes: 45   Total Treatment Minutes: 45       [] EVAL (LOW) 64685 (typically 20 minutes face-to-face)  [] EVAL (MOD) 31556 (typically 30 minutes face-to-face)  [] EVAL (HIGH) 00686 (typically 45 minutes face-to-face)  [] RE-EVAL     [x] GC(30076) x 3     [] DRY NEEDLE 1 OR 2 MUSCLES  [] NMR (23642) x     [] DRY NEEDLE 3+ MUSCLES  [] Manual (34755) x       [] TA (07457) x     [] Mech Traction (76391)  [] ES(attended) (32550)     [] ES (un) (89141):   [] VASO (59164)  [] Other:    If [facility] Please Indicate Time In/Out  CPT Code Time in Time out                                   SHOULDER GOALS:  Patient stated goal: pain-free shoulder, return to PLOF  [] Progressing: [x] Met: [] Not Met: [] Adjusted    Therapist goals for Patient:   Short Term Goals: To be achieved in: 2 weeks  1. Independent in HEP and progression per patient tolerance, in order to prevent re-injury. [] Progressing: [x] Met: [] Not Met: [] Adjusted  2. Patient will have a decrease in pain to facilitate improvement in movement, function, and ADLs as indicated by Functional Deficits. [] Progressing: [x] Met: [] Not Met: [] Adjusted    Long Term Goals: To be achieved in: 6 weeks  1. Disability index score of 25% or less for the Quick DASH to assist with reaching prior level of function. [] Progressing: [x] Met: [] Not Met: [] Adjusted  2. Patient will demonstrate increased AROM to equal contralateral to allow for proper joint functioning as indicated by Functional Deficits. [] Progressing: [x] Met: [] Not Met: [] Adjusted  3. Patient will demonstrate an increase in NM recruitment/activation and overall GH and scapular strength to within n5lbs HHD or WNL for proper functional mobility as indicated by patients Functional Deficits. [] Progressing: [x] Met: [] Not Met: [] Adjusted  4. Patient will return to reaching, lifting, overhead movements, self care especially after using toilet, dressing, grooming activities without increased symptoms or restriction. [] Progressing: [x] Met: [] Not Met: [] Adjusted       HIP GOALS:  Patient stated goal: strengthen LLE, ambulate without cane for short distances  [] Progressing: [x] Met: [] Not Met: [] Adjusted    Therapist goals for Patient:   Short Term Goals: To be achieved in: 2 weeks  1. Independent in HEP and progression per patient tolerance, in order to prevent re-injury. []?? Progressing: [x]? ? Met: []?? Not Met: []?? Adjusted  2. Patient will have a decrease in pain to facilitate improvement in movement, function, and ADLs as indicated by Functional Deficits. []?? Progressing: [x]? ? Met: []?? Not Met: []?? Adjusted     Long Term Goals: To be achieved in: 4-6 weeks  1. Disability index score of 40% or less for the LEFS to assist with reaching prior level of function. [x]? ? Progressing: []?? Met: []?? Not Met: []?? Adjusted  2. Patient will demonstrate increased AROM to WFL to allow for proper joint functioning as indicated by patients Functional Deficits. []?? Progressing: [x]? ? Met: []?? Not Met: []?? Adjusted  3. Patient will demonstrate an increase in Strength to good proximal hip strength and control, within 5lb HHD in LE to allow for proper functional mobility as indicated by patients Functional Deficits. [x]? ? Progressing: []?? Met: []?? Not Met: []?? Adjusted  4. Patient will return to standing, ambulation (with AD as needed), transfers, stairs, squatting without increased symptoms or restriction. [x] Progressing: [] Met: [] Not Met: [] Adjusted      ASSESSMENT:  Good tolerance to today's session. Appropriate fatigue at conclusion. Fatigues quickly with LLE SLS. Making gradual gains in general LLE strength but she continues to present with deficient proximal hip control L>R and poor core stability. Step length more symmetrical but still wide stance and short step length. .Patient will benefit from continued physical therapy with focus on left hip rehabilitation and LE strength and balance training to improve patient's overall function and reduce risk of falls.       Return to Play: (if applicable)   []  Stage 1: Intro to Strength   []  Stage 2: Dynamic Strength and Intro to Plyometrics   []  Stage 3: Advanced Plyometrics and Intro to Throwing   []  Stage 4: Sport specific Training/Return to Sport     []  Ready to Return to Play, [employer]   []  Not Ready for Return to Sports   Comments: Treatment/Activity Tolerance:  [x] Patient tolerated treatment well [] Patient limited by fatique  [] Patient limited by pain  [] Patient limited by other medical complications  [] Other:     Overall Progression Towards Functional goals/ Treatment Progress Update:  [x] Patient is progressing as expected towards functional goals listed. [] Progression is slowed due to complexities/Impairments listed. [] Progression has been slowed due to co-morbidities. [] Plan just implemented, too soon to assess goals progression <30days   [] Goals require adjustment due to lack of progress  [] Patient is not progressing as expected and requires additional follow up with physician  [] Other:     Prognosis for POC: [x] Good [] Fair  [] Poor    Patient requires continued skilled intervention: [x] Yes  [] No      PLAN: Continue PT 2x/week for 8 weeks for shoulder strengthening/ROM if needed, with continued left hip rehabilitation to focus on ROM, proximal hip strengthening, general LLE strength training, and balance training to lessen risk of falls  [x] Continue per plan of care [] Alter current plan (see comments)  [] Plan of care initiated [] Hold pending MD visit [] Discharge    Electronically signed by: Alec Louie PT     Note: If patient does not return for scheduled/recommended follow up visits, this note will serve as a discharge from care along with the most recent update on progress.

## 2021-07-19 ENCOUNTER — HOSPITAL ENCOUNTER (OUTPATIENT)
Dept: PHYSICAL THERAPY | Age: 76
Setting detail: THERAPIES SERIES
Discharge: HOME | End: 2021-07-19
Payer: MEDICARE

## 2021-07-19 NOTE — FLOWSHEET NOTE
[facility]    [facility]  Cancellation/No-show Note  Patient Name:  Chantelle Briseno  :  1945   Date:  2021  Cancelled visits to date: 7  No-shows to date: 0    For today's appointment patient:  [x]  Cancelled  []  Rescheduled appointment  []  No-show     Reason given by patient:  [x]  Patient ill  []  Conflicting appointment   []  No transportation    []  Conflict with work  []  No reason given  []  Other:     Comments:      Electronically signed by:  Carmen Umana, PT

## 2021-07-26 ENCOUNTER — HOSPITAL ENCOUNTER (OUTPATIENT)
Dept: PHYSICAL THERAPY | Age: 76
Setting detail: THERAPIES SERIES
Discharge: HOME | End: 2021-07-26
Payer: MEDICARE

## 2021-07-26 PROCEDURE — 97110 THERAPEUTIC EXERCISES: CPT

## 2021-07-26 NOTE — FLOWSHEET NOTE
[place] [address] [facility]  Phone: [phone] Fax: [phone]          Physical Therapy Treatment Note/ Progress Report:     Date:  2021    Patient Name:  Gaudencio Cespedes    :  1945  MRN: [id number]  Restrictions/Precautions:    Medical/Treatment Diagnosis Information:  · Diagnosis: Right shoulder adhesive capsulitis, left hip pain  · Treatment Diagnosis: M75.01, Z73.129  Insurance/Certification information:  PT Insurance Information: Medicare / [facility]  Physician Information:  Referring Practitioner: Pat Odom of care signed (Y/N):     Date of Patient follow up with Physician: N/A      Progress Report: []  Yes  [x]  No     Date Range for reporting period:  Beginnin21  Ending:  NA    Progress report due (10 Rx/or 30 days whichever is less):      Recertification due (POC duration/ or 90 days whichever is less): 21     Visit # Insurance Allowable Auth Needed   54 ([address] [place] 22) Medicare []Yes    [x]No     Pain level:  0/10 currently, c/o weakness     SUBJECTIVE:  Patient reports that she is doing well.  Feels that she continues to make slow gains with strength and cuntion.        OBJECTIVE:    Observation: Antalgic gait with standard cane used in RUE   Test measurements:  LEFS - 74% impairment      RESTRICTIONS/PRECAUTIONS:      Exercises/Interventions:      Therapeutic Ex (94356)  Min: 45 Sets/sec Reps CUES/Notes   Bike 5'       Treadmill 5'  1.4 mph amb, bilat UE support, 1.0-3.0 incline   Cybex Gastroc- SL  3 10 60#   Leg Press - SL 3 10 70#   Cybex Leg Ext - SL 3 10 15#   Cybex HS curl - SL 3 10 30#   Bridge 10 12     3-way SLR 1 10     Hook lying LLE Ext 1 10 Cues for form   Supine heel slides +  12 Cues for form   Hook lying bilat hip add iso 5 10 Ball squeeze   Clam - side lying 5 10 No resistance   Supine clam - unilateral 10 10 Maroon band, each         BOSU lunge 10  10 each   Slide lunge - retro at ledge 1 15     Standing hip abd 2 10 orange   Marching 1 10 Fwd/bkwd  Bilat Cane for support, SBA   Heel raises - DL 5 10 yellow ball between heels   TKE 5 15 3.5pl   Side stepping 2 15 HHA, maroon above knees   Sit to stand with TRX 2 10 1 airex on seat, sink for support   Step up 3 10 8\"   Lateral step down 2 10 4\"   Standing hip 3-way kicks 5 10 Purple band for TKE   On 2\" step             Manual Intervention  (10529)  Min: 0         Shld /GH Mobs 0'   Gentle inferior glides   Post Cap mobs 0'   Prone LLLD IR   Cervical STM 7'       Manual cervical traction 4'       Hip PROM 7'       IASTM 7'   Gastroc/soleus, L   PROM MT - shoulder 0'   Flex, abd, ER, IR   Hypervolt percussion massage 0'   L gluteals             NMR re-education (67936)  Min: 0         Biodex balance 0'   LS x 2, RC   Small ERIC stance 30' 3 airex   Staggered stance 30' 3 airex   SLS with UE support 30' 3 Cues for neutral foot position   Wobble board - WS to LLE 10 10 RLE on 2\" step, manual perts   Modified SLS - LLE on airex 10 5 RLE on 6\" step   1/2 foam roll SLS - inv/ev focus 20 3 bilat UE support   Betty Scap Bio         Floor Snow angels-sliders                   Therapeutic Activity (71578)  Min:         UE throwing porgression         Dynamic UE stability         Earthquake Bar         Bodyblade                        Therapeutic Exercise and NMR EXR  [x] (83708) Provided verbal/tactile cueing for activities related to strengthening, flexibility, endurance, ROM  for improvements in scapular, scapulothoracic and UE control with self care, reaching, carrying, lifting, house/yardwork, driving/computer work. [x] (82036) Provided verbal/tactile cueing for activities related to improving balance, coordination, kinesthetic sense, posture, motor skill, proprioception  to assist with  scapular, scapulothoracic and UE control with self care, reaching, carrying, lifting, house/yardwork, driving/computer work.     Therapeutic Activities:    [] (90075 or 77246) Provided verbal/tactile cueing for activities related to improving balance, coordination, kinesthetic sense, posture, motor skill, proprioception and motor activation to allow for proper function of scapular, scapulothoracic and UE control with self care, carrying, lifting, driving/computer work.      Home Exercise Program:    [x] (00510) Reviewed/Progressed HEP activities related to strengthening, flexibility, endurance, ROM of scapular, scapulothoracic and UE control with self care, reaching, carrying, lifting, house/yardwork, driving/computer work  [] (19147) Reviewed/Progressed HEP activities related to improving balance, coordination, kinesthetic sense, posture, motor skill, proprioception of scapular, scapulothoracic and UE control with self care, reaching, carrying, lifting, house/yardwork, driving/computer work      Manual Treatments:  PROM / STM / Oscillations-Mobs:  G-I, II, III, IV (PA's, Inf., Post.)  [x] (21100) Provided manual therapy to mobilize soft tissue/joints of cervical/CT, scapular GHJ and UE for the purpose of modulating pain, promoting relaxation,  increasing ROM, reducing/eliminating soft tissue swelling/inflammation/restriction, improving soft tissue extensibility and allowing for proper ROM for normal function with self care, reaching, carrying, lifting, house/yardwork, driving/computer work    Modalities:  None    Charges:  Timed Code Treatment Minutes: 45   Total Treatment Minutes: 45       [] EVAL (LOW) 36278 (typically 20 minutes face-to-face)  [] EVAL (MOD) 63436 (typically 30 minutes face-to-face)  [] EVAL (HIGH) 08831 (typically 45 minutes face-to-face)  [] RE-EVAL     [x] BE(04304) x 3     [] DRY NEEDLE 1 OR 2 MUSCLES  [] NMR (35194) x     [] DRY NEEDLE 3+ MUSCLES  [] Manual (68989) x       [] TA (12343) x     [] Mech Traction (89205)  [] ES(attended) (93368)     [] ES (un) (24196):   [] VASO (74852)  [] Other:    If [facility] Please Indicate Time In/Out  CPT Code Time in Time out                                   SHOULDER GOALS:  Patient stated goal: pain-free shoulder, return to PLOF  [] Progressing: [x] Met: [] Not Met: [] Adjusted    Therapist goals for Patient:   Short Term Goals: To be achieved in: 2 weeks  1. Independent in HEP and progression per patient tolerance, in order to prevent re-injury. [] Progressing: [x] Met: [] Not Met: [] Adjusted  2. Patient will have a decrease in pain to facilitate improvement in movement, function, and ADLs as indicated by Functional Deficits. [] Progressing: [x] Met: [] Not Met: [] Adjusted    Long Term Goals: To be achieved in: 6 weeks  1. Disability index score of 25% or less for the Quick DASH to assist with reaching prior level of function. [] Progressing: [x] Met: [] Not Met: [] Adjusted  2. Patient will demonstrate increased AROM to equal contralateral to allow for proper joint functioning as indicated by Functional Deficits. [] Progressing: [x] Met: [] Not Met: [] Adjusted  3. Patient will demonstrate an increase in NM recruitment/activation and overall GH and scapular strength to within n5lbs HHD or WNL for proper functional mobility as indicated by patients Functional Deficits. [] Progressing: [x] Met: [] Not Met: [] Adjusted  4. Patient will return to reaching, lifting, overhead movements, self care especially after using toilet, dressing, grooming activities without increased symptoms or restriction. [] Progressing: [x] Met: [] Not Met: [] Adjusted       HIP GOALS:  Patient stated goal: strengthen LLE, ambulate without cane for short distances  [] Progressing: [x] Met: [] Not Met: [] Adjusted    Therapist goals for Patient:   Short Term Goals: To be achieved in: 2 weeks  1. Independent in HEP and progression per patient tolerance, in order to prevent re-injury. []?? Progressing: [x]? ? Met: []?? Not Met: []?? Adjusted  2. Patient will have a decrease in pain to facilitate improvement in movement, function, and ADLs as indicated by Functional Deficits. []?? Progressing: [x]? ? Met: []?? Not Met: []?? Adjusted     Long Term Goals: To be achieved in: 4-6 weeks  1. Disability index score of 40% or less for the LEFS to assist with reaching prior level of function. [x]? ? Progressing: []?? Met: []?? Not Met: []?? Adjusted  2. Patient will demonstrate increased AROM to WFL to allow for proper joint functioning as indicated by patients Functional Deficits. []?? Progressing: [x]? ? Met: []?? Not Met: []?? Adjusted  3. Patient will demonstrate an increase in Strength to good proximal hip strength and control, within 5lb HHD in LE to allow for proper functional mobility as indicated by patients Functional Deficits. [x]? ? Progressing: []?? Met: []?? Not Met: []?? Adjusted  4. Patient will return to standing, ambulation (with AD as needed), transfers, stairs, squatting without increased symptoms or restriction. [x] Progressing: [] Met: [] Not Met: [] Adjusted      ASSESSMENT: Good tolerance to treatment today. Able to increase resistance on machines. Progress as tolerated. Making gradual gains in general LLE strength but she continues to present with deficient proximal hip control L>R and poor core stability. Step length more symmetrical but still wide stance and short step length. .Patient will benefit from continued physical therapy with focus on left hip rehabilitation and LE strength and balance training to improve patient's overall function and reduce risk of falls.       Return to Play: (if applicable)   []  Stage 1: Intro to Strength   []  Stage 2: Dynamic Strength and Intro to Plyometrics   []  Stage 3: Advanced Plyometrics and Intro to Throwing   []  Stage 4: Sport specific Training/Return to Sport     []  Ready to Return to Play, [employer]   []  Not Ready for Return to Sports   Comments:      Treatment/Activity Tolerance:  [x] Patient tolerated treatment well [] Patient limited by dian  [] Patient limited by pain  [] Patient limited by other medical complications  [] Other:     Overall Progression Towards Functional goals/ Treatment Progress Update:  [x] Patient is progressing as expected towards functional goals listed. [] Progression is slowed due to complexities/Impairments listed. [] Progression has been slowed due to co-morbidities. [] Plan just implemented, too soon to assess goals progression <30days   [] Goals require adjustment due to lack of progress  [] Patient is not progressing as expected and requires additional follow up with physician  [] Other:     Prognosis for POC: [x] Good [] Fair  [] Poor    Patient requires continued skilled intervention: [x] Yes  [] No      PLAN: Continue PT 2x/week for 8 weeks for shoulder strengthening/ROM if needed, with continued left hip rehabilitation to focus on ROM, proximal hip strengthening, general LLE strength training, and balance training to lessen risk of falls  [x] Continue per plan of care [] Alter current plan (see comments)  [] Plan of care initiated [] Hold pending MD visit [] Discharge    Electronically signed by: Jaz Bashir PTA     Note: If patient does not return for scheduled/recommended follow up visits, this note will serve as a discharge from care along with the most recent update on progress.

## 2021-07-28 ENCOUNTER — HOSPITAL ENCOUNTER (OUTPATIENT)
Dept: PHYSICAL THERAPY | Age: 76
Setting detail: THERAPIES SERIES
Discharge: HOME | End: 2021-07-28
Payer: MEDICARE

## 2021-07-28 PROCEDURE — 97110 THERAPEUTIC EXERCISES: CPT

## 2021-07-28 NOTE — FLOWSHEET NOTE
[place] [address] [address]  Phone: [phone] Fax: [phone]          Physical Therapy Treatment Note/ Progress Report:     Date:  2021    Patient Name:  Azul Johansen    :  1945  MRN: [id number]  Restrictions/Precautions:    Medical/Treatment Diagnosis Information:  · Diagnosis: Right shoulder adhesive capsulitis, left hip pain  · Treatment Diagnosis: M75.01, V92.459  Insurance/Certification information:  PT Insurance Information: Medicare / [facility]  Physician Information:  Referring Practitioner: Yariel Urias of care signed (Y/N):     Date of Patient follow up with Physician: N/A      Progress Report: []  Yes  [x]  No     Date Range for reporting period:  Beginnin21  Ending:  NA    Progress report due (10 Rx/or 30 days whichever is less): 65     Recertification due (POC duration/ or 90 days whichever is less): 21     Visit # Insurance Allowable Auth Needed   64 ([address] [place] 22) Medicare []Yes    [x]No     Pain level:  0/10 currently, c/o weakness     SUBJECTIVE:  Patient states that she was sitting in a folding chair for over three hours while visiting with family and then struggled to get up again both because she felt stiff but also because the chair was not very sturdy and she felt it would tip.  She needed one family member to hold the chair steady and another to help pull her out of the chair.        OBJECTIVE:    Observation: Antalgic gait with standard cane used in RUE   Test measurements:  LEFS - 74% impairment      RESTRICTIONS/PRECAUTIONS:      Exercises/Interventions:      Therapeutic Ex (70350)  Min: 45 Sets/sec Reps CUES/Notes   Bike 5'       Treadmill 5'  1.4 mph amb, bilat UE support, 1.0-3.0 incline   Cybex Gastroc- SL  3 10 60#   Leg Press - SL 3 10 70#   Cybex Leg Ext - SL 3 10 15#   Cybex HS curl - SL 3 10 30#   Bridge 10 12     3-way SLR 1 10     Hook lying LLE Ext 1 10 Cues for form   Supine heel slides + march 1 12 Cues for form   Hook lying bilat hip add iso 5 10 Ball squeeze   Clam - side lying 5 10 No resistance   Supine clam - unilateral 10 10 Maroon band, each         BOSU lunge 10  10 each   Slide lunge - retro at ledge 1 15     Standing hip abd 2 10 orange   Marching 1 10 Fwd/bkwd  Bilat Cane for support, SBA   Heel raises - DL 5 10 yellow ball between heels   TKE 5 15 3.5pl   Side stepping 2 15 HHA, maroon above knees   Sit to stand with TRX 2 10 1 airex on seat, sink for support   Step up 3 10 8\"   Lateral step down 2 10 4\"   Standing hip 3-way kicks 5 10 Purple band for TKE   On 2\" step             Manual Intervention  (19457)  Min: 0         Shld /GH Mobs 0'   Gentle inferior glides   Post Cap mobs 0'   Prone LLLD IR   Cervical STM 7'       Manual cervical traction 4'       Hip PROM 7'       IASTM 7'   Gastroc/soleus, L   PROM MT - shoulder 0'   Flex, abd, ER, IR   Hypervolt percussion massage 0'   L gluteals             NMR re-education (05359)  Min: 0         Biodex balance 0'   LS x 2, RC   Small ERIC stance 30' 3 airex   Staggered stance 30' 3 airex   SLS with UE support 30' 3 Cues for neutral foot position   Wobble board - WS to LLE 10 10 RLE on 2\" step, manual perts   Modified SLS - LLE on airex 10 5 RLE on 6\" step   1/2 foam roll SLS - inv/ev focus 20 3 bilat UE support   Betty Scap Bio         Floor Snow angels-sliders                   Therapeutic Activity (18286)  Min:         UE throwing porgression         Dynamic UE stability         Earthquake Bar         Bodyblade                        Therapeutic Exercise and NMR EXR  [x] (78457) Provided verbal/tactile cueing for activities related to strengthening, flexibility, endurance, ROM  for improvements in scapular, scapulothoracic and UE control with self care, reaching, carrying, lifting, house/yardwork, driving/computer work.     [x] (57698) Provided verbal/tactile cueing for activities related to improving balance, coordination, kinesthetic sense, posture, motor skill, proprioception  to assist with  scapular, scapulothoracic and UE control with self care, reaching, carrying, lifting, house/yardwork, driving/computer work. Therapeutic Activities:    [] (74725 or 36523) Provided verbal/tactile cueing for activities related to improving balance, coordination, kinesthetic sense, posture, motor skill, proprioception and motor activation to allow for proper function of scapular, scapulothoracic and UE control with self care, carrying, lifting, driving/computer work.      Home Exercise Program:    [x] (14049) Reviewed/Progressed HEP activities related to strengthening, flexibility, endurance, ROM of scapular, scapulothoracic and UE control with self care, reaching, carrying, lifting, house/yardwork, driving/computer work  [] (50554) Reviewed/Progressed HEP activities related to improving balance, coordination, kinesthetic sense, posture, motor skill, proprioception of scapular, scapulothoracic and UE control with self care, reaching, carrying, lifting, house/yardwork, driving/computer work      Manual Treatments:  PROM / STM / Oscillations-Mobs:  G-I, II, III, IV (PA's, Inf., Post.)  [x] (70602) Provided manual therapy to mobilize soft tissue/joints of cervical/CT, scapular GHJ and UE for the purpose of modulating pain, promoting relaxation,  increasing ROM, reducing/eliminating soft tissue swelling/inflammation/restriction, improving soft tissue extensibility and allowing for proper ROM for normal function with self care, reaching, carrying, lifting, house/yardwork, driving/computer work    Modalities:  None    Charges:  Timed Code Treatment Minutes: 45   Total Treatment Minutes: 45       [] EVAL (LOW) 51261 (typically 20 minutes face-to-face)  [] EVAL (MOD) 73598 (typically 30 minutes face-to-face)  [] EVAL (HIGH) 89512 (typically 45 minutes face-to-face)  [] RE-EVAL     [x] EF(57587) x 3   KX  [] DRY NEEDLE 1 OR 2 MUSCLES  [] NMR (29671) x     [] DRY NEEDLE 3+ MUSCLES  [] Manual (07234) x       [] TA (73092) x     [] Mech Traction (85041)  [] ES(attended) (00769)     [] ES (un) (25199):   [] VASO (38144)  [] Other:    If [facility] Please Indicate Time In/Out  CPT Code Time in Time out                                   SHOULDER GOALS:  Patient stated goal: pain-free shoulder, return to PLOF  [] Progressing: [x] Met: [] Not Met: [] Adjusted    Therapist goals for Patient:   Short Term Goals: To be achieved in: 2 weeks  1. Independent in HEP and progression per patient tolerance, in order to prevent re-injury. [] Progressing: [x] Met: [] Not Met: [] Adjusted  2. Patient will have a decrease in pain to facilitate improvement in movement, function, and ADLs as indicated by Functional Deficits. [] Progressing: [x] Met: [] Not Met: [] Adjusted    Long Term Goals: To be achieved in: 6 weeks  1. Disability index score of 25% or less for the Quick DASH to assist with reaching prior level of function. [] Progressing: [x] Met: [] Not Met: [] Adjusted  2. Patient will demonstrate increased AROM to equal contralateral to allow for proper joint functioning as indicated by Functional Deficits. [] Progressing: [x] Met: [] Not Met: [] Adjusted  3. Patient will demonstrate an increase in NM recruitment/activation and overall GH and scapular strength to within n5lbs HHD or WNL for proper functional mobility as indicated by patients Functional Deficits. [] Progressing: [x] Met: [] Not Met: [] Adjusted  4. Patient will return to reaching, lifting, overhead movements, self care especially after using toilet, dressing, grooming activities without increased symptoms or restriction. [] Progressing: [x] Met: [] Not Met: [] Adjusted       HIP GOALS:  Patient stated goal: strengthen LLE, ambulate without cane for short distances  [] Progressing: [x] Met: [] Not Met: [] Adjusted    Therapist goals for Patient:   Short Term Goals: To be achieved in: 2 weeks  1. Independent in HEP and progression per patient tolerance, in order to prevent re-injury. []?? Progressing: [x]? ? Met: []?? Not Met: []?? Adjusted  2. Patient will have a decrease in pain to facilitate improvement in movement, function, and ADLs as indicated by Functional Deficits. []?? Progressing: [x]? ? Met: []?? Not Met: []?? Adjusted     Long Term Goals: To be achieved in: 4-6 weeks  1. Disability index score of 40% or less for the LEFS to assist with reaching prior level of function. [x]? ? Progressing: []?? Met: []?? Not Met: []?? Adjusted  2. Patient will demonstrate increased AROM to WFL to allow for proper joint functioning as indicated by patients Functional Deficits. []?? Progressing: [x]? ? Met: []?? Not Met: []?? Adjusted  3. Patient will demonstrate an increase in Strength to good proximal hip strength and control, within 5lb HHD in LE to allow for proper functional mobility as indicated by patients Functional Deficits. [x]? ? Progressing: []?? Met: []?? Not Met: []?? Adjusted  4. Patient will return to standing, ambulation (with AD as needed), transfers, stairs, squatting without increased symptoms or restriction. [x] Progressing: [] Met: [] Not Met: [] Adjusted      ASSESSMENT: Good tolerance to treatment today. Able to increase resistance on machines. Progress as tolerated. Making gradual gains in general LLE strength but she continues to present with deficient proximal hip control L>R and poor core stability. Step length more symmetrical but still wide stance and short step length. .Patient will benefit from continued physical therapy with focus on left hip rehabilitation and LE strength and balance training to improve patient's overall function and reduce risk of falls.       Return to Play: (if applicable)   []  Stage 1: Intro to Strength   []  Stage 2: Dynamic Strength and Intro to Plyometrics   []  Stage 3: Advanced Plyometrics and Intro to Throwing   []  Stage 4: Sport specific Training/Return to Sport     []  Ready to Return to Play, [employer]   []  Not Ready for Return to Sports   Comments:      Treatment/Activity Tolerance:  [x] Patient tolerated treatment well [] Patient limited by fatique  [] Patient limited by pain  [] Patient limited by other medical complications  [] Other:     Overall Progression Towards Functional goals/ Treatment Progress Update:  [x] Patient is progressing as expected towards functional goals listed. [] Progression is slowed due to complexities/Impairments listed. [] Progression has been slowed due to co-morbidities. [] Plan just implemented, too soon to assess goals progression <30days   [] Goals require adjustment due to lack of progress  [] Patient is not progressing as expected and requires additional follow up with physician  [] Other:     Prognosis for POC: [x] Good [] Fair  [] Poor    Patient requires continued skilled intervention: [x] Yes  [] No      PLAN: Continue PT 2x/week for 8 weeks for shoulder strengthening/ROM if needed, with continued left hip rehabilitation to focus on ROM, proximal hip strengthening, general LLE strength training, and balance training to lessen risk of falls  [x] Continue per plan of care [] Alter current plan (see comments)  [] Plan of care initiated [] Hold pending MD visit [] Discharge    Electronically signed by: Rosa Maria Burns PT     Note: If patient does not return for scheduled/recommended follow up visits, this note will serve as a discharge from care along with the most recent update on progress.

## 2021-08-02 ENCOUNTER — HOSPITAL ENCOUNTER (OUTPATIENT)
Dept: PHYSICAL THERAPY | Age: 76
Setting detail: THERAPIES SERIES
Discharge: HOME | End: 2021-08-02
Payer: MEDICARE

## 2021-08-02 PROCEDURE — 97110 THERAPEUTIC EXERCISES: CPT

## 2021-08-02 NOTE — FLOWSHEET NOTE
[place] [address] [facility]  Phone: [phone] Fax: [phone]          Physical Therapy Treatment Note/ Progress Report:     Date:  2021    Patient Name:  Oni Young    :  1945  MRN: [id number]  Restrictions/Precautions:    Medical/Treatment Diagnosis Information:  · Diagnosis: Right shoulder adhesive capsulitis, left hip pain  · Treatment Diagnosis: M75.01, W49.215  Insurance/Certification information:  PT Insurance Information: Medicare / [facility]  Physician Information:  Referring Practitioner: Deisy Mohanr of care signed (Y/N):     Date of Patient follow up with Physician: N/A      Progress Report: []  Yes  [x]  No     Date Range for reporting period:  Beginnin21  Ending:  NA    Progress report due (10 Rx/or 30 days whichever is less):      Recertification due (POC duration/ or 90 days whichever is less): 21     Visit # Insurance Allowable Auth Needed   62 ([id number] [place] 22) Medicare []Yes    [x]No     Pain level:  0/10 currently, c/o weakness     SUBJECTIVE:  Patient states that she was able to ambulate for 10 minutes on the treadmill at 1.5 mph. Did not have any issue with this and was not very fatigued afterward.  Continues to feel that she is getting stronger.        OBJECTIVE:    Observation: Antalgic gait with standard cane used in RUE   Test measurements:  LEFS - 74% impairment      RESTRICTIONS/PRECAUTIONS:      Exercises/Interventions:      Therapeutic Ex (24867)  Min: 45 Sets/sec Reps CUES/Notes   Bike 12'       Treadmill 0'  1.4 mph amb, bilat UE support, 1.0-3.0 incline   Cybex Gastroc- SL  3 10 75#   Leg Press - SL 3 10 75#   Cybex Leg Ext - SL 3 10 15#   Cybex HS curl - SL 3 10 30#   Bridge 10 12     3-way SLR 1 10     Hook lying LLE Ext 1 10 Cues for form   Supine heel slides +  12 Cues for form   Hook lying bilat hip add iso 5 10 Ball squeeze   Clam - side lying 5 10 No resistance   Supine clam - unilateral 10 10 Maroon band, each         BOSU lunge 10  10 each   Slide lunge - retro at ledge 1 15     Standing hip abd 2 10 orange   Marching - airex 1 10 Fwd/bkwd  Bilat Cane for support, SBA   Heel raises - DL 5 10 yellow ball between heels   TKE 5 15 3.5pl   Side stepping 2 15 HHA, maroon above knees   Sit to stand with TRX 2 10 1 airex on seat, sink for support   Step up 3 10 8\"   Lateral step down 2 10 4\"   Standing hip 3-way kicks 5 10 Purple band for TKE   On 2\" step             Manual Intervention  (66989)  Min: 0         Shld /GH Mobs 0'   Gentle inferior glides   Post Cap mobs 0'   Prone LLLD IR   Cervical STM 7'       Manual cervical traction 4'       Hip PROM 7'       IASTM 7'   Gastroc/soleus, L   PROM MT - shoulder 0'   Flex, abd, ER, IR   Hypervolt percussion massage 0'   L gluteals             NMR re-education (23079)  Min: 0         Biodex balance 0'   LS x 2, RC   Small ERIC stance 30' 3 airex   Staggered stance 30' 3 airex   SLS with UE support 30' 3 Cues for neutral foot position   Wobble board - WS to LLE 10 10 RLE on 2\" step, manual perts   Modified SLS - LLE on airex 10 5 RLE on 6\" step   1/2 foam roll SLS - inv/ev focus 20 3 bilat UE support   Betty [employer]         Floor Snow angels-sliders                   Therapeutic Activity (88936)  Min:         UE throwing porgression         Dynamic UE stability         Earthquake Bar         Bodyblade                        Therapeutic Exercise and NMR EXR  [x] (99129) Provided verbal/tactile cueing for activities related to strengthening, flexibility, endurance, ROM  for improvements in scapular, scapulothoracic and UE control with self care, reaching, carrying, lifting, house/yardwork, driving/computer work.     [x] (57997) Provided verbal/tactile cueing for activities related to improving balance, coordination, kinesthetic sense, posture, motor skill, proprioception  to assist with  scapular, scapulothoracic and UE ES(attended) (08791)     [] ES (un) (88035):   [] VASO (24693)  [] Other:    If BWC Please Indicate Time In/Out  CPT Code Time in Time out                                   SHOULDER GOALS:  Patient stated goal: pain-free shoulder, return to PLOF  [] Progressing: [x] Met: [] Not Met: [] Adjusted    Therapist goals for Patient:   Short Term Goals: To be achieved in: 2 weeks  1. Independent in HEP and progression per patient tolerance, in order to prevent re-injury. [] Progressing: [x] Met: [] Not Met: [] Adjusted  2. Patient will have a decrease in pain to facilitate improvement in movement, function, and ADLs as indicated by Functional Deficits. [] Progressing: [x] Met: [] Not Met: [] Adjusted    Long Term Goals: To be achieved in: 6 weeks  1. Disability index score of 25% or less for the Quick DASH to assist with reaching prior level of function. [] Progressing: [x] Met: [] Not Met: [] Adjusted  2. Patient will demonstrate increased AROM to equal contralateral to allow for proper joint functioning as indicated by Functional Deficits. [] Progressing: [x] Met: [] Not Met: [] Adjusted  3. Patient will demonstrate an increase in NM recruitment/activation and overall GH and scapular strength to within n5lbs HHD or WNL for proper functional mobility as indicated by patients Functional Deficits. [] Progressing: [x] Met: [] Not Met: [] Adjusted  4. Patient will return to reaching, lifting, overhead movements, self care especially after using toilet, dressing, grooming activities without increased symptoms or restriction. [] Progressing: [x] Met: [] Not Met: [] Adjusted       HIP GOALS:  Patient stated goal: strengthen LLE, ambulate without cane for short distances  [] Progressing: [x] Met: [] Not Met: [] Adjusted    Therapist goals for Patient:   Short Term Goals: To be achieved in: 2 weeks  1. Independent in HEP and progression per patient tolerance, in order to prevent re-injury. []?? Progressing: [x]? ? Met: []?? Not Met: []?? Adjusted  2. Patient will have a decrease in pain to facilitate improvement in movement, function, and ADLs as indicated by Functional Deficits. []?? Progressing: [x]? ? Met: []?? Not Met: []?? Adjusted     Long Term Goals: To be achieved in: 4-6 weeks  1. Disability index score of 40% or less for the LEFS to assist with reaching prior level of function. [x]? ? Progressing: []?? Met: []?? Not Met: []?? Adjusted  2. Patient will demonstrate increased AROM to WFL to allow for proper joint functioning as indicated by patients Functional Deficits. []?? Progressing: [x]? ? Met: []?? Not Met: []?? Adjusted  3. Patient will demonstrate an increase in Strength to good proximal hip strength and control, within 5lb HHD in LE to allow for proper functional mobility as indicated by patients Functional Deficits. [x]? ? Progressing: []?? Met: []?? Not Met: []?? Adjusted  4. Patient will return to standing, ambulation (with AD as needed), transfers, stairs, squatting without increased symptoms or restriction. [x] Progressing: [] Met: [] Not Met: [] Adjusted      ASSESSMENT: Good tolerance to treatment today. Able to increase resistance on machines. Progress as tolerated. Making gradual gains in general LLE strength but she continues to present with deficient proximal hip control L>R and poor core stability. Step length more symmetrical but still wide stance and short step length. Patient will benefit from continued physical therapy with focus on left hip rehabilitation and LE strength and balance training to improve patient's overall function and reduce risk of falls.       Return to Play: (if applicable)   []  Stage 1: Intro to Strength   []  Stage 2: Dynamic Strength and Intro to Plyometrics   []  Stage 3: Advanced Plyometrics and Intro to Throwing   []  Stage 4: Sport specific Training/Return to Sport     []  Ready to Return to Play, Meets All Above Stages   []  Not Ready for Return to Sports   Comments:

## 2021-08-04 ENCOUNTER — HOSPITAL ENCOUNTER (OUTPATIENT)
Dept: PHYSICAL THERAPY | Age: 76
Setting detail: THERAPIES SERIES
Discharge: HOME | End: 2021-08-04
Payer: MEDICARE

## 2021-08-04 PROCEDURE — 97110 THERAPEUTIC EXERCISES: CPT

## 2021-08-04 PROCEDURE — 97112 NEUROMUSCULAR REEDUCATION: CPT

## 2021-08-04 NOTE — FLOWSHEET NOTE
[place] [address] [address] [place]  Phone: [phone] Fax: [phone]          Physical Therapy Treatment Note/ Progress Report:     Date:  2021    Patient Name:  Jocelyn Hooper    :  1945  MRN: [id number]  Restrictions/Precautions:    Medical/Treatment Diagnosis Information:  · Diagnosis: Right shoulder adhesive capsulitis, left hip pain  · Treatment Diagnosis: M75.01, T62.057  Insurance/Certification information:  PT Insurance Information: Medicare / [facility]  Physician Information:  Referring Practitioner: Radha Sy of care signed (Y/N):     Date of Patient follow up with Physician: N/A      Progress Report: []  Yes  [x]  No     Date Range for reporting period:  Beginnin21  Ending:  NA    Progress report due (10 Rx/or 30 days whichever is less): 16     Recertification due (POC duration/ or 90 days whichever is less): 21     Visit # Insurance Allowable Auth Needed   62 ([id number] 22) Medicare []Yes    [x]No     Pain level:  0/10 currently, c/o weakness     SUBJECTIVE:  Patient states that she was able to ambulate for 10 minutes on the treadmill at 1.5 mph. Did not have any issue with this and was not very fatigued afterward.  Continues to feel that she is getting stronger.        OBJECTIVE:    Observation: Antalgic gait with standard cane used in RUE   Test measurements:  LEFS - 74% impairment      RESTRICTIONS/PRECAUTIONS:      Exercises/Interventions:      Therapeutic Ex (72901)  Min: 35 Sets/sec Reps CUES/Notes   Bike 12'       Treadmill 0'  1.4 mph amb, bilat UE support, 1.0-3.0 incline   Cybex Gastroc- SL  3 10 75#   Leg Press - SL 3 10 75#   Cybex Leg Ext - SL 3 10 15#   Cybex HS curl - SL 3 10 30#   Bridge 10 12     3-way SLR 1 10     Hook lying LLE Ext 1 10 Cues for form   Supine heel slides +  12 Cues for form   Hook lying bilat hip add iso 5 10 Ball squeeze   Clam - side lying 5 10 No [x] Met: [] Not Met: [] Adjusted    Therapist goals for Patient:   Short Term Goals: To be achieved in: 2 weeks  1. Independent in HEP and progression per patient tolerance, in order to prevent re-injury. [] Progressing: [x] Met: [] Not Met: [] Adjusted  2. Patient will have a decrease in pain to facilitate improvement in movement, function, and ADLs as indicated by Functional Deficits. [] Progressing: [x] Met: [] Not Met: [] Adjusted    Long Term Goals: To be achieved in: 6 weeks  1. Disability index score of 25% or less for the Quick DASH to assist with reaching prior level of function. [] Progressing: [x] Met: [] Not Met: [] Adjusted  2. Patient will demonstrate increased AROM to equal contralateral to allow for proper joint functioning as indicated by Functional Deficits. [] Progressing: [x] Met: [] Not Met: [] Adjusted  3. Patient will demonstrate an increase in NM recruitment/activation and overall GH and scapular strength to within n5lbs HHD or WNL for proper functional mobility as indicated by patients Functional Deficits. [] Progressing: [x] Met: [] Not Met: [] Adjusted  4. Patient will return to reaching, lifting, overhead movements, self care especially after using toilet, dressing, grooming activities without increased symptoms or restriction. [] Progressing: [x] Met: [] Not Met: [] Adjusted       HIP GOALS:  Patient stated goal: strengthen LLE, ambulate without cane for short distances  [] Progressing: [x] Met: [] Not Met: [] Adjusted    Therapist goals for Patient:   Short Term Goals: To be achieved in: 2 weeks  1. Independent in HEP and progression per patient tolerance, in order to prevent re-injury. []?? Progressing: [x]? ? Met: []?? Not Met: []?? Adjusted  2. Patient will have a decrease in pain to facilitate improvement in movement, function, and ADLs as indicated by Functional Deficits. []?? Progressing: [x]? ? Met: []?? Not Met: []?? Adjusted     Long Term Goals:  To be achieved in: 4-6 weeks  1. Disability index score of 40% or less for the LEFS to assist with reaching prior level of function. [x]? ? Progressing: []?? Met: []?? Not Met: []?? Adjusted  2. Patient will demonstrate increased AROM to WFL to allow for proper joint functioning as indicated by patients Functional Deficits. []?? Progressing: [x]? ? Met: []?? Not Met: []?? Adjusted  3. Patient will demonstrate an increase in Strength to good proximal hip strength and control, within 5lb HHD in LE to allow for proper functional mobility as indicated by patients Functional Deficits. [x]? ? Progressing: []?? Met: []?? Not Met: []?? Adjusted  4. Patient will return to standing, ambulation (with AD as needed), transfers, stairs, squatting without increased symptoms or restriction. [x] Progressing: [] Met: [] Not Met: [] Adjusted      ASSESSMENT: Appropriate fatigue at conclusion with no increase in pain. Better performance on [employer] balance machine but much more challenged in staggered stance with LLE behind. She continues to present with deficient proximal hip control and poor core stability, but starting to ambulate with smaller ERIC now. Patient will benefit from continued physical therapy with focus on left hip rehabilitation and LE strength and balance training to improve patient's overall function and reduce risk of falls.       Return to Play: (if applicable)   []  Stage 1: Intro to Strength   []  Stage 2: Dynamic Strength and Intro to Plyometrics   []  Stage 3: Advanced Plyometrics and Intro to Throwing   []  Stage 4: Sport specific Training/Return to Sport     []  Ready to Return to Play, [employer]   []  Not Ready for Return to Sports   Comments:      Treatment/Activity Tolerance:  [x] Patient tolerated treatment well [] Patient limited by fatique  [] Patient limited by pain  [] Patient limited by other medical complications  [] Other:     Overall Progression Towards Functional goals/ Treatment Progress Update:  [x] Patient is progressing as expected towards functional goals listed. [] Progression is slowed due to complexities/Impairments listed. [] Progression has been slowed due to co-morbidities. [] Plan just implemented, too soon to assess goals progression <30days   [] Goals require adjustment due to lack of progress  [] Patient is not progressing as expected and requires additional follow up with physician  [] Other:     Prognosis for POC: [x] Good [] Fair  [] Poor    Patient requires continued skilled intervention: [x] Yes  [] No      PLAN: Continue PT 2x/week for 8 weeks for shoulder strengthening/ROM if needed, with continued left hip rehabilitation to focus on ROM, proximal hip strengthening, general LLE strength training, and balance training to lessen risk of falls  [x] Continue per plan of care [] Alter current plan (see comments)  [] Plan of care initiated [] Hold pending MD visit [] Discharge    Electronically signed by: Betty Lockwood PT     Note: If patient does not return for scheduled/recommended follow up visits, this note will serve as a discharge from care along with the most recent update on progress.

## 2021-08-09 ENCOUNTER — HOSPITAL ENCOUNTER (OUTPATIENT)
Dept: PHYSICAL THERAPY | Age: 76
Setting detail: THERAPIES SERIES
Discharge: HOME | End: 2021-08-09
Payer: MEDICARE

## 2021-08-09 PROCEDURE — 97112 NEUROMUSCULAR REEDUCATION: CPT

## 2021-08-09 PROCEDURE — 97110 THERAPEUTIC EXERCISES: CPT

## 2021-08-09 NOTE — FLOWSHEET NOTE
[place] [address] [address]  Phone: [phone] Fax: [phone]          Physical Therapy Treatment Note/ Progress Report:     Date:  2021    Patient Name:  Gaudencio Cespedes    :  1945  MRN: [id number]  Restrictions/Precautions:    Medical/Treatment Diagnosis Information:  · Diagnosis: Right shoulder adhesive capsulitis, left hip pain  · Treatment Diagnosis: M75.01, D45.108  Insurance/Certification information:  PT Insurance Information: Medicare / [facility]  Physician Information:  Referring Practitioner: Pat Odom of care signed (Y/N):     Date of Patient follow up with Physician: N/A      Progress Report: []  Yes  [x]  No     Date Range for reporting period:  Beginnin21  Ending:  NA    Progress report due (10 Rx/or 30 days whichever is less): 3/81/58     Recertification due (POC duration/ or 90 days whichever is less): 21     Visit # Insurance Allowable Auth Needed   61 ([address] [place] ) Medicare []Yes    [x]No     Pain level:  0/10 currently, c/o weakness     SUBJECTIVE: Patient reports that she is doing well and that strength and function are improving.  No complaints of pain entering PT today.        OBJECTIVE:    Observation: Antalgic gait with standard cane used in RUE   Test measurements:  LEFS - 74% impairment      RESTRICTIONS/PRECAUTIONS:      Exercises/Interventions:      Therapeutic Ex (21234)  Min: 35 Sets/sec Reps CUES/Notes   Bike 12'       Treadmill 0'  1.4 mph amb, bilat UE support, 1.0-3.0 incline   Cybex Gastroc- SL  3 10 75#   Leg Press - SL 3 10 75#   Cybex Leg Ext - SL 3 10 15#   Cybex HS curl - SL 3 10 30#   Bridge 10 12     3-way SLR 1 10     Hook lying LLE Ext 1 10 Cues for form   Supine heel slides +  12 Cues for form   Hook lying bilat hip add iso 5 10 Ball squeeze   Clam - side lying 5 10 No resistance   Supine clam - unilateral 10 10 Maroon band, each         BOSU lunge 10  10 each   Slide lunge - retro at ledge 1 15     Standing hip abd 2 10 orange   Marching - airex 1 10 Fwd/bkwd  Bilat Cane for support, SBA   Heel raises - DL 5 10 yellow ball between heels   TKE 5 15 3.5pl   Side stepping 2 15 HHA, maroon above knees   Sit to stand with TRX 2 10 1 airex on seat, sink for support   Step up - bilat UE support 3 10 8\" L, 6\" R   Lateral step down 2 10 4\"   Standing hip 3-way kicks 5 10 Purple band for TKE   On 2\" step             Manual Intervention  (73315)  Min: 0         Shld /GH Mobs 0'   Gentle inferior glides   Post Cap mobs 0'   Prone LLLD IR   Cervical STM 7'       Manual cervical traction 4'       Hip PROM 7'       IASTM 7'   Gastroc/soleus, L   PROM MT - shoulder 0'   Flex, abd, ER, IR   Hypervolt percussion massage 0'   L gluteals             NMR re-education (92318)  Min: 10         Biodex balance 10'   RC x 5 min with small ERIC  PS with staggered stance x 1.5 min each             Therapeutic Activity (81581)  Min:         UE throwing porgression         Dynamic UE stability         Earthquake Bar         Bodyblade                        Therapeutic Exercise and NMR EXR  [x] (45056) Provided verbal/tactile cueing for activities related to strengthening, flexibility, endurance, ROM  for improvements in scapular, scapulothoracic and UE control with self care, reaching, carrying, lifting, house/yardwork, driving/computer work. [x] (25972) Provided verbal/tactile cueing for activities related to improving balance, coordination, kinesthetic sense, posture, motor skill, proprioception  to assist with  scapular, scapulothoracic and UE control with self care, reaching, carrying, lifting, house/yardwork, driving/computer work.     Therapeutic Activities:    [] (07592 or 50494) Provided verbal/tactile cueing for activities related to improving balance, coordination, kinesthetic sense, posture, motor skill, proprioception and motor activation to allow for proper function of scapular, scapulothoracic and UE control with self care, carrying, lifting, driving/computer work. Home Exercise Program:    [x] (48430) Reviewed/Progressed HEP activities related to strengthening, flexibility, endurance, ROM of scapular, scapulothoracic and UE control with self care, reaching, carrying, lifting, house/yardwork, driving/computer work  [] (41965) Reviewed/Progressed HEP activities related to improving balance, coordination, kinesthetic sense, posture, motor skill, proprioception of scapular, scapulothoracic and UE control with self care, reaching, carrying, lifting, house/yardwork, driving/computer work      Manual Treatments:  PROM / STM / Oscillations-Mobs:  G-I, II, III, IV (PA's, Inf., Post.)  [x] (13636) Provided manual therapy to mobilize soft tissue/joints of cervical/CT, scapular GHJ and UE for the purpose of modulating pain, promoting relaxation,  increasing ROM, reducing/eliminating soft tissue swelling/inflammation/restriction, improving soft tissue extensibility and allowing for proper ROM for normal function with self care, reaching, carrying, lifting, house/yardwork, driving/computer work    Modalities:  None    Charges:  Timed Code Treatment Minutes: 45   Total Treatment Minutes: 45       [] EVAL (LOW) 06206 (typically 20 minutes face-to-face)  [] EVAL (MOD) 41964 (typically 30 minutes face-to-face)  [] EVAL (HIGH) 85271 (typically 45 minutes face-to-face)  [] RE-EVAL     [x] US(40893) x 2   KX  [] DRY NEEDLE 1 OR 2 MUSCLES  [x] NMR (88764) x 1  KX [] DRY NEEDLE 3+ MUSCLES  [] Manual (79420) x       [] TA (04324) x     [] Mech Traction (44541)  [] ES(attended) (83252)     [] ES (un) (55042):   [] VASO (44853)  [] Other:    If [facility] Please Indicate Time In/Out  CPT Code Time in Time out                                   SHOULDER GOALS:  Patient stated goal: pain-free shoulder, return to PLOF  [] Progressing: [x] Met: [] Not Met: [] Adjusted    Therapist goals for Patient:   Short Term Goals:  To be achieved in: 2 weeks  1. Independent in HEP and progression per patient tolerance, in order to prevent re-injury. [] Progressing: [x] Met: [] Not Met: [] Adjusted  2. Patient will have a decrease in pain to facilitate improvement in movement, function, and ADLs as indicated by Functional Deficits. [] Progressing: [x] Met: [] Not Met: [] Adjusted    Long Term Goals: To be achieved in: 6 weeks  1. Disability index score of 25% or less for the Quick DASH to assist with reaching prior level of function. [] Progressing: [x] Met: [] Not Met: [] Adjusted  2. Patient will demonstrate increased AROM to equal contralateral to allow for proper joint functioning as indicated by Functional Deficits. [] Progressing: [x] Met: [] Not Met: [] Adjusted  3. Patient will demonstrate an increase in NM recruitment/activation and overall GH and scapular strength to within n5lbs HHD or WNL for proper functional mobility as indicated by patients Functional Deficits. [] Progressing: [x] Met: [] Not Met: [] Adjusted  4. Patient will return to reaching, lifting, overhead movements, self care especially after using toilet, dressing, grooming activities without increased symptoms or restriction. [] Progressing: [x] Met: [] Not Met: [] Adjusted       HIP GOALS:  Patient stated goal: strengthen LLE, ambulate without cane for short distances  [] Progressing: [x] Met: [] Not Met: [] Adjusted    Therapist goals for Patient:   Short Term Goals: To be achieved in: 2 weeks  1. Independent in HEP and progression per patient tolerance, in order to prevent re-injury. []?? Progressing: [x]? ? Met: []?? Not Met: []?? Adjusted  2. Patient will have a decrease in pain to facilitate improvement in movement, function, and ADLs as indicated by Functional Deficits. []?? Progressing: [x]? ? Met: []?? Not Met: []?? Adjusted     Long Term Goals: To be achieved in: 4-6 weeks  1.  Disability index score of 40% or less for the LEFS to assist with reaching prior level of function. [x]? ? Progressing: []?? Met: []?? Not Met: []?? Adjusted  2. Patient will demonstrate increased AROM to WFL to allow for proper joint functioning as indicated by patients Functional Deficits. []?? Progressing: [x]? ? Met: []?? Not Met: []?? Adjusted  3. Patient will demonstrate an increase in Strength to good proximal hip strength and control, within 5lb HHD in LE to allow for proper functional mobility as indicated by patients Functional Deficits. [x]? ? Progressing: []?? Met: []?? Not Met: []?? Adjusted  4. Patient will return to standing, ambulation (with AD as needed), transfers, stairs, squatting without increased symptoms or restriction. [x] Progressing: [] Met: [] Not Met: [] Adjusted      ASSESSMENT:  Patient tolerated treatment well. Appropriately fatigued at conclusion. Requires cueing for proper exercise performance. Continues to make progress. Return to Play: (if applicable)   []  Stage 1: Intro to Strength   []  Stage 2: Dynamic Strength and Intro to Plyometrics   []  Stage 3: Advanced Plyometrics and Intro to Throwing   []  Stage 4: Sport specific Training/Return to Sport     []  Ready to Return to Play, [employer]   []  Not Ready for Return to Sports   Comments:      Treatment/Activity Tolerance:  [x] Patient tolerated treatment well [] Patient limited by fatique  [] Patient limited by pain  [] Patient limited by other medical complications  [] Other:     Overall Progression Towards Functional goals/ Treatment Progress Update:  [x] Patient is progressing as expected towards functional goals listed. [] Progression is slowed due to complexities/Impairments listed. [] Progression has been slowed due to co-morbidities.   [] Plan just implemented, too soon to assess goals progression <30days   [] Goals require adjustment due to lack of progress  [] Patient is not progressing as expected and requires additional follow up with physician  [] Other: Prognosis for POC: [x] Good [] Fair  [] Poor    Patient requires continued skilled intervention: [x] Yes  [] No      PLAN: Continue PT 2x/week for 8 weeks for shoulder strengthening/ROM if needed, with continued left hip rehabilitation to focus on ROM, proximal hip strengthening, general LLE strength training, and balance training to lessen risk of falls  [x] Continue per plan of care [] Alter current plan (see comments)  [] Plan of care initiated [] Hold pending MD visit [] Discharge    Electronically signed by: Kaushik Yin PTA     Note: If patient does not return for scheduled/recommended follow up visits, this note will serve as a discharge from care along with the most recent update on progress.

## 2021-08-11 ENCOUNTER — APPOINTMENT (OUTPATIENT)
Dept: PHYSICAL THERAPY | Age: 76
End: 2021-08-11
Payer: MEDICARE

## 2021-08-16 ENCOUNTER — HOSPITAL ENCOUNTER (OUTPATIENT)
Dept: PHYSICAL THERAPY | Age: 76
Setting detail: THERAPIES SERIES
Discharge: HOME | End: 2021-08-16
Payer: MEDICARE

## 2021-08-16 PROCEDURE — 97140 MANUAL THERAPY 1/> REGIONS: CPT

## 2021-08-16 PROCEDURE — 97110 THERAPEUTIC EXERCISES: CPT

## 2021-08-16 NOTE — FLOWSHEET NOTE
[place] [address] [facility] [address]  Phone: [phone] Fax: [phone]          Physical Therapy Treatment Note/ Progress Report:     Date:  2021    Patient Name:  Magdalena Gibsno    :  1945  MRN: [id number]  Restrictions/Precautions:    Medical/Treatment Diagnosis Information:  · Diagnosis: Right shoulder adhesive capsulitis, left hip pain  · Treatment Diagnosis: M75.01, I57.562  Insurance/Certification information:  [facility]  Physician Information:  Referring Practitioner: Nirav Saunders of care signed (Y/N):     Date of Patient follow up with Physician: N/A      Progress Report: []  Yes  [x]  No     Date Range for reporting period:  Beginnin21  Ending:  NA    Progress report due (10 Rx/or 30 days whichever is less): 27     Recertification due (POC duration/ or 90 days whichever is less): 21     Visit # Insurance Allowable Auth Needed   61 ([address] [address]) Medicare []Yes    [x]No     Pain level:  210 currently, c/o weakness     SUBJECTIVE: Patient states that her hip is bothering her more recently, feels tight and sore directly behind the hip joint. Not sure of anything in particular that may have caused it.  She has been having more trouble over the past couple days with getting the left leg to cross over her right to tie her shoes.        OBJECTIVE:    Observation: Antalgic gait with standard cane used in RUE   Test measurements:  LEFS - 74% impairment      RESTRICTIONS/PRECAUTIONS:      Exercises/Interventions:      Therapeutic Ex (08251)  Min: 30 Sets/sec Reps CUES/Notes   Bike 12'       Treadmill 0'  1.4 mph amb, bilat UE support, 1.0-3.0 incline   Cybex Gastroc- SL  3 10 75#   Leg Press - SL 3 10 75#   Cybex Leg Ext - SL 3 10 15#   Cybex HS curl - SL 3 10 30#   Bridge 10 12     3-way SLR 1 10     Hook lying LLE Ext 1 10 Cues for form   Supine heel slides +  Cues for form   Hook lying bilat hip add iso 5 10 Ball squeeze   Clam - side lying 5 10 No resistance   Supine clam - unilateral 10 10 Maroon band, each         BOSU lunge 10  10 each   Slide lunge - retro at ledge 1 15     Standing hip abd 2 10 orange   Marching - airex 1 10 Fwd/bkwd  Bilat Cane for support, SBA   Heel raises - DL 5 10 yellow ball between heels   TKE 5 15 3.5pl   Side stepping 2 15 HHA, maroon above knees   Sit to stand with TRX 2 10 1 airex on seat, sink for support   Step up - bilat UE support 3 10 8\" L, 6\" R   Lateral step down 2 10 4\"   Standing hip 3-way kicks 5 10 Purple band for TKE   On 2\" step             Manual Intervention  (09423)  Min: 14         Shld /GH Mobs 0'   Gentle inferior glides   Post Cap mobs 0'   Prone LLLD IR   Hypervolt percussion STM 5'   L posterior hip, IT band   Manual cervical traction 0'       Hip PROM 2'       IASTM 7'   L post hip, IT band   PROM MT - shoulder 0'   Flex, abd, ER, IR   Hypervolt percussion massage 0'   L gluteals             NMR re-education (00541)  Min: 0         Biodex balance 10'   RC x 5 min with small ERIC  PS with staggered stance x 1.5 min each             Therapeutic Activity (95779)  Min:         UE throwing porgression         Dynamic UE stability         Earthquake Bar         Bodyblade                        Therapeutic Exercise and NMR EXR  [x] (87651) Provided verbal/tactile cueing for activities related to strengthening, flexibility, endurance, ROM  for improvements in scapular, scapulothoracic and UE control with self care, reaching, carrying, lifting, house/yardwork, driving/computer work. [x] (59682) Provided verbal/tactile cueing for activities related to improving balance, coordination, kinesthetic sense, posture, motor skill, proprioception  to assist with  scapular, scapulothoracic and UE control with self care, reaching, carrying, lifting, house/yardwork, driving/computer work.     Therapeutic Activities:    [] (22686 or 33513) Provided verbal/tactile cueing for activities related to improving balance, coordination, kinesthetic sense, posture, motor skill, proprioception and motor activation to allow for proper function of scapular, scapulothoracic and UE control with self care, carrying, lifting, driving/computer work.      Home Exercise Program:    [x] (97882) Reviewed/Progressed HEP activities related to strengthening, flexibility, endurance, ROM of scapular, scapulothoracic and UE control with self care, reaching, carrying, lifting, house/yardwork, driving/computer work  [] (39346) Reviewed/Progressed HEP activities related to improving balance, coordination, kinesthetic sense, posture, motor skill, proprioception of scapular, scapulothoracic and UE control with self care, reaching, carrying, lifting, house/yardwork, driving/computer work      Manual Treatments:  PROM / STM / Oscillations-Mobs:  G-I, II, III, IV (PA's, Inf., Post.)  [x] (23302) Provided manual therapy to mobilize soft tissue/joints of cervical/CT, scapular GHJ and UE for the purpose of modulating pain, promoting relaxation,  increasing ROM, reducing/eliminating soft tissue swelling/inflammation/restriction, improving soft tissue extensibility and allowing for proper ROM for normal function with self care, reaching, carrying, lifting, house/yardwork, driving/computer work    Modalities:  None    Charges:  Timed Code Treatment Minutes: 44   Total Treatment Minutes: 44       [] EVAL (LOW) 65231 (typically 20 minutes face-to-face)  [] EVAL (MOD) 90040 (typically 30 minutes face-to-face)  [] EVAL (HIGH) 54900 (typically 45 minutes face-to-face)  [] RE-EVAL     [x] XY(99349) x 2   KX  [] DRY NEEDLE 1 OR 2 MUSCLES  [] NMR (91717) x    [] DRY NEEDLE 3+ MUSCLES  [x] Manual (55322) x  1 KX     [] TA (26768) x     [] Mech Traction (23558)  [] ES(attended) (64261)     [] ES (un) (65892):   [] VASO (10499)  [] Other:    If [facility] Please Indicate Time In/Out  CPT Code Time in Time out SHOULDER GOALS:  Patient stated goal: pain-free shoulder, return to PLOF  [] Progressing: [x] Met: [] Not Met: [] Adjusted    Therapist goals for Patient:   Short Term Goals: To be achieved in: 2 weeks  1. Independent in HEP and progression per patient tolerance, in order to prevent re-injury. [] Progressing: [x] Met: [] Not Met: [] Adjusted  2. Patient will have a decrease in pain to facilitate improvement in movement, function, and ADLs as indicated by Functional Deficits. [] Progressing: [x] Met: [] Not Met: [] Adjusted    Long Term Goals: To be achieved in: 6 weeks  1. Disability index score of 25% or less for the Quick DASH to assist with reaching prior level of function. [] Progressing: [x] Met: [] Not Met: [] Adjusted  2. Patient will demonstrate increased AROM to equal contralateral to allow for proper joint functioning as indicated by Functional Deficits. [] Progressing: [x] Met: [] Not Met: [] Adjusted  3. Patient will demonstrate an increase in NM recruitment/activation and overall GH and scapular strength to within n5lbs HHD or WNL for proper functional mobility as indicated by patients Functional Deficits. [] Progressing: [x] Met: [] Not Met: [] Adjusted  4. Patient will return to reaching, lifting, overhead movements, self care especially after using toilet, dressing, grooming activities without increased symptoms or restriction. [] Progressing: [x] Met: [] Not Met: [] Adjusted       HIP GOALS:  Patient stated goal: strengthen LLE, ambulate without cane for short distances  [] Progressing: [x] Met: [] Not Met: [] Adjusted    Therapist goals for Patient:   Short Term Goals: To be achieved in: 2 weeks  1. Independent in HEP and progression per patient tolerance, in order to prevent re-injury. []?? Progressing: [x]? ? Met: []?? Not Met: []?? Adjusted  2. Patient will have a decrease in pain to facilitate improvement in movement, function, and ADLs as indicated by Functional Deficits. []?? Progressing: [x]? ? Met: []?? Not Met: []?? Adjusted     Long Term Goals: To be achieved in: 4-6 weeks  1. Disability index score of 40% or less for the LEFS to assist with reaching prior level of function. [x]? ? Progressing: []?? Met: []?? Not Met: []?? Adjusted  2. Patient will demonstrate increased AROM to WFL to allow for proper joint functioning as indicated by patients Functional Deficits. []?? Progressing: [x]? ? Met: []?? Not Met: []?? Adjusted  3. Patient will demonstrate an increase in Strength to good proximal hip strength and control, within 5lb HHD in LE to allow for proper functional mobility as indicated by patients Functional Deficits. [x]? ? Progressing: []?? Met: []?? Not Met: []?? Adjusted  4. Patient will return to standing, ambulation (with AD as needed), transfers, stairs, squatting without increased symptoms or restriction. [x] Progressing: [] Met: [] Not Met: [] Adjusted      ASSESSMENT:  Improvement in hip tightness following manual and exercise. Soft tissue restrictions noted through posterior hip musculature and proximal IT band/HS. Clenton Reas Appropriately fatigued at conclusion. Requires cueing for proper exercise performance. Continues to make progress. Return to Play: (if applicable)   []  Stage 1: Intro to Strength   []  Stage 2: Dynamic Strength and Intro to Plyometrics   []  Stage 3: Advanced Plyometrics and Intro to Throwing   []  Stage 4: Sport specific Training/Return to Sport     []  Ready to Return to Play, [employer]   []  Not Ready for Return to Sports   Comments:      Treatment/Activity Tolerance:  [x] Patient tolerated treatment well [] Patient limited by fatique  [] Patient limited by pain  [] Patient limited by other medical complications  [] Other:     Overall Progression Towards Functional goals/ Treatment Progress Update:  [x] Patient is progressing as expected towards functional goals listed.    [] Progression is slowed due to

## 2021-08-18 ENCOUNTER — HOSPITAL ENCOUNTER (OUTPATIENT)
Dept: PHYSICAL THERAPY | Age: 76
Setting detail: THERAPIES SERIES
Discharge: HOME | End: 2021-08-18
Payer: MEDICARE

## 2021-08-23 ENCOUNTER — APPOINTMENT (OUTPATIENT)
Dept: PHYSICAL THERAPY | Age: 76
End: 2021-08-23
Payer: MEDICARE

## 2021-08-23 ENCOUNTER — HOSPITAL ENCOUNTER (OUTPATIENT)
Dept: PHYSICAL THERAPY | Age: 76
Setting detail: THERAPIES SERIES
Discharge: HOME | End: 2021-08-23
Payer: MEDICARE

## 2021-08-23 PROCEDURE — 97140 MANUAL THERAPY 1/> REGIONS: CPT

## 2021-08-23 PROCEDURE — 97110 THERAPEUTIC EXERCISES: CPT

## 2021-08-23 NOTE — PLAN OF CARE
21  Endin21    Progress report due (10 Rx/or 30 days whichever is less):      Recertification due (POC duration/ or 90 days whichever is less): 10/4/21     Visit # Insurance Allowable Auth Needed   61 ([address] [place] ) Medicare []Yes    [x]No     Pain level:  2/10 currently, c/o weakness     SUBJECTIVE:  Patient reports that she is having more trouble picking up her feet recently, states that her shoes are \"carpet grabbers\". Left hip has been feeling more sore/tight recently for some reason.  The manual therapy from last session definitely helped but it still feels stiff.        OBJECTIVE:    Observation: Antalgic gait with standard cane used in RUE   Test measurements:  LEFS - 74% impairment            ROM Left Right   Hip Flexion 110 WNL   Hip Abduction WNL WNL   Hip ER 30 WNL   Hip IR WNL WNL   Hip Ext Decreased WNL   Strength  Left Right   Hip Abd 8.7 lb 12.0 lb   Hip Ext NT NT   Knee Ext 51.9 lb 51.5 lb   Knee Flex 35.3 lb  43.5 lb         RESTRICTIONS/PRECAUTIONS:     Exercises/Interventions:      Therapeutic Ex (35287)  Min: 30 Sets/sec Reps CUES/Notes   Bike 12'       Treadmill 0'   1.4 mph amb, bilat UE support, 1.0-3.0 incline   Cybex Gastroc- SL  3 10 75#   Leg Press - SL 3 10 75#   Cybex Leg Ext - SL 3 10 15#   Cybex HS curl - SL 3 10 30#   Bridge 10 12     3-way SLR 1 10     Hook lying LLE Ext 1 10 Cues for form   Supine heel slides +  12 Cues for form   Hook lying bilat hip add iso 5 10 Ball squeeze   Clam - side lying 5 10 No resistance   Supine clam - unilateral 10 10 Maroon band, each         BOSU lunge 10  10 each   Slide lunge - retro at ledge 1 15     Standing hip abd 2 10 orange   Marching - airex 1 10 Fwd/bkwd  Bilat Cane for support, SBA   Heel raises - DL 5 10 yellow ball between heels   TKE 5 15 3.5pl   Side stepping 2 15 HHA, maroon above knees   Sit to stand with TRX 2 10 1 airex on seat, sink for support   Step up - bilat UE support 3 10 8\" L, 6\" R   Lateral step down 2 10 4\"   Standing hip 3-way kicks 5 10 Purple band for TKE   On 2\" step             Manual Intervention  (95667)  Min: 14         Shld /GH Mobs 0'   Gentle inferior glides   Post Cap mobs 0'   Prone LLLD IR   Hypervolt percussion STM 5'   L posterior hip, IT band   Manual cervical traction 0'       Hip PROM 2'       IASTM 7'   L post hip, IT band   PROM MT - shoulder 0'   Flex, abd, ER, IR             NMR re-education (82164)  Min: 0         Biodex balance 10'   RC x 5 min with small ERIC  PS with staggered stance x 1.5 min each                                                 Therapeutic Activity (40715)  Min:         UE throwing porgression         Dynamic UE stability         Earthquake Bar         Bodyblade                            Therapeutic Exercise and NMR EXR  [x] (61830) Provided verbal/tactile cueing for activities related to strengthening, flexibility, endurance, ROM  for improvements in scapular, scapulothoracic and UE control with self care, reaching, carrying, lifting, house/yardwork, driving/computer work. [x] (86055) Provided verbal/tactile cueing for activities related to improving balance, coordination, kinesthetic sense, posture, motor skill, proprioception  to assist with  scapular, scapulothoracic and UE control with self care, reaching, carrying, lifting, house/yardwork, driving/computer work. Therapeutic Activities:    [] (12623 or 68602) Provided verbal/tactile cueing for activities related to improving balance, coordination, kinesthetic sense, posture, motor skill, proprioception and motor activation to allow for proper function of scapular, scapulothoracic and UE control with self care, carrying, lifting, driving/computer work.      Home Exercise Program:    [x] (88931) Reviewed/Progressed HEP activities related to strengthening, flexibility, endurance, ROM of scapular, scapulothoracic and UE control with self care, reaching, carrying, lifting, house/yardwork, driving/computer work  [] (12637) Reviewed/Progressed HEP activities related to improving balance, coordination, kinesthetic sense, posture, motor skill, proprioception of scapular, scapulothoracic and UE control with self care, reaching, carrying, lifting, house/yardwork, driving/computer work      Manual Treatments:  PROM / STM / Oscillations-Mobs:  G-I, II, III, IV (PA's, Inf., Post.)  [x] (69263) Provided manual therapy to mobilize soft tissue/joints of cervical/CT, scapular GHJ and UE for the purpose of modulating pain, promoting relaxation,  increasing ROM, reducing/eliminating soft tissue swelling/inflammation/restriction, improving soft tissue extensibility and allowing for proper ROM for normal function with self care, reaching, carrying, lifting, house/yardwork, driving/computer work    Modalities:  None    Charges:  Timed Code Treatment Minutes: 44   Total Treatment Minutes: 44       [] EVAL (LOW) 12808 (typically 20 minutes face-to-face)  [] EVAL (MOD) 03035 (typically 30 minutes face-to-face)  [] EVAL (HIGH) 71105 (typically 45 minutes face-to-face)  [] RE-EVAL     [x] LC(06766) x 2     [] DRY NEEDLE 1 OR 2 MUSCLES  [] NMR (10083) x     [] DRY NEEDLE 3+ MUSCLES  [x] Manual (79088) x 1      [] TA (69675) x     [] Mech Traction (38804)  [] ES(attended) (86553)     [] ES (un) (79428):   [] VASO (41574)  [] Other:    If [facility] Please Indicate Time In/Out  CPT Code Time in Time out                                   SHOULDER GOALS:  Patient stated goal: pain-free shoulder, return to PLOF  [] Progressing: [x] Met: [] Not Met: [] Adjusted    Therapist goals for Patient:   Short Term Goals: To be achieved in: 2 weeks  1. Independent in HEP and progression per patient tolerance, in order to prevent re-injury. [] Progressing: [x] Met: [] Not Met: [] Adjusted  2. Patient will have a decrease in pain to facilitate improvement in movement, function, and ADLs as indicated by Functional Deficits.   [] Progressing: [x] Met: [] Not Met: [] Adjusted    Long Term Goals: To be achieved in: 6 weeks  1. Disability index score of 25% or less for the Quick DASH to assist with reaching prior level of function. [] Progressing: [x] Met: [] Not Met: [] Adjusted  2. Patient will demonstrate increased AROM to equal contralateral to allow for proper joint functioning as indicated by Functional Deficits. [] Progressing: [x] Met: [] Not Met: [] Adjusted  3. Patient will demonstrate an increase in NM recruitment/activation and overall GH and scapular strength to within n5lbs HHD or WNL for proper functional mobility as indicated by patients Functional Deficits. [] Progressing: [x] Met: [] Not Met: [] Adjusted  4. Patient will return to reaching, lifting, overhead movements, self care especially after using toilet, dressing, grooming activities without increased symptoms or restriction. [] Progressing: [x] Met: [] Not Met: [] Adjusted       HIP GOALS:  Patient stated goal: strengthen LLE, ambulate without cane for short distances  [] Progressing: [x] Met: [] Not Met: [] Adjusted    Therapist goals for Patient:   Short Term Goals: To be achieved in: 2 weeks  1. Independent in HEP and progression per patient tolerance, in order to prevent re-injury. []?? Progressing: [x]? ? Met: []?? Not Met: []?? Adjusted  2. Patient will have a decrease in pain to facilitate improvement in movement, function, and ADLs as indicated by Functional Deficits. []?? Progressing: [x]? ? Met: []?? Not Met: []?? Adjusted     Long Term Goals: To be achieved in: 4-6 weeks  1. Disability index score of 40% or less for the LEFS to assist with reaching prior level of function. [x]? ? Progressing: []?? Met: []?? Not Met: []?? Adjusted  2. Patient will demonstrate increased AROM to WFL to allow for proper joint functioning as indicated by patients Functional Deficits. []?? Progressing: [x]? ? Met: []?? Not Met: []?? Adjusted  3.  Patient will demonstrate an increase in Strength to good proximal hip strength and control, within 5lb HHD in LE to allow for proper functional mobility as indicated by patients Functional Deficits. [x]? ? Progressing: []?? Met: []?? Not Met: []?? Adjusted  4. Patient will return to standing, ambulation (with AD as needed), transfers, stairs, squatting without increased symptoms or restriction. [x] Progressing: [] Met: [] Not Met: [] Adjusted      ASSESSMENT:  Patient has attended 60 physical therapy visits from 12/2/20 through 8/23/21 for treatment of right shoulder adhesive capsulitis and chronic left hip dysfunction with history of left hip hemiarthroplasty in 2017. Patient's right shoulder symptoms continue to be completely resolved. Making gradual gains in general LLE strength but she continues to present with deficient proximal hip control and poor core stability. Gait is still antalgic with uneven step length and wide stance, though making some improvement. Recently the posterior hip and TFL/IT band has been more tight and symptomatic, benefiting from manual therapy to relieve symptoms. Patient will benefit from continued physical therapy with focus on left hip rehabilitation and LE strength/balance training to improve patient's overall function and reduce risk of falls. Return to Play: (if applicable)   []  Stage 1: Intro to Strength   []  Stage 2: Dynamic Strength and Intro to Plyometrics   []  Stage 3: Advanced Plyometrics and Intro to Throwing   []  Stage 4: Sport specific Training/Return to Sport     []  Ready to Return to Play, [email or web address]   []  Not Ready for Return to Sports   Comments:      Treatment/Activity Tolerance:  [x] Patient tolerated treatment well [] Patient limited by fatique  [] Patient limited by pain  [] Patient limited by other medical complications  [] Other:     Overall Progression Towards Functional goals/ Treatment Progress Update:  [x] Patient is progressing as expected towards functional goals listed.    [] Progression is slowed due to complexities/Impairments listed. [] Progression has been slowed due to co-morbidities. [] Plan just implemented, too soon to assess goals progression <30days   [] Goals require adjustment due to lack of progress  [] Patient is not progressing as expected and requires additional follow up with physician  [] Other:     Prognosis for POC: [x] Good [] Fair  [] Poor    Patient requires continued skilled intervention: [x] Yes  [] No      PLAN: Continue PT 2x/week for 8 weeks for shoulder strengthening/ROM if needed, with continued left hip rehabilitation to focus on ROM, proximal hip strengthening, general LLE strength training, and balance training to lessen risk of falls  [x] Continue per plan of care [] Alter current plan (see comments)  [] Plan of care initiated [] Hold pending MD visit [] Discharge    Electronically signed by: Emma Owens PT     Note: If patient does not return for scheduled/recommended follow up visits, this note will serve as a discharge from care along with the most recent update on progress.

## 2021-08-25 ENCOUNTER — HOSPITAL ENCOUNTER (OUTPATIENT)
Dept: PHYSICAL THERAPY | Age: 76
Setting detail: THERAPIES SERIES
Discharge: HOME | End: 2021-08-25
Payer: MEDICARE

## 2021-08-25 PROCEDURE — 97140 MANUAL THERAPY 1/> REGIONS: CPT

## 2021-08-25 PROCEDURE — 97110 THERAPEUTIC EXERCISES: CPT

## 2021-08-25 NOTE — FLOWSHEET NOTE
[place] [address] [facility] [address]  Phone: [phone] Fax: [phone]      Physical Therapy Treatment Note/ Progress Report:     Date:  2021    Patient Name:  Anuradha Paul    :  1945  MRN: [id number]  Restrictions/Precautions:    Medical/Treatment Diagnosis Information:  · Diagnosis: Right shoulder adhesive capsulitis, left hip pain  · Treatment Diagnosis: M75.01, M82.049  Insurance/Certification information:  PT Insurance Information: Medicare / [facility]  Physician Information:  Referring Practitioner: Kevin Franco  pedro signed (Y/N):     Date of Patient follow up with Physician: N/A      Progress Report: []  Yes  [x]  No     Date Range for reporting period:  Beginnin21  Endin21    Progress report due (10 Rx/or 30 days whichever is less):      Recertification due (POC duration/ or 90 days whichever is less): 10/4/21     Visit # Insurance Allowable Auth Needed   64 ([address] [place] ) Medicare []Yes    [x]No     Pain level:  2/10 currently, c/o weakness     SUBJECTIVE:  Patient reports that she is feeling much better today.  Feels like the manual is really helping the hip.        OBJECTIVE:    Observation: Antalgic gait with standard cane used in RUE   Test measurements:  LEFS - 74% impairment            ROM Left Right   Hip Flexion 110 WNL   Hip Abduction WNL WNL   Hip ER 30 WNL   Hip IR WNL WNL   Hip Ext Decreased WNL   Strength  Left Right   Hip Abd 8.7 lb 12.0 lb   Hip Ext NT NT   Knee Ext 51.9 lb 51.5 lb   Knee Flex 35.3 lb  43.5 lb         RESTRICTIONS/PRECAUTIONS:     Exercises/Interventions:      Therapeutic Ex (43300)  Min: 30 Sets/sec Reps CUES/Notes   Bike 12'       Treadmill 0'   1.4 mph amb, bilat UE support, 1.0-3.0 incline   Cybex Gastroc- SL  3 10 75#   Leg Press - SL 3 10 75#   Cybex Leg Ext - SL 3 10 15#   Cybex HS curl - SL 3 10 30#   Bridge 10 12     3-way SLR 1 10     Hook lying LLE Ext 1 10 Cues for form   Supine heel slides + march 1 12 Cues for form   Hook lying bilat hip add iso 5 10 Ball squeeze   Clam - side lying 5 10 No resistance   Supine clam - unilateral 10 10 Maroon band, each         BOSU lunge 10  10 each   Slide lunge - retro at ledge 1 15     Standing hip abd 2 10 orange   Marching - airex 1 10 Fwd/bkwd  Bilat Cane for support, SBA   Heel raises - DL 5 10 yellow ball between heels   TKE 5 15 3.5pl   Side stepping 2 15 HHA, maroon above knees   Sit to stand with TRX 2 10 1 airex on seat, sink for support   Step up - bilat UE support 3 10 8\" L, 6\" R   Lateral step down 2 10 4\"   Standing hip 3-way kicks 5 10 Purple band for TKE   On 2\" step             Manual Intervention  (67449)  Min: 14         Shld /GH Mobs 0'   Gentle inferior glides   Post Cap mobs 0'   Prone LLLD IR   Hypervolt percussion STM 5'   L posterior hip, IT band   Manual cervical traction 0'       Hip PROM 2'       IASTM 7'   L post hip, IT band   PROM MT - shoulder 0'   Flex, abd, ER, IR             NMR re-education (67167)  Min: 0         Biodex balance 10'   RC x 5 min with small ERIC  PS with staggered stance x 1.5 min each                                                 Therapeutic Activity (72941)  Min:         UE throwing porgression         Dynamic UE stability         Earthquake Bar         Bodyblade                            Therapeutic Exercise and NMR EXR  [x] (79766) Provided verbal/tactile cueing for activities related to strengthening, flexibility, endurance, ROM  for improvements in scapular, scapulothoracic and UE control with self care, reaching, carrying, lifting, house/yardwork, driving/computer work.     [x] (80936) Provided verbal/tactile cueing for activities related to improving balance, coordination, kinesthetic sense, posture, motor skill, proprioception  to assist with  scapular, scapulothoracic and UE control with self care, reaching, carrying, lifting, house/yardwork, driving/computer work.    Therapeutic Activities:    [] (89098 or 74325) Provided verbal/tactile cueing for activities related to improving balance, coordination, kinesthetic sense, posture, motor skill, proprioception and motor activation to allow for proper function of scapular, scapulothoracic and UE control with self care, carrying, lifting, driving/computer work.      Home Exercise Program:    [x] (36295) Reviewed/Progressed HEP activities related to strengthening, flexibility, endurance, ROM of scapular, scapulothoracic and UE control with self care, reaching, carrying, lifting, house/yardwork, driving/computer work  [] (98922) Reviewed/Progressed HEP activities related to improving balance, coordination, kinesthetic sense, posture, motor skill, proprioception of scapular, scapulothoracic and UE control with self care, reaching, carrying, lifting, house/yardwork, driving/computer work      Manual Treatments:  PROM / STM / Oscillations-Mobs:  G-I, II, III, IV (PA's, Inf., Post.)  [x] (19361) Provided manual therapy to mobilize soft tissue/joints of cervical/CT, scapular GHJ and UE for the purpose of modulating pain, promoting relaxation,  increasing ROM, reducing/eliminating soft tissue swelling/inflammation/restriction, improving soft tissue extensibility and allowing for proper ROM for normal function with self care, reaching, carrying, lifting, house/yardwork, driving/computer work    Modalities:  None    Charges:  Timed Code Treatment Minutes: 44   Total Treatment Minutes: 44       [] EVAL (LOW) 99701 (typically 20 minutes face-to-face)  [] EVAL (MOD) 33895 (typically 30 minutes face-to-face)  [] EVAL (HIGH) 57326 (typically 45 minutes face-to-face)  [] RE-EVAL     [x] QV(04285) x 2     [] DRY NEEDLE 1 OR 2 MUSCLES  [] NMR (27920) x     [] DRY NEEDLE 3+ MUSCLES  [x] Manual (01052) x 1      [] TA (02450) x     [] Mech Traction (73032)  [] ES(attended) (52468)     [] ES (un) (12128):   [] VASO (80215)  [] Other:    If [facility] improvement in movement, function, and ADLs as indicated by Functional Deficits. []?? Progressing: [x]? ? Met: []?? Not Met: []?? Adjusted     Long Term Goals: To be achieved in: 4-6 weeks  1. Disability index score of 40% or less for the LEFS to assist with reaching prior level of function. [x]? ? Progressing: []?? Met: []?? Not Met: []?? Adjusted  2. Patient will demonstrate increased AROM to WFL to allow for proper joint functioning as indicated by patients Functional Deficits. []?? Progressing: [x]? ? Met: []?? Not Met: []?? Adjusted  3. Patient will demonstrate an increase in Strength to good proximal hip strength and control, within 5lb HHD in LE to allow for proper functional mobility as indicated by patients Functional Deficits. [x]? ? Progressing: []?? Met: []?? Not Met: []?? Adjusted  4. Patient will return to standing, ambulation (with AD as needed), transfers, stairs, squatting without increased symptoms or restriction. [x] Progressing: [] Met: [] Not Met: [] Adjusted      ASSESSMENT:  Patient has attended 60 physical therapy visits from 12/2/20 through 8/23/21 for treatment of right shoulder adhesive capsulitis and chronic left hip dysfunction with history of left hip hemiarthroplasty in 2017. Patient's right shoulder symptoms continue to be completely resolved. Making gradual gains in general LLE strength but she continues to present with deficient proximal hip control and poor core stability. Gait is still antalgic with uneven step length and wide stance, though making some improvement. Recently the posterior hip and TFL/IT band has been more tight and symptomatic, benefiting from manual therapy to relieve symptoms. Patient will benefit from continued physical therapy with focus on left hip rehabilitation and LE strength/balance training to improve patient's overall function and reduce risk of falls.  Pt did require SBA w/ transition between machines today due to \"wobbliness\" in knees she reported was due to fatigue. Return to Play: (if applicable)   []  Stage 1: Intro to Strength   []  Stage 2: Dynamic Strength and Intro to Plyometrics   []  Stage 3: Advanced Plyometrics and Intro to Throwing   []  Stage 4: Sport specific Training/Return to Sport     []  Ready to Return to Play, [employer]   []  Not Ready for Return to Sports   Comments:      Treatment/Activity Tolerance:  [x] Patient tolerated treatment well [] Patient limited by fatique  [] Patient limited by pain  [] Patient limited by other medical complications  [] Other:     Overall Progression Towards Functional goals/ Treatment Progress Update:  [x] Patient is progressing as expected towards functional goals listed. [] Progression is slowed due to complexities/Impairments listed. [] Progression has been slowed due to co-morbidities. [] Plan just implemented, too soon to assess goals progression <30days   [] Goals require adjustment due to lack of progress  [] Patient is not progressing as expected and requires additional follow up with physician  [] Other:     Prognosis for POC: [x] Good [] Fair  [] Poor    Patient requires continued skilled intervention: [x] Yes  [] No      PLAN: Continue PT 2x/week for 8 weeks for shoulder strengthening/ROM if needed, with continued left hip rehabilitation to focus on ROM, proximal hip strengthening, general LLE strength training, and balance training to lessen risk of falls  [x] Continue per plan of care [] Alter current plan (see comments)  [] Plan of care initiated [] Hold pending MD visit [] Discharge    Electronically signed by: Karla Fox PT     Note: If patient does not return for scheduled/recommended follow up visits, this note will serve as a discharge from care along with the most recent update on progress.

## 2021-08-27 ENCOUNTER — APPOINTMENT (OUTPATIENT)
Dept: PHYSICAL THERAPY | Age: 76
End: 2021-08-27
Payer: MEDICARE

## 2021-08-30 ENCOUNTER — HOSPITAL ENCOUNTER (OUTPATIENT)
Dept: PHYSICAL THERAPY | Age: 76
Setting detail: THERAPIES SERIES
Discharge: HOME | End: 2021-08-30
Payer: MEDICARE

## 2021-08-30 PROCEDURE — 97110 THERAPEUTIC EXERCISES: CPT

## 2021-08-30 NOTE — FLOWSHEET NOTE
[place] [address] [facility]  Phone: [phone] Fax: [phone]      Physical Therapy Treatment Note/ Progress Report:     Date:  2021    Patient Name:  Jocelyn Hooper    :  1945  MRN: [id number]  Restrictions/Precautions:    Medical/Treatment Diagnosis Information:  · Diagnosis: Right shoulder adhesive capsulitis, left hip pain  · Treatment Diagnosis: M75.01, F00.991  Insurance/Certification information:  PT Insurance Information: Medicare / [facility]  Physician Information:  Referring Practitioner: Radha Sy of care signed (Y/N):     Date of Patient follow up with Physician: N/A      Progress Report: []  Yes  [x]  No     Date Range for reporting period:  Beginnin21  Endin21    Progress report due (10 Rx/or 30 days whichever is less):      Recertification due (POC duration/ or 90 days whichever is less): 10/4/21     Visit # Insurance Allowable Auth Needed   58 ([address] [place] [address]) Medicare []Yes    [x]No     Pain level:  2/10 currently, c/o weakness     SUBJECTIVE:  Patient reports that she is doing well, left hip is not bothering her as much today.  Feels that strength and function are improving.        OBJECTIVE:    Observation: Antalgic gait with standard cane used in RUE   Test measurements:  LEFS - 74% impairment            ROM Left Right   Hip Flexion 110 WNL   Hip Abduction WNL WNL   Hip ER 30 WNL   Hip IR WNL WNL   Hip Ext Decreased WNL   Strength  Left Right   Hip Abd 8.7 lb 12.0 lb   Hip Ext NT NT   Knee Ext 51.9 lb 51.5 lb   Knee Flex 35.3 lb  43.5 lb         RESTRICTIONS/PRECAUTIONS:     Exercises/Interventions:      Therapeutic Ex (22818)  Min: 45 Sets/sec Reps CUES/Notes   Bike 12'       Treadmill 0'   1.4 mph amb, bilat UE support, 1.0-3.0 incline   Cybex Gastroc- SL  3 10 75#   Leg Press - SL 3 10 75#   Cybex Leg Ext - SL 3 10 15#   Cybex HS curl - SL 3 10 30#   Bridge 10 12     3-way SLR 1 10     Hook lying LLE Ext 1 10 Cues for form   Supine heel slides + march 1 12 Cues for form   Hook lying bilat hip add iso 5 10 Ball squeeze   Clam - side lying 5 10 No resistance   Supine clam - unilateral 10 10 Maroon band, each         BOSU lunge 10  10 each   Slide lunge - retro at ledge 1 15     Standing hip abd 2 10 orange   Marching - airex 1 10 Fwd/bkwd  Bilat Cane for support, SBA   Heel raises - DL 5 10 yellow ball between heels   TKE 5 15 3.5pl   Side stepping 2 15 HHA, maroon above knees   Sit to stand with TRX 2 10 1 airex on seat, sink for support   Step up - bilat UE support 3 10 8\" L, 6\" R   Lateral step down 2 10 4\"   Standing hip 3-way kicks 5 10 Purple band for TKE   On 2\" step             Manual Intervention  (45234)  Min: 14         Shld /GH Mobs 0'   Gentle inferior glides   Post Cap mobs 0'   Prone LLLD IR   Hypervolt percussion STM 5'   L posterior hip, IT band   Manual cervical traction 0'       Hip PROM 2'       IASTM 7'   L post hip, IT band   PROM MT - shoulder 0'   Flex, abd, ER, IR             NMR re-education (91514)  Min: 0         Biodex balance 10'   RC x 5 min with small ERIC  PS with staggered stance x 1.5 min each                                                 Therapeutic Activity (07975)  Min:         UE throwing porgression         Dynamic UE stability         Earthquake Bar         Bodyblade                            Therapeutic Exercise and NMR EXR  [x] (23072) Provided verbal/tactile cueing for activities related to strengthening, flexibility, endurance, ROM  for improvements in scapular, scapulothoracic and UE control with self care, reaching, carrying, lifting, house/yardwork, driving/computer work.     [x] (40947) Provided verbal/tactile cueing for activities related to improving balance, coordination, kinesthetic sense, posture, motor skill, proprioception  to assist with  scapular, scapulothoracic and UE control with self care, reaching, carrying, lifting, house/yardwork, driving/computer work. Therapeutic Activities:    [] (88419 or 97420) Provided verbal/tactile cueing for activities related to improving balance, coordination, kinesthetic sense, posture, motor skill, proprioception and motor activation to allow for proper function of scapular, scapulothoracic and UE control with self care, carrying, lifting, driving/computer work.      Home Exercise Program:    [x] (11928) Reviewed/Progressed HEP activities related to strengthening, flexibility, endurance, ROM of scapular, scapulothoracic and UE control with self care, reaching, carrying, lifting, house/yardwork, driving/computer work  [] (61136) Reviewed/Progressed HEP activities related to improving balance, coordination, kinesthetic sense, posture, motor skill, proprioception of scapular, scapulothoracic and UE control with self care, reaching, carrying, lifting, house/yardwork, driving/computer work      Manual Treatments:  PROM / STM / Oscillations-Mobs:  G-I, II, III, IV (PA's, Inf., Post.)  [x] (78962) Provided manual therapy to mobilize soft tissue/joints of cervical/CT, scapular GHJ and UE for the purpose of modulating pain, promoting relaxation,  increasing ROM, reducing/eliminating soft tissue swelling/inflammation/restriction, improving soft tissue extensibility and allowing for proper ROM for normal function with self care, reaching, carrying, lifting, house/yardwork, driving/computer work    Modalities:  None    Charges:  Timed Code Treatment Minutes: 45   Total Treatment Minutes: 45       [] EVAL (LOW) 88205 (typically 20 minutes face-to-face)  [] EVAL (MOD) 34526 (typically 30 minutes face-to-face)  [] EVAL (HIGH) 79633 (typically 45 minutes face-to-face)  [] RE-EVAL     [x] VT(97105) x 3     [] DRY NEEDLE 1 OR 2 MUSCLES  [] NMR (31137) x     [] DRY NEEDLE 3+ MUSCLES  [] Manual (23664) x       [] TA (63799) x     [] Mech Traction (76172)  [] ES(attended) (00822)     [] ES (un) (19688):   [] VASO (03732)  [] Other:    If [facility] Please Indicate Time In/Out  CPT Code Time in Time out                                   SHOULDER GOALS:  Patient stated goal: pain-free shoulder, return to PLOF  [] Progressing: [x] Met: [] Not Met: [] Adjusted    Therapist goals for Patient:   Short Term Goals: To be achieved in: 2 weeks  1. Independent in HEP and progression per patient tolerance, in order to prevent re-injury. [] Progressing: [x] Met: [] Not Met: [] Adjusted  2. Patient will have a decrease in pain to facilitate improvement in movement, function, and ADLs as indicated by Functional Deficits. [] Progressing: [x] Met: [] Not Met: [] Adjusted    Long Term Goals: To be achieved in: 6 weeks  1. Disability index score of 25% or less for the Quick DASH to assist with reaching prior level of function. [] Progressing: [x] Met: [] Not Met: [] Adjusted  2. Patient will demonstrate increased AROM to equal contralateral to allow for proper joint functioning as indicated by Functional Deficits. [] Progressing: [x] Met: [] Not Met: [] Adjusted  3. Patient will demonstrate an increase in NM recruitment/activation and overall GH and scapular strength to within n5lbs HHD or WNL for proper functional mobility as indicated by patients Functional Deficits. [] Progressing: [x] Met: [] Not Met: [] Adjusted  4. Patient will return to reaching, lifting, overhead movements, self care especially after using toilet, dressing, grooming activities without increased symptoms or restriction. [] Progressing: [x] Met: [] Not Met: [] Adjusted       HIP GOALS:  Patient stated goal: strengthen LLE, ambulate without cane for short distances  [] Progressing: [x] Met: [] Not Met: [] Adjusted    Therapist goals for Patient:   Short Term Goals: To be achieved in: 2 weeks  1. Independent in HEP and progression per patient tolerance, in order to prevent re-injury. []?? Progressing: [x]? ? Met: []?? Not Met: []?? Adjusted  2. Patient will have a decrease in pain to facilitate improvement in movement, function, and ADLs as indicated by Functional Deficits. []?? Progressing: [x]? ? Met: []?? Not Met: []?? Adjusted     Long Term Goals: To be achieved in: 4-6 weeks  1. Disability index score of 40% or less for the LEFS to assist with reaching prior level of function. [x]? ? Progressing: []?? Met: []?? Not Met: []?? Adjusted  2. Patient will demonstrate increased AROM to WFL to allow for proper joint functioning as indicated by patients Functional Deficits. []?? Progressing: [x]? ? Met: []?? Not Met: []?? Adjusted  3. Patient will demonstrate an increase in Strength to good proximal hip strength and control, within 5lb HHD in LE to allow for proper functional mobility as indicated by patients Functional Deficits. [x]? ? Progressing: []?? Met: []?? Not Met: []?? Adjusted  4. Patient will return to standing, ambulation (with AD as needed), transfers, stairs, squatting without increased symptoms or restriction. [x] Progressing: [] Met: [] Not Met: [] Adjusted      ASSESSMENT:  Patient fatigued very quickly today on RLE consistently with all exercises. No increased pain with any activity just fatigue. Return to Play: (if applicable)   []  Stage 1: Intro to Strength   []  Stage 2: Dynamic Strength and Intro to Plyometrics   []  Stage 3: Advanced Plyometrics and Intro to Throwing   []  Stage 4: Sport specific Training/Return to Sport     []  Ready to Return to Play, [employer]   []  Not Ready for Return to Sports   Comments:      Treatment/Activity Tolerance:  [x] Patient tolerated treatment well [] Patient limited by fatique  [] Patient limited by pain  [] Patient limited by other medical complications  [] Other:     Overall Progression Towards Functional goals/ Treatment Progress Update:  [x] Patient is progressing as expected towards functional goals listed. [] Progression is slowed due to complexities/Impairments listed.   [] Progression has been slowed due to co-morbidities. [] Plan just implemented, too soon to assess goals progression <30days   [] Goals require adjustment due to lack of progress  [] Patient is not progressing as expected and requires additional follow up with physician  [] Other:     Prognosis for POC: [x] Good [] Fair  [] Poor    Patient requires continued skilled intervention: [x] Yes  [] No      PLAN: Continue PT 2x/week for 8 weeks for shoulder strengthening/ROM if needed, with continued left hip rehabilitation to focus on ROM, proximal hip strengthening, general LLE strength training, and balance training to lessen risk of falls  [x] Continue per plan of care [] Alter current plan (see comments)  [] Plan of care initiated [] Hold pending MD visit [] Discharge    Electronically signed by: Thiago Bobo PTA     Note: If patient does not return for scheduled/recommended follow up visits, this note will serve as a discharge from care along with the most recent update on progress.

## 2021-09-01 ENCOUNTER — HOSPITAL ENCOUNTER (OUTPATIENT)
Dept: PHYSICAL THERAPY | Age: 76
Setting detail: THERAPIES SERIES
Discharge: HOME | End: 2021-09-01
Payer: MEDICARE

## 2021-09-01 PROCEDURE — 97110 THERAPEUTIC EXERCISES: CPT

## 2021-09-01 NOTE — FLOWSHEET NOTE
[place] [address] [facility]  Phone: [phone] Fax: [phone]      Physical Therapy Treatment Note/ Progress Report:     Date:  2021    Patient Name:  Tanya Jiang    :  1945  MRN: [id number]  Restrictions/Precautions:    Medical/Treatment Diagnosis Information:  · Diagnosis: Right shoulder adhesive capsulitis, left hip pain  · Treatment Diagnosis: M75.01, J12.134  Insurance/Certification information:  PT Insurance Information: [facility] / [facility]  Physician Information:  Referring Practitioner: Anibal Celaya of care signed (Y/N):     Date of Patient follow up with Physician: N/A      Progress Report: []  Yes  [x]  No     Date Range for reporting period:  Beginnin21  Endin21    Progress report due (10 Rx/or 30 days whichever is less): 67     Recertification due (POC duration/ or 90 days whichever is less): 10/4/21     Visit # Insurance Allowable Auth Needed   61 ([address] [place] 22) Medicare []Yes    [x]No     Pain level:  2/10 currently, c/o weakness     SUBJECTIVE:  Patient reports that she is feeling more tired and unsteady on her feet.  States that she has a follow up appointment with her doctor for the .        OBJECTIVE:    Observation: Antalgic gait with standard cane used in RUE   Test measurements:  LEFS - 74% impairment            ROM Left Right   Hip Flexion 110 WNL   Hip Abduction WNL WNL   Hip ER 30 WNL   Hip IR WNL WNL   Hip Ext Decreased WNL   Strength  Left Right   Hip Abd 8.7 lb 12.0 lb   Hip Ext NT NT   Knee Ext 51.9 lb 51.5 lb   Knee Flex 35.3 lb  43.5 lb         RESTRICTIONS/PRECAUTIONS:     Exercises/Interventions:      Therapeutic Ex (02826)  Min: 45 Sets/sec Reps CUES/Notes   Bike 12'       Treadmill 0'   1.4 mph amb, bilat UE support, 1.0-3.0 incline   Cybex Gastroc- SL  3 10 75#   Leg Press - SL 3 10 75#   Cybex Leg Ext - SL 3 10 15#   Cybex HS curl - SL 3 10 30#   Bridge 10 12     3-way SLR 1 10     Hook lying LLE Ext 1 10 Cues for form   Supine heel slides + march 1 12 Cues for form   Hook lying bilat hip add iso 5 10 Ball squeeze   Clam - side lying 5 10 No resistance   Supine clam - unilateral 10 10 Maroon band, each         BOSU lunge 10  10 each   Slide lunge - retro at ledge 1 15     Standing hip abd 2 10 orange   Marching - airex 1 10 Fwd/bkwd  Bilat Cane for support, SBA   Heel raises - DL 5 10 yellow ball between heels   TKE 5 15 3.5pl   Side stepping 2 15 HHA, maroon above knees   Sit to stand with TRX 2 10 1 airex on seat, sink for support   Step up - bilat UE support 3 10 8\" L, 6\" R   Lateral step down 2 10 4\"   Standing hip 3-way kicks 5 10 Purple band for TKE   On 2\" step             Manual Intervention  (56676)  Min: 14         Shld /GH Mobs 0'   Gentle inferior glides   Post Cap mobs 0'   Prone LLLD IR   Hypervolt percussion STM 5'   L posterior hip, IT band   Manual cervical traction 0'       Hip PROM 2'       IASTM 7'   L post hip, IT band   PROM MT - shoulder 0'   Flex, abd, ER, IR             NMR re-education (95586)  Min: 0         Biodex balance 10'   RC x 5 min with small ERIC  PS with staggered stance x 1.5 min each                                                 Therapeutic Activity (03731)  Min:         UE throwing porgression         Dynamic UE stability         Earthquake Bar         Bodyblade                            Therapeutic Exercise and NMR EXR  [x] (85019) Provided verbal/tactile cueing for activities related to strengthening, flexibility, endurance, ROM  for improvements in scapular, scapulothoracic and UE control with self care, reaching, carrying, lifting, house/yardwork, driving/computer work.     [x] (27197) Provided verbal/tactile cueing for activities related to improving balance, coordination, kinesthetic sense, posture, motor skill, proprioception  to assist with  scapular, scapulothoracic and UE control with self care, reaching, carrying, lifting, house/yardwork, driving/computer work. Therapeutic Activities:    [] (59682 or 84520) Provided verbal/tactile cueing for activities related to improving balance, coordination, kinesthetic sense, posture, motor skill, proprioception and motor activation to allow for proper function of scapular, scapulothoracic and UE control with self care, carrying, lifting, driving/computer work.      Home Exercise Program:    [x] (09862) Reviewed/Progressed HEP activities related to strengthening, flexibility, endurance, ROM of scapular, scapulothoracic and UE control with self care, reaching, carrying, lifting, house/yardwork, driving/computer work  [] (30276) Reviewed/Progressed HEP activities related to improving balance, coordination, kinesthetic sense, posture, motor skill, proprioception of scapular, scapulothoracic and UE control with self care, reaching, carrying, lifting, house/yardwork, driving/computer work      Manual Treatments:  PROM / STM / Oscillations-Mobs:  G-I, II, III, IV (PA's, Inf., Post.)  [x] (74287) Provided manual therapy to mobilize soft tissue/joints of cervical/CT, scapular GHJ and UE for the purpose of modulating pain, promoting relaxation,  increasing ROM, reducing/eliminating soft tissue swelling/inflammation/restriction, improving soft tissue extensibility and allowing for proper ROM for normal function with self care, reaching, carrying, lifting, house/yardwork, driving/computer work    Modalities:  None    Charges:  Timed Code Treatment Minutes: 45   Total Treatment Minutes: 45       [] EVAL (LOW) 44508 (typically 20 minutes face-to-face)  [] EVAL (MOD) 14603 (typically 30 minutes face-to-face)  [] EVAL (HIGH) 58985 (typically 45 minutes face-to-face)  [] RE-EVAL     [x] OK(15552) x 3     [] DRY NEEDLE 1 OR 2 MUSCLES  [] NMR (16724) x     [] DRY NEEDLE 3+ MUSCLES  [] Manual (16178) x       [] TA (68982) x     [] Mech Traction (11436)  [] ES(attended) (68778)     [] ES (un) (74860): have a decrease in pain to facilitate improvement in movement, function, and ADLs as indicated by Functional Deficits. []?? Progressing: [x]? ? Met: []?? Not Met: []?? Adjusted     Long Term Goals: To be achieved in: 4-6 weeks  1. Disability index score of 40% or less for the LEFS to assist with reaching prior level of function. [x]? ? Progressing: []?? Met: []?? Not Met: []?? Adjusted  2. Patient will demonstrate increased AROM to WFL to allow for proper joint functioning as indicated by patients Functional Deficits. []?? Progressing: [x]? ? Met: []?? Not Met: []?? Adjusted  3. Patient will demonstrate an increase in Strength to good proximal hip strength and control, within 5lb HHD in LE to allow for proper functional mobility as indicated by patients Functional Deficits. [x]? ? Progressing: []?? Met: []?? Not Met: []?? Adjusted  4. Patient will return to standing, ambulation (with AD as needed), transfers, stairs, squatting without increased symptoms or restriction. [x] Progressing: [] Met: [] Not Met: [] Adjusted      ASSESSMENT:  Slight improved tolerance today over Monday but still fatigued pretty quickly. Relying more on [facility] to steady herself especially once fatigued. Return to Play: (if applicable)   []  Stage 1: Intro to Strength   []  Stage 2: Dynamic Strength and Intro to Plyometrics   []  Stage 3: Advanced Plyometrics and Intro to Throwing   []  Stage 4: Sport specific Training/Return to Sport     []  Ready to Return to Play, [employer]   []  Not Ready for Return to Sports   Comments:      Treatment/Activity Tolerance:  [x] Patient tolerated treatment well [] Patient limited by fatique  [] Patient limited by pain  [] Patient limited by other medical complications  [] Other:     Overall Progression Towards Functional goals/ Treatment Progress Update:  [x] Patient is progressing as expected towards functional goals listed.    [] Progression is slowed due to complexities/Impairments listed. [] Progression has been slowed due to co-morbidities. [] Plan just implemented, too soon to assess goals progression <30days   [] Goals require adjustment due to lack of progress  [] Patient is not progressing as expected and requires additional follow up with physician  [] Other:     Prognosis for POC: [x] Good [] Fair  [] Poor    Patient requires continued skilled intervention: [x] Yes  [] No      PLAN: Continue PT 2x/week for 8 weeks for shoulder strengthening/ROM if needed, with continued left hip rehabilitation to focus on ROM, proximal hip strengthening, general LLE strength training, and balance training to lessen risk of falls  [x] Continue per plan of care [] Alter current plan (see comments)  [] Plan of care initiated [] Hold pending MD visit [] Discharge    Electronically signed by: Vivek Hernandez PTA     Note: If patient does not return for scheduled/recommended follow up visits, this note will serve as a discharge from care along with the most recent update on progress.

## 2021-09-08 ENCOUNTER — HOSPITAL ENCOUNTER (OUTPATIENT)
Dept: PHYSICAL THERAPY | Age: 76
Setting detail: THERAPIES SERIES
Discharge: HOME | End: 2021-09-08
Payer: MEDICARE

## 2021-09-08 PROCEDURE — 97110 THERAPEUTIC EXERCISES: CPT

## 2021-09-08 NOTE — FLOWSHEET NOTE
[place] [address] [facility]  Phone: [phone] Fax: [phone]      Physical Therapy Treatment Note/ Progress Report:     Date:  2021    Patient Name:  Anabel Menon    :  1945  MRN: [id number]  Restrictions/Precautions:    Medical/Treatment Diagnosis Information:  · Diagnosis: Right shoulder adhesive capsulitis, left hip pain  · Treatment Diagnosis: M75.01, H95.799  Insurance/Certification information:  PT Insurance Information: Medicare / [facility]  Physician Information:  Referring Practitioner: Dottie Elliott of care signed (Y/N):     Date of Patient follow up with Physician: N/A      Progress Report: []  Yes  [x]  No     Date Range for reporting period:  Beginnin21  Endin21    Progress report due (10 Rx/or 30 days whichever is less): 29     Recertification due (POC duration/ or 90 days whichever is less): 10/4/21     Visit # Insurance Allowable Auth Needed   59 ([address]) Medicare []Yes    [x]No     Pain level:  2/10 currently, c/o weakness     SUBJECTIVE:  Patient states that she is feeling a little better today.  Still a bit unsteady but not as bad as last week.       OBJECTIVE:    Observation: Antalgic gait with standard cane used in RUE   Test measurements:  LEFS - 74% impairment            ROM Left Right   Hip Flexion 110 WNL   Hip Abduction WNL WNL   Hip ER 30 WNL   Hip IR WNL WNL   Hip Ext Decreased WNL   Strength  Left Right   Hip Abd 8.7 lb 12.0 lb   Hip Ext NT NT   Knee Ext 51.9 lb 51.5 lb   Knee Flex 35.3 lb  43.5 lb         RESTRICTIONS/PRECAUTIONS:     Exercises/Interventions:      Therapeutic Ex (14313)  Min: 45 Sets/sec Reps CUES/Notes   Bike 12'       Treadmill 0'   1.4 mph amb, bilat UE support, 1.0-3.0 incline   Cybex Gastroc- SL  3 10 75#   Leg Press - SL 3 10 75#   Cybex Leg Ext - SL 3 10 15#   Cybex HS curl - SL 3 10 30#   Bridge 10 12     3-way SLR 1 10     Hook lying LLE work.    Therapeutic Activities:    [] (97304 or 53274) Provided verbal/tactile cueing for activities related to improving balance, coordination, kinesthetic sense, posture, motor skill, proprioception and motor activation to allow for proper function of scapular, scapulothoracic and UE control with self care, carrying, lifting, driving/computer work.      Home Exercise Program:    [x] (35231) Reviewed/Progressed HEP activities related to strengthening, flexibility, endurance, ROM of scapular, scapulothoracic and UE control with self care, reaching, carrying, lifting, house/yardwork, driving/computer work  [] (43943) Reviewed/Progressed HEP activities related to improving balance, coordination, kinesthetic sense, posture, motor skill, proprioception of scapular, scapulothoracic and UE control with self care, reaching, carrying, lifting, house/yardwork, driving/computer work      Manual Treatments:  PROM / STM / Oscillations-Mobs:  G-I, II, III, IV (PA's, Inf., Post.)  [x] (30560) Provided manual therapy to mobilize soft tissue/joints of cervical/CT, scapular GHJ and UE for the purpose of modulating pain, promoting relaxation,  increasing ROM, reducing/eliminating soft tissue swelling/inflammation/restriction, improving soft tissue extensibility and allowing for proper ROM for normal function with self care, reaching, carrying, lifting, house/yardwork, driving/computer work    Modalities:  None    Charges:  Timed Code Treatment Minutes: 45   Total Treatment Minutes: 45       [] EVAL (LOW) 48587 (typically 20 minutes face-to-face)  [] EVAL (MOD) 90584 (typically 30 minutes face-to-face)  [] EVAL (HIGH) 40760 (typically 45 minutes face-to-face)  [] RE-EVAL     [x] QN(29680) x 3     [] DRY NEEDLE 1 OR 2 MUSCLES  [] NMR (67434) x     [] DRY NEEDLE 3+ MUSCLES  [] Manual (18417) x       [] TA (50746) x     [] Mech Traction (11899)  [] ES(attended) (08788)     [] ES (un) (56874):   [] VASO (71491)  [] Other:    If [facility] Please Indicate Time In/Out  CPT Code Time in Time out                                   SHOULDER GOALS:  Patient stated goal: pain-free shoulder, return to PLOF  [] Progressing: [x] Met: [] Not Met: [] Adjusted    Therapist goals for Patient:   Short Term Goals: To be achieved in: 2 weeks  1. Independent in HEP and progression per patient tolerance, in order to prevent re-injury. [] Progressing: [x] Met: [] Not Met: [] Adjusted  2. Patient will have a decrease in pain to facilitate improvement in movement, function, and ADLs as indicated by Functional Deficits. [] Progressing: [x] Met: [] Not Met: [] Adjusted    Long Term Goals: To be achieved in: 6 weeks  1. Disability index score of 25% or less for the Quick DASH to assist with reaching prior level of function. [] Progressing: [x] Met: [] Not Met: [] Adjusted  2. Patient will demonstrate increased AROM to equal contralateral to allow for proper joint functioning as indicated by Functional Deficits. [] Progressing: [x] Met: [] Not Met: [] Adjusted  3. Patient will demonstrate an increase in NM recruitment/activation and overall GH and scapular strength to within n5lbs HHD or WNL for proper functional mobility as indicated by patients Functional Deficits. [] Progressing: [x] Met: [] Not Met: [] Adjusted  4. Patient will return to reaching, lifting, overhead movements, self care especially after using toilet, dressing, grooming activities without increased symptoms or restriction. [] Progressing: [x] Met: [] Not Met: [] Adjusted       HIP GOALS:  Patient stated goal: strengthen LLE, ambulate without cane for short distances  [] Progressing: [x] Met: [] Not Met: [] Adjusted    Therapist goals for Patient:   Short Term Goals: To be achieved in: 2 weeks  1. Independent in HEP and progression per patient tolerance, in order to prevent re-injury. []?? Progressing: [x]? ? Met: []?? Not Met: []?? Adjusted  2. Patient will have a decrease in pain to facilitate co-morbidities. [] Plan just implemented, too soon to assess goals progression <30days   [] Goals require adjustment due to lack of progress  [] Patient is not progressing as expected and requires additional follow up with physician  [] Other:     Prognosis for POC: [x] Good [] Fair  [] Poor    Patient requires continued skilled intervention: [x] Yes  [] No      PLAN: Continue PT 2x/week for 8 weeks for shoulder strengthening/ROM if needed, with continued left hip rehabilitation to focus on ROM, proximal hip strengthening, general LLE strength training, and balance training to lessen risk of falls  [x] Continue per plan of care [] Alter current plan (see comments)  [] Plan of care initiated [] Hold pending MD visit [] Discharge    Electronically signed by: Kaushik Yin PTA     Note: If patient does not return for scheduled/recommended follow up visits, this note will serve as a discharge from care along with the most recent update on progress.

## 2021-09-10 ENCOUNTER — HOSPITAL ENCOUNTER (OUTPATIENT)
Dept: PHYSICAL THERAPY | Age: 76
Setting detail: THERAPIES SERIES
Discharge: HOME | End: 2021-09-10
Payer: MEDICARE

## 2021-09-10 PROCEDURE — 97110 THERAPEUTIC EXERCISES: CPT

## 2021-09-10 NOTE — FLOWSHEET NOTE
[facility] [address] [address]  Phone: [phone] Fax: [phone]      Physical Therapy Treatment Note/ Progress Report:     Date:  9/10/2021    Patient Name:  Tanisha Garvin    :  1945  MRN: [id number]  Restrictions/Precautions:    Medical/Treatment Diagnosis Information:  · Diagnosis: Right shoulder adhesive capsulitis, left hip pain  · Treatment Diagnosis: M75.01, W05.464  Insurance/Certification information:  PT Insurance Information: Medicare / [facility]  Physician Information:  Referring Practitioner: Donnie December  care signed (Y/N):     Date of Patient follow up with Physician: N/A      Progress Report: []  Yes  [x]  No     Date Range for reporting period:  Beginnin21  Endin21    Progress report due (10 Rx/or 30 days whichever is less):      Recertification due (POC duration/ or 90 days whichever is less): 10/4/21     Visit # Insurance Allowable Auth Needed   72 ([address] [place] [address]) Medicare []Yes    [x]No     Pain level:  2/10 currently, c/o weakness     SUBJECTIVE:  Patient states that she is feeling ok.   Some unsteadiness, especially when stepping to the side.        OBJECTIVE:    Observation: Antalgic gait with standard cane used in RUE   Test measurements:  LEFS - 74% impairment            ROM Left Right   Hip Flexion 110 WNL   Hip Abduction WNL WNL   Hip ER 30 WNL   Hip IR WNL WNL   Hip Ext Decreased WNL   Strength  Left Right   Hip Abd 8.7 lb 12.0 lb   Hip Ext NT NT   Knee Ext 51.9 lb 51.5 lb   Knee Flex 35.3 lb  43.5 lb         RESTRICTIONS/PRECAUTIONS:     Exercises/Interventions:      Therapeutic Ex (65691)  Min: 36 Sets/sec Reps CUES/Notes   Bike 6'    seat 12   Treadmill 0'   1.4 mph amb, bilat UE support, 1.0-3.0 incline   Cybex Gastroc- SL  3 10 75#   Leg Press - SL 3 10 75#   Cybex Leg Ext - SL 3 10 15#   Cybex HS curl - SL 3 10 30#   Bridge 10 12     3-way SLR 1 10     Hook lying LLE Ext 1 10 Cues for form   Supine heel slides + march 1 12 Cues for form   Hook lying bilat hip add iso 5 10 Ball squeeze   Clam - side lying 5 10 No resistance   Supine clam - unilateral 10 10 Maroon band, each         BOSU lunge 10  10 each   Slide lunge - retro at ledge 1 15     Standing hip abd 2 10 orange   Marching - airex 2 10 Fwd/bkwd  Bilat, hands at wall SBA   Heel raises - DL 5 10 yellow ball between heels   TKE 5 15 3.5pl   Side stepping 2 15 HHA, green band below knees   Sit to stand with TRX 2 10 1 airex on seat, sink for support   Step up - bilat UE support 3 10 8\" L, 6\" R   Lateral step down 2 10 4\"   Standing hip 3-way kicks 5 10 Purple band for TKE   On 2\" step             Manual Intervention  (79528)  Min:          Shld /GH Mobs 0'   Gentle inferior glides   Post Cap mobs 0'   Prone LLLD IR   Hypervolt percussion STM 5'   L posterior hip, IT band   Manual cervical traction 0'       Hip PROM 2'       IASTM 7'   L post hip, IT band   PROM MT - shoulder 0'   Flex, abd, ER, IR             NMR re-education (03046)  Min: 0         Biodex balance 10'   RC x 5 min with small ERIC  PS with staggered stance x 1.5 min each                                                 Therapeutic Activity (19887)  Min:         UE throwing porgression         Dynamic UE stability         Earthquake Bar         Bodyblade                            Therapeutic Exercise and NMR EXR  [x] (98985) Provided verbal/tactile cueing for activities related to strengthening, flexibility, endurance, ROM  for improvements in scapular, scapulothoracic and UE control with self care, reaching, carrying, lifting, house/yardwork, driving/computer work.     [x] (01920) Provided verbal/tactile cueing for activities related to improving balance, coordination, kinesthetic sense, posture, motor skill, proprioception  to assist with  scapular, scapulothoracic and UE control with self care, reaching, carrying, lifting, house/yardwork, driving/computer work.    Therapeutic Activities:    [] (08421 or 57285) Provided verbal/tactile cueing for activities related to improving balance, coordination, kinesthetic sense, posture, motor skill, proprioception and motor activation to allow for proper function of scapular, scapulothoracic and UE control with self care, carrying, lifting, driving/computer work.      Home Exercise Program:    [x] (38750) Reviewed/Progressed HEP activities related to strengthening, flexibility, endurance, ROM of scapular, scapulothoracic and UE control with self care, reaching, carrying, lifting, house/yardwork, driving/computer work  [] (11518) Reviewed/Progressed HEP activities related to improving balance, coordination, kinesthetic sense, posture, motor skill, proprioception of scapular, scapulothoracic and UE control with self care, reaching, carrying, lifting, house/yardwork, driving/computer work      Manual Treatments:  PROM / STM / Oscillations-Mobs:  G-I, II, III, IV (PA's, Inf., Post.)  [] (91259) Provided manual therapy to mobilize soft tissue/joints of cervical/CT, scapular GHJ and UE for the purpose of modulating pain, promoting relaxation,  increasing ROM, reducing/eliminating soft tissue swelling/inflammation/restriction, improving soft tissue extensibility and allowing for proper ROM for normal function with self care, reaching, carrying, lifting, house/yardwork, driving/computer work    Modalities:  None    Charges:  Timed Code Treatment Minutes: 36   Total Treatment Minutes: 40       [] EVAL (LOW) 35293 (typically 20 minutes face-to-face)  [] EVAL (MOD) 82070 (typically 30 minutes face-to-face)  [] EVAL (HIGH) 05343 (typically 45 minutes face-to-face)  [] RE-EVAL     [x] MY(87292) x 2     [] DRY NEEDLE 1 OR 2 MUSCLES  [] NMR (81221) x     [] DRY NEEDLE 3+ MUSCLES  [] Manual (69299) x       [] TA (52131) x     [] Mech Traction (27426)  [] ES(attended) (32757)     [] ES (un) (50611):   [] VASO (98036)  [] Other:     If [facility] Please Indicate Time In/Out  CPT Code Time in Time out                                   SHOULDER GOALS:  Patient stated goal: pain-free shoulder, return to PLOF  [] Progressing: [x] Met: [] Not Met: [] Adjusted    Therapist goals for Patient:   Short Term Goals: To be achieved in: 2 weeks  1. Independent in HEP and progression per patient tolerance, in order to prevent re-injury. [] Progressing: [x] Met: [] Not Met: [] Adjusted  2. Patient will have a decrease in pain to facilitate improvement in movement, function, and ADLs as indicated by Functional Deficits. [] Progressing: [x] Met: [] Not Met: [] Adjusted    Long Term Goals: To be achieved in: 6 weeks  1. Disability index score of 25% or less for the Quick DASH to assist with reaching prior level of function. [] Progressing: [x] Met: [] Not Met: [] Adjusted  2. Patient will demonstrate increased AROM to equal contralateral to allow for proper joint functioning as indicated by Functional Deficits. [] Progressing: [x] Met: [] Not Met: [] Adjusted  3. Patient will demonstrate an increase in NM recruitment/activation and overall GH and scapular strength to within n5lbs HHD or WNL for proper functional mobility as indicated by patients Functional Deficits. [] Progressing: [x] Met: [] Not Met: [] Adjusted  4. Patient will return to reaching, lifting, overhead movements, self care especially after using toilet, dressing, grooming activities without increased symptoms or restriction. [] Progressing: [x] Met: [] Not Met: [] Adjusted       HIP GOALS:  Patient stated goal: strengthen LLE, ambulate without cane for short distances  [] Progressing: [x] Met: [] Not Met: [] Adjusted    Therapist goals for Patient:   Short Term Goals: To be achieved in: 2 weeks  1. Independent in HEP and progression per patient tolerance, in order to prevent re-injury. []?? Progressing: [x]? ? Met: []?? Not Met: []?? Adjusted  2. Patient will have a decrease in pain to facilitate improvement in movement, function, and ADLs as indicated by Functional Deficits. []?? Progressing: [x]? ? Met: []?? Not Met: []?? Adjusted     Long Term Goals: To be achieved in: 4-6 weeks  1. Disability index score of 40% or less for the LEFS to assist with reaching prior level of function. [x]? ? Progressing: []?? Met: []?? Not Met: []?? Adjusted  2. Patient will demonstrate increased AROM to WFL to allow for proper joint functioning as indicated by patients Functional Deficits. []?? Progressing: [x]? ? Met: []?? Not Met: []?? Adjusted  3. Patient will demonstrate an increase in Strength to good proximal hip strength and control, within 5lb HHD in LE to allow for proper functional mobility as indicated by patients Functional Deficits. [x]? ? Progressing: []?? Met: []?? Not Met: []?? Adjusted  4. Patient will return to standing, ambulation (with AD as needed), transfers, stairs, squatting without increased symptoms or restriction. [x] Progressing: [] Met: [] Not Met: [] Adjusted      ASSESSMENT: Overall decreased stamina today compared to last visit. Requires cues for glute activation and proper exercise performance. No complaints of increased pain at conclusion. Return to Play: (if applicable)   []  Stage 1: Intro to Strength   []  Stage 2: Dynamic Strength and Intro to Plyometrics   []  Stage 3: Advanced Plyometrics and Intro to Throwing   []  Stage 4: Sport specific Training/Return to Sport     []  Ready to Return to Play, [employer]   []  Not Ready for Return to Sports   Comments:      Treatment/Activity Tolerance:  [x] Patient tolerated treatment well [x] Patient limited by fatique  [] Patient limited by pain  [] Patient limited by other medical complications  [] Other:     Overall Progression Towards Functional goals/ Treatment Progress Update:  [x] Patient is progressing as expected towards functional goals listed.    [] Progression is slowed due to complexities/Impairments listed. [] Progression has been slowed due to co-morbidities. [] Plan just implemented, too soon to assess goals progression <30days   [] Goals require adjustment due to lack of progress  [] Patient is not progressing as expected and requires additional follow up with physician  [] Other:     Prognosis for POC: [x] Good [] Fair  [] Poor    Patient requires continued skilled intervention: [x] Yes  [] No      PLAN: Continue PT 2x/week for 8 weeks for shoulder strengthening/ROM if needed, with continued left hip rehabilitation to focus on ROM, proximal hip strengthening, general LLE strength training, and balance training to lessen risk of falls  [x] Continue per plan of care [] Alter current plan (see comments)  [] Plan of care initiated [] Hold pending MD visit [] Discharge     Electronically signed by: Osmin De Paz PTA     Note: If patient does not return for scheduled/recommended follow up visits, this note will serve as a discharge from care along with the most recent update on progress.

## 2021-09-13 ENCOUNTER — HOSPITAL ENCOUNTER (OUTPATIENT)
Dept: PHYSICAL THERAPY | Age: 76
Setting detail: THERAPIES SERIES
Discharge: HOME | End: 2021-09-13
Payer: MEDICARE

## 2021-09-13 NOTE — FLOWSHEET NOTE
[facility]    Physical Therapy  Cancellation/No-show Note  Patient Name:  Nasir Jackson  :  1945   Date:  2021  Cancelled visits to date: 5  No-shows to date: 0    For today's appointment patient:  [x]  Cancelled  []  Rescheduled appointment  []  No-show     Reason given by patient:  []  Patient ill  []  Conflicting appointment   []  No transportation    []  Conflict with work  []  No reason given  [x]  Other:     Comments:   Driveway being paved and cannot get out    Electronically signed by:  Abiodun Corral, PT

## 2021-09-15 ENCOUNTER — TELEPHONE (OUTPATIENT)
Dept: RHEUMATOLOGY | Age: 76
End: 2021-09-15

## 2021-09-15 ENCOUNTER — OFFICE VISIT (OUTPATIENT)
Dept: INTERNAL MEDICINE CLINIC | Age: 76
End: 2021-09-15
Payer: MEDICARE

## 2021-09-15 VITALS
BODY MASS INDEX: 33.59 KG/M2 | WEIGHT: 214 LBS | DIASTOLIC BLOOD PRESSURE: 76 MMHG | HEIGHT: 67 IN | HEART RATE: 72 BPM | SYSTOLIC BLOOD PRESSURE: 122 MMHG

## 2021-09-15 DIAGNOSIS — D18.02 VENOUS ANGIOMA OF BRAIN (HCC): ICD-10-CM

## 2021-09-15 DIAGNOSIS — F33.8 SEASONAL AFFECTIVE DISORDER (HCC): ICD-10-CM

## 2021-09-15 DIAGNOSIS — R60.0 BILATERAL LEG EDEMA: ICD-10-CM

## 2021-09-15 DIAGNOSIS — G25.81 RESTLESS LEGS SYNDROME: ICD-10-CM

## 2021-09-15 DIAGNOSIS — K21.9 GASTROESOPHAGEAL REFLUX DISEASE WITHOUT ESOPHAGITIS: ICD-10-CM

## 2021-09-15 DIAGNOSIS — G40.109 LOCALIZATION-RELATED FOCAL EPILEPSY WITH SIMPLE PARTIAL SEIZURES (HCC): ICD-10-CM

## 2021-09-15 DIAGNOSIS — R53.83 FATIGUE, UNSPECIFIED TYPE: Primary | ICD-10-CM

## 2021-09-15 DIAGNOSIS — E55.9 VITAMIN D DEFICIENCY: ICD-10-CM

## 2021-09-15 DIAGNOSIS — Z23 NEED FOR INFLUENZA VACCINATION: ICD-10-CM

## 2021-09-15 DIAGNOSIS — E03.8 SUBCLINICAL HYPOTHYROIDISM: ICD-10-CM

## 2021-09-15 DIAGNOSIS — M81.6 LOCALIZED OSTEOPOROSIS WITHOUT CURRENT PATHOLOGICAL FRACTURE: ICD-10-CM

## 2021-09-15 DIAGNOSIS — E78.00 PURE HYPERCHOLESTEROLEMIA: ICD-10-CM

## 2021-09-15 LAB
A/G RATIO: 1.8 (ref 1.1–2.2)
ALBUMIN SERPL-MCNC: 4.4 G/DL (ref 3.4–5)
ALP BLD-CCNC: 75 U/L (ref 40–129)
ALT SERPL-CCNC: 10 U/L (ref 10–40)
ANION GAP SERPL CALCULATED.3IONS-SCNC: 17 MMOL/L (ref 3–16)
AST SERPL-CCNC: 14 U/L (ref 15–37)
BASOPHILS ABSOLUTE: 0 K/UL (ref 0–0.2)
BASOPHILS RELATIVE PERCENT: 0.5 %
BILIRUB SERPL-MCNC: 0.5 MG/DL (ref 0–1)
BUN BLDV-MCNC: 8 MG/DL (ref 7–20)
CALCIUM SERPL-MCNC: 9.1 MG/DL (ref 8.3–10.6)
CHLORIDE BLD-SCNC: 95 MMOL/L (ref 99–110)
CHOLESTEROL, FASTING: 213 MG/DL (ref 0–199)
CO2: 25 MMOL/L (ref 21–32)
CREAT SERPL-MCNC: 0.7 MG/DL (ref 0.6–1.2)
EOSINOPHILS ABSOLUTE: 0.1 K/UL (ref 0–0.6)
EOSINOPHILS RELATIVE PERCENT: 2.2 %
GFR AFRICAN AMERICAN: >60
GFR NON-AFRICAN AMERICAN: >60
GLOBULIN: 2.4 G/DL
GLUCOSE BLD-MCNC: 98 MG/DL (ref 70–99)
HCT VFR BLD CALC: 40 % (ref 36–48)
HDLC SERPL-MCNC: 76 MG/DL (ref 40–60)
HEMOGLOBIN: 13.3 G/DL (ref 12–16)
LDL CHOLESTEROL CALCULATED: 121 MG/DL
LYMPHOCYTES ABSOLUTE: 1.9 K/UL (ref 1–5.1)
LYMPHOCYTES RELATIVE PERCENT: 29.7 %
MCH RBC QN AUTO: 30.5 PG (ref 26–34)
MCHC RBC AUTO-ENTMCNC: 33.2 G/DL (ref 31–36)
MCV RBC AUTO: 92.1 FL (ref 80–100)
MONOCYTES ABSOLUTE: 0.4 K/UL (ref 0–1.3)
MONOCYTES RELATIVE PERCENT: 6.6 %
NEUTROPHILS ABSOLUTE: 3.9 K/UL (ref 1.7–7.7)
NEUTROPHILS RELATIVE PERCENT: 61 %
PDW BLD-RTO: 14.2 % (ref 12.4–15.4)
PLATELET # BLD: 267 K/UL (ref 135–450)
PMV BLD AUTO: 8.5 FL (ref 5–10.5)
POTASSIUM SERPL-SCNC: 4.1 MMOL/L (ref 3.5–5.1)
RBC # BLD: 4.35 M/UL (ref 4–5.2)
SODIUM BLD-SCNC: 137 MMOL/L (ref 136–145)
T4 FREE: 1.4 NG/DL (ref 0.9–1.8)
TOTAL CK: 70 U/L (ref 26–192)
TOTAL PROTEIN: 6.8 G/DL (ref 6.4–8.2)
TRIGLYCERIDE, FASTING: 78 MG/DL (ref 0–150)
TSH REFLEX: 5.12 UIU/ML (ref 0.27–4.2)
VALPROIC ACID LEVEL: 49.8 UG/ML (ref 50–100)
VITAMIN B-12: 515 PG/ML (ref 211–911)
VLDLC SERPL CALC-MCNC: 16 MG/DL
WBC # BLD: 6.4 K/UL (ref 4–11)

## 2021-09-15 PROCEDURE — 4040F PNEUMOC VAC/ADMIN/RCVD: CPT | Performed by: FAMILY MEDICINE

## 2021-09-15 PROCEDURE — 90694 VACC AIIV4 NO PRSRV 0.5ML IM: CPT | Performed by: FAMILY MEDICINE

## 2021-09-15 PROCEDURE — 99214 OFFICE O/P EST MOD 30 MIN: CPT | Performed by: FAMILY MEDICINE

## 2021-09-15 PROCEDURE — G0008 ADMIN INFLUENZA VIRUS VAC: HCPCS | Performed by: FAMILY MEDICINE

## 2021-09-15 PROCEDURE — G8417 CALC BMI ABV UP PARAM F/U: HCPCS | Performed by: FAMILY MEDICINE

## 2021-09-15 PROCEDURE — G8427 DOCREV CUR MEDS BY ELIG CLIN: HCPCS | Performed by: FAMILY MEDICINE

## 2021-09-15 PROCEDURE — 1123F ACP DISCUSS/DSCN MKR DOCD: CPT | Performed by: FAMILY MEDICINE

## 2021-09-15 PROCEDURE — 1090F PRES/ABSN URINE INCON ASSESS: CPT | Performed by: FAMILY MEDICINE

## 2021-09-15 PROCEDURE — G8399 PT W/DXA RESULTS DOCUMENT: HCPCS | Performed by: FAMILY MEDICINE

## 2021-09-15 PROCEDURE — 1036F TOBACCO NON-USER: CPT | Performed by: FAMILY MEDICINE

## 2021-09-15 RX ORDER — ROPINIROLE 0.5 MG/1
TABLET, FILM COATED ORAL
Qty: 270 TABLET | Refills: 1 | Status: SHIPPED | OUTPATIENT
Start: 2021-09-15 | End: 2022-03-16 | Stop reason: SDUPTHER

## 2021-09-15 RX ORDER — FUROSEMIDE 20 MG/1
TABLET ORAL
Qty: 180 TABLET | Refills: 1 | Status: SHIPPED | OUTPATIENT
Start: 2021-09-15 | End: 2022-03-16 | Stop reason: SDUPTHER

## 2021-09-15 RX ORDER — ROSUVASTATIN CALCIUM 10 MG/1
TABLET, COATED ORAL
Qty: 90 TABLET | Refills: 3 | Status: SHIPPED | OUTPATIENT
Start: 2021-09-15 | End: 2022-03-31

## 2021-09-15 RX ORDER — OMEPRAZOLE 20 MG/1
20 CAPSULE, DELAYED RELEASE ORAL DAILY
Qty: 90 CAPSULE | Refills: 3 | Status: SHIPPED | OUTPATIENT
Start: 2021-09-15

## 2021-09-15 RX ORDER — FAMOTIDINE 20 MG/1
20 TABLET, FILM COATED ORAL NIGHTLY
Qty: 90 TABLET | Refills: 3 | Status: SHIPPED | OUTPATIENT
Start: 2021-09-15

## 2021-09-15 RX ORDER — ERGOCALCIFEROL 1.25 MG/1
CAPSULE ORAL
Qty: 12 CAPSULE | Refills: 3 | Status: SHIPPED | OUTPATIENT
Start: 2021-09-15

## 2021-09-15 NOTE — PATIENT INSTRUCTIONS
I think your weight gain is likely muscle mass and this is good. No quick diets:  losing weight is a marathon and not a sprint. Eat clean or healthy 5-6 days per week and splurge 1-2 days a week. Minimum servings 3 veggies and 2 fruits per day. Try to eat a rainbow of colors over several weeks. Limit or avoid sugar. Don't drink your calories unless they are nutritious  (juice also has a lot of sugar so limit it too)  Get enough sleep or rest  --- most adults need at least 7 hours. Try to move more. If hungry between meals, drink water first.  Best overall diets are The DASH diet or the Mediterranean diet. Patient Education        DASH Diet: Care Instructions  Your Care Instructions  The DASH diet is an eating plan that can help lower your blood pressure. DASH stands for Dietary Approaches to Stop Hypertension. Hypertension is high blood pressure. The DASH diet focuses on eating foods that are high in calcium, potassium, and magnesium. These nutrients can lower blood pressure. The foods that are highest in these nutrients are fruits, vegetables, low-fat dairy products, nuts, seeds, and legumes. But taking calcium, potassium, and magnesium supplements instead of eating foods that are high in those nutrients does not have the same effect. The DASH diet also includes whole grains, fish, and poultry. The DASH diet is one of several lifestyle changes your doctor may recommend to lower your high blood pressure. Your doctor may also want you to decrease the amount of sodium in your diet. Lowering sodium while following the DASH diet can lower blood pressure even further than just the DASH diet alone. Follow-up care is a key part of your treatment and safety. Be sure to make and go to all appointments, and call your doctor if you are having problems. It's also a good idea to know your test results and keep a list of the medicines you take. How can you care for yourself at home?   Following the [facility] diet  · Eat 4 to 5 servings of fruit each day. A serving is 1 medium-sized piece of fruit, ½ cup chopped or canned fruit, 1/4 cup dried fruit, or 4 ounces (½ cup) of fruit juice. Choose fruit more often than fruit juice. · Eat 4 to 5 servings of vegetables each day. A serving is 1 cup of lettuce or raw leafy vegetables, ½ cup of chopped or cooked vegetables, or 4 ounces (½ cup) of vegetable juice. Choose vegetables more often than vegetable juice. · Get 2 to 3 servings of low-fat and fat-free dairy each day. A serving is 8 ounces of milk, 1 cup of yogurt, or 1 ½ ounces of cheese. · Eat 6 to 8 servings of grains each day. A serving is 1 slice of bread, 1 ounce of dry cereal, or ½ cup of cooked rice, pasta, or cooked cereal. Try to choose whole-grain products as much as possible. · Limit lean meat, poultry, and fish to 2 servings each day. A serving is 3 ounces, about the size of a deck of cards. · Eat 4 to 5 servings of nuts, seeds, and legumes (cooked dried beans, lentils, and split peas) each week. A serving is 1/3 cup of nuts, 2 tablespoons of seeds, or ½ cup of cooked beans or peas. · Limit fats and oils to 2 to 3 servings each day. A serving is 1 teaspoon of vegetable oil or 2 tablespoons of salad dressing. · Limit sweets and added sugars to 5 servings or less a week. A serving is 1 tablespoon jelly or jam, ½ cup sorbet, or 1 cup of lemonade. · Eat less than 2,300 milligrams (mg) of sodium a day. If you limit your sodium to 1,500 mg a day, you can lower your blood pressure even more. · Be aware that all of these are the suggested number of servings for people who eat 1,800 to 2,000 calories a day. Your recommended number of servings may be different if you need more or fewer calories. Tips for success  · Start small. Do not try to make dramatic changes to your diet all at once. You might feel that you are missing out on your favorite foods and then be more likely to not follow the plan.  Make small changes, and stick with them. Once those changes become habit, add a few more changes. · Try some of the following:  ? Make it a goal to eat a fruit or vegetable at every meal and at snacks. This will make it easy to get the recommended amount of fruits and vegetables each day. ? Try yogurt topped with fruit and nuts for a snack or healthy dessert. ? Add lettuce, tomato, cucumber, and onion to sandwiches. ? Combine a ready-made pizza crust with low-fat mozzarella cheese and lots of vegetable toppings. Try using tomatoes, squash, spinach, broccoli, carrots, cauliflower, and onions. ? Have a variety of cut-up vegetables with a low-fat dip as an appetizer instead of chips and dip. ? Sprinkle sunflower seeds or chopped almonds over salads. Or try adding chopped walnuts or almonds to cooked vegetables. ? Try some vegetarian meals using beans and peas. Add garbanzo or kidney beans to salads. Make burritos and tacos with mashed wild beans or black beans. Where can you learn more? Go to https://BrayolapepicewThe Farmery.[employer]. org and sign in to your [employer] account. Enter S354 in the [employer] box to learn more about \"DASH Diet: Care Instructions. \"     If you do not have an account, please click on the \"Sign Up Now\" link. Current as of: August 31, 2020               Content Version: 12.9  © 2006-2021 [employer]. Care instructions adapted under license by [employer]. If you have questions about a medical condition or this instruction, always ask your healthcare professional. [place] [address] any warranty or liability for your use of this information. Patient Education        Learning About the Mediterranean Diet  What is the [address]? The Mediterranean diet is a style of eating rather than a diet plan. It features foods eaten in [place], [place], [place] and [place], and other countries along the [facility].  It emphasizes eating foods like fish, fruits, vegetables, beans, high-fiber breads and whole grains, nuts, and olive oil. This style of eating includes limited red meat, cheese, and sweets. Why choose the Mediterranean diet? A Mediterranean-style diet may improve heart health. It contains more fat than other heart-healthy diets. But the fats are mainly from nuts, unsaturated oils (such as fish oils and olive oil), and certain nut or seed oils (such as canola, soybean, or flaxseed oil). These fats may help protect the heart and blood vessels. How can you get started on the Mediterranean diet? Here are some things you can do to switch to a more Mediterranean way of eating. What to eat  · Eat a variety of fruits and vegetables each day, such as grapes, blueberries, tomatoes, broccoli, peppers, figs, olives, spinach, eggplant, beans, lentils, and chickpeas. · Eat a variety of whole-grain foods each day, such as oats, brown rice, and whole wheat bread, pasta, and couscous. · Eat fish at least 2 times a week. Try tuna, salmon, mackerel, lake trout, herring, or sardines. · Eat moderate amounts of low-fat dairy products, such as milk, cheese, or yogurt. · Eat moderate amounts of poultry and eggs. · Choose healthy (unsaturated) fats, such as nuts, olive oil, and certain nut or seed oils like canola, soybean, and flaxseed. · Limit unhealthy (saturated) fats, such as butter, palm oil, and coconut oil. And limit fats found in animal products, such as meat and dairy products made with whole milk. Try to eat red meat only a few times a month in very small amounts. · Limit sweets and desserts to only a few times a week. This includes sugar-sweetened drinks like soda. The Mediterranean diet may also include red wine with your meal--1 glass each day for women and up to 2 glasses a day for men. Tips for eating at home  · Use herbs, spices, garlic, lemon zest, and citrus juice instead of salt to add flavor to foods.   · Add avocado slices to your sandwich instead of crump. · Have fish for lunch or dinner instead of red meat. Brush the fish with olive oil, and broil or grill it. · Sprinkle your salad with seeds or nuts instead of cheese. · Cook with olive or canola oil instead of butter or oils that are high in saturated fat. · Switch from 2% milk or whole milk to 1% or fat-free milk. · Dip raw vegetables in a vinaigrette dressing or hummus instead of dips made from mayonnaise or sour cream.  · Have a piece of fruit for dessert instead of a piece of cake. Try baked apples, or have some dried fruit. Tips for eating out  · Try broiled, grilled, baked, or poached fish instead of having it fried or breaded. · Ask your  to have your meals prepared with olive oil instead of butter. · Order dishes made with marinara sauce or sauces made from olive oil. Avoid sauces made from cream or mayonnaise. · Choose whole-grain breads, whole wheat pasta and pizza crust, brown rice, beans, and lentils. · Cut back on butter or margarine on bread. Instead, you can dip your bread in a small amount of olive oil. · Ask for a side salad or grilled vegetables instead of french fries or chips. Where can you learn more? Go to https://cherikeb.griddig. org and sign in to your [employer] account. Enter [phone] in the [facility] box to learn more about \"Learning About the Mediterranean Diet. \"     If you do not have an account, please click on the \"Sign Up Now\" link. Current as of: December 17, 2020               Content Version: 12.9  © 6054-1231 [employer], [employer]. Care instructions adapted under license by [address] If you have questions about a medical condition or this instruction, always ask your healthcare professional. Ariana Kasper any warranty or liability for your use of this information.

## 2021-09-15 NOTE — TELEPHONE ENCOUNTER
Please obtain VOB and PA for IV reclast infusion.      Last infusion: Reclast #1 08/11/2020  Labs: 09/15/2021  Last Dexa Scan:01/29/2020  DX: M81.6 Localized osteoporosis without current pathological fracture

## 2021-09-15 NOTE — PROGRESS NOTES
Magy Gibson (:  1945) is a 68 y.o. female,Established patient, here for evaluation of the following chief complaint(s):  6 Month Follow-Up (fasting today. )         ASSESSMENT/PLAN:  .1. Fatigue, unspecified type  Will get blood work. Encouraged more exercise if no explanation on blood tests. .  She has treadmill and access to stationary bike. - CBC Auto Differential  - Vitamin B12    2. Hypercholesterolemia  On statin for primary prevention. - rosuvastatin (CRESTOR) 10 MG tablet; TAKE 1 TABLET DAILY  Dispense: 90 tablet; Refill: 3  - Comprehensive Metabolic Panel  - Lipid, Fasting  - CK    3. Osteoporosis without current pathological fracture  Due for yearly Reclast.  Sent not to infusion nurse. 4. Restless legs syndrome  - rOPINIRole (REQUIP) 0.5 MG tablet; TAKE 2-3 TABLETS BY MOUTH AT BEDTIME OR IN EVENING FOR RESTLESS LEGS  Dispense: 270 tablet; Refill: 1    5. Bilateral leg edema  Prn use. - furosemide (LASIX) 20 MG tablet; TAKE 1 OR 2 TABS DAILY AT LUNCH OR IN THE AFTERNOON UNTIL SWELLING DOWN. Dispense: 180 tablet; Refill: 1    6. Gastroesophageal reflux disease without esophagitis  Symptoms controlled. - famotidine (PEPCID) 20 MG tablet; Take 1 tablet by mouth nightly  Dispense: 90 tablet; Refill: 3  - omeprazole (PRILOSEC) 20 MG delayed release capsule; Take 1 capsule by mouth Daily In AM before first bite of food  Dispense: 90 capsule; Refill: 3  - Comprehensive Metabolic Panel  - Vitamin B12    7. Vitamin D deficiency  Was severely low for year. Continue supplement and monitor yearly in March or Spring.   - vitamin D (ERGOCALCIFEROL) 1.25 MG (86911 UT) CAPS capsule; TAKE ONE CAPSULE BY MOUTH WEEKLY  Dispense: 12 capsule; Refill: 3    8. Subclinical hypothyroidism  Watch for hypothyroidism.   - TSH with Reflex    9. Seasonal affective disorder ([address]  History of Zoloft use. Not to use Wellbutrin due to her seizure disorder. Doing OK this part of the year.   Notify office if bad enough to take meds in the late fall/winter. 10. Localization-related focal epilepsy with simple partial seizures [facility]  Treated by neurologist but has not been seen and no virtual visit for over a year. - Valproic acid level, total    11. Venous angioma of brain ([facility]  Incidental finding. No symptoms. 12. Need for influenza vaccination  - INFLUENZA, QUADV, ADJUVANTED, 65 YRS =, IM, PF, PREFILL SYR, 0.5ML (FLUAD)        Return in about 6 months (around 3/15/2022) for med check up;  set up virtual annual wellness visit (phone if cannot do video on smart phone). Subjective   SUBJECTIVE/OBJECTIVE:  HPI    6 month FU  C/o:  Tired all the time. Tried eating more vegetables and it did not work to help in weight loss. .    Always good with eating fruit. She is probably OK with protein. Eats breakfast  In AM and dinner at 5 PM.  Lunch is sometimes but not daily. Eats out for lunch with friends 1-2 x/wk. Getting enough sleep. Tries to go bed at 11 PM and wakes up at 7 AM.  She is sleeping soundly now for the last 3-4 months. Sometimes a little nap. She was not sleeping well for years due to her 's health issues and even after his death, still was in her old pattern of sleeping lightly. She is doing physical therapy 2 times per week and cleans her home, but does not otherwise exercise or do any walking. She knows she gaining muscle. Thigh muscle is bigger. Going to PT for 9-10 months now. It is helping with strength. Measurements: Chest is the same. Pants are the same. Not sure on waist if it is the same or smaller,  Pants are not tighter in the waist so at least not gaining abdominal fat. Review of Systems       Objective   Physical Exam  Vitals reviewed. Constitutional:       General: She is not in acute distress. Appearance: Normal appearance. She is well-developed. She is not diaphoretic. Comments: Mildly obese pear shaped.   Using a cane for support Eyes:      General: No scleral icterus. Neck:      Thyroid: No thyroid mass or thyromegaly. Vascular: No carotid bruit. Cardiovascular:      Rate and Rhythm: Normal rate and regular rhythm. Heart sounds: Normal heart sounds, S1 normal and S2 normal. No murmur heard. Pulmonary:      Effort: Pulmonary effort is normal. No respiratory distress. Breath sounds: Normal breath sounds. No decreased breath sounds, wheezing, rhonchi or rales. Abdominal:      General: Bowel sounds are normal. There is no abdominal bruit. Palpations: Abdomen is soft. There is no hepatomegaly or mass. Tenderness: There is no abdominal tenderness. Musculoskeletal:      Cervical back: Neck supple. Thoracic back: Deformity present. Right lower leg: No edema. Left lower leg: No edema. Comments: Mild scoliosis with right scapula more prominent than left. Lymphadenopathy:      Cervical: No cervical adenopathy. Skin:     General: Skin is warm and dry. Coloration: Skin is not pale. Nails: There is no clubbing. Neurological:      Mental Status: She is alert and oriented to person, place, and time. Motor: No tremor or abnormal muscle tone. Coordination: Coordination normal.      Gait: Gait abnormal.      Comments: Wide-based stiff gait   Psychiatric:         Mood and Affect: Mood is not anxious or depressed. Affect is not tearful. Speech: Speech normal.         Behavior: Behavior normal.         Cognition and Memory: Cognition and memory normal.      Comments: Pleasant                  An electronic signature was used to authenticate this note.     --Diane Davis MD

## 2021-09-17 ENCOUNTER — HOSPITAL ENCOUNTER (OUTPATIENT)
Dept: PHYSICAL THERAPY | Age: 76
Setting detail: THERAPIES SERIES
Discharge: HOME | End: 2021-09-17
Payer: MEDICARE

## 2021-09-17 PROCEDURE — 97110 THERAPEUTIC EXERCISES: CPT

## 2021-09-17 NOTE — TELEPHONE ENCOUNTER
VOB  RECLAST ()  Co Ins-20% Covered-80%  Deductible-$250.00 MET-$250.00  OOP-$2500.00 Met-$971.60  No PA Required  I spoke with Steve Becerra ZFR#[id number]

## 2021-09-17 NOTE — FLOWSHEET NOTE
[address] [place] [address]  Phone: [phone] Fax: [phone]      Physical Therapy Treatment Note/ Progress Report:     Date:  2021    Patient Name:  Tanisha Garvin    :  1945  MRN: [id number]  Restrictions/Precautions:    Medical/Treatment Diagnosis Information:  · Diagnosis: Right shoulder adhesive capsulitis, left hip pain  · Treatment Diagnosis: M75.01, P85.827  Insurance/Certification information:  PT Insurance Information: Medicare / [facility]  Physician Information:  Referring Practitioner: Donnie December of care signed (Y/N):     Date of Patient follow up with Physician: N/A      Progress Report: []  Yes  [x]  No     Date Range for reporting period:  Beginnin21  Ending:  NA    Progress report due (10 Rx/or 30 days whichever is less):      Recertification due (POC duration/ or 90 days whichever is less): 10/4/21     Visit # Insurance Allowable Auth Needed   77 ([address] [place] ) Medicare []Yes    [x]No     Pain level:  2/10 currently, c/o weakness     SUBJECTIVE:  Patient has no new complaints. States she is thinking of getting a walker, but wants to make sure that it is tall enough for her.  Still feels that she benefits from regular therapy visits to work focused strengthening.        OBJECTIVE:    Observation: Antalgic gait with standard cane used in RUE   Test measurements:  LEFS - 74% impairment      RESTRICTIONS/PRECAUTIONS:     Exercises/Interventions:      Therapeutic Ex (37639)  Min: 45 Sets/sec Reps CUES/Notes   Bike 6'    seat 12   Treadmill 0'   1.4 mph amb, bilat UE support, 1.0-3.0 incline   Cybex Gastroc- SL  3 10 75#   Leg Press - SL 3 10 75#   Cybex Leg Ext - SL 3 10 15#   Cybex HS curl - SL 3 10 30#   Bridge 10 12     3-way SLR 1 10     Hook lying LLE Ext 1 10 Cues for form   Supine heel slides +  12 Cues for form   Hook lying bilat hip add iso 5 10 Ball squeeze   Clam - side coordination, kinesthetic sense, posture, motor skill, proprioception and motor activation to allow for proper function of scapular, scapulothoracic and UE control with self care, carrying, lifting, driving/computer work.      Home Exercise Program:    [x] (50247) Reviewed/Progressed HEP activities related to strengthening, flexibility, endurance, ROM of scapular, scapulothoracic and UE control with self care, reaching, carrying, lifting, house/yardwork, driving/computer work  [] (15860) Reviewed/Progressed HEP activities related to improving balance, coordination, kinesthetic sense, posture, motor skill, proprioception of scapular, scapulothoracic and UE control with self care, reaching, carrying, lifting, house/yardwork, driving/computer work      Manual Treatments:  PROM / STM / Oscillations-Mobs:  G-I, II, III, IV (PA's, Inf., Post.)  [] (90783) Provided manual therapy to mobilize soft tissue/joints of cervical/CT, scapular GHJ and UE for the purpose of modulating pain, promoting relaxation,  increasing ROM, reducing/eliminating soft tissue swelling/inflammation/restriction, improving soft tissue extensibility and allowing for proper ROM for normal function with self care, reaching, carrying, lifting, house/yardwork, driving/computer work    Modalities:  None    Charges:  Timed Code Treatment Minutes: 45   Total Treatment Minutes: 45       [] EVAL (LOW) 74522 (typically 20 minutes face-to-face)  [] EVAL (MOD) 42369 (typically 30 minutes face-to-face)  [] EVAL (HIGH) 85730 (typically 45 minutes face-to-face)  [] RE-EVAL     [x] TE(55902) x 3     [] DRY NEEDLE 1 OR 2 MUSCLES  [] NMR (28100) x     [] DRY NEEDLE 3+ MUSCLES  [] Manual (08263) x       [] TA (00679) x     [] Mech Traction (36861)  [] ES(attended) (82470)     [] ES (un) (49705):   [] VASO (14671)  [] Other:     If [facility] Please Indicate Time In/Out  CPT Code Time in Time out                                   SHOULDER GOALS:  Patient stated goal: pain-free shoulder, return to PLOF  [] Progressing: [x] Met: [] Not Met: [] Adjusted    Therapist goals for Patient:   Short Term Goals: To be achieved in: 2 weeks  1. Independent in HEP and progression per patient tolerance, in order to prevent re-injury. [] Progressing: [x] Met: [] Not Met: [] Adjusted  2. Patient will have a decrease in pain to facilitate improvement in movement, function, and ADLs as indicated by Functional Deficits. [] Progressing: [x] Met: [] Not Met: [] Adjusted    Long Term Goals: To be achieved in: 6 weeks  1. Disability index score of 25% or less for the Quick DASH to assist with reaching prior level of function. [] Progressing: [x] Met: [] Not Met: [] Adjusted  2. Patient will demonstrate increased AROM to equal contralateral to allow for proper joint functioning as indicated by Functional Deficits. [] Progressing: [x] Met: [] Not Met: [] Adjusted  3. Patient will demonstrate an increase in NM recruitment/activation and overall GH and scapular strength to within n5lbs HHD or WNL for proper functional mobility as indicated by patients Functional Deficits. [] Progressing: [x] Met: [] Not Met: [] Adjusted  4. Patient will return to reaching, lifting, overhead movements, self care especially after using toilet, dressing, grooming activities without increased symptoms or restriction. [] Progressing: [x] Met: [] Not Met: [] Adjusted       HIP GOALS:  Patient stated goal: strengthen LLE, ambulate without cane for short distances  [] Progressing: [x] Met: [] Not Met: [] Adjusted    Therapist goals for Patient:   Short Term Goals: To be achieved in: 2 weeks  1. Independent in HEP and progression per patient tolerance, in order to prevent re-injury. []?? Progressing: [x]? ? Met: []?? Not Met: []?? Adjusted  2. Patient will have a decrease in pain to facilitate improvement in movement, function, and ADLs as indicated by Functional Deficits. []?? Progressing: [x]? ? Met: []?? Not Met: []?? Adjusted     Long Term Goals: To be achieved in: 4-6 weeks  1. Disability index score of 40% or less for the LEFS to assist with reaching prior level of function. [x]? ? Progressing: []?? Met: []?? Not Met: []?? Adjusted  2. Patient will demonstrate increased AROM to WFL to allow for proper joint functioning as indicated by patients Functional Deficits. []?? Progressing: [x]? ? Met: []?? Not Met: []?? Adjusted  3. Patient will demonstrate an increase in Strength to good proximal hip strength and control, within 5lb HHD in LE to allow for proper functional mobility as indicated by patients Functional Deficits. [x]? ? Progressing: []?? Met: []?? Not Met: []?? Adjusted  4. Patient will return to standing, ambulation (with AD as needed), transfers, stairs, squatting without increased symptoms or restriction. [x] Progressing: [] Met: [] Not Met: [] Adjusted      ASSESSMENT: Appropriate fatigue with no increase in pain. Making gradual gains in general LLE strength but she continues to present with deficient proximal hip control and poor core stability. Gait is still antalgic with uneven step length and wide stance, though making some improvement. Recently the posterior hip and TFL/IT band has been more tight and symptomatic, benefiting from manual therapy to relieve symptoms, though held on this today as exercises seemed to work out the stiffness. Patient will benefit from continued physical therapy with focus on left hip rehabilitation and LE strength/balance training to improve patient's overall function and reduce risk of falls.     Return to Play: (if applicable)   []  Stage 1: Intro to Strength   []  Stage 2: Dynamic Strength and Intro to Plyometrics   []  Stage 3: Advanced Plyometrics and Intro to Throwing   []  Stage 4: Sport specific Training/Return to Sport     []  Ready to Return to Play, [employer]   []  Not Ready for Return to Sports   Comments:      Treatment/Activity Tolerance:  [x] Patient tolerated treatment well [x] Patient limited by fatique  [] Patient limited by pain  [] Patient limited by other medical complications  [] Other:     Overall Progression Towards Functional goals/ Treatment Progress Update:  [x] Patient is progressing as expected towards functional goals listed. [] Progression is slowed due to complexities/Impairments listed. [] Progression has been slowed due to co-morbidities. [] Plan just implemented, too soon to assess goals progression <30days   [] Goals require adjustment due to lack of progress  [] Patient is not progressing as expected and requires additional follow up with physician  [] Other:     Prognosis for POC: [x] Good [] Fair  [] Poor    Patient requires continued skilled intervention: [x] Yes  [] No      PLAN: Continue PT 2x/week for 8 weeks for shoulder strengthening/ROM if needed, with continued left hip rehabilitation to focus on ROM, proximal hip strengthening, general LLE strength training, and balance training to lessen risk of falls  [x] Continue per plan of care [] Alter current plan (see comments)  [] Plan of care initiated [] Hold pending MD visit [] Discharge     Electronically signed by: Kraig Omalley PT     Note: If patient does not return for scheduled/recommended follow up visits, this note will serve as a discharge from care along with the most recent update on progress.

## 2021-09-19 PROBLEM — E03.8 SUBCLINICAL HYPOTHYROIDISM: Status: ACTIVE | Noted: 2021-09-19

## 2021-09-20 ENCOUNTER — HOSPITAL ENCOUNTER (OUTPATIENT)
Dept: PHYSICAL THERAPY | Age: 76
Setting detail: THERAPIES SERIES
Discharge: HOME | End: 2021-09-20
Payer: MEDICARE

## 2021-09-20 PROCEDURE — 97110 THERAPEUTIC EXERCISES: CPT

## 2021-09-20 NOTE — FLOWSHEET NOTE
[facility] [address]  Phone: [phone] Fax: [phone]      Physical Therapy Treatment Note/ Progress Report:     Date:  2021    Patient Name:  Iggy Houser    :  1945  MRN: [id number]  Restrictions/Precautions:    Medical/Treatment Diagnosis Information:  · Diagnosis: Right shoulder adhesive capsulitis, left hip pain  · Treatment Diagnosis: M75.01, L53.472  Insurance/Certification information:  PT Insurance Information: [facility]  Physician Information:  Referring Practitioner: Maryjo Zuñiga of care signed (Y/N):     Date of Patient follow up with Physician: N/A      Progress Report: []  Yes  [x]  No     Date Range for reporting period:  Beginnin21  Ending:  NA    Progress report due (10 Rx/or 30 days whichever is less):      Recertification due (POC duration/ or 90 days whichever is less): 10/4/21     Visit # Insurance Allowable Auth Needed   79 ([address] [address]) Medicare []Yes    [x]No     Pain level:  2/10 currently, c/o weakness     SUBJECTIVE:  Patient reports she is interested in purchasing a recumbent bike for home use. Feels it would be a good idea for her to have better access to a piece of cardio equipment for regular use.  Continues to note that working on focused strengthening in PT is maintaining her functional mobility, just does not feel that she has the ability to received quality care/attention anywhere else that she has been.        OBJECTIVE:    Observation: Antalgic gait with standard cane used in RUE   Test measurements:  LEFS - 74% impairment      RESTRICTIONS/PRECAUTIONS:     Exercises/Interventions:      Therapeutic Ex (50375)  Min: 45 Sets/sec Reps CUES/Notes   Bike 6'    seat 12   Treadmill 0'   1.4 mph amb, bilat UE support, 1.0-3.0 incline   Cybex Gastroc- SL  3 10 75#   Leg Press - SL 3 10 75#   Cybex Leg Ext - SL 3 10 15#   Cybex HS curl - SL 3 10 30#   Bridge 10 12     3-way SLR 1 10     Hook lying LLE Ext 1 10 Cues for form   Supine heel slides + march 1 12 Cues for form   Hook lying bilat hip add iso 5 10 Ball squeeze   Clam - side lying 5 10 No resistance   Supine clam - unilateral 10 10 Maroon band, each         BOSU lunge 10  10 each   Slide lunge - retro at ledge 2 10     Standing hip abd 2 10 orange   SC marching 2 10 Fwd/bkwd, cane for assist   Heel raises - DL 5 10 yellow ball between heels   TKE 5 15 3.5pl   Side stepping 2 15 HHA, green band below knees   Sit to stand with TRX 2 10 1 airex on seat, sink for support   Step up - bilat UE support 3 10 6\"   Lateral step down 2 10 4\"   Standing hip 3-way kicks 5 10 Purple band for TKE   On 2\" step             Manual Intervention  (84989)  Min:          Shld /GH Mobs 0'   Gentle inferior glides   Post Cap mobs 0'   Prone LLLD IR   Hypervolt percussion STM 5'   L posterior hip, IT band   Manual cervical traction 0'       Hip PROM 2'       IASTM 7'   L post hip, IT band   PROM MT - shoulder 0'   Flex, abd, ER, IR             NMR re-education (10661)  Min: 0         Biodex balance 10'   RC x 5 min with small ERIC  PS with staggered stance x 1.5 min each                                                 Therapeutic Activity (07888)  Min:         UE throwing porgression         Dynamic UE stability         Earthquake Bar         Bodyblade                            Therapeutic Exercise and NMR EXR  [x] (22673) Provided verbal/tactile cueing for activities related to strengthening, flexibility, endurance, ROM  for improvements in scapular, scapulothoracic and UE control with self care, reaching, carrying, lifting, house/yardwork, driving/computer work.     [x] (69750) Provided verbal/tactile cueing for activities related to improving balance, coordination, kinesthetic sense, posture, motor skill, proprioception  to assist with  scapular, scapulothoracic and UE control with self care, reaching, carrying, lifting, house/yardwork, driving/computer work. Therapeutic Activities:    [] (08917 or 76893) Provided verbal/tactile cueing for activities related to improving balance, coordination, kinesthetic sense, posture, motor skill, proprioception and motor activation to allow for proper function of scapular, scapulothoracic and UE control with self care, carrying, lifting, driving/computer work.      Home Exercise Program:    [x] (61211) Reviewed/Progressed HEP activities related to strengthening, flexibility, endurance, ROM of scapular, scapulothoracic and UE control with self care, reaching, carrying, lifting, house/yardwork, driving/computer work  [] (10385) Reviewed/Progressed HEP activities related to improving balance, coordination, kinesthetic sense, posture, motor skill, proprioception of scapular, scapulothoracic and UE control with self care, reaching, carrying, lifting, house/yardwork, driving/computer work      Manual Treatments:  PROM / STM / Oscillations-Mobs:  G-I, II, III, IV (PA's, Inf., Post.)  [] (69606) Provided manual therapy to mobilize soft tissue/joints of cervical/CT, scapular GHJ and UE for the purpose of modulating pain, promoting relaxation,  increasing ROM, reducing/eliminating soft tissue swelling/inflammation/restriction, improving soft tissue extensibility and allowing for proper ROM for normal function with self care, reaching, carrying, lifting, house/yardwork, driving/computer work    Modalities:  None    Charges:  Timed Code Treatment Minutes: 45   Total Treatment Minutes: 45       [] EVAL (LOW) 86998 (typically 20 minutes face-to-face)  [] EVAL (MOD) 86293 (typically 30 minutes face-to-face)  [] EVAL (HIGH) 39185 (typically 45 minutes face-to-face)  [] RE-EVAL     [x] CI(69071) x 3     [] DRY NEEDLE 1 OR 2 MUSCLES  [] NMR (49478) x     [] DRY NEEDLE 3+ MUSCLES  [] Manual (04465) x       [] TA (78686) x     [] Mech Traction (94039)  [] ES(attended) (97683)     [] ES (un) (48574):   [] VASO (20932)  [] Other:     If [facility] Please Indicate Time In/Out  CPT Code Time in Time out                                   SHOULDER GOALS:  Patient stated goal: pain-free shoulder, return to PLOF  [] Progressing: [x] Met: [] Not Met: [] Adjusted    Therapist goals for Patient:   Short Term Goals: To be achieved in: 2 weeks  1. Independent in HEP and progression per patient tolerance, in order to prevent re-injury. [] Progressing: [x] Met: [] Not Met: [] Adjusted  2. Patient will have a decrease in pain to facilitate improvement in movement, function, and ADLs as indicated by Functional Deficits. [] Progressing: [x] Met: [] Not Met: [] Adjusted    Long Term Goals: To be achieved in: 6 weeks  1. Disability index score of 25% or less for the Quick DASH to assist with reaching prior level of function. [] Progressing: [x] Met: [] Not Met: [] Adjusted  2. Patient will demonstrate increased AROM to equal contralateral to allow for proper joint functioning as indicated by Functional Deficits. [] Progressing: [x] Met: [] Not Met: [] Adjusted  3. Patient will demonstrate an increase in NM recruitment/activation and overall GH and scapular strength to within n5lbs HHD or WNL for proper functional mobility as indicated by patients Functional Deficits. [] Progressing: [x] Met: [] Not Met: [] Adjusted  4. Patient will return to reaching, lifting, overhead movements, self care especially after using toilet, dressing, grooming activities without increased symptoms or restriction. [] Progressing: [x] Met: [] Not Met: [] Adjusted       HIP GOALS:  Patient stated goal: strengthen LLE, ambulate without cane for short distances  [] Progressing: [x] Met: [] Not Met: [] Adjusted    Therapist goals for Patient:   Short Term Goals: To be achieved in: 2 weeks  1. Independent in HEP and progression per patient tolerance, in order to prevent re-injury. []?? Progressing: [x]? ? Met: []?? Not Met: []?? Adjusted  2. Patient will have a decrease in pain to facilitate improvement in movement, function, and ADLs as indicated by Functional Deficits. []?? Progressing: [x]? ? Met: []?? Not Met: []?? Adjusted     Long Term Goals: To be achieved in: 4-6 weeks  1. Disability index score of 40% or less for the LEFS to assist with reaching prior level of function. [x]? ? Progressing: []?? Met: []?? Not Met: []?? Adjusted  2. Patient will demonstrate increased AROM to WFL to allow for proper joint functioning as indicated by patients Functional Deficits. []?? Progressing: [x]? ? Met: []?? Not Met: []?? Adjusted  3. Patient will demonstrate an increase in Strength to good proximal hip strength and control, within 5lb HHD in LE to allow for proper functional mobility as indicated by patients Functional Deficits. [x]? ? Progressing: []?? Met: []?? Not Met: []?? Adjusted  4. Patient will return to standing, ambulation (with AD as needed), transfers, stairs, squatting without increased symptoms or restriction. [x] Progressing: [] Met: [] Not Met: [] Adjusted      ASSESSMENT: Appropriate fatigue with no increase in pain. Making gradual gains in general LLE strength but she continues to present with deficient proximal hip control and poor core stability. Gait is still antalgic with uneven step length and wide stance, though making some improvement. Patient will benefit from continued physical therapy with focus on left hip rehabilitation and LE strength/balance training to maintain and/or improve patient's overall function and reduce risk of falls.     Return to Play: (if applicable)   []  Stage 1: Intro to Strength   []  Stage 2: Dynamic Strength and Intro to Plyometrics   []  Stage 3: Advanced Plyometrics and Intro to Throwing   []  Stage 4: Sport specific Training/Return to Sport     []  Ready to Return to Play, [employer]   []  Not Ready for Return to Sports   Comments:      Treatment/Activity Tolerance:  [x] Patient tolerated treatment well [x] Patient limited by fatique  [] Patient limited by pain  [] Patient limited by other medical complications  [] Other:     Overall Progression Towards Functional goals/ Treatment Progress Update:  [x] Patient is progressing as expected towards functional goals listed. [] Progression is slowed due to complexities/Impairments listed. [] Progression has been slowed due to co-morbidities. [] Plan just implemented, too soon to assess goals progression <30days   [] Goals require adjustment due to lack of progress  [] Patient is not progressing as expected and requires additional follow up with physician  [] Other:     Prognosis for POC: [x] Good [] Fair  [] Poor    Patient requires continued skilled intervention: [x] Yes  [] No      PLAN: Continue PT 2x/week for 8 weeks for shoulder strengthening/ROM if needed, with continued left hip rehabilitation to focus on ROM, proximal hip strengthening, general LLE strength training, and balance training to lessen risk of falls  [x] Continue per plan of care [] Alter current plan (see comments)  [] Plan of care initiated [] Hold pending MD visit [] Discharge     Electronically signed by: Christin Cross PT     Note: If patient does not return for scheduled/recommended follow up visits, this note will serve as a discharge from care along with the most recent update on progress.

## 2021-09-22 ENCOUNTER — HOSPITAL ENCOUNTER (OUTPATIENT)
Dept: PHYSICAL THERAPY | Age: 76
Setting detail: THERAPIES SERIES
Discharge: HOME | End: 2021-09-22
Payer: MEDICARE

## 2021-09-22 PROCEDURE — 97110 THERAPEUTIC EXERCISES: CPT

## 2021-09-22 NOTE — FLOWSHEET NOTE
[address] [place] [address] [place] [address]  Phone: [phone] Fax: [phone]      Physical Therapy Treatment Note/ Progress Report:     Date:  2021    Patient Name:  Anabel Menon    :  1945  MRN: [id number]  Restrictions/Precautions:    Medical/Treatment Diagnosis Information:  · Diagnosis: Right shoulder adhesive capsulitis, left hip pain  · Treatment Diagnosis: M75.01, Z69.417  Insurance/Certification information:  PT Insurance Information: Medicare / [facility]  Physician Information:  Referring Practitioner: Dottie Elliott of care signed (Y/N):     Date of Patient follow up with Physician: N/A      Progress Report: []  Yes  [x]  No     Date Range for reporting period:  Beginnin21  Ending:  NA    Progress report due (10 Rx/or 30 days whichever is less):      Recertification due (POC duration/ or 90 days whichever is less): 10/4/21     Visit # Insurance Allowable Auth Needed   76 ([id number]) Medicare []Yes    [x]No     Pain level:  2/10 currently, c/o weakness     SUBJECTIVE:  Patient has no new complaints today.  After use of the TRX straps for squats today, she notes that her left arm feels weak and her left leg was very shaky.        OBJECTIVE:    Observation: Antalgic gait with standard cane used in RUE   Test measurements:  LEFS - 74% impairment      RESTRICTIONS/PRECAUTIONS:     Exercises/Interventions:      Therapeutic Ex (41860)  Min: 34 Sets/sec Reps CUES/Notes   Bike 6'    seat 12   Treadmill 0'   1.4 mph amb, bilat UE support, 1.0-3.0 incline   Cybex Gastroc- SL  3 10 75#   Leg Press - SL 3 10 75#   Cybex Leg Ext - SL 3 10 15#   Cybex HS curl - SL 3 10 30#   Bridge 10 12     3-way SLR 1 10     Hook lying LLE Ext 1 10 Cues for form   Supine heel slides +  12 Cues for form   Hook lying bilat hip add iso 5 10 Ball squeeze   Clam - side lying 5 10 No resistance   Supine clam - unilateral 10 10 Maroon band, each         BOSU lunge 10  10 each   Slide lunge - retro at ledge 2 10     Standing hip abd 2 10 orange   SC marching 2 10 Fwd/bkwd, cane for assist   Heel raises - DL 5 10 yellow ball between heels   TRX squat 2 10 cues   Side stepping 2 15 HHA, green band below knees   Sit to stand with TRX 2 10 1 airex on seat, sink for support   Step up - bilat UE support 3 10 6\"   Lateral step down 2 10 4\"   Standing hip 3-way kicks 5 10 Purple band for TKE   On 2\" step             Manual Intervention  (92426)  Min:          Shld /GH Mobs 0'   Gentle inferior glides   Post Cap mobs 0'   Prone LLLD IR   Hypervolt percussion STM 5'   L posterior hip, IT band   Manual cervical traction 0'       Hip PROM 2'       IASTM 7'   L post hip, IT band   PROM MT - shoulder 0'   Flex, abd, ER, IR             NMR re-education (64781)  Min: 0         Biodex balance 10'   RC x 5 min with small ERIC  PS with staggered stance x 1.5 min each                                                 Therapeutic Activity (73439)  Min:         UE throwing porgression         Dynamic UE stability         Earthquake Bar         Bodyblade                            Therapeutic Exercise and NMR EXR  [x] (84668) Provided verbal/tactile cueing for activities related to strengthening, flexibility, endurance, ROM  for improvements in scapular, scapulothoracic and UE control with self care, reaching, carrying, lifting, house/yardwork, driving/computer work. [x] (64253) Provided verbal/tactile cueing for activities related to improving balance, coordination, kinesthetic sense, posture, motor skill, proprioception  to assist with  scapular, scapulothoracic and UE control with self care, reaching, carrying, lifting, house/yardwork, driving/computer work.     Therapeutic Activities:    [] (70918 or 25351) Provided verbal/tactile cueing for activities related to improving balance, coordination, kinesthetic sense, posture, motor skill, proprioception and motor activation to allow for proper function of scapular, scapulothoracic and UE control with self care, carrying, lifting, driving/computer work.      Home Exercise Program:    [x] (81578) Reviewed/Progressed HEP activities related to strengthening, flexibility, endurance, ROM of scapular, scapulothoracic and UE control with self care, reaching, carrying, lifting, house/yardwork, driving/computer work  [] (60729) Reviewed/Progressed HEP activities related to improving balance, coordination, kinesthetic sense, posture, motor skill, proprioception of scapular, scapulothoracic and UE control with self care, reaching, carrying, lifting, house/yardwork, driving/computer work      Manual Treatments:  PROM / STM / Oscillations-Mobs:  G-I, II, III, IV (PA's, Inf., Post.)  [] (45257) Provided manual therapy to mobilize soft tissue/joints of cervical/CT, scapular GHJ and UE for the purpose of modulating pain, promoting relaxation,  increasing ROM, reducing/eliminating soft tissue swelling/inflammation/restriction, improving soft tissue extensibility and allowing for proper ROM for normal function with self care, reaching, carrying, lifting, house/yardwork, driving/computer work    Modalities:  None    Charges:  Timed Code Treatment Minutes: 34   Total Treatment Minutes: 34       [] EVAL (LOW) 50708 (typically 20 minutes face-to-face)  [] EVAL (MOD) 22949 (typically 30 minutes face-to-face)  [] EVAL (HIGH) 50386 (typically 45 minutes face-to-face)  [] RE-EVAL     [x] TE(50787) x 2     [] DRY NEEDLE 1 OR 2 MUSCLES  [] NMR (79073) x     [] DRY NEEDLE 3+ MUSCLES  [] Manual (74699) x       [] TA (02861) x     [] Mech Traction (30945)  [] ES(attended) (44329)     [] ES (un) (21906):   [] VASO (03602)  [] Other:     If [facility] Please Indicate Time In/Out  CPT Code Time in Time out                                   SHOULDER GOALS:  Patient stated goal: pain-free shoulder, return to PLOF  [] Progressing: [x] Met: [] Not Met: [] Adjusted    Therapist goals for Patient:   Short Term Goals: To be achieved in: 2 weeks  1. Independent in HEP and progression per patient tolerance, in order to prevent re-injury. [] Progressing: [x] Met: [] Not Met: [] Adjusted  2. Patient will have a decrease in pain to facilitate improvement in movement, function, and ADLs as indicated by Functional Deficits. [] Progressing: [x] Met: [] Not Met: [] Adjusted    Long Term Goals: To be achieved in: 6 weeks  1. Disability index score of 25% or less for the Quick DASH to assist with reaching prior level of function. [] Progressing: [x] Met: [] Not Met: [] Adjusted  2. Patient will demonstrate increased AROM to equal contralateral to allow for proper joint functioning as indicated by Functional Deficits. [] Progressing: [x] Met: [] Not Met: [] Adjusted  3. Patient will demonstrate an increase in NM recruitment/activation and overall GH and scapular strength to within n5lbs HHD or WNL for proper functional mobility as indicated by patients Functional Deficits. [] Progressing: [x] Met: [] Not Met: [] Adjusted  4. Patient will return to reaching, lifting, overhead movements, self care especially after using toilet, dressing, grooming activities without increased symptoms or restriction. [] Progressing: [x] Met: [] Not Met: [] Adjusted       HIP GOALS:  Patient stated goal: strengthen LLE, ambulate without cane for short distances  [] Progressing: [x] Met: [] Not Met: [] Adjusted    Therapist goals for Patient:   Short Term Goals: To be achieved in: 2 weeks  1. Independent in HEP and progression per patient tolerance, in order to prevent re-injury. []?? Progressing: [x]? ? Met: []?? Not Met: []?? Adjusted  2. Patient will have a decrease in pain to facilitate improvement in movement, function, and ADLs as indicated by Functional Deficits. []?? Progressing: [x]? ? Met: []?? Not Met: []?? Adjusted     Long Term Goals: To be achieved in: 4-6 weeks  1.  Disability index score of 40% or less for the LEFS to assist with reaching prior level of function. [x]? ? Progressing: []?? Met: []?? Not Met: []?? Adjusted  2. Patient will demonstrate increased AROM to WFL to allow for proper joint functioning as indicated by patients Functional Deficits. []?? Progressing: [x]? ? Met: []?? Not Met: []?? Adjusted  3. Patient will demonstrate an increase in Strength to good proximal hip strength and control, within 5lb HHD in LE to allow for proper functional mobility as indicated by patients Functional Deficits. [x]? ? Progressing: []?? Met: []?? Not Met: []?? Adjusted  4. Patient will return to standing, ambulation (with AD as needed), transfers, stairs, squatting without increased symptoms or restriction. [x] Progressing: [] Met: [] Not Met: [] Adjusted      ASSESSMENT: Appropriate fatigue with no increase in pain. Making gradual gains in general LLE strength but she continues to present with deficient proximal hip control and poor core stability. Gait is still antalgic with uneven step length and wide stance, though making some improvement. Patient will benefit from continued physical therapy with focus on left hip rehabilitation and LE strength/balance training to maintain and/or improve patient's overall function and reduce risk of falls.     Return to Play: (if applicable)   []  Stage 1: Intro to Strength   []  Stage 2: Dynamic Strength and Intro to Plyometrics   []  Stage 3: Advanced Plyometrics and Intro to Throwing   []  Stage 4: Sport specific Training/Return to Sport     []  Ready to Return to Play, [employer]   []  Not Ready for Return to Sports   Comments:      Treatment/Activity Tolerance:  [x] Patient tolerated treatment well [x] Patient limited by fatique  [] Patient limited by pain  [] Patient limited by other medical complications  [] Other:     Overall Progression Towards Functional goals/ Treatment Progress Update:  [x] Patient is progressing as expected

## 2021-09-27 ENCOUNTER — TELEPHONE (OUTPATIENT)
Dept: RHEUMATOLOGY | Age: 76
End: 2021-09-27

## 2021-09-27 ENCOUNTER — HOSPITAL ENCOUNTER (OUTPATIENT)
Dept: PHYSICAL THERAPY | Age: 76
Setting detail: THERAPIES SERIES
Discharge: HOME | End: 2021-09-27
Payer: MEDICARE

## 2021-09-27 PROCEDURE — 97110 THERAPEUTIC EXERCISES: CPT

## 2021-09-27 NOTE — FLOWSHEET NOTE
[facility] [address]  Phone: [phone] Fax: [phone]      Physical Therapy Treatment Note/ Progress Report:     Date:  2021    Patient Name:  Yue Che    :  1945  MRN: [id number]  Restrictions/Precautions:    Medical/Treatment Diagnosis Information:  · Diagnosis: Right shoulder adhesive capsulitis, left hip pain  · Treatment Diagnosis: M75.01, M42.245  Insurance/Certification information:  PT Insurance Information: Medicare / [facility]  Physician Information:  Referring Practitioner: Milana Rodriguezet of care signed (Y/N):     Date of Patient follow up with Physician: N/A      Progress Report: []  Yes  [x]  No     Date Range for reporting period:  Beginnin21  Ending:  NA    Progress report due (10 Rx/or 30 days whichever is less):      Recertification due (POC duration/ or 90 days whichever is less): 10/4/21     Visit # Insurance Allowable Auth Needed   71 ([id number]) Medicare []Yes    [x]No     Pain level:  2/10 currently, c/o weakness     SUBJECTIVE:  Patient tried looking at [facility] for a recumbent bike but did not have any luck. Is open to trying out [employer] to be more independent with a gym program, as she is to the point that we are exhausting her insurance coverage.  Thinks she is also starting to be more comfortable with the idea of doing more on her own at home as well.         OBJECTIVE:    Observation: Antalgic gait with standard cane used in RUE   Test measurements:  LEFS - 74% impairment      RESTRICTIONS/PRECAUTIONS:     Exercises/Interventions:      Therapeutic Ex (33126)  Min: 34 Sets/sec Reps CUES/Notes   Bike 6'    seat 12   Treadmill 0'   1.4 mph amb, bilat UE support, 1.0-3.0 incline   Cybex Gastroc- SL  3 10 75#   Leg Press - SL 3 10 75#   Cybex Leg Ext - SL 3 10 15#   Cybex HS curl - SL 3 10 30#   Bridge 10 12     3-way SLR 1 10     Hook lying LLE Ext 1 10 Cues for form   Supine heel slides + march 1 12 Cues for form   Hook lying bilat hip add iso 5 10 Ball squeeze   Clam - side lying 5 10 No resistance   Supine clam - unilateral 10 10 Maroon band, each         BOSU lunge 10  10 each   Slide lunge - retro at ledge 2 10     Standing hip abd 2 10 orange   SC marching 2 10 Fwd/bkwd, cane for assist   Heel raises - DL 5 10 yellow ball between heels   TRX squat 2 10 cues   Side stepping 2 15 HHA, green band below knees   Sit to stand with TRX 2 10 1 airex on seat, sink for support   Step up - bilat UE support 3 10 6\"   Lateral step down 2 10 4\"   Standing hip 3-way kicks 5 10 Purple band for TKE   On 2\" step             Manual Intervention  (15302)  Min:          Shld /GH Mobs 0'   Gentle inferior glides   Post Cap mobs 0'   Prone LLLD IR   Hypervolt percussion STM 5'   L posterior hip, IT band   Manual cervical traction 0'       Hip PROM 2'       IASTM 7'   L post hip, IT band   PROM MT - shoulder 0'   Flex, abd, ER, IR             NMR re-education (84515)  Min: 0         Biodex balance 10'   RC x 5 min with small ERIC  PS with staggered stance x 1.5 min each                                                 Therapeutic Activity (82038)  Min:         UE throwing porgression         Dynamic UE stability         Earthquake Bar         Bodyblade                            Therapeutic Exercise and NMR EXR  [x] (86454) Provided verbal/tactile cueing for activities related to strengthening, flexibility, endurance, ROM  for improvements in scapular, scapulothoracic and UE control with self care, reaching, carrying, lifting, house/yardwork, driving/computer work. [x] (29485) Provided verbal/tactile cueing for activities related to improving balance, coordination, kinesthetic sense, posture, motor skill, proprioception  to assist with  scapular, scapulothoracic and UE control with self care, reaching, carrying, lifting, house/yardwork, driving/computer work.     Therapeutic Activities: [] (55757 or 80243) Provided verbal/tactile cueing for activities related to improving balance, coordination, kinesthetic sense, posture, motor skill, proprioception and motor activation to allow for proper function of scapular, scapulothoracic and UE control with self care, carrying, lifting, driving/computer work.      Home Exercise Program:    [x] (43602) Reviewed/Progressed HEP activities related to strengthening, flexibility, endurance, ROM of scapular, scapulothoracic and UE control with self care, reaching, carrying, lifting, house/yardwork, driving/computer work  [] (53665) Reviewed/Progressed HEP activities related to improving balance, coordination, kinesthetic sense, posture, motor skill, proprioception of scapular, scapulothoracic and UE control with self care, reaching, carrying, lifting, house/yardwork, driving/computer work      Manual Treatments:  PROM / STM / Oscillations-Mobs:  G-I, II, III, IV (PA's, Inf., Post.)  [] (72071) Provided manual therapy to mobilize soft tissue/joints of cervical/CT, scapular GHJ and UE for the purpose of modulating pain, promoting relaxation,  increasing ROM, reducing/eliminating soft tissue swelling/inflammation/restriction, improving soft tissue extensibility and allowing for proper ROM for normal function with self care, reaching, carrying, lifting, house/yardwork, driving/computer work    Modalities:  None    Charges:  Timed Code Treatment Minutes: 34   Total Treatment Minutes: 34       [] EVAL (LOW) 28967 (typically 20 minutes face-to-face)  [] EVAL (MOD) 43334 (typically 30 minutes face-to-face)  [] EVAL (HIGH) 32353 (typically 45 minutes face-to-face)  [] RE-EVAL     [x] CG(39860) x 2     [] DRY NEEDLE 1 OR 2 MUSCLES  [] NMR (26899) x     [] DRY NEEDLE 3+ MUSCLES  [] Manual (84647) x       [] TA (47496) x     [] Mech Traction (55610)  [] ES(attended) (76255)     [] ES (un) (58177):   [] VASO (15030)  [] Other:     If [facility] Please Indicate Time In/Out  CPT Code Time in Time out                                   SHOULDER GOALS:  Patient stated goal: pain-free shoulder, return to PLOF  [] Progressing: [x] Met: [] Not Met: [] Adjusted    Therapist goals for Patient:   Short Term Goals: To be achieved in: 2 weeks  1. Independent in HEP and progression per patient tolerance, in order to prevent re-injury. [] Progressing: [x] Met: [] Not Met: [] Adjusted  2. Patient will have a decrease in pain to facilitate improvement in movement, function, and ADLs as indicated by Functional Deficits. [] Progressing: [x] Met: [] Not Met: [] Adjusted    Long Term Goals: To be achieved in: 6 weeks  1. Disability index score of 25% or less for the Quick DASH to assist with reaching prior level of function. [] Progressing: [x] Met: [] Not Met: [] Adjusted  2. Patient will demonstrate increased AROM to equal contralateral to allow for proper joint functioning as indicated by Functional Deficits. [] Progressing: [x] Met: [] Not Met: [] Adjusted  3. Patient will demonstrate an increase in NM recruitment/activation and overall GH and scapular strength to within n5lbs HHD or WNL for proper functional mobility as indicated by patients Functional Deficits. [] Progressing: [x] Met: [] Not Met: [] Adjusted  4. Patient will return to reaching, lifting, overhead movements, self care especially after using toilet, dressing, grooming activities without increased symptoms or restriction. [] Progressing: [x] Met: [] Not Met: [] Adjusted       HIP GOALS:  Patient stated goal: strengthen LLE, ambulate without cane for short distances  [] Progressing: [x] Met: [] Not Met: [] Adjusted    Therapist goals for Patient:   Short Term Goals: To be achieved in: 2 weeks  1. Independent in HEP and progression per patient tolerance, in order to prevent re-injury. []?? Progressing: [x]? ? Met: []?? Not Met: []?? Adjusted  2. Patient will have a decrease in pain to facilitate improvement in movement, function, and ADLs as indicated by Functional Deficits. []?? Progressing: [x]? ? Met: []?? Not Met: []?? Adjusted     Long Term Goals: To be achieved in: 4-6 weeks  1. Disability index score of 40% or less for the LEFS to assist with reaching prior level of function. [x]? ? Progressing: []?? Met: []?? Not Met: []?? Adjusted  2. Patient will demonstrate increased AROM to WFL to allow for proper joint functioning as indicated by patients Functional Deficits. []?? Progressing: [x]? ? Met: []?? Not Met: []?? Adjusted  3. Patient will demonstrate an increase in Strength to good proximal hip strength and control, within 5lb HHD in LE to allow for proper functional mobility as indicated by patients Functional Deficits. [x]? ? Progressing: []?? Met: []?? Not Met: []?? Adjusted  4. Patient will return to standing, ambulation (with AD as needed), transfers, stairs, squatting without increased symptoms or restriction. [x] Progressing: [] Met: [] Not Met: [] Adjusted      ASSESSMENT: Appropriate fatigue with no increase in pain. Making gradual gains in general LLE strength but she continues to present with deficient proximal hip control and poor core stability. Gait is still antalgic with uneven step length and wide stance, though making some improvement. Patient is at the point where she would be appropriate for progression to HEP and gym program only. She was provided with an updated HEP for comprehensive LE strengthening for home. Patient demonstrated all exercises competently and safely.      Return to Play: (if applicable)   []  Stage 1: Intro to Strength   []  Stage 2: Dynamic Strength and Intro to Plyometrics   []  Stage 3: Advanced Plyometrics and Intro to Throwing   []  Stage 4: Sport specific Training/Return to Sport     []  Ready to Return to Play, [employer]   []  Not Ready for Return to Sports   Comments:      Treatment/Activity Tolerance:  [x] Patient tolerated treatment well [x] Patient limited by dian  [] Patient limited by pain  [] Patient limited by other medical complications  [] Other:     Overall Progression Towards Functional goals/ Treatment Progress Update:  [x] Patient is progressing as expected towards functional goals listed. [] Progression is slowed due to complexities/Impairments listed. [] Progression has been slowed due to co-morbidities. [] Plan just implemented, too soon to assess goals progression <30days   [] Goals require adjustment due to lack of progress  [] Patient is not progressing as expected and requires additional follow up with physician  [] Other:     Prognosis for POC: [x] Good [] Fair  [] Poor    Patient requires continued skilled intervention: [x] Yes  [] No      PLAN: Patient to have one more visit this week and then progress to HEP only at that point. [x] Continue per plan of care [] Alter current plan (see comments)  [] Plan of care initiated [] Hold pending MD visit [] Discharge     Electronically signed by: Raman Gross PT     Note: If patient does not return for scheduled/recommended follow up visits, this note will serve as a discharge from care along with the most recent update on progress.

## 2021-09-27 NOTE — TELEPHONE ENCOUNTER
Pt's infusion was cancelled d/t patient wanting to complete pt first.  Rescheduled infusion for 10/07/2021.

## 2021-09-29 ENCOUNTER — HOSPITAL ENCOUNTER (OUTPATIENT)
Dept: PHYSICAL THERAPY | Age: 76
Setting detail: THERAPIES SERIES
Discharge: HOME | End: 2021-09-29
Payer: MEDICARE

## 2021-09-29 PROCEDURE — 97110 THERAPEUTIC EXERCISES: CPT

## 2021-09-29 NOTE — DISCHARGE SUMMARY
[place] [address] [address]  Phone: [phone] Fax: [phone]     Physical Therapy Discharge Summary    Dear  Bro Meyer,    We had the pleasure of treating the following patient for physical therapy services at [address] [place] [address]. A summary of our findings can be found in the discharge summary below. If you have any questions or concerns regarding these findings, please do not hesitate to contact me at the office phone number above. Thank you for the referral.     Physician Signature:________________________________Date:__________________  By signing above (or electronic signature), therapists plan is approved by physician      Date Range Of Visits: 20 - 21  Total Visits to Date: 79  Overall Response to Treatment:   []Patient is responding well to treatment and improvement is noted with regards  to goals   [x]Patient should continue to improve in reasonable time if they continue HEP   []Patient has plateaued and is no longer responding to skilled PT intervention    []Patient is getting worse and would benefit from return to referring MD   []Patient unable to adhere to initial POC   [x]Other: Patient has met all goals set at initial evaluation and is appropriate for discharge to HEP only.                   Physical Therapy Treatment Note/ Progress Report:     Date:  2021    Patient Name:  Cheryl Amaya    :  1945  MRN: [id number]  Restrictions/Precautions:    Medical/Treatment Diagnosis Information:  · Diagnosis: Right shoulder adhesive capsulitis, left hip pain  · Treatment Diagnosis: M75.01, L60.203  Insurance/Certification information:  PT Insurance Information: Medicare / [facility]  Physician Information:  Referring Practitioner: Suzanne Wilson of care signed (Y/N):     Date of Patient follow up with Physician: N/A      Progress Report: [x]  Yes  []  No     Date Range for reporting period:  Beginnin21  Endin21    Progress report due (10 Rx/or 30 days whichever is less): NA    Recertification due (POC duration/ or 90 days whichever is less): NA     Visit # Insurance Allowable Auth Needed   79 ([address] [place] 22) Medicare []Yes    [x]No     Pain level:  0/10 currently, c/o weakness     SUBJECTIVE:  Patient states that she really does not feel any discomfort in her hip anymore. She is still concerned about her lack of balance and knows that she need to keep up with a gym program and HEP in order to continue gaining/maintaining strength. She has looked into [facility] and feels this might be a good option for her, will have her daughter go with her the first few times until she gets more familiar with the setting.  She has also scheduled a consult with Dr. Racheal Mccormick to see if he can give her any insight as to the shakiness of the LLE at times.        OBJECTIVE:    Observation: Antalgic gait with standard cane used in RUE   Test measurements:  LEFS - 61% impairment            ROM Left Right   Hip Flexion 110 WNL   Hip Abduction WNL WNL   Hip ER 30 WNL   Hip IR WNL WNL   Hip Ext Decreased WNL   Strength  Left Right   Hip Abd 9.3 lb 9.2 lb   Hip Ext NT NT   Knee Ext 51.9 lb 51.5 lb   Knee Flex 35.3 lb  34.5 lb         RESTRICTIONS/PRECAUTIONS:     Exercises/Interventions:      Therapeutic Ex (77682)  Min: 32 Sets/sec Reps CUES/Notes   Bike 12'       Treadmill 0'   1.4 mph amb, bilat UE support, 1.0-3.0 incline   Cybex Gastroc- SL  3 10 75#   Leg Press - SL 3 10 75#   Cybex Leg Ext - SL 3 10 15#   Cybex HS curl - SL 3 10 30#   Bridge 10 12     3-way SLR 1 10     Hook lying LLE Ext 1 10 Cues for form   Supine heel slides +  12 Cues for form   Hook lying bilat hip add iso 5 10 Ball squeeze   Clam - side lying 5 10 No resistance   Supine clam - unilateral 10 10 Maroon band, each         BOSU lunge 10  10 each   Slide lunge - retro at ledge 1 15     Standing hip abd 2 10 orange Marching - airex 1 10 Fwd/bkwd  Bilat Cane for support, SBA   Heel raises - DL 5 10 yellow ball between heels   TKE 5 15 3.5pl   Side stepping 2 15 HHA, maroon above knees   Sit to stand with TRX 2 10 1 airex on seat, sink for support   Step up - bilat UE support 3 10 8\" L, 6\" R   Lateral step down 2 10 4\"   Standing hip 3-way kicks 5 10 Purple band for TKE   On 2\" step             Manual Intervention  (48838)  Min: 0         Shld /GH Mobs 0'   Gentle inferior glides   Post Cap mobs 0'   Prone LLLD IR   Hypervolt percussion STM 5'   L posterior hip, IT band   Manual cervical traction 0'       Hip PROM 2'       IASTM 7'   L post hip, IT band   PROM MT - shoulder 0'   Flex, abd, ER, IR             NMR re-education (66325)  Min: 0         Biodex balance 10'   RC x 5 min with small ERIC  PS with staggered stance x 1.5 min each                                                 Therapeutic Activity (68123)  Min:         UE throwing porgression         Dynamic UE stability         Earthquake Bar         Bodyblade                            Therapeutic Exercise and NMR EXR  [x] (00892) Provided verbal/tactile cueing for activities related to strengthening, flexibility, endurance, ROM  for improvements in scapular, scapulothoracic and UE control with self care, reaching, carrying, lifting, house/yardwork, driving/computer work. [x] (69341) Provided verbal/tactile cueing for activities related to improving balance, coordination, kinesthetic sense, posture, motor skill, proprioception  to assist with  scapular, scapulothoracic and UE control with self care, reaching, carrying, lifting, house/yardwork, driving/computer work.     Therapeutic Activities:    [] (15650 or 40945) Provided verbal/tactile cueing for activities related to improving balance, coordination, kinesthetic sense, posture, motor skill, proprioception and motor activation to allow for proper function of scapular, scapulothoracic and UE control with self care, carrying, lifting, driving/computer work. Home Exercise Program:    [x] (68517) Reviewed/Progressed HEP activities related to strengthening, flexibility, endurance, ROM of scapular, scapulothoracic and UE control with self care, reaching, carrying, lifting, house/yardwork, driving/computer work  [] (11261) Reviewed/Progressed HEP activities related to improving balance, coordination, kinesthetic sense, posture, motor skill, proprioception of scapular, scapulothoracic and UE control with self care, reaching, carrying, lifting, house/yardwork, driving/computer work      Manual Treatments:  PROM / STM / Oscillations-Mobs:  G-I, II, III, IV (PA's, Inf., Post.)  [] (23827) Provided manual therapy to mobilize soft tissue/joints of cervical/CT, scapular GHJ and UE for the purpose of modulating pain, promoting relaxation,  increasing ROM, reducing/eliminating soft tissue swelling/inflammation/restriction, improving soft tissue extensibility and allowing for proper ROM for normal function with self care, reaching, carrying, lifting, house/yardwork, driving/computer work    Modalities:  None    Charges:  Timed Code Treatment Minutes: 32   Total Treatment Minutes: 35       [] EVAL (LOW) 38241 (typically 20 minutes face-to-face)  [] EVAL (MOD) 41396 (typically 30 minutes face-to-face)  [] EVAL (HIGH) 81119 (typically 45 minutes face-to-face)  [] RE-EVAL     [x] VO(77751) x 2     [] DRY NEEDLE 1 OR 2 MUSCLES  [] NMR (97293) x     [] DRY NEEDLE 3+ MUSCLES  [] Manual (74689) x       [] TA (61211) x     [] Mech Traction (15426)  [] ES(attended) (93091)     [] ES (un) (37398):   [] VASO (17418)  [] Other:    If [facility] Please Indicate Time In/Out  CPT Code Time in Time out                                   SHOULDER GOALS:  Patient stated goal: pain-free shoulder, return to PLOF  [] Progressing: [x] Met: [] Not Met: [] Adjusted    Therapist goals for Patient:   Short Term Goals: To be achieved in: 2 weeks  1.  Independent in HEP and progression per patient tolerance, in order to prevent re-injury. [] Progressing: [x] Met: [] Not Met: [] Adjusted  2. Patient will have a decrease in pain to facilitate improvement in movement, function, and ADLs as indicated by Functional Deficits. [] Progressing: [x] Met: [] Not Met: [] Adjusted    Long Term Goals: To be achieved in: 6 weeks  1. Disability index score of 25% or less for the Quick DASH to assist with reaching prior level of function. [] Progressing: [x] Met: [] Not Met: [] Adjusted  2. Patient will demonstrate increased AROM to equal contralateral to allow for proper joint functioning as indicated by Functional Deficits. [] Progressing: [x] Met: [] Not Met: [] Adjusted  3. Patient will demonstrate an increase in NM recruitment/activation and overall GH and scapular strength to within n5lbs HHD or WNL for proper functional mobility as indicated by patients Functional Deficits. [] Progressing: [x] Met: [] Not Met: [] Adjusted  4. Patient will return to reaching, lifting, overhead movements, self care especially after using toilet, dressing, grooming activities without increased symptoms or restriction. [] Progressing: [x] Met: [] Not Met: [] Adjusted       HIP GOALS:  Patient stated goal: strengthen LLE, ambulate without cane for short distances  [] Progressing: [x] Met: [] Not Met: [] Adjusted    Therapist goals for Patient:   Short Term Goals: To be achieved in: 2 weeks  1. Independent in HEP and progression per patient tolerance, in order to prevent re-injury. []?? Progressing: [x]? ? Met: []?? Not Met: []?? Adjusted  2. Patient will have a decrease in pain to facilitate improvement in movement, function, and ADLs as indicated by Functional Deficits. []?? Progressing: [x]? ? Met: []?? Not Met: []?? Adjusted     Long Term Goals: To be achieved in: 4-6 weeks  1. Disability index score of 40% or less for the LEFS to assist with reaching prior level of function. [x]? ? Progressing: []?? Met: []?? Not Met: []?? Adjusted  2. Patient will demonstrate increased AROM to WFL to allow for proper joint functioning as indicated by patients Functional Deficits. []?? Progressing: [x]? ? Met: []?? Not Met: []?? Adjusted  3. Patient will demonstrate an increase in Strength to good proximal hip strength and control, within 5lb HHD in LE to allow for proper functional mobility as indicated by patients Functional Deficits. [x]? ? Progressing: []?? Met: []?? Not Met: []?? Adjusted  4. Patient will return to standing, ambulation (with AD as needed), transfers, stairs, squatting without increased symptoms or restriction. [] Progressing: [x] Met: [] Not Met: [] Adjusted      ASSESSMENT:  Patient has attended 79 physical therapy visits from 12/2/20 through 9/29/21 for treatment of right shoulder adhesive capsulitis and chronic left hip dysfunction with history of left hip hemiarthroplasty in 2017. Patient's right shoulder symptoms continue to be completely resolved. Making gradual gains in general LLE strength but she continues to present with deficient proximal hip control and poor core stability. Gait is still antalgic with uneven step length and wide stance, though making some improvement. Patient has been benefiting from physical therapy with focus on left hip rehabilitation and LE strength/balance training, but at this point is max-ing out her Medicare coverage and requires progression to HEP/gym program. Patient is independent in strengthening HEP exercises and is pursuing a gym membership at [employer] to use cardio and strength equipment.        Return to Play: (if applicable)   []  Stage 1: Intro to Strength   []  Stage 2: Dynamic Strength and Intro to Plyometrics   []  Stage 3: Advanced Plyometrics and Intro to Throwing   []  Stage 4: Sport specific Training/Return to Sport     []  Ready to Return to Play, Meets All Above Stages   []  Not Ready for Return to Sports   Comments: Treatment/Activity Tolerance:  [x] Patient tolerated treatment well [] Patient limited by fatique  [] Patient limited by pain  [] Patient limited by other medical complications  [] Other:     Overall Progression Towards Functional goals/ Treatment Progress Update:  [] Patient is progressing as expected towards functional goals listed. [] Progression is slowed due to complexities/Impairments listed. [] Progression has been slowed due to co-morbidities. [] Plan just implemented, too soon to assess goals progression <30days   [] Goals require adjustment due to lack of progress  [] Patient is not progressing as expected and requires additional follow up with physician  [x] Other: Patient has met goals set at initial evaluation and is appropriate for discharge to HEP only. Prognosis for POC: [x] Good [] Fair  [] Poor    Patient requires continued skilled intervention: [] Yes  [x] No      PLAN: Discharge to HEP/gym program only, patient encouraged to call with any questions or concerns following discharge. [x] Continue per plan of care [] Alter current plan (see comments)  [] Plan of care initiated [] Hold pending MD visit [] Discharge    Electronically signed by: Abiodun Corral PT     Note: If patient does not return for scheduled/recommended follow up visits, this note will serve as a discharge from care along with the most recent update on progress.

## 2021-09-30 ENCOUNTER — TELEPHONE (OUTPATIENT)
Dept: RHEUMATOLOGY | Age: 76
End: 2021-09-30

## 2021-09-30 NOTE — TELEPHONE ENCOUNTER
Last Reclast 8/11/2020  Last Dexa Scan 1/29/2020  Labs 9/15/2021  GFR >60  Calcium 9.1  Creatinine 0.7

## 2021-10-07 ENCOUNTER — NURSE ONLY (OUTPATIENT)
Dept: RHEUMATOLOGY | Age: 76
End: 2021-10-07

## 2021-10-07 ENCOUNTER — TELEPHONE (OUTPATIENT)
Dept: RHEUMATOLOGY | Age: 76
End: 2021-10-07

## 2021-10-07 ENCOUNTER — TELEPHONE (OUTPATIENT)
Dept: INTERNAL MEDICINE CLINIC | Age: 76
End: 2021-10-07

## 2021-10-07 NOTE — TELEPHONE ENCOUNTER
Note was added to appointment desk asking to reschedule Reclast appointment. Contacted patient, who reported \"not feeling well, had an episode of low blood sugar. \"  Rescheduled  Reclast to 10/12/2021 at 2 pm. Pt verbalized understanding.

## 2021-10-07 NOTE — PATIENT INSTRUCTIONS
Notify your dentist you are receiving Reclast.  Notify your dentist of any mouth, gum, teeth, or jaw changes. Notify your MD if you are having NEW or worsening jaw or hip discomfort. zoledronic acid  Pronunciation:  BRIANNA Pham AS id  Brand:  Reclast, Zometa  What is the most important information I should know about zoledronic acid? Do not use if you are pregnant. Use effective birth control, and ask your doctor how long to prevent pregnancy after you stop using zoledronic acid. Zoledronic acid can cause serious kidney problems,  especially if you are dehydrated, if you take diuretic medicine, or if you already have kidney disease. Call your doctor if you urinate less than usual, if you have swelling in your feet or ankles, or if you feel tired or short of breath. Also call your doctor if you have muscle spasms, numbness or tingling (in hands and feet or around the mouth), new or unusual hip pain, or severe pain in your joints, bones, or muscles. What is zoledronic acid? Reclast and Zometa are two different brands of zoledronic acid. Reclast is used to treat or prevent osteoporosis caused by menopause, or steroid use. This medicine also increases bone mass in men with osteoporosis. Reclast is for use when you have a high risk of bone fracture. Reclast is also used to treat Paget's disease of bone. Zometa is used to treat high blood levels of calcium caused by cancer (also called hypercalcemia of malignancy). This medicine also treats multiple myeloma (a type of bone marrow cancer) or bone cancer that has spread from elsewhere in the body. You should not use Reclast and Zometa at the same time. Zoledronic acid may also be used for purposes not listed in this medication guide. What should I discuss with my healthcare provider before receiving zoledronic acid? You should not be treated with zoledronic acid if you are allergic to it.   You also should not receive Reclast if you have:  · low levels of calcium in your blood (hypocalcemia); or  · severe kidney disease. You should not be treated with zoledronic acid if are currently using any other bisphosphonate (such as alendronate, etidronate, ibandronate, pamidronate, risedronate, or tiludronate). Tell your doctor if you have ever had:  · kidney disease;  · hypocalcemia;  · thyroid or parathyroid surgery;  · surgery to remove part of your intestine;  · asthma caused by taking aspirin;  · any condition that makes it hard for your body to absorb nutrients from food (malabsorption);  · a dental problem (you may need a dental exam before you receive zoledronic acid);  · if you are dehydrated; or  · if you take a diuretic or \"water pill\". Zoledronic acid can cause serious kidney problems,  especially if you are dehydrated, if you take diuretic medicine, or if you already have kidney disease. This medicine may cause jaw bone problems (osteonecrosis). The risk is highest in people with cancer, blood cell disorders, pre-existing dental problems, or people treated with steroids, chemotherapy, or radiation. Ask your doctor about your own risk. You may need to have a negative pregnancy test before starting this treatment. Do not use if you are pregnant. This medicine may harm an unborn baby or cause birth defects. Zoledronic acid can remain in your body for weeks or years after your last dose. Use effective birth control to prevent pregnancy while using this medicine. Talk with your doctor about the need to prevent pregnancy after you stop using zoledronic acid. This medicine may affect fertility (ability to have children) in women. However, it is important to use birth control to prevent pregnancy because zoledronic acid can harm an unborn baby. You should not breastfeed while using zoledronic acid. How is zoledronic acid given? Zoledronic acid is given as an infusion into a vein. A healthcare provider will give you this injection.   Zoledronic acid is sometimes given as a single dose only one time. It may also be given once every 1 or 2 years. How often you receive zoledronic acid will depend on why you are using this medicine. Follow your doctor's instructions. Drink at least 2 glasses of water within a few hours before your injection to keep from getting dehydrated. You will need frequent medical tests. Pay special attention to your dental hygiene while using zoledronic acid. Brush and floss your teeth regularly. If you need to have any dental work (especially surgery), tell the dentist ahead of time that you are using zoledronic acid. Zoledronic acid is only part of a complete program of treatment that may also include diet changes and taking calcium and vitamin supplements. Follow your doctor's instructions very closely. Your doctor will determine how long to treat you with this medicine. Zoledronic acid is often given for only 3 to 5 years. What happens if I miss a dose? Call your doctor for instructions if you miss an appointment for your zoledronic acid injection. What happens if I overdose? Seek emergency medical attention or call the Poison Help line at 1-939.545.6416. What should I avoid while receiving zoledronic acid? Avoid smoking, or try to quit. Smoking can reduce your bone mineral density, making fractures more likely. Avoid drinking large amounts of alcohol. Heavy drinking can also cause bone loss. What are the possible side effects of zoledronic acid? Get emergency medical help if you have signs of an allergic reaction: hives; wheezing, chest tightness, trouble breathing; swelling of your face, lips, tongue, or throat.   Call your doctor at once if you have:  · new or unusual pain in your thigh or hip;  · jaw pain or numbness, red or swollen gums, loose teeth, or slow healing after dental work;  · severe joint, bone, or muscle pain;  · kidney problems --little or no urination, swelling in your feet or ankles, feeling tired;  · low red blood cells (anemia) --pale skin, unusual tiredness, feeling light-headed or short of breath, cold hands and feet; or  · low calcium levels --muscle spasms or contractions, numbness or tingly feeling (around your mouth, or in your fingers and toes). Serious side effects on the kidneys may be more likely in older adults. Common side effects may include:  · trouble breathing;  · nausea, vomiting, diarrhea, constipation;  · bone pain, muscle or joint pain;  · fever or other flu symptoms;  · tiredness;  · eye pain or swelling;  · pain in your arms or legs;  · headache; or  · anemia. This is not a complete list of side effects and others may occur. Call your doctor for medical advice about side effects. You may report side effects to FDA at 1-404-FDA-9737. What other drugs will affect zoledronic acid? Zoledronic acid can harm your kidneys, especially if you also use certain medicines for infections, cancer, osteoporosis, organ transplant rejection, bowel disorders, high blood pressure, or pain or arthritis (including Advil, Motrin, and Aleve). Other drugs may affect zoledronic acid, including prescription and over-the-counter medicines, vitamins, and herbal products. Tell your doctor about all your current medicines and any medicine you start or stop using. Where can I get more information? Your doctor or pharmacist can provide more information about zoledronic acid. Remember, keep this and all other medicines out of the reach of children, never share your medicines with others, and use this medication only for the indication prescribed. Every effort has been made to ensure that the information provided by [address] is accurate, up-to-date, and complete, but no guarantee is made to that effect. Drug information contained herein may be time sensitive.  [facility] information has been compiled for use by healthcare practitioners and consumers in the [place] and therefore [facility] does not warrant that uses outside of the [place] are appropriate, unless specifically indicated otherwise. [facility] drug information does not endorse drugs, diagnose patients or recommend therapy. [facility] drug information is an informational resource designed to assist licensed healthcare practitioners in caring for their patients and/or to serve consumers viewing this service as a supplement to, and not a substitute for, the expertise, skill, knowledge and judgment of healthcare practitioners. The absence of a warning for a given drug or drug combination in no way should be construed to indicate that the drug or drug combination is safe, effective or appropriate for any given patient. [facility] does not assume any responsibility for any aspect of healthcare administered with the aid of information [facility] provides. The information contained herein is not intended to cover all possible uses, directions, precautions, warnings, drug interactions, allergic reactions, or adverse effects. If you have questions about the drugs you are taking, check with your doctor, nurse or pharmacist.  Copyright 3324-7053 [address]: 18.01. Revision date: 2/26/2021. Care instructions adapted under license by [facility] ([facility]). If you have questions about a medical condition or this instruction, always ask your healthcare professional. [place] [address] any warranty or liability for your use of this information.

## 2021-10-12 ENCOUNTER — NURSE ONLY (OUTPATIENT)
Dept: RHEUMATOLOGY | Age: 76
End: 2021-10-12
Payer: MEDICARE

## 2021-10-12 VITALS
TEMPERATURE: 98.5 F | DIASTOLIC BLOOD PRESSURE: 70 MMHG | SYSTOLIC BLOOD PRESSURE: 128 MMHG | HEART RATE: 72 BPM | RESPIRATION RATE: 16 BRPM

## 2021-10-12 DIAGNOSIS — M81.6 LOCALIZED OSTEOPOROSIS WITHOUT CURRENT PATHOLOGICAL FRACTURE: Primary | ICD-10-CM

## 2021-10-12 PROCEDURE — 96365 THER/PROPH/DIAG IV INF INIT: CPT | Performed by: FAMILY MEDICINE

## 2021-10-12 RX ORDER — ZOLEDRONIC ACID 5 MG/100ML
5 INJECTION, SOLUTION INTRAVENOUS ONCE
Status: COMPLETED | OUTPATIENT
Start: 2021-10-12 | End: 2021-10-12

## 2021-10-12 RX ADMIN — ZOLEDRONIC ACID 5 MG: 5 INJECTION, SOLUTION INTRAVENOUS at 14:22

## 2021-10-12 NOTE — PROGRESS NOTES
Pt here for Reclast infusion #2, no follow up visit with Dr. Marco Navarro, today. No  Adverse effects from previous infusion,no blood work drawn. Infusion tolerated well. Patient discharged ambulatory.     IV Gauge: #24  IV Site: right A/C  # of Attempts: 1  IV Start: 7968  IV Stop: 8546

## 2021-10-12 NOTE — PATIENT INSTRUCTIONS
Patient Education        zoledronic acid  Pronunciation:  BRIANNA Pham AS id  Brand:  Reclast, Zometa  What is the most important information I should know about zoledronic acid? Do not use if you are pregnant. Use effective birth control, and ask your doctor how long to prevent pregnancy after you stop using zoledronic acid. Zoledronic acid can cause serious kidney problems,  especially if you are dehydrated, if you take diuretic medicine, or if you already have kidney disease. Call your doctor if you urinate less than usual, if you have swelling in your feet or ankles, or if you feel tired or short of breath. Also call your doctor if you have muscle spasms, numbness or tingling (in hands and feet or around the mouth), new or unusual hip pain, or severe pain in your joints, bones, or muscles. What is zoledronic acid? Reclast and Zometa are two different brands of zoledronic acid. Reclast is used to treat or prevent osteoporosis caused by menopause, or steroid use. This medicine also increases bone mass in men with osteoporosis. Reclast is for use when you have a high risk of bone fracture. Reclast is also used to treat Paget's disease of bone. Zometa is used to treat high blood levels of calcium caused by cancer (also called hypercalcemia of malignancy). This medicine also treats multiple myeloma (a type of bone marrow cancer) or bone cancer that has spread from elsewhere in the body. You should not use Reclast and Zometa at the same time. Zoledronic acid may also be used for purposes not listed in this medication guide. What should I discuss with my healthcare provider before receiving zoledronic acid? You should not be treated with zoledronic acid if you are allergic to it. You also should not receive Reclast if you have:  · low levels of calcium in your blood (hypocalcemia); or  · severe kidney disease.   You should not be treated with zoledronic acid if are currently using any other bisphosphonate (such as alendronate, etidronate, ibandronate, pamidronate, risedronate, or tiludronate). Tell your doctor if you have ever had:  · kidney disease;  · hypocalcemia;  · thyroid or parathyroid surgery;  · surgery to remove part of your intestine;  · asthma caused by taking aspirin;  · any condition that makes it hard for your body to absorb nutrients from food (malabsorption);  · a dental problem (you may need a dental exam before you receive zoledronic acid);  · if you are dehydrated; or  · if you take a diuretic or \"water pill\". Zoledronic acid can cause serious kidney problems,  especially if you are dehydrated, if you take diuretic medicine, or if you already have kidney disease. This medicine may cause jaw bone problems (osteonecrosis). The risk is highest in people with cancer, blood cell disorders, pre-existing dental problems, or people treated with steroids, chemotherapy, or radiation. Ask your doctor about your own risk. You may need to have a negative pregnancy test before starting this treatment. Do not use if you are pregnant. This medicine may harm an unborn baby or cause birth defects. Zoledronic acid can remain in your body for weeks or years after your last dose. Use effective birth control to prevent pregnancy while using this medicine. Talk with your doctor about the need to prevent pregnancy after you stop using zoledronic acid. This medicine may affect fertility (ability to have children) in women. However, it is important to use birth control to prevent pregnancy because zoledronic acid can harm an unborn baby. You should not breastfeed while using zoledronic acid. How is zoledronic acid given? Zoledronic acid is given as an infusion into a vein. A healthcare provider will give you this injection. Zoledronic acid is sometimes given as a single dose only one time. It may also be given once every 1 or 2 years.  How often you receive zoledronic acid will depend on why you are using this medicine. Follow your doctor's instructions. Drink at least 2 glasses of water within a few hours before your injection to keep from getting dehydrated. You will need frequent medical tests. Pay special attention to your dental hygiene while using zoledronic acid. Brush and floss your teeth regularly. If you need to have any dental work (especially surgery), tell the dentist ahead of time that you are using zoledronic acid. Zoledronic acid is only part of a complete program of treatment that may also include diet changes and taking calcium and vitamin supplements. Follow your doctor's instructions very closely. Your doctor will determine how long to treat you with this medicine. Zoledronic acid is often given for only 3 to 5 years. What happens if I miss a dose? Call your doctor for instructions if you miss an appointment for your zoledronic acid injection. What happens if I overdose? Seek emergency medical attention or call the [employer] Help line at [phone]. What should I avoid while receiving zoledronic acid? Avoid smoking, or try to quit. Smoking can reduce your bone mineral density, making fractures more likely. Avoid drinking large amounts of alcohol. Heavy drinking can also cause bone loss. What are the possible side effects of zoledronic acid? Get emergency medical help if you have signs of an allergic reaction: hives; wheezing, chest tightness, trouble breathing; swelling of your face, lips, tongue, or throat.   Call your doctor at once if you have:  · new or unusual pain in your thigh or hip;  · jaw pain or numbness, red or swollen gums, loose teeth, or slow healing after dental work;  · severe joint, bone, or muscle pain;  · kidney problems --little or no urination, swelling in your feet or ankles, feeling tired;  · low red blood cells (anemia) --pale skin, unusual tiredness, feeling light-headed or short of breath, cold hands and feet; or  · low calcium levels --muscle spasms or contractions, numbness or tingly feeling (around your mouth, or in your fingers and toes). Serious side effects on the kidneys may be more likely in older adults. Common side effects may include:  · trouble breathing;  · nausea, vomiting, diarrhea, constipation;  · bone pain, muscle or joint pain;  · fever or other flu symptoms;  · tiredness;  · eye pain or swelling;  · pain in your arms or legs;  · headache; or  · anemia. This is not a complete list of side effects and others may occur. Call your doctor for medical advice about side effects. You may report side effects to FDA at 8-963-DRJ-6864. What other drugs will affect zoledronic acid? Zoledronic acid can harm your kidneys, especially if you also use certain medicines for infections, cancer, osteoporosis, organ transplant rejection, bowel disorders, high blood pressure, or pain or arthritis (including Advil, Motrin, and Aleve). Other drugs may affect zoledronic acid, including prescription and over-the-counter medicines, vitamins, and herbal products. Tell your doctor about all your current medicines and any medicine you start or stop using. Where can I get more information? Your doctor or pharmacist can provide more information about zoledronic acid. Remember, keep this and all other medicines out of the reach of children, never share your medicines with others, and use this medication only for the indication prescribed. Every effort has been made to ensure that the information provided by [address] is accurate, up-to-date, and complete, but no guarantee is made to that effect. Drug information contained herein may be time sensitive. [facility] information has been compiled for use by healthcare practitioners and consumers in the [place] and therefore [employer] does not warrant that uses outside of the [place] are appropriate, unless specifically indicated otherwise.  [facility]'s drug information does not endorse drugs,

## 2021-10-22 ENCOUNTER — TELEPHONE (OUTPATIENT)
Dept: INTERNAL MEDICINE CLINIC | Age: 76
End: 2021-10-22

## 2021-10-22 DIAGNOSIS — M62.830 BACK SPASM: Primary | ICD-10-CM

## 2021-10-22 RX ORDER — TIZANIDINE 2 MG/1
2 TABLET ORAL EVERY 6 HOURS PRN
Qty: 30 TABLET | Refills: 0 | Status: SHIPPED | OUTPATIENT
Start: 2021-10-22 | End: 2022-06-13 | Stop reason: ALTCHOICE

## 2021-10-22 NOTE — TELEPHONE ENCOUNTER
Patient states she must have pulled something in her back while sleeping last night. She states she has back pain on right side at waist line. Patient is requesting for Dr Sujatha Pineda to send something over to [address] [facility].

## 2021-11-30 ENCOUNTER — TELEPHONE (OUTPATIENT)
Dept: INTERNAL MEDICINE CLINIC | Age: 76
End: 2021-11-30

## 2021-11-30 DIAGNOSIS — R29.898 WEAKNESS OF BOTH LOWER EXTREMITIES: ICD-10-CM

## 2021-11-30 DIAGNOSIS — M41.9 ACQUIRED SCOLIOSIS: Primary | ICD-10-CM

## 2021-11-30 NOTE — TELEPHONE ENCOUNTER
Patient is requesting for Dr Abad Suazo to give her a few names for a new neurologist.  She states she had an appointment with a practitioner at [address] [place] [address] [address] that did not go well and she does not want to go back to them. Patient states her knees shake when she walks and her orthopedic physician thinks that could be related to her back. She said she would like 2 or 3 names to check out. She said we can call her or email her at [email or web address].

## 2021-11-30 NOTE — TELEPHONE ENCOUNTER
It almost sounds like maybe she needs to see physical medicine and rehab more than neurologist if it is for leg issues from your back. .    If she wants neurologist for seizure issue, Dr. Betty Ratliff [facility]) is with [facility], otherwise I suggest [facility]:  [employer]

## 2021-12-03 NOTE — TELEPHONE ENCOUNTER
I would actually like to refer you to a very experienced orthopedic back physician assistant who works with the [address] [place] [address]. She can do a thorough evaluation and see if you need to see one of the physicians. Treatment options:  Therapy, injections or see back surgeon. I don't think a neurologist is the primary place to have you evaluated for these symptoms. [address] [facility], [place]    I pended the order to send if she is OK with this.

## 2021-12-14 ENCOUNTER — TELEPHONE (OUTPATIENT)
Dept: ORTHOPEDIC SURGERY | Age: 76
End: 2021-12-14

## 2021-12-14 NOTE — TELEPHONE ENCOUNTER
General Question     Subject: EMG  Patient and /or Facility Request: Jame Esquivel  Contact Number: [phone]    DAUGHTER CALLING REGARDING MOTHER HAS AN APPOINTMENT ON  Wednesday December 15, 2021. DAUGHTER STATED THAT [facility] WILL NOT RELEASE THE EMG RESULTS  UNLESS THE REQUEST IS SENT BY [facility]. THE DOCTOR THAT DID THE EMG WAS DONE BY DR. FUNK ON October 27, 2021 AT [facility]. THE FAX NUMBERS ARE: [phone], BACKUP FAX NUMBER: [phone]  AND THE LAST FAX NUMBER: [phone]. PLEASE CALL BACK DAUGHTER,JADEN IF YOU NEED TO SPEAK TO HER AT THE ABOVE NUMBER.

## 2021-12-15 ENCOUNTER — OFFICE VISIT (OUTPATIENT)
Dept: ORTHOPEDIC SURGERY | Age: 76
End: 2021-12-15
Payer: MEDICARE

## 2021-12-15 VITALS — HEIGHT: 67 IN | BODY MASS INDEX: 33.59 KG/M2 | WEIGHT: 214 LBS

## 2021-12-15 DIAGNOSIS — M51.36 DDD (DEGENERATIVE DISC DISEASE), LUMBAR: Primary | ICD-10-CM

## 2021-12-15 DIAGNOSIS — R29.898 LEFT LEG WEAKNESS: ICD-10-CM

## 2021-12-15 DIAGNOSIS — S22.080A CLOSED WEDGE COMPRESSION FRACTURE OF T11 VERTEBRA, INITIAL ENCOUNTER (HCC): ICD-10-CM

## 2021-12-15 DIAGNOSIS — S32.030A CLOSED WEDGE COMPRESSION FRACTURE OF L3 VERTEBRA, INITIAL ENCOUNTER (HCC): ICD-10-CM

## 2021-12-15 PROCEDURE — G8484 FLU IMMUNIZE NO ADMIN: HCPCS | Performed by: PHYSICIAN ASSISTANT

## 2021-12-15 PROCEDURE — 1036F TOBACCO NON-USER: CPT | Performed by: PHYSICIAN ASSISTANT

## 2021-12-15 PROCEDURE — 1090F PRES/ABSN URINE INCON ASSESS: CPT | Performed by: PHYSICIAN ASSISTANT

## 2021-12-15 PROCEDURE — 99204 OFFICE O/P NEW MOD 45 MIN: CPT | Performed by: PHYSICIAN ASSISTANT

## 2021-12-15 PROCEDURE — G8417 CALC BMI ABV UP PARAM F/U: HCPCS | Performed by: PHYSICIAN ASSISTANT

## 2021-12-15 PROCEDURE — 4040F PNEUMOC VAC/ADMIN/RCVD: CPT | Performed by: PHYSICIAN ASSISTANT

## 2021-12-15 PROCEDURE — G8399 PT W/DXA RESULTS DOCUMENT: HCPCS | Performed by: PHYSICIAN ASSISTANT

## 2021-12-15 PROCEDURE — G8427 DOCREV CUR MEDS BY ELIG CLIN: HCPCS | Performed by: PHYSICIAN ASSISTANT

## 2021-12-15 PROCEDURE — 1123F ACP DISCUSS/DSCN MKR DOCD: CPT | Performed by: PHYSICIAN ASSISTANT

## 2021-12-15 NOTE — PROGRESS NOTES
New Patient: Jose Carlos Hawk    12/15/2021     CHIEF COMPLAINT:    Chief Complaint   Patient presents with    New Patient     NEW SPINE/SCOLIOSIS REF BY DR. Doshi Both       HISTORY OF PRESENT ILLNESS:              The patient is a 68 y.o. 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 Her predominant complaint is left knee \"shaking\" which has been ongoing since January 2021. Periodically she will also experience some posterior left knee pain. She reports left leg weakness. At times she will have some low back discomfort. Her symptoms are increased with walking, standing. Relief with sitting and resting. Conservative care includes physical therapy. She had a recent knee/hip evaluation with Dr. Baljinder Mccollum. She did recently undergo a left lower extremity EMG. She has been through physical therapy. She currently denies any upper extremity radiating symptoms. She denies any fine motor difficulty. No cervicothoracic pain. She does report some gait instability. She denies any recent bowel or bladder dysfunction or saddle anesthesia. She denies any recent injury or trauma. She denies any recent fevers chills or infections. She has a history of a left hip hemiarthroplasty for a femoral neck fracture years prior. She had a recent evaluation with Dr. Baljinder Mccollum whom recommended neurology consultation. The pain assessment was noted & reviewed in the medical record today.      Work Status/Functionality: Retired    Past Medical History: Medical history form was reviewed today & scanned into the media tab  Past Medical History:   Diagnosis Date    Brain tumor ([facility] [address]     benign in childhood    Closed fracture of neck of femur with routine healing 01/17/2017    LEFT    Complex partial seizure disorder ([address]     Don    High frequency hearing loss     Hypertension     Macular to time, place and person. · Mood & Affect:The patient's mood and affect are appropriate   · Lymphatic: The lymphatic examination bilaterally reveals all areas to be without enlargement or induration  · Sensation: Sensation is intact without deficit    CERVICAL EXAMINATION:  · Inspection: Local inspection shows no step-off or bruising. Cervical alignment is normal.     · Range of Motion: Intact flexion and extension without provocation of lower extremity symptoms  · Strength: 5/5 bilateral upper extremities   · Special Tests:    ·   Spurling's, L'Hermitte's & Castellanos's negative bilaterally. .      · Skin:There are no rashes, ulcerations or lesions in right & left upper extremities. · Reflexes: Bilaterally triceps, biceps and brachioradialis are 1-2+. Clonus absent bilaterally at the feet. LUMBAR/SACRAL EXAMINATION:  · Inspection: Local inspection shows no step-off or bruising. Kyphoscoliosis  · Palpation:   No evidence of tenderness at the midline. · Range of Motion: Mild loss of flexion mild to moderate loss extension without provocation of lower extremity symptoms  · Strength:   Strength testing is 5/5 in all muscle groups tested. · Special Tests:   Straight leg raise and crossed SLR negative. Leg length and pelvis level.  0 out of 5 Dwight's signs. · Skin: There are no rashes, ulcerations or lesions. · Reflexes: Reflexes are symmetrically 1+ at the patellar and ankle tendons. Clonus absent bilaterally at the feet. · Gait & station: Visibly antalgic with cane  · Additional Examinations:   · RIGHT LOWER EXTREMITY: Inspection/examination of the right lower extremity does not show any tenderness, deformity or injury. Range of motion is full. There is no gross instability. There are no rashes, ulcerations or lesions. Strength and tone are normal.  ·   · LEFT LOWER EXTREMITY:  Inspection/examination of the left lower extremity does not show any tenderness, deformity or injury.  There is no gross instability. There are no rashes, ulcerations or lesions. Strength and tone are normal.    Diagnostic Testin views lumbar spine 12/15/2021 L3 superior endplate compression fracture, T11 anterior compression fracture. Trilevel DDD L3-S1 with facet arthropathy. Left lower extremity EMG 2021 essentially normal EMG of left lower extremity. No evidence to support focal neuropathy acute or chronic radiculopathy, plexopathy, myopathy or large fiber peripheral neuropathy    CT head   IMPRESSION:           1.  Right parietal scalp hematoma without CT evidence of acute intracranial abnormality. 2.  Chronic left cerebellar and occipital bone changes. CT cervical spine 2017 films report independently reviewed no acute abnormality. Chronic degenerative changes C4-C7 with moderate central stenosis    Labs reviewed 2021    [facility] notes reviewed from 2021        Impression:  1) Chronic left LE weakness, tremors and gait instability   2) Chronic intermittent LBP w/ underlying left lumbar radiculitis  3) L3 & T11 compression fractures--detailed above  4) Left knee eval and EMG--[facility]  5) H/o brain tumor resection w/plate, seizure d/o  6) Osteoporosis       Plan:   1) 2 views lumbar spine as above were discussed today in detail. We also reviewed her left lower extremity EMG. 2) Lumbar CT WO assess to T11--- she is unable to have MRI  3) Declining LSO   4) Referral to neurology for evaluation. We discussed her left lower extremity tremors are not clearly spine related. She is not myelopathic on today's examination. She does have a history of brain tumor resection.   5) F/u to review CT           Thang Hudson PA-C, MPAS  Board Certified by the [facility]

## 2021-12-15 NOTE — TELEPHONE ENCOUNTER
Called & left a voicemail for the patient informing her that I was trying to get her EMG results from [facility], I have not gotten the EMG results yet. I will try and get them before her appointment, if she has any questions or concerns she can call [phone].

## 2021-12-21 ENCOUNTER — HOSPITAL ENCOUNTER (OUTPATIENT)
Dept: CT IMAGING | Age: 76
Discharge: HOME | End: 2021-12-21
Payer: MEDICARE

## 2021-12-21 DIAGNOSIS — R29.898 LEFT LEG WEAKNESS: ICD-10-CM

## 2021-12-21 DIAGNOSIS — M51.36 DDD (DEGENERATIVE DISC DISEASE), LUMBAR: ICD-10-CM

## 2021-12-21 DIAGNOSIS — S22.080A CLOSED WEDGE COMPRESSION FRACTURE OF T11 VERTEBRA, INITIAL ENCOUNTER (HCC): ICD-10-CM

## 2021-12-21 DIAGNOSIS — S32.030A CLOSED WEDGE COMPRESSION FRACTURE OF L3 VERTEBRA, INITIAL ENCOUNTER (HCC): ICD-10-CM

## 2021-12-21 PROCEDURE — 72131 CT LUMBAR SPINE W/O DYE: CPT

## 2022-01-03 ENCOUNTER — TELEPHONE (OUTPATIENT)
Dept: ORTHOPEDIC SURGERY | Age: 77
End: 2022-01-03

## 2022-01-03 NOTE — TELEPHONE ENCOUNTER
Called and spoke with the patient, informing her that I was returning her voicemail call. I informed her I could get her scheduled for an appointment to follow up with Amy Laguerre PA-C. Patient was unable to schedule over the phone at this time, she will call back to schedule an appointment.

## 2022-01-18 ENCOUNTER — TELEPHONE (OUTPATIENT)
Dept: ORTHOPEDIC SURGERY | Age: 77
End: 2022-01-18

## 2022-01-18 NOTE — TELEPHONE ENCOUNTER
Called and spoke with the patient in regards to her missing her appointment yesterday. Patient states she was unable to get out of her driveway and she was afraid to go out in the weather. Patient wanted to reschedule the appointment. Patient is rescheduled for this coming Wednesday at 3:30PM at our [facility] office.

## 2022-01-24 ENCOUNTER — OFFICE VISIT (OUTPATIENT)
Dept: ORTHOPEDIC SURGERY | Age: 77
End: 2022-01-24
Payer: MEDICARE

## 2022-01-24 VITALS — HEIGHT: 67 IN | BODY MASS INDEX: 33.59 KG/M2 | WEIGHT: 214 LBS

## 2022-01-24 DIAGNOSIS — M48.061 SPINAL STENOSIS OF LUMBAR REGION, UNSPECIFIED WHETHER NEUROGENIC CLAUDICATION PRESENT: ICD-10-CM

## 2022-01-24 DIAGNOSIS — R29.898 LEFT LEG WEAKNESS: ICD-10-CM

## 2022-01-24 DIAGNOSIS — M51.36 DDD (DEGENERATIVE DISC DISEASE), LUMBAR: Primary | ICD-10-CM

## 2022-01-24 PROCEDURE — G8399 PT W/DXA RESULTS DOCUMENT: HCPCS | Performed by: PHYSICIAN ASSISTANT

## 2022-01-24 PROCEDURE — G8484 FLU IMMUNIZE NO ADMIN: HCPCS | Performed by: PHYSICIAN ASSISTANT

## 2022-01-24 PROCEDURE — 1036F TOBACCO NON-USER: CPT | Performed by: PHYSICIAN ASSISTANT

## 2022-01-24 PROCEDURE — 4040F PNEUMOC VAC/ADMIN/RCVD: CPT | Performed by: PHYSICIAN ASSISTANT

## 2022-01-24 PROCEDURE — G8417 CALC BMI ABV UP PARAM F/U: HCPCS | Performed by: PHYSICIAN ASSISTANT

## 2022-01-24 PROCEDURE — 99214 OFFICE O/P EST MOD 30 MIN: CPT | Performed by: PHYSICIAN ASSISTANT

## 2022-01-24 PROCEDURE — G8427 DOCREV CUR MEDS BY ELIG CLIN: HCPCS | Performed by: PHYSICIAN ASSISTANT

## 2022-01-24 PROCEDURE — 1090F PRES/ABSN URINE INCON ASSESS: CPT | Performed by: PHYSICIAN ASSISTANT

## 2022-01-24 PROCEDURE — 1123F ACP DISCUSS/DSCN MKR DOCD: CPT | Performed by: PHYSICIAN ASSISTANT

## 2022-01-24 NOTE — PROGRESS NOTES
 Hypertension     Macular degeneration (senile) of retina, unspecified     Major depression in full remission ([facility] [address] 2017    Osteoporosis     osteopenia -2.2 but hip fracture after fall onto floor at home.     Pure hypercholesterolemia     Restless legs syndrome     Seasonal affective disorder (HCC)     Secondary hyperparathyroidism, non-renal ([facility] [address] 2008    due to low vitamin D    Spondylolisthesis     Tinnitus     Vitamin D deficiency       Past Surgical History:     Past Surgical History:   Procedure Laterality Date    BRAIN SURGERY      1949 or 46 for tumor; metal plate skull     SECTION      1971    HIP FRACTURE SURGERY Left 2017    LEFT BIPOLAR HIP HEMIARTHROPLASTY          Current Medications:     Current Outpatient Medications:     tiZANidine (ZANAFLEX) 2 MG tablet, Take 1 tablet by mouth every 6 hours as needed (back spasms or pain.), Disp: 30 tablet, Rfl: 0    rOPINIRole (REQUIP) 0.5 MG tablet, TAKE 2-3 TABLETS BY MOUTH AT BEDTIME OR IN EVENING FOR RESTLESS LEGS, Disp: 270 tablet, Rfl: 1    furosemide (LASIX) 20 MG tablet, TAKE 1 OR 2 TABS DAILY AT LUNCH OR IN THE AFTERNOON UNTIL SWELLING DOWN., Disp: 180 tablet, Rfl: 1    rosuvastatin (CRESTOR) 10 MG tablet, TAKE 1 TABLET DAILY, Disp: 90 tablet, Rfl: 3    famotidine (PEPCID) 20 MG tablet, Take 1 tablet by mouth nightly, Disp: 90 tablet, Rfl: 3    vitamin D (ERGOCALCIFEROL) 1.25 MG (57281 UT) CAPS capsule, TAKE ONE CAPSULE BY MOUTH WEEKLY, Disp: 12 capsule, Rfl: 3    omeprazole (PRILOSEC) 20 MG delayed release capsule, Take 1 capsule by mouth Daily In AM before first bite of food, Disp: 90 capsule, Rfl: 3    Coenzyme Q10 (CO Q 10) 10 MG CAPS, Take by mouth, Disp: , Rfl:     valsartan-hydroCHLOROthiazide (DIOVAN-HCT) 160-12.5 MG per tablet, TAKE 1 TABLET BY MOUTH EVERY DAY, Disp: 90 tablet, Rfl: 3    zoledronic acid (RECLAST) 5 MG/100ML SOLN, Infuse 100 mLs intravenously once for 1 dose Per year, Disp: 100 mL, Rfl: 0    Biotin 5000 MCG CAPS, Take 5,000 mcg by mouth, Disp: , Rfl:     aspirin 81 MG tablet, Take 81 mg by mouth daily, Disp: , Rfl:     divalproex (DEPAKOTE ER) 500 MG ER tablet, Take 1 tablet by mouth daily Take 1 tablet by mouth  daily, Disp: 90 tablet, Rfl: 1    Multiple Vitamins-Minerals (ICAPS) CAPS, Take  by mouth daily. (Patient not taking: Reported on 10/12/2021), Disp: , Rfl:   Allergies:  Keppra, Indocin [indomethacin], Lovastatin, Other, and Topamax  Social History:    reports that she quit smoking about 56 years ago. Her smoking use included cigarettes. She has a 0.30 pack-year smoking history. She has never used smokeless tobacco. She reports current alcohol use of about 5.0 standard drinks of alcohol per week. She reports that she does not use drugs. Family History:   Family History   Problem Relation Age of Onset    High Blood Pressure Mother     Diabetes Father     Cancer Sister     High Blood Pressure Sister     Alzheimer's Disease Sister     High Blood Pressure Brother     High Blood Pressure Sister     Urolithiasis Son    Thomas Guerra Other Son         cervical disc disease. Hydrocephalus. REVIEW OF SYSTEMS: Full ROS reviewed & scanned into chart  CONSTITUTIONAL: Denies unexplained weight loss, fevers   SKIN: Denies active skin conditions       PHYSICAL EXAM:    Vitals: Height 5' 7\" (1.702 m), weight 214 lb (97.1 kg). 3/10    GENERAL EXAM:  · General Apparence: Patient is adequately groomed with no evidence of malnutrition. · Orientation: The patient is oriented to time, place and person. · Mood & Affect:The patient's mood and affect are appropriate   · Lymphatic: The lymphatic examination bilaterally reveals all areas to be without enlargement or induration  · Sensation: Sensation is intact without deficit    LUMBAR/SACRAL EXAMINATION:  · Inspection: Local inspection shows no step-off or bruising. Kyphoscoliosis  · Palpation:   No evidence of tenderness at the midline. · Range of Motion: Able to sit forward flexed  · Strength:   Strength testing is 5/5 in all muscle groups tested. · Special Tests:   Straight leg raise and crossed SLR negative. Leg length and pelvis level.  0 out of 5 Dwight's signs. · Skin: There are no rashes, ulcerations or lesions. · Reflexes: Reflexes are symmetrically 1+ at the patellar and ankle tendons. Clonus absent bilaterally at the feet. · Gait & station: Visibly antalgic, slow shuffling gait  · Additional Examinations:   · RIGHT LOWER EXTREMITY: Inspection/examination of the right lower extremity does not show any tenderness, deformity or injury. Range of motion is full. There is no gross instability. There are no rashes, ulcerations or lesions. Strength and tone are normal.  ·   · LEFT LOWER EXTREMITY:  Inspection/examination of the left lower extremity does not show any tenderness, deformity or injury. There is no gross instability. There are no rashes, ulcerations or lesions. Strength and tone are normal.    Diagnostic Testing:    Lumbar CT scan report independently reviewed from December 2021 showing L4-5 spondylolisthesis with moderate central stenosis, multilevel facet arthropathy, chronic L3 superior endplate compression deformity. 2020 DEXA scan shows osteoporosis    2 views lumbar spine 12/15/2021 L3 superior endplate compression fracture, T11 anterior compression fracture. Trilevel DDD L3-S1 with facet arthropathy. Left lower extremity EMG October 2021 essentially normal EMG of left lower extremity. No evidence to support focal neuropathy acute or chronic radiculopathy, plexopathy, myopathy or large fiber peripheral neuropathy    CT head 2019  IMPRESSION:           1.  Right parietal scalp hematoma without CT evidence of acute intracranial abnormality. 2.  Chronic left cerebellar and occipital bone changes. CT cervical spine 2017 films report independently reviewed no acute abnormality.   Chronic degenerative changes C4-C7 with moderate central stenosis    Labs reviewed September 2021    [facility] notes reviewed from November 2021        Impression:  1) Chronic left LE weakness, tremors and gait instability   2) Chronic intermittent LBP w/ underlying left lumbar radiculitis, L4-5 mod CS  3) L3 & T11 compression fractures, h/o osteoporosis  4) Left knee eval and EMG--[place]  5) H/o brain tumor resection w/plate, seizure d/o  6) Osteoporosis   7) Cervical spondylosis/DDD w/mod CS      Plan:   1) We reviewed her lumbar CT scan today in detail. I still recommend neurology consultation given her predominant complaint of left lower extremity weakness with tremors     2) We discussed left L4-5 LESI #1 for low back and underlying radiating left leg pain. Procedure risk and benefits were discussed. Pamphlet provided for more information. She may call if wishing to proceed. She understands this would only alleviate symptoms originating from her lumbar spine.     3) ED if any new or progressive sx           Yeimi Pope PA-C, MPAS  Board Certified by the [facility] [address]

## 2022-02-08 ENCOUNTER — TELEPHONE (OUTPATIENT)
Dept: ORTHOPEDIC SURGERY | Age: 77
End: 2022-02-08

## 2022-02-08 NOTE — TELEPHONE ENCOUNTER
Called & spoke with the patient informing I was calling her after listening to her voicemail. Patient informed me that she got everything taken care of and settled. Patient may call back with any further questions or concerns.

## 2022-03-02 ENCOUNTER — HOSPITAL ENCOUNTER (OUTPATIENT)
Dept: PHYSICAL THERAPY | Age: 77
Setting detail: THERAPIES SERIES
Discharge: HOME | End: 2022-03-02
Payer: MEDICARE

## 2022-03-02 PROCEDURE — 97161 PT EVAL LOW COMPLEX 20 MIN: CPT

## 2022-03-02 NOTE — FLOWSHEET NOTE
[facility]  [address]  Phone: [phone] Fax: [phone]    Physical Therapy Treatment Note/ Progress Report:     Date:  3/2/2022    Patient Name:  Orly Galloway    :  1945  MRN: [id number]  Restrictions/Precautions:    Medical/Treatment Diagnosis Information:  · Diagnosis: Weakness of left lower extremity  · Treatment Diagnosis: W21.234  Insurance/Certification information:  PT Insurance Information: [facility]  Physician Information:  Referring Practitioner: Taty Myers MD  Plan of care signed (Y/N):     Date of Patient follow up with Physician:      Progress Report: []  Yes  [x]  No     Date Range for reporting period:  Beginning:  3/2/22  Ending:  NA    Progress report due (10 Rx/or 30 days whichever is less): 1/3/11     Recertification due (POC duration/ or 90 days whichever is less): 22     Visit # Insurance Allowable Auth Needed   1 Medicare []Yes   []No     Latex Allergy:  [x]NO      []YES  Preferred Language for Healthcare:   [x]English       []other:  Functional Scale: LEFS - 81% impairment   Date assessed:3/2/22    Pain level:  2-3/10     SUBJECTIVE:  See eval    OBJECTIVE: See eval   Observation:    Test measurements:      RESTRICTIONS/PRECAUTIONS: impaired balance, hx of left hip hemiarthroplasty in 2017    Exercises/Interventions:     Therapeutic Ex (76395)   Min: Sets/sec Reps Notes/CUES   Retro Stepper/BIKE      Alter G      BFR      Sportcord March      3 way SLR      SAQ      Clam ABD      Hip Ext Kathlee Sheets      BOSU fwd/side lunge      BOSU squat      Leg Press Iso/Con/Ecc 0-      Cybex HS curl      TKE      Glute side walks      RDL      Slide Lunge      Slide HS eccentrics      Step ups/ecc step down      Swissball wall rolls- in SLS- hip drive      Quad hip ext/wall-ball rolls                  Manual Intervention (56901)  Min:      Knee mobs/PROM      Tib/Fem Mobs      Patella Mobs      Ankle mobs                  NMR re-education (85945)  Min:   CUES NEEDED   Japanese/Biofeedback 10/10      BFR      G. Med activation      Hip Ext full ROM/ G. Activation      Bosu Bal and Prop- G Med      Single leg stance/Balance/Prop      Bosu Retro G. Med act      Prone Hip froggers- sliders/elevated            Therapeutic Activity (11593)  Min:      Ladders      Plyos      Dynamic Balance                            Therapeutic Exercise and NMR EXR  [x] (73811) Provided verbal/tactile cueing for activities related to strengthening, flexibility, endurance, ROM for improvements in LE, proximal hip, and core control with self care, mobility, lifting, ambulation. [x] (74029) Provided verbal/tactile cueing for activities related to improving balance, coordination, kinesthetic sense, posture, motor skill, proprioception  to assist with LE, proximal hip, and core control in self care, mobility, lifting, ambulation and eccentric single leg control.      NMR and Therapeutic Activities:    [x] (87067 or 81913) Provided verbal/tactile cueing for activities related to improving balance, coordination, kinesthetic sense, posture, motor skill, proprioception and motor activation to allow for proper function of core, proximal hip and LE with self care and ADLs and functional mobility.   [] (63693) Gait Re-education- Provided training and instruction to the patient for proper LE, core and proximal hip recruitment and positioning and eccentric body weight control with ambulation re-education including up and down stairs     Home Exercise Program:    [x] (84768) Reviewed/Progressed HEP activities related to strengthening, flexibility, endurance, ROM of core, proximal hip and LE for functional self-care, mobility, lifting and ambulation/stair navigation   [] (40280)Reviewed/Progressed HEP activities related to improving balance, coordination, kinesthetic sense, posture, motor skill, proprioception of core, proximal hip and LE for self care, mobility, lifting, and ambulation/stair navigation      Manual Treatments:  PROM / STM / Oscillations-Mobs:  G-I, II, III, IV (PA's, Inf., Post.)  [] (50769) Provided manual therapy to mobilize LE, proximal hip and/or LS spine soft tissue/joints for the purpose of modulating pain, promoting relaxation,  increasing ROM, reducing/eliminating soft tissue swelling/inflammation/restriction, improving soft tissue extensibility and allowing for proper ROM for normal function with self care, mobility, lifting and ambulation. Modalities:     [] GAME READY (VASO)- for significant edema, swelling, pain control. Charges:  Timed Code Treatment Minutes: 0   Total Treatment Minutes: 40      [x] EVAL (LOW) 99682 (typically 20 minutes face-to-face)  [] EVAL (MOD) 47052 (typically 30 minutes face-to-face)  [] EVAL (HIGH) 63170 (typically 45 minutes face-to-face)  [] RE-EVAL     [] TF(94706) x     [] DRY NEEDLE 1 OR 2 MUSCLES  [] NMR (36825) x     [] DRY NEEDLE 3+ MUSCLES  [] Manual (72934) x       [] TA (08357) x     [] Mech Traction (84274)  [] ES(attended) (61168)     [] ES (un) (28540):   [] VASO (75704)  [] Other:    If [facility] Please Indicate Time In/Out  CPT Code Time in Time out                                   GOALS:  Patient stated goal: reduce hip pain, walk without cane  [] Progressing: [] Met: [] Not Met: [] Adjusted    Therapist goals for Patient:   Short Term Goals: To be achieved in: 2 weeks  1. Independent in HEP and progression per patient tolerance, in order to prevent re-injury. [] Progressing: [] Met: [] Not Met: [] Adjusted  2. Patient will have a decrease in pain to facilitate improvement in movement, function, and ADLs as indicated by Functional Deficits. [] Progressing: [] Met: [] Not Met: [] Adjusted    Long Term Goals: To be achieved in: 6 weeks  1. Disability index score of 65% or less for the LEFS to assist with reaching prior level of function.    [] Progressing: [] Met: [] Not Met: [] Adjusted  2. Patient will demonstrate increased AROM to [facility] to allow for proper joint functioning as indicated by patients Functional Deficits. [] Progressing: [] Met: [] Not Met: [] Adjusted  3. Patient will demonstrate an increase in Strength to good proximal hip strength and control, within 5lb HHD in LE to allow for proper functional mobility as indicated by patients Functional Deficits. [] Progressing: [] Met: [] Not Met: [] Adjusted  4. Patient will return to sleeping, sitting, standing, walking, squatting, stairs functional activities without increased symptoms or restriction. [] Progressing: [] Met: [] Not Met: [] Adjusted    ASSESSMENT:  See eval    Return to Play: (if applicable)   []  Stage 1: Intro to Strength   []  Stage 2: Return to Run and Strength   []  Stage 3: Return to Jump and Strength   []  Stage 4: Dynamic Strength and Agility   []  Stage 5: Sport Specific Training     []  Ready to Return to Play, Meets All Above Stages   []  Not Ready for Return to Sports   Comments:            Treatment/Activity Tolerance:  [x] Patient tolerated treatment well [] Patient limited by fatique  [] Patient limited by pain  [] Patient limited by other medical complications  [] Other:     Overall Progression Towards Functional goals/ Treatment Progress Update:  [] Patient is progressing as expected towards functional goals listed. [] Progression is slowed due to complexities/Impairments listed. [] Progression has been slowed due to co-morbidities.   [x] Plan just implemented, too soon to assess goals progression <30days   [] Goals require adjustment due to lack of progress  [] Patient is not progressing as expected and requires additional follow up with physician  [] Other    Prognosis for POC: [x] Good [] Fair  [] Poor    Patient requires continued skilled intervention: [x] Yes  [] No        PLAN: See eval  [] Continue per plan of care [] Alter current plan (see comments)  [x] Plan of care initiated [] Hold pending MD visit [] Discharge    Electronically signed by: Bette Morales PT    Note: If patient does not return for scheduled/recommended follow up visits, this note will serve as a discharge from care along with the most recent update on progress.

## 2022-03-02 NOTE — PLAN OF CARE
[facility]  Phone: [phone]   Fax:     [phone]                                                       Physical Therapy Certification    Dear Referring Practitioner: Heri Donohue MD,    We had the pleasure of evaluating the following patient for physical therapy services at [address] [place] [address]. A summary of our findings can be found in the initial assessment below. This includes our plan of care. If you have any questions or concerns regarding these findings, please do not hesitate to contact me at the office phone number checked above. Thank you for the referral.       Physician Signature:_______________________________Date:__________________  By signing above (or electronic signature), therapists plan is approved by physician      Patient: Willie Bonilla   : 1945   MRN: [id number]  Referring Physician: Referring Practitioner: Heri Donohue MD      Evaluation Date: 3/2/2022      Medical Diagnosis Information:  Diagnosis: Weakness of left lower extremity   Treatment Diagnosis: R29.898                                         Insurance information: PT Insurance Information: [facility]     Precautions/ Contra-indications: None  Latex Allergy:  [x]NO      []YES  Preferred Language for Healthcare:   [x]English       []other:    C-SSRS Triggered by Intake questionnaire (Past 2 wk assessment ):   [x] No, Questionnaire did not trigger screening.   [] Yes, Patient intake triggered C-SSRS Screening      [] C-SSRS Screening completed  [] PCP notified via Epic     SUBJECTIVE: Patient presents to physical therapy with complaints of LLE weakness. Was treated for several sessions in  but ceased therapy in September due to exhaustion of Medical benefits.  Patient states that since stopping she attempted to do her own workouts at [facility] but this was not feasible because there is an incline up to the front door and she cannot manage inclines well due to fear of falling backward. Patient has tried to work on some exercises at home but does feel that she has lost a bit of the strength that she gained with PT last year. Left leg continues to shake when she gets tired, and she states that none of the physicians she has seen is able to explain this to her. She reports she fatigues quickly. Uses her cane for ambulation. States balance is still poor but she denies having any instances of falling since she was last seen in September 2021. Left hip is becoming achy again as well.      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      Type: []? Constant       [x]? Intermittent  []? Radiating    [x]? Localized    []? other:                Numbness/Tingling: Denies     Occupation/School: Retired      Living Status/Prior Level of Function: Independent with ADLs and IADLs, uses standard cane for ambulation due to LLE weakness and balance issues       OBJECTIVE:             ROM Left Right   Hip Flexion 110 WNL   Hip Abduction WNL WNL   Hip ER 30 WNL   Hip IR WNL WNL   Hip Ext Decreased WNL   Strength  Left Right   Hip Abd 7.9 lb 8.9 lb   Hip Ext NT NT   Knee Ext 38.9 lb 49.2 lb   Knee Flex 35.3 lb  43.8 lb          Reflexes/Sensation:               [x]? Dermatomes/Myotomes intact               [x]? Reflexes equal and normal bilaterally              []?Other:     Joint mobility:               []?Normal                      [x]? Hypo - lumbar spine,                []?Hyper     Palpation: tender to palpation of left IT band, gluteals, proximal HS, soft tissue restrictions noted thoughout     Functional Mobility/Transfers: UE assist for sit to stand     Posture: forward head, rounded shoulders     Bandages/Dressings/Incisions: NA     Gait: (include devices/WB status): antalgic with standard cane, wide ERIC, short step length with bilateral LE ER, decreased hip extension    Orthopedic Special Tests: NA                       [x] Patient history, allergies, meds reviewed. Medical chart reviewed. See intake form. Review Of Systems (ROS):  [x]Performed Review of systems (Integumentary, CardioPulmonary, Neurological) by intake and observation. Intake form has been scanned into medical record. Patient has been instructed to contact their primary care physician regarding ROS issues if not already being addressed at this time. Co-morbidities/Complexities (which will affect course of rehabilitation):   []? None              Arthritic conditions   []? Rheumatoid arthritis (M05.9)  []? Osteoarthritis (M19.91)    Cardiovascular conditions   [x]? Hypertension (I10)  []? Hyperlipidemia (E78.5)  []? Angina pectoris (I20)  []? Atherosclerosis (I70)  []? CVA Musculoskeletal conditions   []? Disc pathology   []? Congenital spine pathologies   []? Prior surgical intervention  [x]? Osteoporosis (M81.8)  [x]? Osteopenia (M85.8)   Endocrine conditions   []? Hypothyroid (E03.9)  []? Hyperthyroid Gastrointestinal conditions   []? Constipation (K38.40)    Metabolic conditions   []? Morbid obesity (E66.01)  []? Diabetes type 1(E10.65) or 2 (E11.65)   []? Neuropathy (G60.9)      Pulmonary conditions   []? Asthma (J45)  []? Coughing   []? COPD (J44.9)    Psychological Disorders  []? Anxiety (F41.9)  []? Depression (F32.9)   []? Other:    [x]? Other:   Cancer         Barriers to/and or personal factors that will affect rehab potential:              []? Age  []? Sex              []?Smoker              []? Motivation/Lack of Motivation                        [x]? Co-Morbidities              []? Cognitive Function, education/learning barriers              []? Environmental, home barriers              []? profession/work barriers  [x]? past PT/medical experience  []? other:  Justification:  left hip hemiarthroplasty in 2017, left hip has never been back to PLOF since. Falls Risk Assessment (30 days):   [] Falls Risk assessed and no intervention required. [x] Falls Risk assessed and Patient requires intervention due to being higher risk   TUG score (>12s at risk):     [x] Falls education provided, including use of cane, etc.         ASSESSMENT: Patient is a 59-year-old female who presents to physical therapy with general LLE weakness, with unexplained shaking/tremors present in LLE that increase with muscle fatigue. Patient presents with impaired balance and abnormal gait. Left hip dysfunction present following hx of left hip replacement in 2017.      Functional Impairments:     []Noted lumbar/proximal hip/LE hypomobility   [x]Decreased LE functional ROM   [x]Decreased core/proximal hip strength and neuromuscular control   [x]Decreased LE functional strength   [x]Reduced balance/proprioceptive control   []other:      Functional Activity Limitations (from functional questionnaire and intake)   [x]Reduced ability to tolerate prolonged functional positions   [x]Reduced ability or difficulty with changes of positions or transfers between positions   [x]Reduced ability to maintain good posture and demonstrate good body mechanics with sitting, bending, and lifting   [x]Reduced ability to sleep   [x] Reduced ability or tolerance with driving and/or computer work   [x]Reduced ability to perform lifting, carrying tasks   [x]Reduced ability to squat   [x]Reduced ability to forward bend   [x]Reduced ability to ambulate prolonged functional periods/distances/surfaces   [x]Reduced ability to ascend/descend stairs   []Reduced ability to run, hop or jump   []other:     Participation Restrictions   []Reduced participation in self care activities   [x]Reduced participation in home management activities   []Reduced participation in work activities   [x]Reduced participation in social activities. []Reduced participation in sport activities. Classification :    []Signs/symptoms consistent with post-surgical status including decreased ROM, strength and function. []Signs/symptoms consistent with joint sprain/strain   []Signs/symptoms consistent with patella-femoral syndrome   []Signs/symptoms consistent with knee OA/hip OA   []Signs/symptoms consistent with internal derangement of knee/Hip   [x]Signs/symptoms consistent with functional hip weakness/NMR control      []Signs/symptoms consistent with tendinitis/tendinosis    []signs/symptoms consistent with pathology which may benefit from Dry needling      []other:      Prognosis/Rehab Potential:      []Excellent   [x]Good    []Fair   []Poor    Tolerance of evaluation/treatment:    []Excellent   [x]Good    []Fair   []Poor    Physical Therapy Evaluation Complexity Justification  [x] A history of present problem with:  [] no personal factors and/or comorbidities that impact the plan of care;  [x]1-2 personal factors and/or comorbidities that impact the plan of care  []3 personal factors and/or comorbidities that impact the plan of care  [x] An examination of body systems using standardized tests and measures addressing any of the following: body structures and functions (impairments), activity limitations, and/or participation restrictions;:  [x] a total of 1-2 or more elements   [] a total of 3 or more elements   [] a total of 4 or more elements   [x] A clinical presentation with:  [x] stable and/or uncomplicated characteristics   [] evolving clinical presentation with changing characteristics  [] unstable and unpredictable characteristics;   [x] Clinical decision making of [x] low, [] moderate, [] high complexity using standardized patient assessment instrument and/or measurable assessment of functional outcome.     [x] EVAL (LOW) 57981 (typically 20 minutes face-to-face)  [] EVAL (MOD) 97553 (typically 30 minutes face-to-face)  [] EVAL (HIGH) 60712 (typically 45 minutes face-to-face)  [] RE-EVAL     PLAN:  Frequency/Duration:  1-2 days per week for 6 Weeks:  Interventions:  [x]  Therapeutic exercise including: strength training, ROM, for Lower extremity and core   [x]  NMR activation and proprioception for LE, Glutes and Core   [x]  Manual therapy as indicated for LE, Hip and spine to include: Dry Needling/IASTM, STM, PROM, Gr I-IV mobilizations, manipulation. [x] Modalities as needed that may include: thermal agents, E-stim, Biofeedback, US, iontophoresis as indicated  [x] Patient education on joint protection, postural re-education, activity modification, progression of HEP. HEP instruction: (see scanned forms)    GOALS:  Patient stated goal: reduce hip pain, walk without cane  [] Progressing: [] Met: [] Not Met: [] Adjusted    Therapist goals for Patient:   Short Term Goals: To be achieved in: 2 weeks  1. Independent in HEP and progression per patient tolerance, in order to prevent re-injury. [] Progressing: [] Met: [] Not Met: [] Adjusted  2. Patient will have a decrease in pain to facilitate improvement in movement, function, and ADLs as indicated by Functional Deficits. [] Progressing: [] Met: [] Not Met: [] Adjusted    Long Term Goals: To be achieved in: 6 weeks  1. Disability index score of 65% or less for the LEFS to assist with reaching prior level of function. [] Progressing: [] Met: [] Not Met: [] Adjusted  2. Patient will demonstrate increased AROM to [facility] to allow for proper joint functioning as indicated by patients Functional Deficits. [] Progressing: [] Met: [] Not Met: [] Adjusted  3. Patient will demonstrate an increase in Strength to good proximal hip strength and control, within 5lb HHD in LE to allow for proper functional mobility as indicated by patients Functional Deficits.    [] Progressing: [] Met: [] Not Met: [] Adjusted  4. Patient will return to sleeping, sitting, standing, walking, squatting, stairs functional activities without increased symptoms or restriction.    [] Progressing: [] Met: [] Not Met: [] Adjusted       Electronically signed by:  Alanis Mendoza, PT

## 2022-03-07 ENCOUNTER — HOSPITAL ENCOUNTER (OUTPATIENT)
Dept: PHYSICAL THERAPY | Age: 77
Setting detail: THERAPIES SERIES
Discharge: HOME | End: 2022-03-07
Payer: MEDICARE

## 2022-03-07 NOTE — FLOWSHEET NOTE
[facility]    Physical Therapy  Cancellation/No-show Note  Patient Name:  Olivia Cardenas  :  1945   Date:  3/7/2022  Cancelled visits to date: 1  No-shows to date: 0    For today's appointment patient:  [x]  Cancelled  []  Rescheduled appointment  []  No-show     Reason given by patient:  []  Patient ill  [x]  Conflicting appointment  []  No transportation    []  Conflict with work  []  No reason given  []  Other:     Comments:       Electronically signed by:  Noemi Maldonado, PT

## 2022-03-11 ENCOUNTER — HOSPITAL ENCOUNTER (OUTPATIENT)
Dept: PHYSICAL THERAPY | Age: 77
Setting detail: THERAPIES SERIES
Discharge: HOME | End: 2022-03-11
Payer: MEDICARE

## 2022-03-11 PROCEDURE — 97110 THERAPEUTIC EXERCISES: CPT

## 2022-03-14 ENCOUNTER — APPOINTMENT (OUTPATIENT)
Dept: PHYSICAL THERAPY | Age: 77
End: 2022-03-14
Payer: MEDICARE

## 2022-03-16 ENCOUNTER — OFFICE VISIT (OUTPATIENT)
Dept: INTERNAL MEDICINE CLINIC | Age: 77
End: 2022-03-16
Payer: MEDICARE

## 2022-03-16 VITALS
WEIGHT: 215 LBS | BODY MASS INDEX: 33.74 KG/M2 | HEIGHT: 67 IN | SYSTOLIC BLOOD PRESSURE: 130 MMHG | DIASTOLIC BLOOD PRESSURE: 84 MMHG | HEART RATE: 68 BPM

## 2022-03-16 DIAGNOSIS — D18.02 VENOUS ANGIOMA OF BRAIN (HCC): ICD-10-CM

## 2022-03-16 DIAGNOSIS — G40.109 LOCALIZATION-RELATED FOCAL EPILEPSY WITH SIMPLE PARTIAL SEIZURES (HCC): ICD-10-CM

## 2022-03-16 DIAGNOSIS — R60.0 BILATERAL LEG EDEMA: ICD-10-CM

## 2022-03-16 DIAGNOSIS — G25.81 RESTLESS LEGS SYNDROME: Primary | ICD-10-CM

## 2022-03-16 DIAGNOSIS — R26.89 POOR BALANCE: ICD-10-CM

## 2022-03-16 DIAGNOSIS — F33.8 SEASONAL AFFECTIVE DISORDER (HCC): ICD-10-CM

## 2022-03-16 DIAGNOSIS — R26.9 ABNORMAL GAIT DUE TO MUSCLE WEAKNESS: ICD-10-CM

## 2022-03-16 DIAGNOSIS — M62.81 ABNORMAL GAIT DUE TO MUSCLE WEAKNESS: ICD-10-CM

## 2022-03-16 DIAGNOSIS — M81.6 LOCALIZED OSTEOPOROSIS WITHOUT CURRENT PATHOLOGICAL FRACTURE: ICD-10-CM

## 2022-03-16 DIAGNOSIS — I10 ESSENTIAL HYPERTENSION: ICD-10-CM

## 2022-03-16 PROCEDURE — 99214 OFFICE O/P EST MOD 30 MIN: CPT | Performed by: FAMILY MEDICINE

## 2022-03-16 RX ORDER — FUROSEMIDE 20 MG/1
TABLET ORAL
Qty: 180 TABLET | Refills: 1 | Status: SHIPPED | OUTPATIENT
Start: 2022-03-16

## 2022-03-16 RX ORDER — VALSARTAN AND HYDROCHLOROTHIAZIDE 160; 12.5 MG/1; MG/1
TABLET, FILM COATED ORAL
Qty: 90 TABLET | Refills: 3 | Status: SHIPPED | OUTPATIENT
Start: 2022-03-16

## 2022-03-16 RX ORDER — ROPINIROLE 0.5 MG/1
TABLET, FILM COATED ORAL
Qty: 270 TABLET | Refills: 1 | Status: SHIPPED | OUTPATIENT
Start: 2022-03-16

## 2022-03-16 ASSESSMENT — PATIENT HEALTH QUESTIONNAIRE - PHQ9
1. LITTLE INTEREST OR PLEASURE IN DOING THINGS: 1
2. FEELING DOWN, DEPRESSED OR HOPELESS: 1
SUM OF ALL RESPONSES TO PHQ QUESTIONS 1-9: 2
SUM OF ALL RESPONSES TO PHQ9 QUESTIONS 1 & 2: 2
SUM OF ALL RESPONSES TO PHQ QUESTIONS 1-9: 2

## 2022-03-16 NOTE — Clinical Note
Josep her and tell her that I tried to research the scanning and best test that may help about old stroke or vascular disease in the head will be a CT angiogram.  I put in an order for it at [facility].

## 2022-03-16 NOTE — PROGRESS NOTES
3/16/2022    Junior Villeda (:  1945) is a 68 y.o. female, here for evaluation of the following chief complaint(s):  Medication Check (fasting today.)      ASSESSMENT/PLAN:    1. Essential hypertension  BP: 130/84   At goal and meeting medical guidelines. Continue treatment.    - valsartan-hydroCHLOROthiazide (DIOVAN-HCT) 160-12.5 MG per tablet; TAKE 1 TABLET BY MOUTH EVERY DAY  Dispense: 90 tablet; Refill: 3    2. Seasonal affective disorder ([facility] [address]  PHQ Scores 3/16/2022 2018 2017 10/24/2016   PHQ2 Score 2 2 2 2   PHQ9 Score 2 10 2 2     Interpretation of Total Score Depression Severity: 1-4 = Minimal depression, 5-9 = Mild depression, 10-14 = Moderate depression, 15-19 = Moderately severe depression, 20-27 = Severe depression      3. Bilateral leg edema  Doing OK with prn medication.   - furosemide (LASIX) 20 MG tablet; TAKE 1 OR 2 TABS DAILY AT LUNCH OR IN THE AFTERNOON UNTIL SWELLING DOWN. Dispense: 180 tablet; Refill: 1    4. Venous angioma of brain ([facility] [address]  Last CT head with contrast in . After contrast administration, there is again noted to be a large   developmental venous anomaly involving the right frontal lobe,   unchanged in comparison to previous exam. Otherwise, no enhancing   lesions within the brain parenchyma are detected. Will get CTA     . 5. Restless legs syndrome  Stable with current treatment. - rOPINIRole (REQUIP) 0.5 MG tablet; TAKE 2-3 TABLETS BY MOUTH AT BEDTIME OR IN EVENING FOR RESTLESS LEGS  Dispense: 270 tablet; Refill: 1    6. Localization-related focal epilepsy with simple partial seizures [facility])  Sees neurologist.      7. Osteoporosis without current pathological fracture  -  Reclast yearly. Last infusion 10/12/21  - DEXA BONE DENSITY AXIAL SKELETON; Future    8. Poor balance  I suspect it is due to left leg weakness after complicated course from left hip fracture.    - [facility]  - CTA head neck mariza Wilson contrast         FOLLOW UP:  Return in about 3 months (around 6/16/2022) for Medicare Annual Wellness Visit. SUBJECTIVE/OBJECTIVE:    HPI  Went to 3 doctor's for her shaking and leg weakness and they all said she was fine. Dr. Keyla Reagan, Dr. Clemente Mota and ACRLOS Buckley with ortho spine program.    Otherwise she feels fine. 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 .       See Assessment for other issues addressed. Outpatient Medications Marked as Taking for the 3/16/22 encounter (Office Visit) with Alice Lira MD   Medication Sig Dispense Refill    rOPINIRole (REQUIP) 0.5 MG tablet TAKE 2-3 TABLETS BY MOUTH AT BEDTIME OR IN EVENING FOR RESTLESS LEGS 270 tablet 1    furosemide (LASIX) 20 MG tablet TAKE 1 OR 2 TABS DAILY AT LUNCH OR IN THE AFTERNOON UNTIL SWELLING DOWN. 180 tablet 1    rosuvastatin (CRESTOR) 10 MG tablet TAKE 1 TABLET DAILY 90 tablet 3    famotidine (PEPCID) 20 MG tablet Take 1 tablet by mouth nightly 90 tablet 3    vitamin D (ERGOCALCIFEROL) 1.25 MG (22802 UT) CAPS capsule TAKE ONE CAPSULE BY MOUTH WEEKLY 12 capsule 3    omeprazole (PRILOSEC) 20 MG delayed release capsule Take 1 capsule by mouth Daily In AM before first bite of food 90 capsule 3    Coenzyme Q10 (CO Q 10) 10 MG CAPS Take by mouth      valsartan-hydroCHLOROthiazide (DIOVAN-HCT) 160-12.5 MG per tablet TAKE 1 TABLET BY MOUTH EVERY DAY 90 tablet 3    Biotin 5000 MCG CAPS Take 5,000 mcg by mouth      aspirin 81 MG tablet Take 81 mg by mouth daily      divalproex (DEPAKOTE ER) 500 MG ER tablet Take 1 tablet by mouth daily Take 1 tablet by mouth  daily 90 tablet 1    Multiple Vitamins-Minerals (ICAPS) CAPS Take by mouth daily             Review of Systems   Respiratory: Negative for cough, chest tightness, shortness of breath and wheezing. Cardiovascular: Negative for chest pain and palpitations.    Skin: Negative for rash and wound. Neurological: Negative for dizziness, syncope, facial asymmetry, speech difficulty, weakness, numbness and headaches. Vitals:    03/16/22 1018   BP: 130/84   Pulse: 68   Weight: 215 lb (97.5 kg)   Height: 5' 7\" (1.702 m)       Wt Readings from Last 3 Encounters:   03/16/22 215 lb (97.5 kg)   01/24/22 214 lb (97.1 kg)   12/15/21 214 lb (97.1 kg)       BP Readings from Last 3 Encounters:   03/16/22 130/84   10/12/21 128/70   09/15/21 122/76       Physical Exam  Vitals reviewed. Constitutional:       General: She is not in acute distress. Appearance: Normal appearance. She is well-developed. She is not diaphoretic. Eyes:      General: No scleral icterus. Neck:      Thyroid: No thyroid mass or thyromegaly. Vascular: No carotid bruit. Cardiovascular:      Rate and Rhythm: Normal rate and regular rhythm. Heart sounds: Normal heart sounds, S1 normal and S2 normal. No murmur heard. Pulmonary:      Effort: Pulmonary effort is normal. No respiratory distress. Breath sounds: Normal breath sounds. No decreased breath sounds, wheezing, rhonchi or rales. Abdominal:      General: Bowel sounds are normal. There is no abdominal bruit. Palpations: Abdomen is soft. There is no hepatomegaly or mass. Tenderness: There is no abdominal tenderness. Musculoskeletal:      Cervical back: Neck supple. Right lower leg: No edema. Left lower leg: No edema. Lymphadenopathy:      Cervical: No cervical adenopathy. Skin:     General: Skin is warm and dry. Coloration: Skin is not pale. Nails: There is no clubbing. Neurological:      Mental Status: She is alert and oriented to person, place, and time. Cranial Nerves: Cranial nerves are intact. Motor: No weakness, tremor, atrophy, abnormal muscle tone or pronator drift. Coordination: Coordination normal.      Gait: Gait abnormal (wide based with toes pointed out).    Psychiatric: Speech: Speech normal.         Behavior: Behavior normal.           An electronic signature was used to authenticate this note.     Noreen Sorenson MD

## 2022-03-20 ASSESSMENT — ENCOUNTER SYMPTOMS
COUGH: 0
WHEEZING: 0
CHEST TIGHTNESS: 0
SHORTNESS OF BREATH: 0

## 2022-03-22 ENCOUNTER — TELEPHONE (OUTPATIENT)
Dept: INTERNAL MEDICINE CLINIC | Age: 77
End: 2022-03-22

## 2022-03-22 NOTE — TELEPHONE ENCOUNTER
Patient is returning a call to Nisswa. I let her know that Nisswa is not in the office this afternoon and I would ask her to call patient back tomorrow.

## 2022-03-30 ENCOUNTER — HOSPITAL ENCOUNTER (OUTPATIENT)
Dept: PHYSICAL THERAPY | Age: 77
Setting detail: THERAPIES SERIES
Discharge: HOME | End: 2022-03-30
Payer: MEDICARE

## 2022-03-30 PROCEDURE — 97140 MANUAL THERAPY 1/> REGIONS: CPT

## 2022-03-30 PROCEDURE — 97110 THERAPEUTIC EXERCISES: CPT

## 2022-03-30 NOTE — FLOWSHEET NOTE
[facility] [address] [facility]  Phone: [phone] Fax: [phone]    Physical Therapy Treatment Note/ Progress Report:     Date:  3/11/2022    Patient Name:  Sabiha Mondragon    :  1945  MRN: [id number]  Restrictions/Precautions:    Medical/Treatment Diagnosis Information:  · Diagnosis: Weakness of left lower extremity  · Treatment Diagnosis: B89.007  Insurance/Certification information:  PT Insurance Information: [facility]  Physician Information:  Referring Practitioner: Jami Olivier MD  Plan of care signed (Y/N):     Date of Patient follow up with Physician:      Progress Report: []  Yes  [x]  No     Date Range for reporting period:  Beginning:  3/2/22  Ending:  NA    Progress report due (10 Rx/or 30 days whichever is less): 45     Recertification due (POC duration/ or 90 days whichever is less): 22     Visit # Insurance Allowable Auth Needed   2 Medicare []Yes   []No     Latex Allergy:  [x]NO      []YES  Preferred Language for Healthcare:   [x]English       []other:  Functional Scale: LEFS - 81% impairment   Date assessed:3/2/22    Pain level:  2-3/10     SUBJECTIVE:  Patient eager to get back to strengthening exercises.  Feels that she has lost a little ground since she was last in PT.     OBJECTIVE:    Observation:    Test measurements:      RESTRICTIONS/PRECAUTIONS: impaired balance, hx of left hip hemiarthroplasty in 2017    Exercises/Interventions:      Therapeutic Ex (76489)  Min: 40 Sets/sec Reps CUES/Notes   Bike 6'    seat 12   Treadmill 0'   1.4 mph amb, bilat UE support, 1.0-3.0 incline   Cybex Gastroc- SL  3 10 75#   Leg Press - SL 3 10 75#   Cybex Leg Ext - SL 3 10 15#   Cybex HS curl - SL 3 10 30#   Bridge 10 12     3-way SLR 1 10     Hook lying LLE Ext 1 10 Cues for form   Supine heel slides +  12 Cues for form   Hook lying bilat hip add iso 5 10 Ball squeeze   Clam - side lying 5 10 No resistance Supine clam - unilateral 10 10 Maroon band, each         BOSU lunge 10  10 each   Slide lunge - retro at ledge 2 10     Standing hip abd 2 10 orange   SC marching 2 10 Fwd/bkwd, cane for assist   Heel raises - DL 5 10 yellow ball between heels   TRX squat 2 10 cues   Side stepping 2 15 HHA, green band below knees   Sit to stand with TRX 2 10 1 airex on seat, sink for support   Step up - bilat UE support 3 10 6\"   Lateral step down 2 10 4\"   Standing hip 3-way kicks 5 10 Purple band for TKE   On 2\" step             Manual Intervention  (58072)  Min:  0         Hypervolt percussion STM    L posterior hip, IT band   Hip PROM        IASTM    L post hip, IT band             NMR re-education (19783)  Min: 0         Biodex balance 10'   RC x 5 min with small ERIC  PS with staggered stance x 1.5 min each                                                     Therapeutic Exercise and NMR EXR  [x] (92875) Provided verbal/tactile cueing for activities related to strengthening, flexibility, endurance, ROM for improvements in LE, proximal hip, and core control with self care, mobility, lifting, ambulation. [x] (60194) Provided verbal/tactile cueing for activities related to improving balance, coordination, kinesthetic sense, posture, motor skill, proprioception  to assist with LE, proximal hip, and core control in self care, mobility, lifting, ambulation and eccentric single leg control.      NMR and Therapeutic Activities:    [x] (83246 or 72548) Provided verbal/tactile cueing for activities related to improving balance, coordination, kinesthetic sense, posture, motor skill, proprioception and motor activation to allow for proper function of core, proximal hip and LE with self care and ADLs and functional mobility.   [] (71081) Gait Re-education- Provided training and instruction to the patient for proper LE, core and proximal hip recruitment and positioning and eccentric body weight control with ambulation re-education including up and down stairs     Home Exercise Program:    [x] (01131) Reviewed/Progressed HEP activities related to strengthening, flexibility, endurance, ROM of core, proximal hip and LE for functional self-care, mobility, lifting and ambulation/stair navigation   [] (73738)Reviewed/Progressed HEP activities related to improving balance, coordination, kinesthetic sense, posture, motor skill, proprioception of core, proximal hip and LE for self care, mobility, lifting, and ambulation/stair navigation      Manual Treatments:  PROM / STM / Oscillations-Mobs:  G-I, II, III, IV (PA's, Inf., Post.)  [] (25763) Provided manual therapy to mobilize LE, proximal hip and/or LS spine soft tissue/joints for the purpose of modulating pain, promoting relaxation,  increasing ROM, reducing/eliminating soft tissue swelling/inflammation/restriction, improving soft tissue extensibility and allowing for proper ROM for normal function with self care, mobility, lifting and ambulation. Modalities:     [] GAME READY (VASO)- for significant edema, swelling, pain control. Charges:  Timed Code Treatment Minutes: 40   Total Treatment Minutes: 40      [] EVAL (LOW) 49282 (typically 20 minutes face-to-face)  [] EVAL (MOD) 58041 (typically 30 minutes face-to-face)  [] EVAL (HIGH) 04266 (typically 45 minutes face-to-face)  [] RE-EVAL     [x] BK(20297) x  3   [] DRY NEEDLE 1 OR 2 MUSCLES  [] NMR (16921) x     [] DRY NEEDLE 3+ MUSCLES  [] Manual (45952) x       [] TA (90051) x     [] Mech Traction (19730)  [] ES(attended) (63130)     [] ES (un) (16600):   [] VASO (91974)  [] Other:    If BW Please Indicate Time In/Out  CPT Code Time in Time out                                   GOALS:  Patient stated goal: reduce hip pain, walk without cane  [] Progressing: [] Met: [] Not Met: [] Adjusted    Therapist goals for Patient:   Short Term Goals: To be achieved in: 2 weeks  1.  Independent in HEP and progression per patient tolerance, in order to prevent re-injury. [] Progressing: [] Met: [] Not Met: [] Adjusted  2. Patient will have a decrease in pain to facilitate improvement in movement, function, and ADLs as indicated by Functional Deficits. [] Progressing: [] Met: [] Not Met: [] Adjusted    Long Term Goals: To be achieved in: 6 weeks  1. Disability index score of 65% or less for the LEFS to assist with reaching prior level of function. [] Progressing: [] Met: [] Not Met: [] Adjusted  2. Patient will demonstrate increased AROM to [facility] to allow for proper joint functioning as indicated by patients Functional Deficits. [] Progressing: [] Met: [] Not Met: [] Adjusted  3. Patient will demonstrate an increase in Strength to good proximal hip strength and control, within 5lb HHD in LE to allow for proper functional mobility as indicated by patients Functional Deficits. [] Progressing: [] Met: [] Not Met: [] Adjusted  4. Patient will return to sleeping, sitting, standing, walking, squatting, stairs functional activities without increased symptoms or restriction. [] Progressing: [] Met: [] Not Met: [] Adjusted    ASSESSMENT:  Appropriate fatigue with no increase in pain. She presents with deficient proximal hip control and poor core stability. Gait is antalgic with uneven step length and wide stance.  Will benefit from continued work on general LLE strength and balance.       Return to Play: (if applicable)   []  Stage 1: Intro to Strength   []  Stage 2: Return to Run and Strength   []  Stage 3: Return to Jump and Strength   []  Stage 4: Dynamic Strength and Agility   []  Stage 5: Sport Specific Training     []  Ready to Return to Play, Meets All Above Stages   []  Not Ready for Return to Sports   Comments:            Treatment/Activity Tolerance:  [x] Patient tolerated treatment well [] Patient limited by fatique  [] Patient limited by pain  [] Patient limited by other medical complications  [] Other:     Overall Progression Towards Functional goals/ Treatment Progress Update:  [] Patient is progressing as expected towards functional goals listed. [] Progression is slowed due to complexities/Impairments listed. [] Progression has been slowed due to co-morbidities. [x] Plan just implemented, too soon to assess goals progression <30days   [] Goals require adjustment due to lack of progress  [] Patient is not progressing as expected and requires additional follow up with physician  [] Other    Prognosis for POC: [x] Good [] Fair  [] Poor    Patient requires continued skilled intervention: [x] Yes  [] No        PLAN:   [x] Continue per plan of care [] Alter current plan (see comments)  [] Plan of care initiated [] Hold pending MD visit [] Discharge    Electronically signed by: Milli Grace PT    Note: If patient does not return for scheduled/recommended follow up visits, this note will serve as a discharge from care along with the most recent update on progress.

## 2022-03-30 NOTE — FLOWSHEET NOTE
[facility] [address] [facility], [address]  Phone: [phone] Fax: [phone]    Physical Therapy Treatment Note/ Progress Report:     Date:  3/30/2022    Patient Name:  Candace Garces    :  1945  MRN: [id number]  Restrictions/Precautions:    Medical/Treatment Diagnosis Information:  · Diagnosis: Weakness of left lower extremity  · Treatment Diagnosis: V66.717  Insurance/Certification information:  PT Insurance Information: [facility]  Physician Information:  Referring Practitioner: Young Jacob MD  Plan of care signed (Y/N):     Date of Patient follow up with Physician:      Progress Report: []  Yes  [x]  No     Date Range for reporting period:  Beginning:  3/2/22  Ending:  NA    Progress report due (10 Rx/or 30 days whichever is less): 57     Recertification due (POC duration/ or 90 days whichever is less): 22     Visit # Insurance Allowable Auth Needed   3 Medicare []Yes   []No     Latex Allergy:  [x]NO      []YES  Preferred Language for Healthcare:   [x]English       []other:  Functional Scale: LEFS - 81% impairment   Date assessed:3/2/22    Pain level:  2-3/10     SUBJECTIVE:  Patient states that she saw her PCP and inquired about getting more imaging, her daughter is worried that she may have suffered from a stroke several months ago. She will be getting a CT scan in the near future. Still bothered by the unexplained shakiness of her legs. Left hip has been achy in posterior/lateral aspect.      OBJECTIVE:    Observation:    Test measurements:      RESTRICTIONS/PRECAUTIONS: impaired balance, hx of left hip hemiarthroplasty in 2017    Exercises/Interventions:      Therapeutic Ex (01226)  Min: 35 Sets/sec Reps CUES/Notes   Bike 6'    seat 12   Treadmill 0'   1.4 mph amb, bilat UE support, 1.0-3.0 incline   Cybex Gastroc- SL  3 10 75#   Leg Press - SL 3 10 75#   Cybex Leg Ext - SL 3 10 15#   Cybex HS curl - SL 3 10 30#   Bridge 10 12     3-way SLR 1 10     Hook lying LLE Ext 1 10 Cues for form   Supine heel slides + march 1 12 Cues for form   Hook lying bilat hip add iso 5 10 Ball squeeze   Clam - side lying 5 10 No resistance   Supine clam - unilateral 10 10 Maroon band, each         BOSU lunge 10  10 each   Slide lunge - retro at ledge 2 10     Standing hip abd 2 10 orange   SC marching 2 10 Fwd/bkwd, cane for assist   Heel raises - DL 5 10 yellow ball between heels   TRX squat 2 10 cues   Side stepping 2 15 HHA, green band below knees   Sit to stand  2 10 2 airex on seat, sink for support   Step up - bilat UE support 3 10 6\"   Lateral step down 2 10 4\"   Standing hip 3-way kicks 5 10 Purple band for TKE   On 2\" step             Manual Intervention  (83440)  Min:  10         Hypervolt percussion STM    L posterior hip, IT band   Hip PROM 5'       IASTM 5'   L post hip, IT band             NMR re-education (29998)  Min: 0         Biodex balance 10'   RC x 5 min with small ERIC  PS with staggered stance x 1.5 min each                                                     Therapeutic Exercise and NMR EXR  [x] (72934) Provided verbal/tactile cueing for activities related to strengthening, flexibility, endurance, ROM for improvements in LE, proximal hip, and core control with self care, mobility, lifting, ambulation. [x] (23284) Provided verbal/tactile cueing for activities related to improving balance, coordination, kinesthetic sense, posture, motor skill, proprioception  to assist with LE, proximal hip, and core control in self care, mobility, lifting, ambulation and eccentric single leg control.      NMR and Therapeutic Activities:    [x] (54962 or 84769) Provided verbal/tactile cueing for activities related to improving balance, coordination, kinesthetic sense, posture, motor skill, proprioception and motor activation to allow for proper function of core, proximal hip and LE with self care and ADLs and functional mobility.   [] (18725) Gait Re-education- Provided training and instruction to the patient for proper LE, core and proximal hip recruitment and positioning and eccentric body weight control with ambulation re-education including up and down stairs     Home Exercise Program:    [x] (17556) Reviewed/Progressed HEP activities related to strengthening, flexibility, endurance, ROM of core, proximal hip and LE for functional self-care, mobility, lifting and ambulation/stair navigation   [] (98713)Reviewed/Progressed HEP activities related to improving balance, coordination, kinesthetic sense, posture, motor skill, proprioception of core, proximal hip and LE for self care, mobility, lifting, and ambulation/stair navigation      Manual Treatments:  PROM / STM / Oscillations-Mobs:  G-I, II, III, IV (PA's, Inf., Post.)  [x] (00996) Provided manual therapy to mobilize LE, proximal hip and/or LS spine soft tissue/joints for the purpose of modulating pain, promoting relaxation,  increasing ROM, reducing/eliminating soft tissue swelling/inflammation/restriction, improving soft tissue extensibility and allowing for proper ROM for normal function with self care, mobility, lifting and ambulation. Modalities:     [] GAME READY (VASO)- for significant edema, swelling, pain control.      Charges:  Timed Code Treatment Minutes: 45   Total Treatment Minutes: 45      [] EVAL (LOW) 07148 (typically 20 minutes face-to-face)  [] EVAL (MOD) 76594 (typically 30 minutes face-to-face)  [] EVAL (HIGH) 86184 (typically 45 minutes face-to-face)  [] RE-EVAL     [x] CJ(67536) x  3   [] DRY NEEDLE 1 OR 2 MUSCLES  [] NMR (28984) x     [] DRY NEEDLE 3+ MUSCLES  [] Manual (45279) x       [] TA (53534) x     [] Mech Traction (24035)  [] ES(attended) (51320)     [] ES (un) (97343):   [] VASO (32161)  [] Other:    If BW Please Indicate Time In/Out  CPT Code Time in Time out                                   GOALS:  Patient stated goal: reduce hip pain, walk without cane  [] Progressing: [] Met: [] Not Met: [] Adjusted    Therapist goals for Patient:   Short Term Goals: To be achieved in: 2 weeks  1. Independent in HEP and progression per patient tolerance, in order to prevent re-injury. [] Progressing: [] Met: [] Not Met: [] Adjusted  2. Patient will have a decrease in pain to facilitate improvement in movement, function, and ADLs as indicated by Functional Deficits. [] Progressing: [] Met: [] Not Met: [] Adjusted    Long Term Goals: To be achieved in: 6 weeks  1. Disability index score of 65% or less for the LEFS to assist with reaching prior level of function. [] Progressing: [] Met: [] Not Met: [] Adjusted  2. Patient will demonstrate increased AROM to [facility] to allow for proper joint functioning as indicated by patients Functional Deficits. [] Progressing: [] Met: [] Not Met: [] Adjusted  3. Patient will demonstrate an increase in Strength to good proximal hip strength and control, within 5lb HHD in LE to allow for proper functional mobility as indicated by patients Functional Deficits. [] Progressing: [] Met: [] Not Met: [] Adjusted  4. Patient will return to sleeping, sitting, standing, walking, squatting, stairs functional activities without increased symptoms or restriction. [] Progressing: [] Met: [] Not Met: [] Adjusted    ASSESSMENT:  Appropriate fatigue with no increase in pain. She presents with deficient proximal hip control and poor core stability. Gait is antalgic with uneven step length and wide stance.  Will benefit from continued work on general LLE strength and balance. Fatigues quickly and requires frequent breaks.      Return to Play: (if applicable)   []  Stage 1: Intro to Strength   []  Stage 2: Return to Run and Strength   []  Stage 3: Return to Jump and Strength   []  Stage 4: Dynamic Strength and Agility   []  Stage 5: Sport Specific Training     []  Ready to Return to Play, Meets All Above Stages   []  Not Ready for Return to Sports   Comments: Treatment/Activity Tolerance:  [x] Patient tolerated treatment well [] Patient limited by fatique  [] Patient limited by pain  [] Patient limited by other medical complications  [] Other:     Overall Progression Towards Functional goals/ Treatment Progress Update:  [] Patient is progressing as expected towards functional goals listed. [] Progression is slowed due to complexities/Impairments listed. [] Progression has been slowed due to co-morbidities. [x] Plan just implemented, too soon to assess goals progression <30days   [] Goals require adjustment due to lack of progress  [] Patient is not progressing as expected and requires additional follow up with physician  [] Other    Prognosis for POC: [x] Good [] Fair  [] Poor    Patient requires continued skilled intervention: [x] Yes  [] No        PLAN:   [x] Continue per plan of care [] Alter current plan (see comments)  [] Plan of care initiated [] Hold pending MD visit [] Discharge    Electronically signed by: Moy Nation PT    Note: If patient does not return for scheduled/recommended follow up visits, this note will serve as a discharge from care along with the most recent update on progress.

## 2022-03-31 DIAGNOSIS — E78.00 PURE HYPERCHOLESTEROLEMIA: ICD-10-CM

## 2022-03-31 RX ORDER — ROSUVASTATIN CALCIUM 10 MG/1
TABLET, COATED ORAL
Qty: 45 TABLET | Refills: 2 | Status: SHIPPED | OUTPATIENT
Start: 2022-03-31

## 2022-04-01 ENCOUNTER — HOSPITAL ENCOUNTER (OUTPATIENT)
Dept: PHYSICAL THERAPY | Age: 77
Setting detail: THERAPIES SERIES
Discharge: HOME | End: 2022-04-01
Payer: MEDICARE

## 2022-04-01 PROCEDURE — 97110 THERAPEUTIC EXERCISES: CPT

## 2022-04-01 PROCEDURE — 97140 MANUAL THERAPY 1/> REGIONS: CPT

## 2022-04-01 PROCEDURE — 97112 NEUROMUSCULAR REEDUCATION: CPT

## 2022-04-01 NOTE — FLOWSHEET NOTE
[place] [address] [place]  Phone: [phone] Fax: [phone]    Physical Therapy Treatment Note/ Progress Report:     Date:  2022    Patient Name:  Magy Gibson    :  1945  MRN: [id number]  Restrictions/Precautions:    Medical/Treatment Diagnosis Information:  · Diagnosis: Weakness of left lower extremity  · Treatment Diagnosis: U38.601  Insurance/Certification information:  PT Insurance Information: [facility]  Physician Information:  Referring Practitioner: Paty Brown MD  Plan of care signed (Y/N):     Date of Patient follow up with Physician:      Progress Report: []  Yes  [x]  No     Date Range for reporting period:  Beginning:  3/2/22  Ending:  NA    Progress report due (10 Rx/or 30 days whichever is less): 72     Recertification due (POC duration/ or 90 days whichever is less): 22     Visit # Insurance Allowable Auth Needed   4 Medicare []Yes   []No     Latex Allergy:  [x]NO      []YES  Preferred Language for Healthcare:   [x]English       []other:  Functional Scale: LEFS - 81% impairment   Date assessed:3/2/22    Pain level:  2-3/10     SUBJECTIVE:  Patient reports that her hip has been feeling less achy since last session, thinks that the manual from last session did help. Did not feel too fatigued later that day following her last session. Wants to work on balance. Will get her CT scan done in 2 weeks.      OBJECTIVE:    Observation:    Test measurements:      RESTRICTIONS/PRECAUTIONS: impaired balance, hx of left hip hemiarthroplasty in 2017    Exercises/Interventions:      Therapeutic Ex (62098)  Min: 20 Sets/sec Reps CUES/Notes   Bike 8'    seat 12   Treadmill    1.4 mph amb, bilat UE support, 1.0-3.0 incline   Cybex Gastroc- SL    75#   Leg Press - SL   75#   Cybex Leg Ext - SL   15#   Cybex HS curl - SL   30#   Bridge 10 12 [place] between knees    3-way SLR 1 10     Hook lying LLE Ext 1 10 Cues for form   Supine heel slides + march 1 12 Cues for form   Hook lying bilat hip add iso 5 10 Ball squeeze   Clam - side lying 5 10 No resistance   Supine clam - unilateral 10 10 Maroon band, each         BOSU lunge 10  10 each   Slide lunge - retro at ledge 2 10     Standing hip abd 2 10 orange   SC marching 2 10 Fwd/bkwd, cane for assist   Heel raises - DL 5 10 yellow ball between heels   TRX squat 2 10 cues   Side stepping 2 15 HHA, green band below knees   Sit to stand  2 10 2 airex on seat, sink for support   Step up - bilat UE support 3 10 6\"   Lateral step down 2 10 4\"   Standing hip 3-way kicks 5 10 Purple band for TKE   On 2\" step             Manual Intervention  (35681)  Min:  10         Hypervolt percussion STM    L posterior hip, IT band   Hip PROM 5'       IASTM 5'   L post hip, IT band             NMR re-education (35239)  Min: 15         Cone step over/back 1 10 Blue, bilat UE support, each   Small ERIC stance 30' 3 airex   Staggered stance 30' 3 airex                 Therapeutic Exercise and NMR EXR  [x] (95647) Provided verbal/tactile cueing for activities related to strengthening, flexibility, endurance, ROM for improvements in LE, proximal hip, and core control with self care, mobility, lifting, ambulation. [x] (46108) Provided verbal/tactile cueing for activities related to improving balance, coordination, kinesthetic sense, posture, motor skill, proprioception  to assist with LE, proximal hip, and core control in self care, mobility, lifting, ambulation and eccentric single leg control.      NMR and Therapeutic Activities:    [x] (30252 or 11190) Provided verbal/tactile cueing for activities related to improving balance, coordination, kinesthetic sense, posture, motor skill, proprioception and motor activation to allow for proper function of core, proximal hip and LE with self care and ADLs and functional mobility.   [] (00065) Gait Re-education- Provided training and instruction to the patient for proper LE, core and proximal hip recruitment and positioning and eccentric body weight control with ambulation re-education including up and down stairs     Home Exercise Program:    [x] (98110) Reviewed/Progressed HEP activities related to strengthening, flexibility, endurance, ROM of core, proximal hip and LE for functional self-care, mobility, lifting and ambulation/stair navigation   [] (44896)Reviewed/Progressed HEP activities related to improving balance, coordination, kinesthetic sense, posture, motor skill, proprioception of core, proximal hip and LE for self care, mobility, lifting, and ambulation/stair navigation      Manual Treatments:  PROM / STM / Oscillations-Mobs:  G-I, II, III, IV (PA's, Inf., Post.)  [x] (35877) Provided manual therapy to mobilize LE, proximal hip and/or LS spine soft tissue/joints for the purpose of modulating pain, promoting relaxation,  increasing ROM, reducing/eliminating soft tissue swelling/inflammation/restriction, improving soft tissue extensibility and allowing for proper ROM for normal function with self care, mobility, lifting and ambulation. Modalities:     [] GAME READY (VASO)- for significant edema, swelling, pain control.      Charges:  Timed Code Treatment Minutes: 45   Total Treatment Minutes: 45      [] EVAL (LOW) 16455 (typically 20 minutes face-to-face)  [] EVAL (MOD) 96727 (typically 30 minutes face-to-face)  [] EVAL (HIGH) 88721 (typically 45 minutes face-to-face)  [] RE-EVAL     [x] FB(38677) x  1   [] DRY NEEDLE 1 OR 2 MUSCLES  [x] NMR (56813) x 1    [] DRY NEEDLE 3+ MUSCLES  [x] Manual (07546) x 1     [] TA (68394) x     [] Mech Traction (01972)  [] ES(attended) (90569)     [] ES (un) (75469):   [] VASO (21691)  [] Other:    If BW Please Indicate Time In/Out  CPT Code Time in Time out                                   GOALS:  Patient stated goal: reduce hip pain, walk without cane  [] Progressing: [] Met: [] Not Met: [] Adjusted    Therapist goals for Patient:   Short Term Goals: To be achieved in: 2 weeks  1. Independent in HEP and progression per patient tolerance, in order to prevent re-injury. [] Progressing: [] Met: [] Not Met: [] Adjusted  2. Patient will have a decrease in pain to facilitate improvement in movement, function, and ADLs as indicated by Functional Deficits. [] Progressing: [] Met: [] Not Met: [] Adjusted    Long Term Goals: To be achieved in: 6 weeks  1. Disability index score of 65% or less for the LEFS to assist with reaching prior level of function. [] Progressing: [] Met: [] Not Met: [] Adjusted  2. Patient will demonstrate increased AROM to [facility] to allow for proper joint functioning as indicated by patients Functional Deficits. [] Progressing: [] Met: [] Not Met: [] Adjusted  3. Patient will demonstrate an increase in Strength to good proximal hip strength and control, within 5lb HHD in LE to allow for proper functional mobility as indicated by patients Functional Deficits. [] Progressing: [] Met: [] Not Met: [] Adjusted  4. Patient will return to sleeping, sitting, standing, walking, squatting, stairs functional activities without increased symptoms or restriction. [] Progressing: [] Met: [] Not Met: [] Adjusted    ASSESSMENT:  Appropriate fatigue with no increase in pain. She presents with deficient proximal hip control and poor core stability. Gait is antalgic with uneven step length and wide stance.  Will benefit from continued work on general LLE strength and balance. Fatigues quickly and requires frequent breaks.      Return to Play: (if applicable)   []  Stage 1: Intro to Strength   []  Stage 2: Return to Run and Strength   []  Stage 3: Return to Jump and Strength   []  Stage 4: Dynamic Strength and Agility   []  Stage 5: Sport Specific Training     []  Ready to Return to Play, Meets All Above Stages   []  Not Ready for Return to Sports   Comments:            Treatment/Activity Tolerance:  [x] Patient tolerated

## 2022-04-06 ENCOUNTER — HOSPITAL ENCOUNTER (OUTPATIENT)
Dept: PHYSICAL THERAPY | Age: 77
Setting detail: THERAPIES SERIES
Discharge: HOME | End: 2022-04-06
Payer: MEDICARE

## 2022-04-06 PROCEDURE — 97110 THERAPEUTIC EXERCISES: CPT

## 2022-04-06 PROCEDURE — 97112 NEUROMUSCULAR REEDUCATION: CPT

## 2022-04-06 PROCEDURE — 97140 MANUAL THERAPY 1/> REGIONS: CPT

## 2022-04-06 NOTE — FLOWSHEET NOTE
[place] [address] [place]  Phone: [phone] Fax: [phone]    Physical Therapy Treatment Note/ Progress Report:     Date:  2022    Patient Name:  Nasir Jackson    :  1945  MRN: [id number]  Restrictions/Precautions:    Medical/Treatment Diagnosis Information:  · Diagnosis: Weakness of left lower extremity  · Treatment Diagnosis: V85.931  Insurance/Certification information:  PT Insurance Information: [facility]  Physician Information:  Referring Practitioner: Manny Hayes MD  Plan of care signed (Y/N):     Date of Patient follow up with Physician:      Progress Report: []  Yes  [x]  No     Date Range for reporting period:  Beginning:  3/2/22  Ending:  NA    Progress report due (10 Rx/or 30 days whichever is less): 92     Recertification due (POC duration/ or 90 days whichever is less): 22     Visit # Insurance Allowable Auth Needed   5 Medicare []Yes   []No     Latex Allergy:  [x]NO      []YES  Preferred Language for Healthcare:   [x]English       []other:  Functional Scale: LEFS - 81% impairment   Date assessed:3/2/22    Pain level:  2-3/10     SUBJECTIVE:  Patient states that her hip has been feeling much better since last session, but last night her left calf really started to hurt, it feels very sore/tight this morning.       OBJECTIVE:    Observation:    Test measurements:      RESTRICTIONS/PRECAUTIONS: impaired balance, hx of left hip hemiarthroplasty in 2017    Exercises/Interventions:      Therapeutic Ex (76697)  Min: 20 Sets/sec Reps CUES/Notes   Bike 8'    seat 12   Treadmill    1.4 mph amb, bilat UE support, 1.0-3.0 incline   Cybex Gastroc- SL    75#   Leg Press - SL   75#   Cybex Leg Ext - SL   15#   Cybex HS curl - SL   30#   Bridge 10 12 [place] between [employer]   3-way SLR 1 10     Hook lying LLE Ext 1 10 Cues for form   Supine heel slides +  12 Cues for form   Hook lying bilat hip add iso 5 10 Ball squeeze   Clam - side lying 5 10 No resistance   Supine clam - unilateral 10 10 Maroon band, each         BOSU lunge 10  10 each   Slide lunge - retro at ledge 2 10     Standing hip abd 2 10 orange   SC marching 2 10 Fwd/bkwd, cane for assist   Heel raises - DL 5 10 yellow ball between heels   TRX squat 2 10 cues   Side stepping 2 15 HHA, green band below knees   Sit to stand  2 10 2 airex on seat, sink for support   Step up - bilat UE support 3 10 6\"   Lateral step down 2 10 4\"   Standing hip 3-way kicks 5 10 Purple band for TKE   On 2\" step             Manual Intervention  (70937)  Min:  15         Hypervolt percussion STM    L posterior hip, IT band   Hip PROM 5'       IASTM 10'   L post hip, IT band, gastroc             NMR re-education (52689)  Min: 10         Cone step over/back 1 10 Blue, bilat UE support, each   Small ERIC stance 30' 3 airex   Staggered stance 30' 3 airex                 Therapeutic Exercise and NMR EXR  [x] (83732) Provided verbal/tactile cueing for activities related to strengthening, flexibility, endurance, ROM for improvements in LE, proximal hip, and core control with self care, mobility, lifting, ambulation. [x] (84276) Provided verbal/tactile cueing for activities related to improving balance, coordination, kinesthetic sense, posture, motor skill, proprioception  to assist with LE, proximal hip, and core control in self care, mobility, lifting, ambulation and eccentric single leg control.      NMR and Therapeutic Activities:    [x] (52041 or 77801) Provided verbal/tactile cueing for activities related to improving balance, coordination, kinesthetic sense, posture, motor skill, proprioception and motor activation to allow for proper function of core, proximal hip and LE with self care and ADLs and functional mobility.   [] (35842) Gait Re-education- Provided training and instruction to the patient for proper LE, core and proximal hip recruitment and positioning and eccentric body weight control with ambulation re-education including up and down stairs     Home Exercise Program:    [x] (44749) Reviewed/Progressed HEP activities related to strengthening, flexibility, endurance, ROM of core, proximal hip and LE for functional self-care, mobility, lifting and ambulation/stair navigation   [] (64328)Reviewed/Progressed HEP activities related to improving balance, coordination, kinesthetic sense, posture, motor skill, proprioception of core, proximal hip and LE for self care, mobility, lifting, and ambulation/stair navigation      Manual Treatments:  PROM / STM / Oscillations-Mobs:  G-I, II, III, IV (PA's, Inf., Post.)  [x] (68213) Provided manual therapy to mobilize LE, proximal hip and/or LS spine soft tissue/joints for the purpose of modulating pain, promoting relaxation,  increasing ROM, reducing/eliminating soft tissue swelling/inflammation/restriction, improving soft tissue extensibility and allowing for proper ROM for normal function with self care, mobility, lifting and ambulation. Modalities:     [] GAME READY (VASO)- for significant edema, swelling, pain control. Charges:  Timed Code Treatment Minutes: 45   Total Treatment Minutes: 45      [] EVAL (LOW) 15562 (typically 20 minutes face-to-face)  [] EVAL (MOD) 29367 (typically 30 minutes face-to-face)  [] EVAL (HIGH) 99997 (typically 45 minutes face-to-face)  [] RE-EVAL     [x] BL(96629) x  1   [] DRY NEEDLE 1 OR 2 MUSCLES  [x] NMR (70386) x 1    [] DRY NEEDLE 3+ MUSCLES  [x] Manual (93956) x 1     [] TA (94563) x     [] [facility] (29921[facility]  [] ES(attended) (37862)     [] ES (un) (72942):   [] VASO (06890)  [] Other:    If [facility] Please Indicate Time In/Out  CPT Code Time in Time out                                   GOALS:  Patient stated goal: reduce hip pain, walk without cane  [] Progressing: [] Met: [] Not Met: [] Adjusted    Therapist goals for Patient:   Short Term Goals: To be achieved in: 2 weeks  1.  Independent in HEP and progression per patient tolerance, in order to prevent re-injury. [] Progressing: [] Met: [] Not Met: [] Adjusted  2. Patient will have a decrease in pain to facilitate improvement in movement, function, and ADLs as indicated by Functional Deficits. [] Progressing: [] Met: [] Not Met: [] Adjusted    Long Term Goals: To be achieved in: 6 weeks  1. Disability index score of 65% or less for the LEFS to assist with reaching prior level of function. [] Progressing: [] Met: [] Not Met: [] Adjusted  2. Patient will demonstrate increased AROM to [facility] to allow for proper joint functioning as indicated by patients Functional Deficits. [] Progressing: [] Met: [] Not Met: [] Adjusted  3. Patient will demonstrate an increase in Strength to good proximal hip strength and control, within 5lb HHD in LE to allow for proper functional mobility as indicated by patients Functional Deficits. [] Progressing: [] Met: [] Not Met: [] Adjusted  4. Patient will return to sleeping, sitting, standing, walking, squatting, stairs functional activities without increased symptoms or restriction. [] Progressing: [] Met: [] Not Met: [] Adjusted    ASSESSMENT:  Appropriate fatigue with no increase in pain. She presents with deficient proximal hip control and poor core stability. Gait is antalgic with uneven step length and wide stance.  Improvement in gastroc symptoms at conclusion. Will benefit from continued work on general LLE strength and balance. Fatigues quickly and requires frequent breaks.      Return to Play: (if applicable)   []  Stage 1: Intro to Strength   []  Stage 2: Return to Run and Strength   []  Stage 3: Return to Jump and Strength   []  Stage 4: Dynamic Strength and Agility   []  Stage 5: Sport Specific Training     []  Ready to Return to Play, Meets All Above Stages   []  Not Ready for Return to Sports   Comments:            Treatment/Activity Tolerance:  [x] Patient tolerated treatment well [] Patient limited by dian  [] Patient limited by pain  [] Patient limited by other medical complications  [] Other:     Overall Progression Towards Functional goals/ Treatment Progress Update:  [] Patient is progressing as expected towards functional goals listed. [] Progression is slowed due to complexities/Impairments listed. [] Progression has been slowed due to co-morbidities. [x] Plan just implemented, too soon to assess goals progression <30days   [] Goals require adjustment due to lack of progress  [] Patient is not progressing as expected and requires additional follow up with physician  [] Other    Prognosis for POC: [x] Good [] Fair  [] Poor    Patient requires continued skilled intervention: [x] Yes  [] No        PLAN:   [x] Continue per plan of care [] Alter current plan (see comments)  [] Plan of care initiated [] Hold pending MD visit [] Discharge    Electronically signed by: Carol Tillman PT    Note: If patient does not return for scheduled/recommended follow up visits, this note will serve as a discharge from care along with the most recent update on progress.

## 2022-04-08 ENCOUNTER — HOSPITAL ENCOUNTER (OUTPATIENT)
Dept: PHYSICAL THERAPY | Age: 77
Setting detail: THERAPIES SERIES
Discharge: HOME | End: 2022-04-08
Payer: MEDICARE

## 2022-04-08 PROCEDURE — 97140 MANUAL THERAPY 1/> REGIONS: CPT

## 2022-04-08 PROCEDURE — 97112 NEUROMUSCULAR REEDUCATION: CPT

## 2022-04-08 PROCEDURE — 97110 THERAPEUTIC EXERCISES: CPT

## 2022-04-08 NOTE — FLOWSHEET NOTE
[place] [address] [facility]  Phone: [phone] Fax: [phone]    Physical Therapy Treatment Note/ Progress Report:     Date:  2022    Patient Name:  Gaudencio Cespedes    :  1945  MRN: [id number]  Restrictions/Precautions:    Medical/Treatment Diagnosis Information:  · Diagnosis: Weakness of left lower extremity  · Treatment Diagnosis: P06.800  Insurance/Certification information:  PT [place] Information: [employer]  Physician Information:  Referring Practitioner: Cathy Elise MD  Plan of care signed (Y/N):     Date of Patient follow up with Physician:      Progress Report: []  Yes  [x]  No     Date Range for reporting period:  Beginning:  3/2/22  Ending:  NA    Progress report due (10 Rx/or 30 days whichever is less): 19     Recertification due (POC duration/ or 90 days whichever is less): 22     Visit # Insurance Allowable Auth Needed   6 Medicare []Yes   []No     Latex Allergy:  [x]NO      []YES  Preferred Language for Healthcare:   [x]English       []other:  Functional Scale: LEFS - 81% impairment   Date assessed:3/2/22    Pain level:  1/10     SUBJECTIVE:  Patient reports significant improvement in calf and hip pain since last session. States that she didn't have a cramp in the left calf at all since last session until 10:00 last night. Feels it would be good to do STM on the calf again today.      OBJECTIVE:    Observation:    Test measurements:      RESTRICTIONS/PRECAUTIONS: impaired balance, hx of left hip hemiarthroplasty in 2017    Exercises/Interventions:      Therapeutic Ex (48677)  Min: 20 Sets/sec Reps CUES/Notes   Bike 8'    seat 12   Treadmill    1.4 mph amb, bilat UE support, 1.0-3.0 incline   Cybex Gastroc- SL    75#   Leg Press - SL   75#   Cybex Leg Ext - SL   15#   Cybex HS curl - SL   30#   Bridge 10 12 Lamb between knees    3-way SLR 1 10     Hook lying LLE Ext 1 10 Cues for form   Supine heel slides + march 1 12 Cues for form   Hook lying bilat hip add iso 5 10 Ball squeeze   Clam - side lying 5 10 No resistance   Supine clam - unilateral 10 10 Maroon band, each   Prone hip ext - over table 1 15 1.5#, each   BOSU lunge 10  10 each   Slide lunge - retro at ledge 2 10     Standing hip abd 2 10 orange   SC marching 2 10 Fwd/bkwd, cane for assist   Heel raises - DL 5 10 Edge of balance machine   TRX squat 2 10 cues   Side stepping 2 15 HHA, green band below knees   Sit to stand  2 10 2 airex on seat, sink for support   Step up - bilat UE support 3 10 6\"   Slant board gastroc stretch 30 3    Lateral step down 2 10 4\"   Standing hip 3-way kicks 5 10 Purple band for TKE   On 2\" step             Manual Intervention  (20417)  Min:  15         Hypervolt percussion STM    L posterior hip, IT band   Hip PROM 5'       IASTM 10'   L post hip, IT band, gastroc             NMR re-education (16565)  Min: 10         Cone step over/back 1 10 Blue, bilat UE support, each   Small ERIC stance 30' 3 airex   Staggered stance 30' 3 airex                 Therapeutic Exercise and NMR EXR  [x] (22404) Provided verbal/tactile cueing for activities related to strengthening, flexibility, endurance, ROM for improvements in LE, proximal hip, and core control with self care, mobility, lifting, ambulation. [x] (38584) Provided verbal/tactile cueing for activities related to improving balance, coordination, kinesthetic sense, posture, motor skill, proprioception  to assist with LE, proximal hip, and core control in self care, mobility, lifting, ambulation and eccentric single leg control.      NMR and Therapeutic Activities:    [x] (39636 or 65179) Provided verbal/tactile cueing for activities related to improving balance, coordination, kinesthetic sense, posture, motor skill, proprioception and motor activation to allow for proper function of core, proximal hip and LE with self care and ADLs and functional mobility.   [] (74214) Gait Re-education- Provided training and instruction to the patient for proper LE, core and proximal hip recruitment and positioning and eccentric body weight control with ambulation re-education including up and down stairs     Home Exercise Program:    [x] (54435) Reviewed/Progressed HEP activities related to strengthening, flexibility, endurance, ROM of core, proximal hip and LE for functional self-care, mobility, lifting and ambulation/stair navigation   [] (75249)Reviewed/Progressed HEP activities related to improving balance, coordination, kinesthetic sense, posture, motor skill, proprioception of core, proximal hip and LE for self care, mobility, lifting, and ambulation/stair navigation      Manual Treatments:  PROM / STM / Oscillations-Mobs:  G-I, II, III, IV (PA's, Inf., Post.)  [x] (15710) Provided manual therapy to mobilize LE, proximal hip and/or LS spine soft tissue/joints for the purpose of modulating pain, promoting relaxation,  increasing ROM, reducing/eliminating soft tissue swelling/inflammation/restriction, improving soft tissue extensibility and allowing for proper ROM for normal function with self care, mobility, lifting and ambulation. Modalities:     [] GAME READY (VASO)- for significant edema, swelling, pain control.      Charges:  Timed Code Treatment Minutes: 45   Total Treatment Minutes: 45      [] EVAL (LOW) 76260 (typically 20 minutes face-to-face)  [] EVAL (MOD) 91249 (typically 30 minutes face-to-face)  [] EVAL (HIGH) 26044 (typically 45 minutes face-to-face)  [] RE-EVAL     [x] AB(22896) x  1   [] DRY NEEDLE 1 OR 2 MUSCLES  [x] NMR (52531) x 1    [] DRY NEEDLE 3+ MUSCLES  [x] Manual (16954) x 1     [] TA (69327) x     [] [facility] T[facility] (70468)  [] ES(attended) (63634)     [] ES (un) (93946):   [] VASO (34948)  [] Other:    If BW Please Indicate Time In/Out  CPT Code Time in Time out                                   GOALS:  Patient stated goal: reduce hip pain, walk without cane  [] Progressing: [] Met: [] Not Met: [] Adjusted    Therapist goals for Patient:   Short Term Goals: To be achieved in: 2 weeks  1. Independent in HEP and progression per patient tolerance, in order to prevent re-injury. [] Progressing: [] Met: [] Not Met: [] Adjusted  2. Patient will have a decrease in pain to facilitate improvement in movement, function, and ADLs as indicated by Functional Deficits. [] Progressing: [] Met: [] Not Met: [] Adjusted    Long Term Goals: To be achieved in: 6 weeks  1. Disability index score of 65% or less for the LEFS to assist with reaching prior level of function. [] Progressing: [] Met: [] Not Met: [] Adjusted  2. Patient will demonstrate increased AROM to [facility] to allow for proper joint functioning as indicated by patients Functional Deficits. [] Progressing: [] Met: [] Not Met: [] Adjusted  3. Patient will demonstrate an increase in Strength to good proximal hip strength and control, within 5lb HHD in LE to allow for proper functional mobility as indicated by patients Functional Deficits. [] Progressing: [] Met: [] Not Met: [] Adjusted  4. Patient will return to sleeping, sitting, standing, walking, squatting, stairs functional activities without increased symptoms or restriction. [] Progressing: [] Met: [] Not Met: [] Adjusted    ASSESSMENT:  Appropriate fatigue with no increase in pain. She presents with deficient proximal hip control and poor core stability. Gait is antalgic with uneven step length and wide stance.  Improvement in gastroc symptoms at conclusion, soft tissue restrictions noted in lateral head of gastroc. Will benefit from continued work on general LLE strength and balance. Fatigues quickly.      Return to Play: (if applicable)   []  Stage 1: Intro to Strength   []  Stage 2: Return to Run and Strength   []  Stage 3: Return to Jump and Strength   []  Stage 4: Dynamic Strength and Agility   []  Stage 5: Sport Specific Training     []  Ready to Return to Play, Meets All Above Stages   []  Not Ready for Return to Sports   Comments:            Treatment/Activity Tolerance:  [x] Patient tolerated treatment well [] Patient limited by fatique  [] Patient limited by pain  [] Patient limited by other medical complications  [] Other:     Overall Progression Towards Functional goals/ Treatment Progress Update:  [] Patient is progressing as expected towards functional goals listed. [] Progression is slowed due to complexities/Impairments listed. [] Progression has been slowed due to co-morbidities. [x] Plan just implemented, too soon to assess goals progression <30days   [] Goals require adjustment due to lack of progress  [] Patient is not progressing as expected and requires additional follow up with physician  [] Other    Prognosis for POC: [x] Good [] Fair  [] Poor    Patient requires continued skilled intervention: [x] Yes  [] No        PLAN:   [x] Continue per plan of care [] Alter current plan (see comments)  [] Plan of care initiated [] Hold pending MD visit [] Discharge    Electronically signed by: Zacarias Cramer PT    Note: If patient does not return for scheduled/recommended follow up visits, this note will serve as a discharge from care along with the most recent update on progress.

## 2022-04-15 ENCOUNTER — HOSPITAL ENCOUNTER (OUTPATIENT)
Dept: PHYSICAL THERAPY | Age: 77
Setting detail: THERAPIES SERIES
Discharge: HOME | End: 2022-04-15
Payer: MEDICARE

## 2022-04-15 PROCEDURE — 97140 MANUAL THERAPY 1/> REGIONS: CPT

## 2022-04-15 PROCEDURE — 97032 APPL MODALITY 1+ESTIM EA 15: CPT

## 2022-04-15 PROCEDURE — 20560 NDL INSJ W/O NJX 1 OR 2 MUSC: CPT

## 2022-04-15 NOTE — FLOWSHEET NOTE
[place] [address] [facility]  Phone: [phone] Fax: [phone]    Physical Therapy Treatment Note/ Progress Report:     Date:  4/15/2022    Patient Name:  Cheryl Amaya    :  1945  MRN: [id number]  Restrictions/Precautions:    Medical/Treatment Diagnosis Information:  · Diagnosis: Weakness of left lower extremity  · Treatment Diagnosis: Y67.592  Insurance/Certification information:  PT Insurance Information: [facility]  Physician Information:  Referring Practitioner: Alvaro Tran MD  Plan of care signed (Y/N):     Date of Patient follow up with Physician:      Progress Report: []  Yes  [x]  No     Date Range for reporting period:  Beginning:  3/2/22  Ending:  NA    Progress report due (10 Rx/or 30 days whichever is less):      Recertification due (POC duration/ or 90 days whichever is less): 22     Visit # Insurance Allowable Auth Needed   7 Medicare []Yes   []No     Latex Allergy:  [x]NO      []YES  Preferred Language for Healthcare:   [x]English       []other:  Functional Scale: LEFS - 81% impairment   Date assessed:3/2/22    Pain level:  1/10     SUBJECTIVE:  Patient states that her calf and hip felt great for a couple days following last session, but since that time both have tightened up and gotten sore again, especially her calf. Feels that the tightness might be part of why she shakes there at times, and could explain her difficulty walking. Would like to attempt dry needling today.      OBJECTIVE:    Observation:    Test measurements:      RESTRICTIONS/PRECAUTIONS: impaired balance, hx of left hip hemiarthroplasty in 2017    Exercises/Interventions:      Therapeutic Ex (24657)  Min: 6 Sets/sec Reps CUES/Notes   Bike 8'    seat 12   Treadmill    1.4 mph amb, bilat UE support, 1.0-3.0 incline   Cybex Gastroc- SL    75#   Leg Press - SL   75#   Cybex Leg Ext - SL   15#   Cybex HS curl - SL   30#   Bridge 10 12 [place] between [employer]   3-way SLR 1 10     Hook lying LLE Ext 1 10 Cues for form   Supine heel slides + march 1 12 Cues for form   Hook lying bilat hip add iso 5 10 Ball squeeze   Clam - side lying 5 10 No resistance   Supine clam - unilateral 10 10 Maroon band, each   Prone hip ext - over table 1 15 1.5#, each   BOSU lunge 10  10 each   Slide lunge - retro at ledge 2 10     Standing hip abd 2 10 orange   SC marching 2 10 Fwd/bkwd, cane for assist   Heel raises - DL 5 10 Edge of balance machine   TRX squat 2 10 cues   Side stepping 2 15 HHA, green band below knees   Sit to stand  2 10 2 airex on seat, sink for support   Step up - bilat UE support 3 10 6\"   Slant board gastroc stretch 30 3    Lateral step down 2 10 4\"   Standing hip 3-way kicks 5 10 Purple band for TKE   On 2\" step             Manual Intervention  (12812)  Min:  15         Hypervolt percussion STM    L posterior hip, IT band   Hip PROM 5'       IASTM 10'   L post hip, IT band, gastroc             NMR re-education (62573)  Min: 0         Cone step over/back 1 10 Blue, bilat UE support, each   Small ERIC stance 30' 3 airex   Staggered stance 30' 3 airex                 Dry needling manual therapy: consisted on the placement of 8 needles in the following muscles:  left med/lat gastroc, soleus. A 50 mm needle was inserted, piston, rotated, and coned to produce intramuscular mobilization. These techniques were used to restore functional range of motion, reduce muscle spasm and induce healing in the corresponding musculature. (46763)  Clean Technique was utilized today while applying Dry needling treatment. The treatment sites where cleaned with 70% solution of  isopropyl alcohol . The PT washed their hands and utilized treatment gloves along with hand  prior to inserting the needles. All needles where removed and discarded in the appropriate sharps container. MD has given verbal and/or written approval for this treatment.      Carine low frequency (1-20Hz) electrical stimulation was utilized in conjunction with Dry Needling:  the Estim was manipulated between all above needles for a period of 10 min. at 4 volts. The low frequency electrical stimulation was used to help reduce muscle spasm and help to interrupt /Jackson the pain cycle. (07064)       Therapeutic Exercise and NMR EXR  [x] (19930) Provided verbal/tactile cueing for activities related to strengthening, flexibility, endurance, ROM for improvements in LE, proximal hip, and core control with self care, mobility, lifting, ambulation. [x] (41556) Provided verbal/tactile cueing for activities related to improving balance, coordination, kinesthetic sense, posture, motor skill, proprioception  to assist with LE, proximal hip, and core control in self care, mobility, lifting, ambulation and eccentric single leg control.      NMR and Therapeutic Activities:    [x] (63010 or 11171) Provided verbal/tactile cueing for activities related to improving balance, coordination, kinesthetic sense, posture, motor skill, proprioception and motor activation to allow for proper function of core, proximal hip and LE with self care and ADLs and functional mobility.   [] (82972) Gait Re-education- Provided training and instruction to the patient for proper LE, core and proximal hip recruitment and positioning and eccentric body weight control with ambulation re-education including up and down stairs     Home Exercise Program:    [x] (28318) Reviewed/Progressed HEP activities related to strengthening, flexibility, endurance, ROM of core, proximal hip and LE for functional self-care, mobility, lifting and ambulation/stair navigation   [] (10178)Reviewed/Progressed HEP activities related to improving balance, coordination, kinesthetic sense, posture, motor skill, proprioception of core, proximal hip and LE for self care, mobility, lifting, and ambulation/stair navigation      Manual Treatments:  PROM / STM / Oscillations-Mobs:  G-I, II, III, IV (PA's, Inf., Post.)  [x] (84187) Provided manual therapy to mobilize LE, proximal hip and/or LS spine soft tissue/joints for the purpose of modulating pain, promoting relaxation,  increasing ROM, reducing/eliminating soft tissue swelling/inflammation/restriction, improving soft tissue extensibility and allowing for proper ROM for normal function with self care, mobility, lifting and ambulation. Modalities:     [] GAME READY (VASO)- for significant edema, swelling, pain control. Charges:  Timed Code Treatment Minutes: 31   Total Treatment Minutes: 44      [] EVAL (LOW) 06235 (typically 20 minutes face-to-face)  [] EVAL (MOD) 96823 (typically 30 minutes face-to-face)  [] EVAL (HIGH) 36879 (typically 45 minutes face-to-face)  [] RE-EVAL     [] OS(80889) x     [x] DRY NEEDLE 1 OR 2 MUSCLES  [] NMR (05677) x     [] DRY NEEDLE 3+ MUSCLES  [x] Manual (21157) x 1     [] TA (96123) x     [] Mech Traction (46338)  [x] ES(attended) (23187)     [] ES (un) (47411):   [] VASO (40220)  [] Other:    If [facility] Please Indicate Time In/Out  CPT Code Time in Time out                                   GOALS:  Patient stated goal: reduce hip pain, walk without cane  [] Progressing: [] Met: [] Not Met: [] Adjusted    Therapist goals for Patient:   Short Term Goals: To be achieved in: 2 weeks  1. Independent in HEP and progression per patient tolerance, in order to prevent re-injury. [] Progressing: [] Met: [] Not Met: [] Adjusted  2. Patient will have a decrease in pain to facilitate improvement in movement, function, and ADLs as indicated by Functional Deficits. [] Progressing: [] Met: [] Not Met: [] Adjusted    Long Term Goals: To be achieved in: 6 weeks  1. Disability index score of 65% or less for the LEFS to assist with reaching prior level of function. [] Progressing: [] Met: [] Not Met: [] Adjusted  2.  Patient will demonstrate increased AROM to [facility] to allow for proper joint functioning as indicated by patients Functional Deficits. [] Progressing: [] Met: [] Not Met: [] Adjusted  3. Patient will demonstrate an increase in Strength to good proximal hip strength and control, within 5lb HHD in LE to allow for proper functional mobility as indicated by patients Functional Deficits. [] Progressing: [] Met: [] Not Met: [] Adjusted  4. Patient will return to sleeping, sitting, standing, walking, squatting, stairs functional activities without increased symptoms or restriction. [] Progressing: [] Met: [] Not Met: [] Adjusted    ASSESSMENT:  Good tolerance to dry needling today, patient reporting better stretch in gastroc on slant board following technique with less tenderness to palpation of the muscle as well. Also demonstrated slight improvement in gait with a more narrow ERIC. She presents with deficient proximal hip control and poor core stability. Will benefit from continued work on general LLE strength and balance. Fatigues quickly. Return to Play: (if applicable)   []  Stage 1: Intro to Strength   []  Stage 2: Return to Run and Strength   []  Stage 3: Return to Jump and Strength   []  Stage 4: Dynamic Strength and Agility   []  Stage 5: Sport Specific Training     []  Ready to Return to Play, Meets All Above Stages   []  Not Ready for Return to Sports   Comments:            Treatment/Activity Tolerance:  [x] Patient tolerated treatment well [] Patient limited by fatique  [] Patient limited by pain  [] Patient limited by other medical complications  [] Other:     Overall Progression Towards Functional goals/ Treatment Progress Update:  [] Patient is progressing as expected towards functional goals listed. [] Progression is slowed due to complexities/Impairments listed. [] Progression has been slowed due to co-morbidities.   [x] Plan just implemented, too soon to assess goals progression <30days   [] Goals require adjustment due to lack of progress  [] Patient is not progressing as expected and requires additional follow up with physician  [] Other    Prognosis for POC: [x] Good [] Fair  [] Poor    Patient requires continued skilled intervention: [x] Yes  [] No        PLAN:   [x] Continue per plan of care [] Alter current plan (see comments)  [] Plan of care initiated [] Hold pending MD visit [] Discharge    Electronically signed by: Lucas Carranza PT    Note: If patient does not return for scheduled/recommended follow up visits, this note will serve as a discharge from care along with the most recent update on progress.

## 2022-04-20 ENCOUNTER — HOSPITAL ENCOUNTER (OUTPATIENT)
Dept: PHYSICAL THERAPY | Age: 77
Setting detail: THERAPIES SERIES
Discharge: HOME | End: 2022-04-20
Payer: MEDICARE

## 2022-04-20 PROCEDURE — 20560 NDL INSJ W/O NJX 1 OR 2 MUSC: CPT

## 2022-04-20 PROCEDURE — 97140 MANUAL THERAPY 1/> REGIONS: CPT

## 2022-04-20 PROCEDURE — 97032 APPL MODALITY 1+ESTIM EA 15: CPT

## 2022-04-20 NOTE — FLOWSHEET NOTE
[facility] [address] [facility]  Phone: [phone] Fax: [phone]    Physical Therapy Treatment Note/ Progress Report:     Date:  2022    Patient Name:  Roselyn Mayberry    :  1945  MRN: [id number]  Restrictions/Precautions:    Medical/Treatment Diagnosis Information:  · Diagnosis: Weakness of left lower extremity  · Treatment Diagnosis: R56.307  Insurance/Certification information:  PT Insurance Information: [facility]  Physician Information:  Referring Practitioner: Nickie Muñoz MD  Plan of care signed (Y/N):     Date of Patient follow up with Physician:      Progress Report: []  Yes  [x]  No     Date Range for reporting period:  Beginning:  3/2/22  Ending:  NA    Progress report due (10 Rx/or 30 days whichever is less):      Recertification due (POC duration/ or 90 days whichever is less): 22     Visit # Insurance Allowable Auth Needed   8 Medicare []Yes   []No     Latex Allergy:  [x]NO      []YES  Preferred Language for Healthcare:   [x]English       []other:  Functional Scale: LEFS - 81% impairment   Date assessed:3/2/22    Pain level:  1/10     SUBJECTIVE:  Patient reports that the dry needling from last session was very effective. She had significant reduction in calf tightness and spasms for several days afterward, and she does feel that it helped with her walking as well. She would like to have this performed again today.      OBJECTIVE:    Observation:    Test measurements:      RESTRICTIONS/PRECAUTIONS: impaired balance, hx of left hip hemiarthroplasty in 2017    Exercises/Interventions:      Therapeutic Ex (64133)  Min: 6 Sets/sec Reps CUES/Notes   Bike 8'    seat 12   Treadmill    1.4 mph amb, bilat UE support, 1.0-3.0 incline   Cybex Gastroc- SL    75#   Leg Press - SL   75#   Cybex Leg Ext - SL   15#   Cybex HS curl - SL   30#   Bridge 10 12 [place] between knees    3-way SLR 1 10     Hook lying LLE Ext 1 10 Cues for form   Supine heel slides + march 1 12 Cues for form   Hook lying bilat hip add iso 5 10 Ball squeeze   Clam - side lying 5 10 No resistance   Supine clam - unilateral 10 10 Maroon band, each   Prone hip ext - over table 1 15 1.5#, each   BOSU lunge 10  10 each   Slide lunge - retro at ledge 2 10     Standing hip abd 2 10 orange   SC marching 2 10 Fwd/bkwd, cane for assist   Heel raises - DL 5 10 Edge of balance machine   TRX squat 2 10 cues   Side stepping 2 15 HHA, green band below knees   Sit to stand  2 10 2 airex on seat, sink for support   Step up - bilat UE support 3 10 6\"   Slant board gastroc stretch 30 3    Lateral step down 2 10 4\"   Standing hip 3-way kicks 5 10 Purple band for TKE   On 2\" step             Manual Intervention  (13794)  Min:  15         Hypervolt percussion STM    L posterior hip, IT band   Hip PROM 5'       IASTM 10'   L post hip, IT band, gastroc             NMR re-education (98965)  Min: 0         Cone step over/back 1 10 Blue, bilat UE support, each   Small REIC stance 30' 3 airex   Staggered stance 30' 3 airex                 Dry needling manual therapy: consisted on the placement of 8 needles in the following muscles:  left med/lat gastroc, soleus. A 50 mm needle was inserted, piston, rotated, and coned to produce intramuscular mobilization. These techniques were used to restore functional range of motion, reduce muscle spasm and induce healing in the corresponding musculature. (98387)  Clean Technique was utilized today while applying Dry needling treatment. The treatment sites where cleaned with 70% solution of  isopropyl alcohol . The PT washed their hands and utilized treatment gloves along with hand  prior to inserting the needles. All needles where removed and discarded in the appropriate sharps container. MD has given verbal and/or written approval for this treatment.      Attended low frequency (1-20Hz) electrical stimulation was utilized in conjunction with Dry Needling:  the Estim was manipulated between all above needles for a period of 10 min. at 4 volts. The low frequency electrical stimulation was used to help reduce muscle spasm and help to interrupt /[place] the pain cycle. (28044)       Therapeutic Exercise and NMR EXR  [x] (46926) Provided verbal/tactile cueing for activities related to strengthening, flexibility, endurance, ROM for improvements in LE, proximal hip, and core control with self care, mobility, lifting, ambulation. [x] (55400) Provided verbal/tactile cueing for activities related to improving balance, coordination, kinesthetic sense, posture, motor skill, proprioception  to assist with LE, proximal hip, and core control in self care, mobility, lifting, ambulation and eccentric single leg control.      NMR and Therapeutic Activities:    [x] (09882 or 15403) Provided verbal/tactile cueing for activities related to improving balance, coordination, kinesthetic sense, posture, motor skill, proprioception and motor activation to allow for proper function of core, proximal hip and LE with self care and ADLs and functional mobility.   [] (11584) Gait Re-education- Provided training and instruction to the patient for proper LE, core and proximal hip recruitment and positioning and eccentric body weight control with ambulation re-education including up and down stairs     Home Exercise Program:    [x] (52647) Reviewed/Progressed HEP activities related to strengthening, flexibility, endurance, ROM of core, proximal hip and LE for functional self-care, mobility, lifting and ambulation/stair navigation   [] (02077)Reviewed/Progressed HEP activities related to improving balance, coordination, kinesthetic sense, posture, motor skill, proprioception of core, proximal hip and LE for self care, mobility, lifting, and ambulation/stair navigation      Manual Treatments:  PROM / STM / Oscillations-Mobs:  G-I, II, III, IV (PA's, Inf., Post.)  [x] (09454) Provided manual therapy to mobilize LE, proximal hip and/or LS spine soft tissue/joints for the purpose of modulating pain, promoting relaxation,  increasing ROM, reducing/eliminating soft tissue swelling/inflammation/restriction, improving soft tissue extensibility and allowing for proper ROM for normal function with self care, mobility, lifting and ambulation. Modalities:     [] GAME READY (VASO)- for significant edema, swelling, pain control. Charges:  Timed Code Treatment Minutes: 31   Total Treatment Minutes: 44      [] EVAL (LOW) 46376 (typically 20 minutes face-to-face)  [] EVAL (MOD) 35762 (typically 30 minutes face-to-face)  [] EVAL (HIGH) 78917 (typically 45 minutes face-to-face)  [] RE-EVAL     [] PM(96578) x     [x] DRY NEEDLE 1 OR 2 MUSCLES  [] NMR (27242) x     [] DRY NEEDLE 3+ MUSCLES  [x] Manual (96549) x 1     [] TA (76465) x     [] Mech Traction (69307)  [x] ES(attended) (07141)     [] ES (un) (07992):   [] VASO (27803)  [] Other:    If [facility] Please Indicate Time In/Out  CPT Code Time in Time out                                   GOALS:  Patient stated goal: reduce hip pain, walk without cane  [] Progressing: [] Met: [] Not Met: [] Adjusted    Therapist goals for Patient:   Short Term Goals: To be achieved in: 2 weeks  1. Independent in HEP and progression per patient tolerance, in order to prevent re-injury. [] Progressing: [] Met: [] Not Met: [] Adjusted  2. Patient will have a decrease in pain to facilitate improvement in movement, function, and ADLs as indicated by Functional Deficits. [] Progressing: [] Met: [] Not Met: [] Adjusted    Long Term Goals: To be achieved in: 6 weeks  1. Disability index score of 65% or less for the LEFS to assist with reaching prior level of function. [] Progressing: [] Met: [] Not Met: [] Adjusted  2. Patient will demonstrate increased AROM to [facility] to allow for proper joint functioning as indicated by patients Functional Deficits.    [] Progressing: [] Met: [] Not Met: [] Adjusted  3. Patient will demonstrate an increase in Strength to good proximal hip strength and control, within 5lb HHD in LE to allow for proper functional mobility as indicated by patients Functional Deficits. [] Progressing: [] Met: [] Not Met: [] Adjusted  4. Patient will return to sleeping, sitting, standing, walking, squatting, stairs functional activities without increased symptoms or restriction. [] Progressing: [] Met: [] Not Met: [] Adjusted    ASSESSMENT:  Good tolerance to dry needling today, again reporting improvement in stiffness at conclusion. Also demonstrated slight improvement in gait with a more narrow ERIC. She presents with deficient proximal hip control and poor core stability. Will benefit from continued work on general LLE strength and balance. Fatigues quickly. Return to Play: (if applicable)   []  Stage 1: Intro to Strength   []  Stage 2: Return to Run and Strength   []  Stage 3: Return to Jump and Strength   []  Stage 4: Dynamic Strength and Agility   []  Stage 5: Sport Specific Training     []  Ready to Return to Play, Meets All Above Stages   []  Not Ready for Return to Sports   Comments:            Treatment/Activity Tolerance:  [x] Patient tolerated treatment well [] Patient limited by fatique  [] Patient limited by pain  [] Patient limited by other medical complications  [] Other:     Overall Progression Towards Functional goals/ Treatment Progress Update:  [] Patient is progressing as expected towards functional goals listed. [] Progression is slowed due to complexities/Impairments listed. [] Progression has been slowed due to co-morbidities.   [x] Plan just implemented, too soon to assess goals progression <30days   [] Goals require adjustment due to lack of progress  [] Patient is not progressing as expected and requires additional follow up with physician  [] Other    Prognosis for POC: [x] Good [] Fair  [] Poor    Patient requires continued skilled intervention: [x] Yes  [] No        PLAN:   [x] Continue per plan of care [] Alter current plan (see comments)  [] Plan of care initiated [] Hold pending MD visit [] Discharge    Electronically signed by: Rosa Maria Burns PT    Note: If patient does not return for scheduled/recommended follow up visits, this note will serve as a discharge from care along with the most recent update on progress.

## 2022-04-27 ENCOUNTER — HOSPITAL ENCOUNTER (OUTPATIENT)
Dept: PHYSICAL THERAPY | Age: 77
Setting detail: THERAPIES SERIES
Discharge: HOME | End: 2022-04-27
Payer: MEDICARE

## 2022-04-27 PROCEDURE — 97140 MANUAL THERAPY 1/> REGIONS: CPT

## 2022-04-27 PROCEDURE — 20560 NDL INSJ W/O NJX 1 OR 2 MUSC: CPT

## 2022-04-27 PROCEDURE — 97032 APPL MODALITY 1+ESTIM EA 15: CPT

## 2022-04-27 NOTE — FLOWSHEET NOTE
[place] [address] [facility]  Phone: [phone] Fax: [phone]    Physical Therapy Treatment Note/ Progress Report:     Date:  2022    Patient Name:  Katia Olivares    :  1945  MRN: [id number]  Restrictions/Precautions:    Medical/Treatment Diagnosis Information:  · Diagnosis: Weakness of left lower extremity  · Treatment Diagnosis: E23.328  Insurance/Certification information:  PT Insurance Information: [facility]  Physician Information:  Referring Practitioner: Penny Guevara MD  Plan of care signed (Y/N):     Date of Patient follow up with Physician:      Progress Report: []  Yes  [x]  No     Date Range for reporting period:  Beginning:  3/2/22  Ending:  NA    Progress report due (10 Rx/or 30 days whichever is less): 80     Recertification due (POC duration/ or 90 days whichever is less): 22     Visit # Insurance Allowable Auth Needed   9 Medicare []Yes   []No     Latex Allergy:  [x]NO      []YES  Preferred Language for Healthcare:   [x]English       []other:  Functional Scale: LEFS - 81% impairment   Date assessed:3/2/22    Pain level:  1/10     SUBJECTIVE:  Patient states that she is pleased with the effects of the needling, has been noticing less of the shakiness in the left calf. States that she didn't really feel that worsen again until yesterday. Would like to have this performed again today.      OBJECTIVE:    Observation:    Test measurements:      RESTRICTIONS/PRECAUTIONS: impaired balance, hx of left hip hemiarthroplasty in 2017    Exercises/Interventions:      Therapeutic Ex (41135)  Min: 6 Sets/sec Reps CUES/Notes   Bike 8'    seat 12   Treadmill    1.4 mph amb, bilat UE support, 1.0-3.0 incline   Cybex Gastroc- SL    75#   Leg Press - SL   75#   Cybex Leg Ext - SL   15#   Cybex HS curl - SL   30#   Bridge 10 12 [place] between knees    3-way SLR 1 10     Hook lying LLE Ext 1 10 Cues for form   Supine heel slides + march 1 12 Cues for form   Hook lying bilat hip add iso 5 10 Ball squeeze   Clam - side lying 5 10 No resistance   Supine clam - unilateral 10 10 Maroon band, each   Prone hip ext - over table 1 15 1.5#, each   BOSU lunge 10  10 each   Slide lunge - retro at ledge 2 10     Standing hip abd 2 10 orange   SC marching 2 10 Fwd/bkwd, cane for assist   Heel raises - DL 5 10 Edge of balance machine   TRX squat 2 10 cues   Side stepping 2 15 HHA, green band below knees   Sit to stand  2 10 2 airex on seat, sink for support   Step up - bilat UE support 3 10 6\"   Slant board gastroc stretch 30 3    Lateral step down 2 10 4\"   Standing hip 3-way kicks 5 10 Purple band for TKE   On 2\" step             Manual Intervention  (24984)  Min:  15         Hypervolt percussion STM    L posterior hip, IT band   Hip PROM 5'       IASTM 10'   L post hip, IT band, gastroc             NMR re-education (25076)  Min: 0         Cone step over/back 1 10 Blue, bilat UE support, each   Small ERIC stance 30' 3 airex   Staggered stance 30' 3 airex                 Dry needling manual therapy: consisted on the placement of 8 needles in the following muscles:  left med/lat gastroc, soleus. A 60 mm needle was inserted, piston, rotated, and coned to produce intramuscular mobilization. These techniques were used to restore functional range of motion, reduce muscle spasm and induce healing in the corresponding musculature. (91782)  Clean Technique was utilized today while applying Dry needling treatment. The treatment sites where cleaned with 70% solution of  isopropyl alcohol . The PT washed their hands and utilized treatment gloves along with hand  prior to inserting the needles. All needles where removed and discarded in the appropriate sharps container. MD has given verbal and/or written approval for this treatment.      Attended low frequency (1-20Hz) electrical stimulation was utilized in conjunction with Dry Needling: the Estim was manipulated between all above needles for a period of 10 min. at 4 volts. The low frequency electrical stimulation was used to help reduce muscle spasm and help to interrupt /[place] the pain cycle. (64753)       Therapeutic Exercise and NMR EXR  [x] (86108) Provided verbal/tactile cueing for activities related to strengthening, flexibility, endurance, ROM for improvements in LE, proximal hip, and core control with self care, mobility, lifting, ambulation. [x] (66746) Provided verbal/tactile cueing for activities related to improving balance, coordination, kinesthetic sense, posture, motor skill, proprioception  to assist with LE, proximal hip, and core control in self care, mobility, lifting, ambulation and eccentric single leg control.      NMR and Therapeutic Activities:    [x] (11585 or 68206) Provided verbal/tactile cueing for activities related to improving balance, coordination, kinesthetic sense, posture, motor skill, proprioception and motor activation to allow for proper function of core, proximal hip and LE with self care and ADLs and functional mobility.   [] (63791) Gait Re-education- Provided training and instruction to the patient for proper LE, core and proximal hip recruitment and positioning and eccentric body weight control with ambulation re-education including up and down stairs     Home Exercise Program:    [x] (07163) Reviewed/Progressed HEP activities related to strengthening, flexibility, endurance, ROM of core, proximal hip and LE for functional self-care, mobility, lifting and ambulation/stair navigation   [] (43294)Reviewed/Progressed HEP activities related to improving balance, coordination, kinesthetic sense, posture, motor skill, proprioception of core, proximal hip and LE for self care, mobility, lifting, and ambulation/stair navigation      Manual Treatments:  PROM / STM / Oscillations-Mobs:  G-I, II, III, IV (PA's, Inf., Post.)  [x] (32000) Provided manual therapy to mobilize LE, proximal hip and/or LS spine soft tissue/joints for the purpose of modulating pain, promoting relaxation,  increasing ROM, reducing/eliminating soft tissue swelling/inflammation/restriction, improving soft tissue extensibility and allowing for proper ROM for normal function with self care, mobility, lifting and ambulation. Modalities:     [] GAME READY (VASO)- for significant edema, swelling, pain control. Charges:  Timed Code Treatment Minutes: 31   Total Treatment Minutes: 44      [] EVAL (LOW) 99248 (typically 20 minutes face-to-face)  [] EVAL (MOD) 83585 (typically 30 minutes face-to-face)  [] EVAL (HIGH) 70654 (typically 45 minutes face-to-face)  [] RE-EVAL     [] HI(32189) x     [x] DRY NEEDLE 1 OR 2 MUSCLES  [] NMR (53038) x     [] DRY NEEDLE 3+ MUSCLES  [x] Manual (07270) x 1     [] TA (92298) x     [] Mech Traction (00870)  [x] ES(attended) (67323)     [] ES (un) (06165):   [] VASO (59701)  [] Other:    If [facility] Please Indicate Time In/Out  CPT Code Time in Time out                                   GOALS:  Patient stated goal: reduce hip pain, walk without cane  [] Progressing: [] Met: [] Not Met: [] Adjusted    Therapist goals for Patient:   Short Term Goals: To be achieved in: 2 weeks  1. Independent in HEP and progression per patient tolerance, in order to prevent re-injury. [] Progressing: [] Met: [] Not Met: [] Adjusted  2. Patient will have a decrease in pain to facilitate improvement in movement, function, and ADLs as indicated by Functional Deficits. [] Progressing: [] Met: [] Not Met: [] Adjusted    Long Term Goals: To be achieved in: 6 weeks  1. Disability index score of 65% or less for the LEFS to assist with reaching prior level of function. [] Progressing: [] Met: [] Not Met: [] Adjusted  2. Patient will demonstrate increased AROM to [facility] to allow for proper joint functioning as indicated by patients Functional Deficits.    [] Progressing: [] Met: [] Not Met: [] Adjusted  3. Patient will demonstrate an increase in Strength to good proximal hip strength and control, within 5lb HHD in LE to allow for proper functional mobility as indicated by patients Functional Deficits. [] Progressing: [] Met: [] Not Met: [] Adjusted  4. Patient will return to sleeping, sitting, standing, walking, squatting, stairs functional activities without increased symptoms or restriction. [] Progressing: [] Met: [] Not Met: [] Adjusted    ASSESSMENT:  Good tolerance to dry needling today, again reporting improvement in stiffness at conclusion. Also demonstrated slight improvement in gait with a more narrow ERIC. She presents with deficient proximal hip control and poor core stability. Will benefit from continued work on general LLE strength and balance. Fatigues quickly. Return to Play: (if applicable)   []  Stage 1: Intro to Strength   []  Stage 2: Return to Run and Strength   []  Stage 3: Return to Jump and Strength   []  Stage 4: Dynamic Strength and Agility   []  Stage 5: Sport Specific Training     []  Ready to Return to Play, Meets All Above Stages   []  Not Ready for Return to Sports   Comments:            Treatment/Activity Tolerance:  [x] Patient tolerated treatment well [] Patient limited by fatique  [] Patient limited by pain  [] Patient limited by other medical complications  [] Other:     Overall Progression Towards Functional goals/ Treatment Progress Update:  [] Patient is progressing as expected towards functional goals listed. [] Progression is slowed due to complexities/Impairments listed. [] Progression has been slowed due to co-morbidities.   [x] Plan just implemented, too soon to assess goals progression <30days   [] Goals require adjustment due to lack of progress  [] Patient is not progressing as expected and requires additional follow up with physician  [] Other    Prognosis for POC: [x] Good [] Fair  [] Poor    Patient requires continued skilled intervention: [x] Yes  [] No        PLAN:   [x] Continue per plan of care [] Alter current plan (see comments)  [] Plan of care initiated [] Hold pending MD visit [] Discharge    Electronically signed by: Geraldine Joseph PT    Note: If patient does not return for scheduled/recommended follow up visits, this note will serve as a discharge from care along with the most recent update on progress.

## 2022-05-04 ENCOUNTER — HOSPITAL ENCOUNTER (OUTPATIENT)
Dept: PHYSICAL THERAPY | Age: 77
Setting detail: THERAPIES SERIES
Discharge: HOME | End: 2022-05-04
Payer: MEDICARE

## 2022-05-04 PROCEDURE — 97110 THERAPEUTIC EXERCISES: CPT

## 2022-05-04 PROCEDURE — 97112 NEUROMUSCULAR REEDUCATION: CPT

## 2022-05-04 NOTE — FLOWSHEET NOTE
[facility] [address] [facility]  Phone: [phone] Fax: [phone]    Physical Therapy Treatment Note/ Progress Report:     Date:  2022    Patient Name:  Carmelita Babinski    :  1945  MRN: [id number]  Restrictions/Precautions:    Medical/Treatment Diagnosis Information:  · Diagnosis: Weakness of left lower extremity  · Treatment Diagnosis: W05.961  Insurance/Certification information:  PT Insurance Information: [facility]  Physician Information:  Referring Practitioner: Sravanthi Lopez MD  Plan of care signed (Y/N):     Date of Patient follow up with Physician:      Progress Report: []  Yes  [x]  No     Date Range for reporting period:  Beginning:  3/2/22  Ending:  NA    Progress report due (10 Rx/or 30 days whichever is less):      Recertification due (POC duration/ or 90 days whichever is less): 22     Visit # Insurance Allowable Auth Needed   10 Medicare []Yes   []No     Latex Allergy:  [x]NO      []YES  Preferred Language for Healthcare:   [x]English       []other:  Functional Scale: LEFS - 81% impairment   Date assessed:3/2/22    Pain level:  1/10     SUBJECTIVE:  Patient states that the needling has really helped with the muscle tightness/cramping in her left calf, and she also feels that the shakiness of that leg has improved some as well. Ready to attempt working more on strength/balance today.      OBJECTIVE:    Observation:    Test measurements:      RESTRICTIONS/PRECAUTIONS: impaired balance, hx of left hip hemiarthroplasty in 2017    Exercises/Interventions:      Therapeutic Ex (34969)  Min: 25 Sets/sec Reps CUES/Notes   Bike 8'    seat 12   Treadmill    1.4 mph amb, bilat UE support, 1.0-3.0 incline   Cybex Gastroc- SL    75#   Leg Press - SL   75#   Cybex Leg Ext - SL   15#   Cybex HS curl - SL   30#   Bridge 10 12 [place] between knees    3-way SLR 1 10     Hook lying LLE Ext 1 10 Cues for form   Supine heel slides + march 1 12 Cues for form   Hook lying bilat hip add iso 5 10 Ball squeeze   Clam - side lying 5 10 No resistance   Supine clam - unilateral 10 10 Maroon band, each   Prone hip ext - over table 1 15 1.5#, each   BOSU lunge 10  10 each   Slide lunge - retro at ledge 2 10     Standing hip abd 2 10 orange   SC marching 2 10 Fwd/bkwd, cane for assist   Heel raises - DL 5 10 Edge of balance machine   Wobble board - 4 way, circles 2 10 Cues   Side stepping 2 15 HHA, green band below knees   Sit to stand - hi/low table 1 15 LLE in front   Step up - bilat UE support 3 10 6\"   Slant board gastroc stretch 30 3    Lateral step down 2 10 4\"   Standing hip 3-way kicks 5 10 Purple band for TKE   On 2\" step             Manual Intervention  (16420)  Min:  0         Hypervolt percussion STM    L posterior hip, IT band   Hip PROM 5'       IASTM 10'   L post hip, IT band, gastroc             NMR re-education (18014)  Min: 20         Biodex balance      Cone step over/back 1 10 Blue, bilat UE support, each   Small ERIC stance 30' 3 airex   Staggered stance 30' 3 airex                     Therapeutic Exercise and NMR EXR  [x] (18541) Provided verbal/tactile cueing for activities related to strengthening, flexibility, endurance, ROM for improvements in LE, proximal hip, and core control with self care, mobility, lifting, ambulation. [x] (42043) Provided verbal/tactile cueing for activities related to improving balance, coordination, kinesthetic sense, posture, motor skill, proprioception  to assist with LE, proximal hip, and core control in self care, mobility, lifting, ambulation and eccentric single leg control.      NMR and Therapeutic Activities:    [x] (03428 or 67125) Provided verbal/tactile cueing for activities related to improving balance, coordination, kinesthetic sense, posture, motor skill, proprioception and motor activation to allow for proper function of core, proximal hip and LE with self care and ADLs and functional mobility.   [] (89030) Gait Re-education- Provided training and instruction to the patient for proper LE, core and proximal hip recruitment and positioning and eccentric body weight control with ambulation re-education including up and down stairs     Home Exercise Program:    [x] (78464) Reviewed/Progressed HEP activities related to strengthening, flexibility, endurance, ROM of core, proximal hip and LE for functional self-care, mobility, lifting and ambulation/stair navigation   [] (73974)Reviewed/Progressed HEP activities related to improving balance, coordination, kinesthetic sense, posture, motor skill, proprioception of core, proximal hip and LE for self care, mobility, lifting, and ambulation/stair navigation      Manual Treatments:  PROM / STM / Oscillations-Mobs:  G-I, II, III, IV (PA's, Inf., Post.)  [x] (54248) Provided manual therapy to mobilize LE, proximal hip and/or LS spine soft tissue/joints for the purpose of modulating pain, promoting relaxation,  increasing ROM, reducing/eliminating soft tissue swelling/inflammation/restriction, improving soft tissue extensibility and allowing for proper ROM for normal function with self care, mobility, lifting and ambulation. Modalities:     [] GAME READY (VASO)- for significant edema, swelling, pain control.      Charges:  Timed Code Treatment Minutes: 31   Total Treatment Minutes: 44      [] EVAL (LOW) 57149 (typically 20 minutes face-to-face)  [] EVAL (MOD) 04721 (typically 30 minutes face-to-face)  [] EVAL (HIGH) 53510 (typically 45 minutes face-to-face)  [] RE-EVAL     [x] AH(19726) x  2   [] DRY NEEDLE 1 OR 2 MUSCLES  [x] NMR (02997) x 1    [] DRY NEEDLE 3+ MUSCLES  [] Manual (08327) x      [] TA (73507) x     [] Mech Traction (43581)  [] ES(attended) (09333)     [] ES (un) (55500):   [] VASO (65767)  [] Other:    If [facility] Please Indicate Time In/Out  CPT Code Time in Time out                                   GOALS:  Patient stated goal: reduce hip pain, walk without cane  [] Progressing: [] Met: [] Not Met: [] Adjusted    Therapist goals for Patient:   Short Term Goals: To be achieved in: 2 weeks  1. Independent in HEP and progression per patient tolerance, in order to prevent re-injury. [] Progressing: [] Met: [] Not Met: [] Adjusted  2. Patient will have a decrease in pain to facilitate improvement in movement, function, and ADLs as indicated by Functional Deficits. [] Progressing: [] Met: [] Not Met: [] Adjusted    Long Term Goals: To be achieved in: 6 weeks  1. Disability index score of 65% or less for the LEFS to assist with reaching prior level of function. [] Progressing: [] Met: [] Not Met: [] Adjusted  2. Patient will demonstrate increased AROM to [facility] to allow for proper joint functioning as indicated by patients Functional Deficits. [] Progressing: [] Met: [] Not Met: [] Adjusted  3. Patient will demonstrate an increase in Strength to good proximal hip strength and control, within 5lb HHD in LE to allow for proper functional mobility as indicated by patients Functional Deficits. [] Progressing: [] Met: [] Not Met: [] Adjusted  4. Patient will return to sleeping, sitting, standing, walking, squatting, stairs functional activities without increased symptoms or restriction. [] Progressing: [] Met: [] Not Met: [] Adjusted    ASSESSMENT:  Appropriate fatigue at conclusion with no increase in pain. Poor sit to stand strategy, tends to fall backward or extend knees to quickly with overuse of hamstrings. She presents with deficient proximal hip control and poor core stability. Will benefit from continued work on general LLE strength and balance. Fatigues quickly.      Return to Play: (if applicable)   []  Stage 1: Intro to Strength   []  Stage 2: Return to Run and Strength   []  Stage 3: Return to Jump and Strength   []  Stage 4: Dynamic Strength and Agility   []  Stage 5: Sport Specific Training     []  Ready to Return to Play, [employer] All Above Stages   []  Not Ready for Return to Sports   Comments:            Treatment/Activity Tolerance:  [x] Patient tolerated treatment well [] Patient limited by fatique  [] Patient limited by pain  [] Patient limited by other medical complications  [] Other:     Overall Progression Towards Functional goals/ Treatment Progress Update:  [] Patient is progressing as expected towards functional goals listed. [] Progression is slowed due to complexities/Impairments listed. [] Progression has been slowed due to co-morbidities. [x] Plan just implemented, too soon to assess goals progression <30days   [] Goals require adjustment due to lack of progress  [] Patient is not progressing as expected and requires additional follow up with physician  [] Other    Prognosis for POC: [x] Good [] Fair  [] Poor    Patient requires continued skilled intervention: [x] Yes  [] No        PLAN:   [x] Continue per plan of care [] Alter current plan (see comments)  [] Plan of care initiated [] Hold pending MD visit [] Discharge    Electronically signed by: Joselo Donis PT    Note: If patient does not return for scheduled/recommended follow up visits, this note will serve as a discharge from care along with the most recent update on progress.

## 2022-05-11 ENCOUNTER — HOSPITAL ENCOUNTER (OUTPATIENT)
Dept: PHYSICAL THERAPY | Age: 77
Setting detail: THERAPIES SERIES
Discharge: HOME | End: 2022-05-11
Payer: MEDICARE

## 2022-05-11 PROCEDURE — 97110 THERAPEUTIC EXERCISES: CPT

## 2022-05-11 PROCEDURE — 97032 APPL MODALITY 1+ESTIM EA 15: CPT

## 2022-05-11 PROCEDURE — 20560 NDL INSJ W/O NJX 1 OR 2 MUSC: CPT

## 2022-05-11 NOTE — PLAN OF CARE
[place] [address] [address]  Phone: [phone] Fax: [phone]        Physical Therapy Re-Certification Plan of Care/MD UPDATE      Dear  Jorge Yousif MD,    We had the pleasure of treating the following patient for physical therapy services at [address] [place] [address]. A summary of our findings can be found in the updated assessment below. This includes our plan of care. If you have any questions or concerns regarding these findings, please do not hesitate to contact me at the office phone number checked above.   Thank you for the referral.     Physician Signature:________________________________Date:__________________  By signing above (or electronic signature), therapists plan is approved by physician    Date Range Of Visits: 3/2/22 - 22  Total Visits to Date: 6  Overall Response to Treatment:   [x]Patient is responding well to treatment and improvement is noted with regards  to goals   []Patient should continue to improve in reasonable time if they continue HEP   []Patient has plateaued and is no longer responding to skilled PT intervention    []Patient is getting worse and would benefit from return to referring MD   []Patient unable to adhere to initial POC   []Other:                     Physical Therapy Treatment Note/ Progress Report:     Date:  2022    Patient Name:  Raul Aleman    :  1945  MRN: [id number]  Restrictions/Precautions:    Medical/Treatment Diagnosis Information:  · Diagnosis: Weakness of left lower extremity  · Treatment Diagnosis: U36.634  Insurance/Certification information:  PT Insurance Information: [facility]  Physician Information:  Referring Practitioner: Jorge Yousif MD  Plan of care signed (Y/N):     Date of Patient follow up with Physician:      Progress Report: [x]  Yes  []  No     Date Range for reporting period:  Beginning:  3/2/22  Endin22    Progress report due (10 Rx/or 30 days whichever is less): 8/10/19     Recertification due (POC duration/ or 90 days whichever is less): 6/22/22     Visit # Insurance Allowable Auth Needed   11 Medicare []Yes   [x]No     Latex Allergy:  [x]NO      []YES  Preferred Language for Healthcare:   [x]English       []other:  Functional Scale: LEFS - 81% impairment   Date assessed:3/2/22    Pain level:  1/10     SUBJECTIVE:  Patient reports that she saw her doctor since last session and he is pleased that the needling seems to be helping. He told her she should continue PT visits with needling as needed for as long as she feels she should. Patient states that she is not feeling the constant shakiness in her LLE like she used to, now it happens more sporadically. She does feel that her left calf has been feeling \"heavy\" over the past couple days. Would like to try needling again today.     OBJECTIVE:    Observation: Gait antalgic with standard cane, wide ERIC, short step length   Test measurements:              ROM Left Right   Hip Flexion 110 WNL   Hip Abduction WNL WNL   Hip ER 30 WNL   Hip IR WNL WNL   Hip Ext Decreased WNL   Strength  Left Right   Hip Abd 7.9 lb 8.9 lb   Hip Ext NT NT   Knee Ext 38.9 lb 49.2 lb   Knee Flex 35.3 lb  43.8 lb            RESTRICTIONS/PRECAUTIONS: impaired balance, hx of left hip hemiarthroplasty in 2017    Exercises/Interventions:      Therapeutic Ex (77616)  Min: 20 Sets/sec Reps CUES/Notes   Bike 8'    seat 12   Treadmill    1.4 mph amb, bilat UE support, 1.0-3.0 incline   Cybex Gastroc- SL    75#   Leg Press - SL   75#   Cybex Leg Ext - SL   15#   Cybex HS curl - SL   30#   Bridge 10 12 [place] between [employer]   3-way SLR 1 10     Hook lying LLE Ext 1 10 Cues for form   Supine heel slides + march 1 12 Cues for form   Hook lying bilat hip add iso 5 10 Ball squeeze   Clam - side lying 5 10 No resistance   Supine clam - unilateral 10 10 Maroon band, each   Prone hip ext - over table 1 15 1.5#, each BOSU lunge 10  10 each   Slide lunge - retro at ledge 2 10     Standing hip abd 2 10 orange   SC marching 2 10 Fwd/bkwd, cane for assist   Heel raises - DL 5 10 Edge of balance machine   Wobble board - 4 way, circles 2 10 Cues   Side stepping 2 15 HHA, green band below knees   Sit to stand - hi/low table 1 15 LLE in front   Step up - bilat UE support 3 10 6\"   Slant board gastroc stretch 30 3    Lateral step down 2 10 4\"   Standing hip 3-way kicks 5 10 Purple band for TKE   On 2\" step             Manual Intervention  (07916)  Min:  0         Hypervolt percussion STM    L posterior hip, IT band   Hip PROM 5'       IASTM 10'   L post hip, IT band, gastroc             NMR re-education (95918)  Min: 0         Biodex balance      Cone step over/back 1 10 Blue, bilat UE support, each   Small ERIC stance 30' 3 airex   Staggered stance 30' 3 airex                 Dry needling manual therapy: consisted on the placement of 8 needles in the following muscles:  left med/lat gastroc, soleus. A 60 mm needle was inserted, piston, rotated, and coned to produce intramuscular mobilization. These techniques were used to restore functional range of motion, reduce muscle spasm and induce healing in the corresponding musculature. (92105)  Clean Technique was utilized today while applying Dry needling treatment. The treatment sites where cleaned with 70% solution of  isopropyl alcohol . The PT washed their hands and utilized treatment gloves along with hand  prior to inserting the needles. All needles where removed and discarded in the appropriate sharps container. MD has given verbal and/or written approval for this treatment. Attended low frequency (1-20Hz) electrical stimulation was utilized in conjunction with Dry Needling:  the Estim was manipulated between all above needles for a period of 10 min. at 3-4 volts.   The low frequency electrical stimulation was used to help reduce muscle spasm and help to interrupt /[place] the pain cycle. (36381)     Therapeutic Exercise and NMR EXR  [x] (25087) Provided verbal/tactile cueing for activities related to strengthening, flexibility, endurance, ROM for improvements in LE, proximal hip, and core control with self care, mobility, lifting, ambulation. [x] (94867) Provided verbal/tactile cueing for activities related to improving balance, coordination, kinesthetic sense, posture, motor skill, proprioception  to assist with LE, proximal hip, and core control in self care, mobility, lifting, ambulation and eccentric single leg control.      NMR and Therapeutic Activities:    [x] (86610 or 80174) Provided verbal/tactile cueing for activities related to improving balance, coordination, kinesthetic sense, posture, motor skill, proprioception and motor activation to allow for proper function of core, proximal hip and LE with self care and ADLs and functional mobility.   [] (91425) Gait Re-education- Provided training and instruction to the patient for proper LE, core and proximal hip recruitment and positioning and eccentric body weight control with ambulation re-education including up and down stairs     Home Exercise Program:    [x] (31915) Reviewed/Progressed HEP activities related to strengthening, flexibility, endurance, ROM of core, proximal hip and LE for functional self-care, mobility, lifting and ambulation/stair navigation   [] (11639)Reviewed/Progressed HEP activities related to improving balance, coordination, kinesthetic sense, posture, motor skill, proprioception of core, proximal hip and LE for self care, mobility, lifting, and ambulation/stair navigation      Manual Treatments:  PROM / STM / Oscillations-Mobs:  G-I, II, III, IV (PA's, Inf., Post.)  [x] (40149) Provided manual therapy to mobilize LE, proximal hip and/or LS spine soft tissue/joints for the purpose of modulating pain, promoting relaxation,  increasing ROM, reducing/eliminating soft tissue swelling/inflammation/restriction, improving soft tissue extensibility and allowing for proper ROM for normal function with self care, mobility, lifting and ambulation. Modalities:     [] GAME READY (VASO)- for significant edema, swelling, pain control. Charges:  Timed Code Treatment Minutes: 30   Total Treatment Minutes: 40      [] EVAL (LOW) 42582 (typically 20 minutes face-to-face)  [] EVAL (MOD) 97224 (typically 30 minutes face-to-face)  [] EVAL (HIGH) 33273 (typically 45 minutes face-to-face)  [] RE-EVAL     [x] IU(49019) x  1   [x] DRY NEEDLE 1 OR 2 MUSCLES  [] NMR (18621) x     [] DRY NEEDLE 3+ MUSCLES  [] Manual (40970) x      [] TA (76199) x     [] Mech Traction (62259)  [x] ES(attended) (66798)     [] ES (un) (19885):   [] VASO (65999)  [] Other:    If [facility] Please Indicate Time In/Out  CPT Code Time in Time out                                   GOALS:  Patient stated goal: reduce hip pain, walk without cane  [x] Progressing: [] Met: [] Not Met: [] Adjusted    Therapist goals for Patient:   Short Term Goals: To be achieved in: 2 weeks  1. Independent in HEP and progression per patient tolerance, in order to prevent re-injury. [] Progressing: [x] Met: [] Not Met: [] Adjusted  2. Patient will have a decrease in pain to facilitate improvement in movement, function, and ADLs as indicated by Functional Deficits. [] Progressing: [x] Met: [] Not Met: [] Adjusted    Long Term Goals: To be achieved in: 6 weeks  1. Disability index score of 65% or less for the LEFS to assist with reaching prior level of function. [x] Progressing: [] Met: [] Not Met: [] Adjusted  2. Patient will demonstrate increased AROM to [facility] to allow for proper joint functioning as indicated by patients Functional Deficits. [x] Progressing: [] Met: [] Not Met: [] Adjusted  3.  Patient will demonstrate an increase in Strength to good proximal hip strength and control, within 5lb HHD in LE to allow for proper functional mobility as indicated by patients Functional Deficits. [x] Progressing: [] Met: [] Not Met: [] Adjusted  4. Patient will return to sleeping, sitting, standing, walking, squatting, stairs functional activities without increased symptoms or restriction. [x] Progressing: [] Met: [] Not Met: [] Adjusted    ASSESSMENT:  Patient has attended 11 physical therapy visits from 3/2/22 through 5/11/22 for treatment of left leg weakness and hip pain. Patient's hip pain has improved significantly but LLE strength still very deficient. She has a poor sit to stand strategy, tends to fall backward or extend knees to quickly with overuse of hamstrings. Needs increased quad and glute strength especially. She presents with deficient proximal hip control and poor core stability. Will benefit from continued work on general LLE strength and balance. Has also benefited from trigger point dry needling to the left calf with regard to the \"shakiness\" she has been experiencing often. Patient will benefit from continued physical therapy to address these impairments and improve overall function. Return to Play: (if applicable)   []  Stage 1: Intro to Strength   []  Stage 2: Return to Run and Strength   []  Stage 3: Return to Jump and Strength   []  Stage 4: Dynamic Strength and Agility   []  Stage 5: Sport Specific Training     []  Ready to Return to Play, Meets All Above Stages   []  Not Ready for Return to Sports   Comments:            Treatment/Activity Tolerance:  [x] Patient tolerated treatment well [] Patient limited by fatique  [] Patient limited by pain  [] Patient limited by other medical complications  [] Other:     Overall Progression Towards Functional goals/ Treatment Progress Update:  [x] Patient is progressing as expected towards functional goals listed. [] Progression is slowed due to complexities/Impairments listed. [] Progression has been slowed due to co-morbidities.   [] Plan just implemented, too soon to assess goals progression <30days   [] Goals require adjustment due to lack of progress  [] Patient is not progressing as expected and requires additional follow up with physician  [] Other    Prognosis for POC: [x] Good [] Fair  [] Poor    Patient requires continued skilled intervention: [x] Yes  [] No        PLAN: Continue PT 1-2x/week for 6 weeks  [x] Continue per plan of care [] Alter current plan (see comments)  [] Plan of care initiated [] Hold pending MD visit [] Discharge    Electronically signed by: Raman Gross, PT    Note: If patient does not return for scheduled/recommended follow up visits, this note will serve as a discharge from care along with the most recent update on progress.

## 2022-05-18 ENCOUNTER — HOSPITAL ENCOUNTER (OUTPATIENT)
Dept: PHYSICAL THERAPY | Age: 77
Setting detail: THERAPIES SERIES
Discharge: HOME | End: 2022-05-18
Payer: MEDICARE

## 2022-05-18 NOTE — FLOWSHEET NOTE
[facility]    Physical Therapy  Cancellation/No-show Note  Patient Name:  Magy Gibson  :  1945   Date:  2022  Cancelled visits to date: 2  No-shows to date: 0    For today's appointment patient:  [x]  Cancelled  []  Rescheduled appointment  []  No-show     Reason given by patient:  []  Patient ill  []  Conflicting appointment  []  No transportation    []  Conflict with work  []  No reason given  [x]  Other:     Comments:  Patient states that she has already fallen almost 3 times today, something seems off.       Electronically signed by:  Chantelle Ward PT

## 2022-05-25 ENCOUNTER — HOSPITAL ENCOUNTER (OUTPATIENT)
Dept: PHYSICAL THERAPY | Age: 77
Setting detail: THERAPIES SERIES
Discharge: HOME | End: 2022-05-25
Payer: MEDICARE

## 2022-05-25 PROCEDURE — 97110 THERAPEUTIC EXERCISES: CPT

## 2022-05-25 PROCEDURE — 97032 APPL MODALITY 1+ESTIM EA 15: CPT

## 2022-05-25 PROCEDURE — 20560 NDL INSJ W/O NJX 1 OR 2 MUSC: CPT

## 2022-05-25 NOTE — FLOWSHEET NOTE
[place] [address] [address] [place]  Phone: [phone] Fax: [phone]              Physical Therapy Treatment Note/ Progress Report:     Date:  2022    Patient Name:  Carmelita Babinski    :  1945  MRN: [id number]  Restrictions/Precautions:    Medical/Treatment Diagnosis Information:  · Diagnosis: Weakness of left lower extremity  · Treatment Diagnosis: T03.198  Insurance/Certification information:  PT Insurance Information: [facility]  Physician Information:  Referring Practitioner: Sravanhti Lopez MD  Plan of care signed (Y/N):     Date of Patient follow up with Physician:      Progress Report: [x]  Yes  []  No     Date Range for reporting period:  Beginning:  3/2/22  Endin22    Progress report due (10 Rx/or 30 days whichever is less):      Recertification due (POC duration/ or 90 days whichever is less): 22     Visit # Insurance Allowable Auth Needed   12 Medicare []Yes   [x]No     Latex Allergy:  [x]NO      []YES  Preferred Language for Healthcare:   [x]English       []other:  Functional Scale: LEFS - 81% impairment   Date assessed:3/2/22    Pain level:  1/10     SUBJECTIVE:  Patient reports that she missed that week because she wasn't feeling good, LLE is feeling shaky again and has had 3 incidents of almost falling. Would like to needle today.      OBJECTIVE:    Observation: Gait antalgic with standard cane, wide ERIC, short step length   Test measurements:              ROM Left Right   Hip Flexion 110 WNL   Hip Abduction WNL WNL   Hip ER 30 WNL   Hip IR WNL WNL   Hip Ext Decreased WNL   Strength  Left Right   Hip Abd 7.9 lb 8.9 lb   Hip Ext NT NT   Knee Ext 38.9 lb 49.2 lb   Knee Flex 35.3 lb  43.8 lb            RESTRICTIONS/PRECAUTIONS: impaired balance, hx of left hip hemiarthroplasty in 2017    Exercises/Interventions:      Therapeutic Ex (38320)  Min: 20 Sets/sec Reps CUES/Notes   Bike 8'    seat 12 Treadmill    1.4 mph amb, bilat UE support, 1.0-3.0 incline   Cybex Gastroc- SL    75#   Leg Press - SL   75#   Cybex Leg Ext - SL   15#   Cybex HS curl - SL   30#   Bridge 10 12 [place] between [employer]   3-way SLR 1 10     Hook lying LLE Ext 1 10 Cues for form   Supine heel slides + march 1 12 Cues for form   Hook lying bilat hip add iso 5 10 Ball squeeze   Clam - side lying 5 10 No resistance   Supine clam - unilateral 10 10 Maroon band, each   Prone hip ext - over table 1 15 1.5#, each   BOSU lunge 10  10 each   Slide lunge - retro at ledge 2 10     Standing hip abd 2 10 orange   SC marching 2 10 Fwd/bkwd, cane for assist   Heel raises - DL 5 10 Edge of balance machine   Wobble board - 4 way, circles 2 10 Cues   Side stepping 2 15 HHA, green band below knees   Sit to stand - hi/low table 1 15 LLE in front   Step up - bilat UE support 3 10 6\"   Slant board gastroc stretch 30 3    Lateral step down 2 10 4\"   Standing hip 3-way kicks 5 10 Purple band for TKE   On 2\" step             Manual Intervention  (62064)  Min:  0         Hypervolt percussion STM    L posterior hip, IT band   Hip PROM 5'       IASTM 10'   L post hip, IT band, gastroc             NMR re-education (71195)  Min: 0         Biodex balance      Cone step over/back 1 10 Blue, bilat UE support, each   Small ERIC stance 30' 3 airex   Staggered stance 30' 3 airex               Per Hiral Greene PT 5/25  Dry needling manual therapy: consisted on the placement of 8 needles in the following muscles:  left med/lat gastroc, soleus. A 60 mm needle was inserted, piston, rotated, and coned to produce intramuscular mobilization. These techniques were used to restore functional range of motion, reduce muscle spasm and induce healing in the corresponding musculature. (90203)  Clean Technique was utilized today while applying Dry needling treatment. The treatment sites where cleaned with 70% solution of  isopropyl alcohol .   The PT washed their hands and utilized treatment gloves along with hand  prior to inserting the needles. All needles where removed and discarded in the appropriate sharps container. MD has given verbal and/or written approval for this treatment. Attended low frequency (1-20Hz) electrical stimulation was utilized in conjunction with Dry Needling:  the Estim was manipulated between all above needles for a period of 10 min. at 3-4 volts. The low frequency electrical stimulation was used to help reduce muscle spasm and help to interrupt /[place] the pain cycle. (48431)     Therapeutic Exercise and NMR EXR  [x] (99960) Provided verbal/tactile cueing for activities related to strengthening, flexibility, endurance, ROM for improvements in LE, proximal hip, and core control with self care, mobility, lifting, ambulation. [x] (48314) Provided verbal/tactile cueing for activities related to improving balance, coordination, kinesthetic sense, posture, motor skill, proprioception  to assist with LE, proximal hip, and core control in self care, mobility, lifting, ambulation and eccentric single leg control.      NMR and Therapeutic Activities:    [x] (43734 or 40440) Provided verbal/tactile cueing for activities related to improving balance, coordination, kinesthetic sense, posture, motor skill, proprioception and motor activation to allow for proper function of core, proximal hip and LE with self care and ADLs and functional mobility.   [] (25304) Gait Re-education- Provided training and instruction to the patient for proper LE, core and proximal hip recruitment and positioning and eccentric body weight control with ambulation re-education including up and down stairs     Home Exercise Program:    [x] (71136) Reviewed/Progressed HEP activities related to strengthening, flexibility, endurance, ROM of core, proximal hip and LE for functional self-care, mobility, lifting and ambulation/stair navigation   [] (79891)Reviewed/Progressed HEP activities related to improving balance, coordination, kinesthetic sense, posture, motor skill, proprioception of core, proximal hip and LE for self care, mobility, lifting, and ambulation/stair navigation      Manual Treatments:  PROM / STM / Oscillations-Mobs:  G-I, II, III, IV (PA's, Inf., Post.)  [x] (49575) Provided manual therapy to mobilize LE, proximal hip and/or LS spine soft tissue/joints for the purpose of modulating pain, promoting relaxation,  increasing ROM, reducing/eliminating soft tissue swelling/inflammation/restriction, improving soft tissue extensibility and allowing for proper ROM for normal function with self care, mobility, lifting and ambulation. Modalities:     [] GAME READY (VASO)- for significant edema, swelling, pain control. Charges:  Timed Code Treatment Minutes: 30   Total Treatment Minutes: 40      [] EVAL (LOW) 79507 (typically 20 minutes face-to-face)  [] EVAL (MOD) 67170 (typically 30 minutes face-to-face)  [] EVAL (HIGH) 12667 (typically 45 minutes face-to-face)  [] RE-EVAL     [x] VO(24522) x  1   [x] DRY NEEDLE 1 OR 2 MUSCLES  [] NMR (42149) x     [] DRY NEEDLE 3+ MUSCLES  [] Manual (88381) x      [] TA (50125) x     [] Mech Traction (47343)  [x] ES(attended) (64733)     [] ES (un) (41356):   [] VASO (51699)  [] Other:    If [facility] Please Indicate Time In/Out  CPT Code Time in Time out                                   GOALS:  Patient stated goal: reduce hip pain, walk without cane  [x] Progressing: [] Met: [] Not Met: [] Adjusted    Therapist goals for Patient:   Short Term Goals: To be achieved in: 2 weeks  1. Independent in HEP and progression per patient tolerance, in order to prevent re-injury. [] Progressing: [x] Met: [] Not Met: [] Adjusted  2. Patient will have a decrease in pain to facilitate improvement in movement, function, and ADLs as indicated by Functional Deficits. [] Progressing: [x] Met: [] Not Met: [] Adjusted    Long Term Goals:  To be achieved in: 6 weeks  1. Disability index score of 65% or less for the LEFS to assist with reaching prior level of function. [x] Progressing: [] Met: [] Not Met: [] Adjusted  2. Patient will demonstrate increased AROM to [facility] to allow for proper joint functioning as indicated by patients Functional Deficits. [x] Progressing: [] Met: [] Not Met: [] Adjusted  3. Patient will demonstrate an increase in Strength to good proximal hip strength and control, within 5lb HHD in LE to allow for proper functional mobility as indicated by patients Functional Deficits. [x] Progressing: [] Met: [] Not Met: [] Adjusted  4. Patient will return to sleeping, sitting, standing, walking, squatting, stairs functional activities without increased symptoms or restriction. [x] Progressing: [] Met: [] Not Met: [] Adjusted    ASSESSMENT: Good tolerance to treatment. Appropriately fatigued at conclusion. Felt much better after DN but still has shaking in LLE. Return to Play: (if applicable)   []  Stage 1: Intro to Strength   []  Stage 2: Return to Run and Strength   []  Stage 3: Return to Jump and Strength   []  Stage 4: Dynamic Strength and Agility   []  Stage 5: Sport Specific Training     []  Ready to Return to Play, Meets All Above Stages   []  Not Ready for Return to Sports   Comments:            Treatment/Activity Tolerance:  [x] Patient tolerated treatment well [] Patient limited by fatique  [] Patient limited by pain  [] Patient limited by other medical complications  [] Other:     Overall Progression Towards Functional goals/ Treatment Progress Update:  [x] Patient is progressing as expected towards functional goals listed. [] Progression is slowed due to complexities/Impairments listed. [] Progression has been slowed due to co-morbidities.   [] Plan just implemented, too soon to assess goals progression <30days   [] Goals require adjustment due to lack of progress  [] Patient is not progressing as expected and requires additional follow up with physician  [] Other    Prognosis for POC: [x] Good [] Fair  [] Poor    Patient requires continued skilled intervention: [x] Yes  [] No        PLAN: Continue PT 1-2x/week for 6 weeks  [x] Continue per plan of care [] Alter current plan (see comments)  [] Plan of care initiated [] Hold pending MD visit [] Discharge    Electronically signed by: Kaushik Yin PTA    Note: If patient does not return for scheduled/recommended follow up visits, this note will serve as a discharge from care along with the most recent update on progress.

## 2022-06-03 ENCOUNTER — APPOINTMENT (OUTPATIENT)
Dept: PHYSICAL THERAPY | Age: 77
End: 2022-06-03
Payer: MEDICARE

## 2022-06-07 ENCOUNTER — TELEPHONE (OUTPATIENT)
Dept: INTERNAL MEDICINE CLINIC | Age: 77
End: 2022-06-07

## 2022-06-07 NOTE — TELEPHONE ENCOUNTER
Pt called voicing concern over next appointment (AWV 6/16/22). Patient has been having heavy arm and was experiencing heart palpitations every night up until about 2-3 weeks ago. Wants to see if she can maybe cancel her AWV and make it a regular office visit instead and then reschedule her AWV since there is a possibility she will need an entire separate appointment to discuss symptoms. Was informed that Dr. Zee Muir and Karen Lopez are out of office for remainder of the day and will be back tomorrow.   Patient wishes to be contacted at [phone]

## 2022-06-08 ENCOUNTER — HOSPITAL ENCOUNTER (OUTPATIENT)
Dept: PHYSICAL THERAPY | Age: 77
Setting detail: THERAPIES SERIES
Discharge: HOME | End: 2022-06-08
Payer: MEDICARE

## 2022-06-08 PROCEDURE — 97140 MANUAL THERAPY 1/> REGIONS: CPT

## 2022-06-08 PROCEDURE — 97032 APPL MODALITY 1+ESTIM EA 15: CPT

## 2022-06-08 PROCEDURE — 20560 NDL INSJ W/O NJX 1 OR 2 MUSC: CPT

## 2022-06-08 NOTE — FLOWSHEET NOTE
[address] [place] [address]  Phone: [phone] Fax: [phone]              Physical Therapy Treatment Note/ Progress Report:     Date:  2022    Patient Name:  Rafael Moseley    :  1945  MRN: [id number]  Restrictions/Precautions:    Medical/Treatment Diagnosis Information:  · Diagnosis: Weakness of left lower extremity  · Treatment Diagnosis: G93.001  Insurance/Certification information:  PT Insurance Information: [facility]  Physician Information:  Referring Practitioner: Wendy Srivastava MD  Plan of care signed (Y/N):     Date of Patient follow up with Physician:      Progress Report: [x]  Yes  []  No     Date Range for reporting period:  Beginning:  3/2/22  Endin22    Progress report due (10 Rx/or 30 days whichever is less): 01     Recertification due (POC duration/ or 90 days whichever is less): 22     Visit # Insurance Allowable Auth Needed   13 Medicare []Yes   [x]No     Latex Allergy:  [x]NO      []YES  Preferred Language for Healthcare:   [x]English       []other:  Functional Scale: LEFS - 81% impairment   Date assessed:3/2/22    Pain level:  1/10     SUBJECTIVE:  Patient states that she has been feeling really \"off\" for the past couple weeks. Her left arm has been feeling heavy, she has experienced heart palpitations, more frequent feelings of falling backwards (though no actual falls, she has had to catch herself on furniture, objects). She has also been feeling like the left leg is hard to /advance forward when walking. She is scheduled to have a CT scan next week, and will have an appointment to talk about her symptoms with her PCP around that same time as well. She also notes that her mood has been more irritable than usual. She is concerned, not sure if she had a stroke, or something else neurologically based.  Also plans to make an appointment with her neurologist. Delaney Victors that she is \"backsliding\". Does still feel a lot of stiffness in the left proximal calf, would like to have it needled today as it does at least help with this aspect of her symptoms.      OBJECTIVE:    Observation: Gait antalgic with standard cane, wide ERIC, short step length   Test measurements:        RESTRICTIONS/PRECAUTIONS: impaired balance, hx of left hip hemiarthroplasty in 2017    Exercises/Interventions:      Therapeutic Ex (13006)  Min: 0 Sets/sec Reps CUES/Notes   Bike 8'    seat 12   Treadmill    1.4 mph amb, bilat UE support, 1.0-3.0 incline   Cybex Gastroc- SL    75#   Leg Press - SL   75#   Cybex Leg Ext - SL   15#   Cybex HS curl - SL   30#   Bridge 10 12 [place] between [employer]   3-way SLR 1 10     Hook lying LLE Ext 1 10 Cues for form   Supine heel slides + march 1 12 Cues for form   Hook lying bilat hip add iso 5 10 Ball squeeze   Clam - side lying 5 10 No resistance   Supine clam - unilateral 10 10 Maroon band, each   Prone hip ext - over table 1 15 1.5#, each   BOSU lunge 10  10 each   Slide lunge - retro at ledge 2 10     Standing hip abd 2 10 orange   SC marching 2 10 Fwd/bkwd, cane for assist   Heel raises - DL 5 10 Edge of balance machine   Wobble board - 4 way, circles 2 10 Cues   Side stepping 2 15 HHA, green band below knees   Sit to stand - hi/low table 1 15 LLE in front   Step up - bilat UE support 3 10 6\"   Slant board gastroc stretch 30 3    Lateral step down 2 10 4\"   Standing hip 3-way kicks 5 10 Purple band for TKE   On 2\" step             Manual Intervention  (91811)  Min:  15         Hypervolt percussion STM    L posterior hip, IT band   Hip PROM 5'       IASTM 10'   L post hip, IT band, gastroc             NMR re-education (64013)  Min: 0         Biodex balance      Cone step over/back 1 10 Blue, bilat UE support, each   Small ERIC stance 30' 3 airex   Staggered stance 30' 3 airex                 Dry needling manual therapy: consisted on the placement of 6 needles in the following muscles: the patient for proper LE, core and proximal hip recruitment and positioning and eccentric body weight control with ambulation re-education including up and down stairs     Home Exercise Program:    [x] (63367) Reviewed/Progressed HEP activities related to strengthening, flexibility, endurance, ROM of core, proximal hip and LE for functional self-care, mobility, lifting and ambulation/stair navigation   [] (14162)Reviewed/Progressed HEP activities related to improving balance, coordination, kinesthetic sense, posture, motor skill, proprioception of core, proximal hip and LE for self care, mobility, lifting, and ambulation/stair navigation      Manual Treatments:  PROM / STM / Oscillations-Mobs:  G-I, II, III, IV (PA's, Inf., Post.)  [x] (43298) Provided manual therapy to mobilize LE, proximal hip and/or LS spine soft tissue/joints for the purpose of modulating pain, promoting relaxation,  increasing ROM, reducing/eliminating soft tissue swelling/inflammation/restriction, improving soft tissue extensibility and allowing for proper ROM for normal function with self care, mobility, lifting and ambulation. Modalities:     [] GAME READY (VASO)- for significant edema, swelling, pain control.      Charges:  Timed Code Treatment Minutes: 25   Total Treatment Minutes: 40      [] EVAL (LOW) 71153 (typically 20 minutes face-to-face)  [] EVAL (MOD) 76526 (typically 30 minutes face-to-face)  [] EVAL (HIGH) 10776 (typically 45 minutes face-to-face)  [] RE-EVAL     [] GM(24222) x     [x] DRY NEEDLE 1 OR 2 MUSCLES  [] NMR (63900) x     [] DRY NEEDLE 3+ MUSCLES  [x] Manual (82852) x 1      [] TA (87171) x     [] Mech Traction (49380)  [x] ES(attended) (54164)     [] ES (un) (66029):   [] VASO (44155)  [] Other:    If [facility] Please Indicate Time In/Out  CPT Code Time in Time out                                   GOALS:  Patient stated goal: reduce hip pain, walk without cane  [x] Progressing: [] Met: [] Not Met: [] Adjusted    Therapist goals for Patient:   Short Term Goals: To be achieved in: 2 weeks  1. Independent in HEP and progression per patient tolerance, in order to prevent re-injury. [] Progressing: [x] Met: [] Not Met: [] Adjusted  2. Patient will have a decrease in pain to facilitate improvement in movement, function, and ADLs as indicated by Functional Deficits. [] Progressing: [x] Met: [] Not Met: [] Adjusted    Long Term Goals: To be achieved in: 6 weeks  1. Disability index score of 65% or less for the LEFS to assist with reaching prior level of function. [x] Progressing: [] Met: [] Not Met: [] Adjusted  2. Patient will demonstrate increased AROM to [facility] to allow for proper joint functioning as indicated by patients Functional Deficits. [x] Progressing: [] Met: [] Not Met: [] Adjusted  3. Patient will demonstrate an increase in Strength to good proximal hip strength and control, within 5lb HHD in LE to allow for proper functional mobility as indicated by patients Functional Deficits. [x] Progressing: [] Met: [] Not Met: [] Adjusted  4. Patient will return to sleeping, sitting, standing, walking, squatting, stairs functional activities without increased symptoms or restriction. [x] Progressing: [] Met: [] Not Met: [] Adjusted    ASSESSMENT: Good tolerance to dry needling today, improvement in LLE stiffness at concluison. Patient's symptoms seeming neurologic in nature. Encouraged her to follow up with PCP/neurologist for testing. In the meantime, may return to PT if stiffness persists and another round of dry needling is warranted.       Return to Play: (if applicable)   []  Stage 1: Intro to Strength   []  Stage 2: Return to Run and Strength   []  Stage 3: Return to Jump and Strength   []  Stage 4: Dynamic Strength and Agility   []  Stage 5: Sport Specific Training     []  Ready to Return to Play, Meets All Above Stages   []  Not Ready for Return to Sports   Comments:            Treatment/Activity Tolerance:  [x] Patient tolerated treatment well [] Patient limited by fatique  [] Patient limited by pain  [] Patient limited by other medical complications  [] Other:     Overall Progression Towards Functional goals/ Treatment Progress Update:  [x] Patient is progressing as expected towards functional goals listed. [] Progression is slowed due to complexities/Impairments listed. [] Progression has been slowed due to co-morbidities. [] Plan just implemented, too soon to assess goals progression <30days   [] Goals require adjustment due to lack of progress  [] Patient is not progressing as expected and requires additional follow up with physician  [] Other    Prognosis for POC: [x] Good [] Fair  [] Poor    Patient requires continued skilled intervention: [x] Yes  [] No        PLAN: Continue PT, but needs to focus on visits with other providers (PCP, neurology, etc) to determine the cause of her symptoms and seemingly worsening condition  [x] Continue per plan of care [] Alter current plan (see comments)  [] Plan of care initiated [] Hold pending MD visit [] Discharge    Electronically signed by: Rosa Maria Burns PT    Note: If patient does not return for scheduled/recommended follow up visits, this note will serve as a discharge from care along with the most recent update on progress.

## 2022-06-13 ENCOUNTER — OFFICE VISIT (OUTPATIENT)
Dept: INTERNAL MEDICINE CLINIC | Age: 77
End: 2022-06-13
Payer: MEDICARE

## 2022-06-13 VITALS
SYSTOLIC BLOOD PRESSURE: 110 MMHG | HEIGHT: 67 IN | DIASTOLIC BLOOD PRESSURE: 80 MMHG | WEIGHT: 209 LBS | BODY MASS INDEX: 32.8 KG/M2 | HEART RATE: 88 BPM

## 2022-06-13 DIAGNOSIS — E53.8 B12 DEFICIENCY: ICD-10-CM

## 2022-06-13 DIAGNOSIS — G47.10 HYPERSOMNIA: Primary | ICD-10-CM

## 2022-06-13 DIAGNOSIS — K21.9 GASTROESOPHAGEAL REFLUX DISEASE WITHOUT ESOPHAGITIS: ICD-10-CM

## 2022-06-13 DIAGNOSIS — M48.062 SPINAL STENOSIS OF LUMBAR REGION WITH NEUROGENIC CLAUDICATION: ICD-10-CM

## 2022-06-13 DIAGNOSIS — M81.6 LOCALIZED OSTEOPOROSIS WITHOUT CURRENT PATHOLOGICAL FRACTURE: ICD-10-CM

## 2022-06-13 DIAGNOSIS — G40.109 LOCALIZATION-RELATED FOCAL EPILEPSY WITH SIMPLE PARTIAL SEIZURES (HCC): ICD-10-CM

## 2022-06-13 DIAGNOSIS — E03.8 SUBCLINICAL HYPOTHYROIDISM: ICD-10-CM

## 2022-06-13 LAB
A/G RATIO: 1.7 (ref 1.1–2.2)
ALBUMIN SERPL-MCNC: 4.1 G/DL (ref 3.4–5)
ALP BLD-CCNC: 58 U/L (ref 40–129)
ALT SERPL-CCNC: 6 U/L (ref 10–40)
ANION GAP SERPL CALCULATED.3IONS-SCNC: 15 MMOL/L (ref 3–16)
AST SERPL-CCNC: 11 U/L (ref 15–37)
BASOPHILS ABSOLUTE: 0 K/UL (ref 0–0.2)
BASOPHILS RELATIVE PERCENT: 0.4 %
BILIRUB SERPL-MCNC: <0.2 MG/DL (ref 0–1)
BUN BLDV-MCNC: 15 MG/DL (ref 7–20)
CALCIUM SERPL-MCNC: 9.9 MG/DL (ref 8.3–10.6)
CHLORIDE BLD-SCNC: 93 MMOL/L (ref 99–110)
CO2: 28 MMOL/L (ref 21–32)
CREAT SERPL-MCNC: 0.8 MG/DL (ref 0.6–1.2)
EOSINOPHILS ABSOLUTE: 0.1 K/UL (ref 0–0.6)
EOSINOPHILS RELATIVE PERCENT: 1.6 %
GFR AFRICAN AMERICAN: >60
GFR NON-AFRICAN AMERICAN: >60
GLUCOSE BLD-MCNC: 99 MG/DL (ref 70–99)
HCT VFR BLD CALC: 42.2 % (ref 36–48)
HEMOGLOBIN: 14.1 G/DL (ref 12–16)
LYMPHOCYTES ABSOLUTE: 1.7 K/UL (ref 1–5.1)
LYMPHOCYTES RELATIVE PERCENT: 22.8 %
MAGNESIUM: 2 MG/DL (ref 1.8–2.4)
MCH RBC QN AUTO: 30.7 PG (ref 26–34)
MCHC RBC AUTO-ENTMCNC: 33.4 G/DL (ref 31–36)
MCV RBC AUTO: 91.8 FL (ref 80–100)
MONOCYTES ABSOLUTE: 0.6 K/UL (ref 0–1.3)
MONOCYTES RELATIVE PERCENT: 8 %
NEUTROPHILS ABSOLUTE: 4.9 K/UL (ref 1.7–7.7)
NEUTROPHILS RELATIVE PERCENT: 67.2 %
PDW BLD-RTO: 14 % (ref 12.4–15.4)
PLATELET # BLD: 225 K/UL (ref 135–450)
PMV BLD AUTO: 9 FL (ref 5–10.5)
POTASSIUM SERPL-SCNC: 4.5 MMOL/L (ref 3.5–5.1)
RBC # BLD: 4.6 M/UL (ref 4–5.2)
SODIUM BLD-SCNC: 136 MMOL/L (ref 136–145)
T4 FREE: 1.3 NG/DL (ref 0.9–1.8)
TOTAL PROTEIN: 6.5 G/DL (ref 6.4–8.2)
TSH REFLEX: 9.44 UIU/ML (ref 0.27–4.2)
VALPROIC ACID LEVEL: 73.1 UG/ML (ref 50–100)
VITAMIN B-12: 421 PG/ML (ref 211–911)
VITAMIN D 25-HYDROXY: 54.4 NG/ML
WBC # BLD: 7.3 K/UL (ref 4–11)

## 2022-06-13 PROCEDURE — 3288F FALL RISK ASSESSMENT DOCD: CPT | Performed by: FAMILY MEDICINE

## 2022-06-13 PROCEDURE — 99214 OFFICE O/P EST MOD 30 MIN: CPT | Performed by: FAMILY MEDICINE

## 2022-06-13 PROCEDURE — 1123F ACP DISCUSS/DSCN MKR DOCD: CPT | Performed by: FAMILY MEDICINE

## 2022-06-13 ASSESSMENT — PATIENT HEALTH QUESTIONNAIRE - PHQ9
1. LITTLE INTEREST OR PLEASURE IN DOING THINGS: 0
3. TROUBLE FALLING OR STAYING ASLEEP: 0
5. POOR APPETITE OR OVEREATING: 0
SUM OF ALL RESPONSES TO PHQ QUESTIONS 1-9: 0
8. MOVING OR SPEAKING SO SLOWLY THAT OTHER PEOPLE COULD HAVE NOTICED. OR THE OPPOSITE, BEING SO FIGETY OR RESTLESS THAT YOU HAVE BEEN MOVING AROUND A LOT MORE THAN USUAL: 0
SUM OF ALL RESPONSES TO PHQ9 QUESTIONS 1 & 2: 0
SUM OF ALL RESPONSES TO PHQ QUESTIONS 1-9: 0
7. TROUBLE CONCENTRATING ON THINGS, SUCH AS READING THE NEWSPAPER OR WATCHING TELEVISION: 0
2. FEELING DOWN, DEPRESSED OR HOPELESS: 0
SUM OF ALL RESPONSES TO PHQ QUESTIONS 1-9: 0
9. THOUGHTS THAT YOU WOULD BE BETTER OFF DEAD, OR OF HURTING YOURSELF: 0
SUM OF ALL RESPONSES TO PHQ QUESTIONS 1-9: 0
10. IF YOU CHECKED OFF ANY PROBLEMS, HOW DIFFICULT HAVE THESE PROBLEMS MADE IT FOR YOU TO DO YOUR WORK, TAKE CARE OF THINGS AT HOME, OR GET ALONG WITH OTHER PEOPLE: 0
4. FEELING TIRED OR HAVING LITTLE ENERGY: 0
6. FEELING BAD ABOUT YOURSELF - OR THAT YOU ARE A FAILURE OR HAVE LET YOURSELF OR YOUR FAMILY DOWN: 0

## 2022-06-13 NOTE — PROGRESS NOTES
2022    Bianca Larson (:  1945) is a 68 y.o. female, here for evaluation of the following chief complaint(s):  Chief Complaint   Patient presents with    Other     not feeling well, fatigue, leg heaviness and pain. back of head pain            ASSESSMENT/PLAN:    1. Hypersomnia  R/o narcolepsy vs CARMEN vs other   - Elva Santos MD, Sleep Medicine, [facility] and Spine    2. Spinal stenosis of lumbar region with neurogenic claudication  Encouraged her to pursue ESIs and if not improved, even surgery. - [address] [facility] Sp[facility]    3. Gastroesophageal reflux disease without esophagitis  On daily PPI omeprazole  - CBC with Auto Differential  - Comprehensive Metabolic Panel  - Magnesium  - Vitamin B12    4. B12 deficiency  Check B12 as above. 5. Osteoporosis without current pathological fracture  - Vitamin D 25 Hydroxy    6. Subclinical hypothyroidism  - TSH with Reflex    7. Localization-related focal epilepsy with simple partial seizures (HCC)  - Comprehensive Metabolic Panel  - Valproic Acid Level, Total        FOLLOW UP:  Will determine return based on test results and consults. HPI   Appointment to evaluate symptoms. Primary is fatigue and hypersomnia  Falls asleep all the time. Stopped driving because of this. It was happening before she increased her Depakote dose. She is just sitting and her eyelids will close. This can happen 3-4 times per day    Not sure if anxiety or depression. Not sure what is going on. Her note she gave me today says:  Palpitations sometimes. Shaky on left side of the body  Feels dizzy and nauseated when sits up in bed    Secondary is leg and back pain  Leg is starting to go numb again. Left leg is feeling heavy again. Left leg is hurting earlier in the day. Was doing OK with dry needling at physical therapy, but it came back despite continued PT.     The back of her head hurts if/when she stands up straight. Better to bend forward. Has her CT and CTA scheduled for 6/18. Not able to do MRIs      CT scan is scheduled this coming Sat. She bumped her Depakote dose up due to arm and leg shaking. She started this about a week ago. Sleepiness present even before she increaed the dose. Outpatient Medications Marked as Taking for the 6/13/22 encounter (Office Visit) with Luis Miguel Marroquin MD   Medication Sig Dispense Refill    rosuvastatin (CRESTOR) 10 MG tablet TAKE 1 TABLET EVERY OTHER DAY ON EVEN DAYS OF THE MONTH.  45 tablet 2    rOPINIRole (REQUIP) 0.5 MG tablet TAKE 2-3 TABLETS BY MOUTH AT BEDTIME OR IN EVENING FOR RESTLESS LEGS 270 tablet 1    furosemide (LASIX) 20 MG tablet TAKE 1 OR 2 TABS DAILY AT LUNCH OR IN THE AFTERNOON UNTIL SWELLING DOWN. 180 tablet 1    valsartan-hydroCHLOROthiazide (DIOVAN-HCT) 160-12.5 MG per tablet TAKE 1 TABLET BY MOUTH EVERY DAY 90 tablet 3    tiZANidine (ZANAFLEX) 2 MG tablet Take 1 tablet by mouth every 6 hours as needed (back spasms or pain.) 30 tablet 0    famotidine (PEPCID) 20 MG tablet Take 1 tablet by mouth nightly 90 tablet 3    vitamin D (ERGOCALCIFEROL) 1.25 MG (53640 UT) CAPS capsule TAKE ONE CAPSULE BY MOUTH WEEKLY 12 capsule 3    omeprazole (PRILOSEC) 20 MG delayed release capsule Take 1 capsule by mouth Daily In AM before first bite of food 90 capsule 3    Coenzyme Q10 (CO Q 10) 10 MG CAPS Take by mouth      Biotin 5000 MCG CAPS Take 5,000 mcg by mouth      aspirin 81 MG tablet Take 81 mg by mouth daily      divalproex (DEPAKOTE ER) 500 MG ER tablet Take 1 tablet by mouth daily Take 1 tablet by mouth  daily (Patient taking differently: Take 1,000 mg by mouth daily Take 1 tablet by mouth  daily) 90 tablet 1    Multiple Vitamins-Minerals (ICAPS) CAPS Take by mouth daily           Review of Systems    OBJECTIVE:    Labs 9/21 without concerns other than subclinical hypothyroidism  b12 was checked and good    Vitals:    06/13/22 6523 BP: 110/80   Pulse: 88   Weight: 209 lb (94.8 kg)   Height: 5' 7\" (1.702 m)       Physical Exam  Vitals reviewed. Constitutional:       General: She is not in acute distress. Appearance: Normal appearance. She is well-developed. She is obese. She is not diaphoretic. Comments: Using a cane and appears to be in pain moving   Eyes:      General: No scleral icterus. Neck:      Thyroid: No thyroid mass or thyromegaly. Vascular: No carotid bruit. Cardiovascular:      Rate and Rhythm: Normal rate and regular rhythm. Heart sounds: Normal heart sounds, S1 normal and S2 normal. No murmur heard. Pulmonary:      Effort: Pulmonary effort is normal. No respiratory distress. Breath sounds: Normal breath sounds. No decreased breath sounds, wheezing, rhonchi or rales. Abdominal:      General: Bowel sounds are normal. There is no abdominal bruit. Palpations: Abdomen is soft. There is no hepatomegaly or mass. Tenderness: There is no abdominal tenderness. Musculoskeletal:      Cervical back: Neck supple. Right lower leg: No edema. Left lower leg: No edema. Lymphadenopathy:      Cervical: No cervical adenopathy. Skin:     General: Skin is warm and dry. Coloration: Skin is not pale. Nails: There is no clubbing. Neurological:      Mental Status: She is alert and oriented to person, place, and time. Motor: Weakness (RLE 5/5;  LLE 4+/5) present. No tremor or abnormal muscle tone. Coordination: Coordination normal.      Gait: Gait abnormal (antalgic). Psychiatric:         Speech: Speech normal.         Behavior: Behavior normal.           An electronic signature was used to authenticate this note.     Jozef Manning MD

## 2022-06-14 NOTE — RESULT ENCOUNTER NOTE
Call patient:  nothing on blood tests to explain your symptoms. I suspect a sleep disorder. Your thyroid is still in range where we do not need to treat with thyroid pills but you will likely need to take them unless you can get some weight down.   180-185 lbs would be a good goal.

## 2022-06-18 ENCOUNTER — HOSPITAL ENCOUNTER (OUTPATIENT)
Dept: CT IMAGING | Age: 77
Discharge: HOME | End: 2022-06-18
Payer: MEDICARE

## 2022-06-18 DIAGNOSIS — D18.02 VENOUS ANGIOMA OF BRAIN (HCC): ICD-10-CM

## 2022-06-18 DIAGNOSIS — R26.89 POOR BALANCE: ICD-10-CM

## 2022-06-18 DIAGNOSIS — M62.81 ABNORMAL GAIT DUE TO MUSCLE WEAKNESS: ICD-10-CM

## 2022-06-18 DIAGNOSIS — R26.9 ABNORMAL GAIT DUE TO MUSCLE WEAKNESS: ICD-10-CM

## 2022-06-18 PROCEDURE — 70450 CT HEAD/BRAIN W/O DYE: CPT

## 2022-06-18 PROCEDURE — 6360000004 HC RX CONTRAST MEDICATION: Performed by: FAMILY MEDICINE

## 2022-06-18 PROCEDURE — 70496 CT ANGIOGRAPHY HEAD: CPT

## 2022-06-18 RX ADMIN — IOPAMIDOL 75 ML: 755 INJECTION, SOLUTION INTRAVENOUS at 10:19

## 2022-06-20 NOTE — RESULT ENCOUNTER NOTE
Call patient:  (she did not answer at home or mobile). The CTA showed clear arteries in the neck and brain. The regular brain CT scan did not show any tumors but some softening and deterioration of the cerebellum = balance center from your old brain tumor surgery. This would be the cause of your gait and balance issues. Using a walker and doing whatever therapy you can to improve your balance is probably all that can be done.

## 2022-10-28 ENCOUNTER — TELEPHONE (OUTPATIENT)
Dept: INTERNAL MEDICINE CLINIC | Age: 77
End: 2022-10-28

## 2022-10-28 NOTE — TELEPHONE ENCOUNTER
It is not appropriate medical practice to order an x-ray without an examination. You may wish to call [facility] = an urgent care on wheels, that will come to your home, have a medical provider (nurse practitioner or physician assistant) examine you and they can get an x-ray done that same day. Make sure to tell them that you have COVID. They have supplies to be able to let them still come to your home with masking and gowns. [facility] phone number is [phone]. You can call them and just let them know I referred you. I am told it is an Urgent Care visit co-payment.

## 2022-10-28 NOTE — TELEPHONE ENCOUNTER
Pt fell Wed night in the bathroom,she hit her head,shoulder and back. She is starting to feel better, her main complaint is Back pain. Requesting xray. Pt is covid positive. She states she could come wed for an evaluation if needed.

## 2022-11-01 ENCOUNTER — TELEPHONE (OUTPATIENT)
Dept: INTERNAL MEDICINE CLINIC | Age: 77
End: 2022-11-01

## 2022-11-01 DIAGNOSIS — M54.9 OTHER ACUTE BACK PAIN: ICD-10-CM

## 2022-11-01 DIAGNOSIS — M25.559 HIP PAIN: ICD-10-CM
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
## 2022-11-01 NOTE — TELEPHONE ENCOUNTER
[facility] did not say anything about getting a CT of head; in fact they said not needed. I will order the spinal x-rays and pelvis x-ray    You will need to go to a [address] location to get these done because that is where the orders are.

## 2022-11-01 NOTE — TELEPHONE ENCOUNTER
Patient calling. She was seen by [facility]. They recommended a XR of her back and hip. They were going to send someone out to her house for the XR. Pt states after a 1.5 days they had not been out and [facility] [facility] was unable to reach the company to find out when they would be there for the XR so the patient canceled them coming. She states they also recommended a CT of the head. She is requesting Dr Bedoya Rater order these tests since she canceled these with [facility].

## 2022-11-07 ENCOUNTER — HOSPITAL ENCOUNTER (OUTPATIENT)
Dept: GENERAL RADIOLOGY | Age: 77
Discharge: HOME | End: 2022-11-07
Payer: MEDICARE

## 2022-11-07 ENCOUNTER — HOSPITAL ENCOUNTER (OUTPATIENT)
Age: 77
Discharge: HOME | End: 2022-11-07
Payer: MEDICARE

## 2022-11-07 DIAGNOSIS — M25.559 HIP PAIN: ICD-10-CM

## 2022-11-07 DIAGNOSIS — M54.9 OTHER ACUTE BACK PAIN: ICD-10-CM
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
## 2022-11-07 PROCEDURE — 72070 X-RAY EXAM THORAC SPINE 2VWS: CPT

## 2022-11-07 PROCEDURE — 72100 X-RAY EXAM L-S SPINE 2/3 VWS: CPT

## 2022-11-07 PROCEDURE — 72190 X-RAY EXAM OF PELVIS: CPT

## 2022-12-01 DIAGNOSIS — E55.9 VITAMIN D DEFICIENCY: ICD-10-CM

## 2022-12-01 RX ORDER — ERGOCALCIFEROL 1.25 MG/1
CAPSULE ORAL
Qty: 12 CAPSULE | Refills: 1 | Status: SHIPPED | OUTPATIENT
Start: 2022-12-01 | End: 2023-01-19 | Stop reason: SDUPTHER

## 2022-12-01 NOTE — TELEPHONE ENCOUNTER
Future Appointments    This patient does not currently have any appointments scheduled.   Past Visits    Date Provider Specialty Visit Type Primary Dx   06/13/2022 Magdalena Minaya MD Internal Medicine Office Visit Hypersomnia   03/16/2022 Magdalena Minaya MD Internal Medicine Office Visit Restless legs syndrome

## 2023-01-05 DIAGNOSIS — E78.00 PURE HYPERCHOLESTEROLEMIA: ICD-10-CM

## 2023-01-05 RX ORDER — ROSUVASTATIN CALCIUM 10 MG/1
TABLET, COATED ORAL
Qty: 45 TABLET | Refills: 0 | Status: SHIPPED | OUTPATIENT
Start: 2023-01-05 | End: 2023-01-19 | Stop reason: SDUPTHER

## 2023-01-05 NOTE — TELEPHONE ENCOUNTER
Future Appointments    This patient does not currently have any appointments scheduled.   Past Visits    Date Provider Specialty Visit Type Primary Dx   06/13/2022 Violet Vazquez MD Internal Medicine Office Visit Hypersomnia   03/16/2022 Violet Vazquez MD Internal Medicine Office Visit Restless legs syndrome

## 2023-01-05 NOTE — TELEPHONE ENCOUNTER
Call and get her scheduled for a visit. See if we need to get [facility] involved dionte with transportation for medical visit.

## 2023-01-18 ENCOUNTER — OFFICE VISIT (OUTPATIENT)
Dept: INTERNAL MEDICINE CLINIC | Age: 78
End: 2023-01-18

## 2023-01-18 VITALS
OXYGEN SATURATION: 96 % | SYSTOLIC BLOOD PRESSURE: 145 MMHG | HEART RATE: 68 BPM | HEART RATE: 68 BPM | BODY MASS INDEX: 32.58 KG/M2 | BODY MASS INDEX: 32.51 KG/M2 | DIASTOLIC BLOOD PRESSURE: 80 MMHG | SYSTOLIC BLOOD PRESSURE: 145 MMHG | DIASTOLIC BLOOD PRESSURE: 80 MMHG | WEIGHT: 207.6 LBS | OXYGEN SATURATION: 96 % | HEIGHT: 67 IN | WEIGHT: 207.6 LBS

## 2023-01-18 DIAGNOSIS — D18.02 VENOUS ANGIOMA OF BRAIN (HCC): ICD-10-CM

## 2023-01-18 DIAGNOSIS — Z00.00 MEDICARE ANNUAL WELLNESS VISIT, SUBSEQUENT: ICD-10-CM

## 2023-01-18 DIAGNOSIS — E03.8 SUBCLINICAL HYPOTHYROIDISM: ICD-10-CM

## 2023-01-18 DIAGNOSIS — E78.00 PURE HYPERCHOLESTEROLEMIA: ICD-10-CM

## 2023-01-18 DIAGNOSIS — R41.3 MEMORY CHANGE: Primary | ICD-10-CM

## 2023-01-18 DIAGNOSIS — Z23 NEED FOR INFLUENZA VACCINATION: ICD-10-CM

## 2023-01-18 DIAGNOSIS — Z79.899 HIGH RISK MEDICATION USE: ICD-10-CM

## 2023-01-18 DIAGNOSIS — G40.109 LOCALIZATION-RELATED FOCAL EPILEPSY WITH SIMPLE PARTIAL SEIZURES (HCC): ICD-10-CM

## 2023-01-18 DIAGNOSIS — E55.9 VITAMIN D DEFICIENCY: ICD-10-CM

## 2023-01-18 DIAGNOSIS — F33.8 SEASONAL AFFECTIVE DISORDER (HCC): ICD-10-CM

## 2023-01-18 DIAGNOSIS — M81.6 LOCALIZED OSTEOPOROSIS WITHOUT CURRENT PATHOLOGICAL FRACTURE: ICD-10-CM

## 2023-01-18 DIAGNOSIS — R00.2 PALPITATIONS: ICD-10-CM

## 2023-01-18 DIAGNOSIS — Z00.00 INITIAL MEDICARE ANNUAL WELLNESS VISIT: Primary | ICD-10-CM

## 2023-01-18 DIAGNOSIS — Z79.899 CURRENT USE OF PROTON PUMP INHIBITOR: ICD-10-CM

## 2023-01-18 LAB
A/G RATIO: 1.7 (ref 1.1–2.2)
ALBUMIN SERPL-MCNC: 4.3 G/DL (ref 3.4–5)
ALP BLD-CCNC: 64 U/L (ref 40–129)
ALT SERPL-CCNC: 12 U/L (ref 10–40)
ANION GAP SERPL CALCULATED.3IONS-SCNC: 12 MMOL/L (ref 3–16)
AST SERPL-CCNC: 16 U/L (ref 15–37)
BASOPHILS ABSOLUTE: 0.1 K/UL (ref 0–0.2)
BASOPHILS RELATIVE PERCENT: 0.6 %
BILIRUB SERPL-MCNC: 0.3 MG/DL (ref 0–1)
BUN BLDV-MCNC: 15 MG/DL (ref 7–20)
CALCIUM SERPL-MCNC: 9.4 MG/DL (ref 8.3–10.6)
CHLORIDE BLD-SCNC: 97 MMOL/L (ref 99–110)
CHOLESTEROL, TOTAL: 223 MG/DL (ref 0–199)
CO2: 29 MMOL/L (ref 21–32)
CREAT SERPL-MCNC: 0.7 MG/DL (ref 0.6–1.2)
EOSINOPHILS ABSOLUTE: 0.1 K/UL (ref 0–0.6)
EOSINOPHILS RELATIVE PERCENT: 1 %
GFR SERPL CREATININE-BSD FRML MDRD: >60 ML/MIN/{1.73_M2}
GLUCOSE BLD-MCNC: 90 MG/DL (ref 70–99)
HCT VFR BLD CALC: 42.8 % (ref 36–48)
HDLC SERPL-MCNC: 84 MG/DL (ref 40–60)
HEMOGLOBIN: 13.8 G/DL (ref 12–16)
LDL CHOLESTEROL CALCULATED: 118 MG/DL
LYMPHOCYTES ABSOLUTE: 1.6 K/UL (ref 1–5.1)
LYMPHOCYTES RELATIVE PERCENT: 18.8 %
MAGNESIUM: 2.2 MG/DL (ref 1.8–2.4)
MCH RBC QN AUTO: 30.4 PG (ref 26–34)
MCHC RBC AUTO-ENTMCNC: 32.2 G/DL (ref 31–36)
MCV RBC AUTO: 94.3 FL (ref 80–100)
MONOCYTES ABSOLUTE: 0.5 K/UL (ref 0–1.3)
MONOCYTES RELATIVE PERCENT: 5.7 %
NEUTROPHILS ABSOLUTE: 6.5 K/UL (ref 1.7–7.7)
NEUTROPHILS RELATIVE PERCENT: 73.9 %
PDW BLD-RTO: 14.7 % (ref 12.4–15.4)
PLATELET # BLD: 253 K/UL (ref 135–450)
PMV BLD AUTO: 9.4 FL (ref 5–10.5)
POTASSIUM SERPL-SCNC: 4.1 MMOL/L (ref 3.5–5.1)
RBC # BLD: 4.54 M/UL (ref 4–5.2)
SODIUM BLD-SCNC: 138 MMOL/L (ref 136–145)
T4 FREE: 1.3 NG/DL (ref 0.9–1.8)
TOTAL PROTEIN: 6.8 G/DL (ref 6.4–8.2)
TRIGL SERPL-MCNC: 105 MG/DL (ref 0–150)
TSH REFLEX: 4.48 UIU/ML (ref 0.27–4.2)
VALPROIC ACID LEVEL: 64.9 UG/ML (ref 50–100)
VITAMIN B-12: 379 PG/ML (ref 211–911)
VITAMIN D 25-HYDROXY: 49.4 NG/ML
VLDLC SERPL CALC-MCNC: 21 MG/DL
WBC # BLD: 8.7 K/UL (ref 4–11)

## 2023-01-18 RX ORDER — ZOLEDRONIC ACID 5 MG/100ML
5 INJECTION, SOLUTION INTRAVENOUS ONCE
Qty: 100 ML | Refills: 0 | Status: SHIPPED | OUTPATIENT
Start: 2023-01-18 | End: 2023-01-18

## 2023-01-18 RX ORDER — SODIUM CHLORIDE 9 MG/ML
5-250 INJECTION, SOLUTION INTRAVENOUS PRN
Status: CANCELLED | OUTPATIENT
Start: 2023-01-18

## 2023-01-18 RX ORDER — SODIUM CHLORIDE 0.9 % (FLUSH) 0.9 %
5-40 SYRINGE (ML) INJECTION PRN
Status: CANCELLED | OUTPATIENT
Start: 2023-01-18

## 2023-01-18 RX ORDER — ZOLEDRONIC ACID 5 MG/100ML
5 INJECTION, SOLUTION INTRAVENOUS ONCE
Status: CANCELLED | OUTPATIENT
Start: 2023-01-18 | End: 2023-01-18

## 2023-01-18 SDOH — ECONOMIC STABILITY: FOOD INSECURITY: WITHIN THE PAST 12 MONTHS, THE FOOD YOU BOUGHT JUST DIDN'T LAST AND YOU DIDN'T HAVE MONEY TO GET MORE.: NEVER TRUE

## 2023-01-18 SDOH — ECONOMIC STABILITY: FOOD INSECURITY: WITHIN THE PAST 12 MONTHS, YOU WORRIED THAT YOUR FOOD WOULD RUN OUT BEFORE YOU GOT MONEY TO BUY MORE.: NEVER TRUE

## 2023-01-18 ASSESSMENT — LIFESTYLE VARIABLES
HOW OFTEN DURING THE LAST YEAR HAVE YOU FAILED TO DO WHAT WAS NORMALLY EXPECTED FROM YOU BECAUSE OF DRINKING: 0
HOW OFTEN DURING THE LAST YEAR HAVE YOU HAD A FEELING OF GUILT OR REMORSE AFTER DRINKING: 0
HOW OFTEN DURING THE LAST YEAR HAVE YOU FOUND THAT YOU WERE NOT ABLE TO STOP DRINKING ONCE YOU HAD STARTED: 0
HOW OFTEN DURING THE LAST YEAR HAVE YOU BEEN UNABLE TO REMEMBER WHAT HAPPENED THE NIGHT BEFORE BECAUSE YOU HAD BEEN DRINKING: 0
HOW OFTEN DO YOU HAVE A DRINK CONTAINING ALCOHOL: 4 OR MORE TIMES A WEEK
HOW OFTEN DURING THE LAST YEAR HAVE YOU NEEDED AN ALCOHOLIC DRINK FIRST THING IN THE MORNING TO GET YOURSELF GOING AFTER A NIGHT OF HEAVY DRINKING: 0
HAS A RELATIVE, FRIEND, DOCTOR, OR ANOTHER HEALTH PROFESSIONAL EXPRESSED CONCERN ABOUT YOUR DRINKING OR SUGGESTED YOU CUT DOWN: 0
HOW MANY STANDARD DRINKS CONTAINING ALCOHOL DO YOU HAVE ON A TYPICAL DAY: 1 OR 2
HAVE YOU OR SOMEONE ELSE BEEN INJURED AS A RESULT OF YOUR DRINKING: 0

## 2023-01-18 ASSESSMENT — PATIENT HEALTH QUESTIONNAIRE - PHQ9
9. THOUGHTS THAT YOU WOULD BE BETTER OFF DEAD, OR OF HURTING YOURSELF: 0
8. MOVING OR SPEAKING SO SLOWLY THAT OTHER PEOPLE COULD HAVE NOTICED. OR THE OPPOSITE, BEING SO FIGETY OR RESTLESS THAT YOU HAVE BEEN MOVING AROUND A LOT MORE THAN USUAL: 0
5. POOR APPETITE OR OVEREATING: 0
SUM OF ALL RESPONSES TO PHQ QUESTIONS 1-9: 1
7. TROUBLE CONCENTRATING ON THINGS, SUCH AS READING THE NEWSPAPER OR WATCHING TELEVISION: 0
10. IF YOU CHECKED OFF ANY PROBLEMS, HOW DIFFICULT HAVE THESE PROBLEMS MADE IT FOR YOU TO DO YOUR WORK, TAKE CARE OF THINGS AT HOME, OR GET ALONG WITH OTHER PEOPLE: 0
SUM OF ALL RESPONSES TO PHQ QUESTIONS 1-9: 1
SUM OF ALL RESPONSES TO PHQ9 QUESTIONS 1 & 2: 0
6. FEELING BAD ABOUT YOURSELF - OR THAT YOU ARE A FAILURE OR HAVE LET YOURSELF OR YOUR FAMILY DOWN: 1
SUM OF ALL RESPONSES TO PHQ QUESTIONS 1-9: 1
SUM OF ALL RESPONSES TO PHQ QUESTIONS 1-9: 1
3. TROUBLE FALLING OR STAYING ASLEEP: 0
1. LITTLE INTEREST OR PLEASURE IN DOING THINGS: 0
4. FEELING TIRED OR HAVING LITTLE ENERGY: 0
2. FEELING DOWN, DEPRESSED OR HOPELESS: 0

## 2023-01-18 ASSESSMENT — SOCIAL DETERMINANTS OF HEALTH (SDOH): HOW HARD IS IT FOR YOU TO PAY FOR THE VERY BASICS LIKE FOOD, HOUSING, MEDICAL CARE, AND HEATING?: NOT HARD AT ALL

## 2023-01-18 NOTE — Clinical Note
Will you start care co-ordination. I impressed upon her that Rollator (4 large wheel walker with brakes) is not the best thing to prevent falls. Aluminum walker with 2 small front wheels is more stable. Also can you review meds and med compliance and consider doing the non visual portion of SLUMs. Gunnar Sky RN gave her credit for the clock but she is not telling the correct time with the hands. She is minimizing any memory deficits.

## 2023-01-18 NOTE — PATIENT INSTRUCTIONS
Personalized Preventive Plan for Sher Richey - 1/18/2023  Medicare offers a range of preventive health benefits. Some of the tests and screenings are paid in full while other may be subject to a deductible, co-insurance, and/or copay. Some of these benefits include a comprehensive review of your medical history including lifestyle, illnesses that may run in your family, and various assessments and screenings as appropriate. After reviewing your medical record and screening and assessments performed today your provider may have ordered immunizations, labs, imaging, and/or referrals for you. A list of these orders (if applicable) as well as your Preventive Care list are included within your After Visit Summary for your review. Other Preventive Recommendations:    A preventive eye exam performed by an eye specialist is recommended every 1-2 years to screen for glaucoma; cataracts, macular degeneration, and other eye disorders. A preventive dental visit is recommended every 6 months. Try to get at least 150 minutes of exercise per week or 10,000 steps per day on a pedometer . Order or download the FREE \"Exercise & Physical Activity: Your Everyday Guide\" from [employer]. Call 2-868.147.2297 or search [employer] online. You need 8132-1983 mg of calcium and 6948-8520 IU of vitamin D per day. It is possible to meet your calcium requirement with diet alone, but a vitamin D supplement is usually necessary to meet this goal.  When exposed to the sun, use a sunscreen that protects against both UVA and UVB radiation with an SPF of 30 or greater. Reapply every 2 to 3 hours or after sweating, drying off with a towel, or swimming. Always wear a seat belt when traveling in a car. Always wear a helmet when riding a bicycle or motorcycle.

## 2023-01-18 NOTE — PATIENT INSTRUCTIONS
Home readings for BP; Within 1 hour of getting up; sit 5 minutes in a chair with back supported and feet on floor. No talking or texting:  check your BP  Twice 1 minute apart. Repeat this process in the late afternoon. Try check your BP before caffeine or after hours have passed from drinking caffeine and not immediately after exercise. Your goal is 145/85 and lower most of the time. A top number rarely in the 150s is OK. Do not panic unless the top number over 200. Xxxxxxxxxxxxxxxxxxxxxxxxxxxxxxxxxxxxxx    Lucia Richard RN is called a care -co-ordinator. She will help with getting some testing set up.

## 2023-01-18 NOTE — PROGRESS NOTES
2023    Tanya Jiang (:  1945) is a 68 y.o. female, here for evaluation of the following chief complaint(s):  Medication Refill    Here with daughter Jennifer Moore    ASSESSMENT/PLAN:    1. Memory change  I will request Care Coordination and would like the non visual portion of SLUMS to be completed. - TSH with Reflex    2. Palpitations  Due to infrequent occurrence. Not sure we can catch this without MCOT for 30 days. Will start  with EKG and she can get done the same day as DEXA scan. 3. Hypercholesterolemia  Lab Results   Component Value Date    LDLCALC 121 (H) 09/15/2021    LDLDIRECT 126 (H) 2015     Not fasting but over due for check. On statin. Continue    - Comprehensive Metabolic Panel  - Lipid Panel    4. Vitamin D deficiency  On high dose Rx. Need to monitor. Last check still a little low. Wonder about med compliance   - Vitamin D 25 Hydroxy    5. Osteoporosis without current pathological fracture  Due for reclast.  Discussed. - Comprehensive Metabolic Panel  - zoledronic acid (RECLAST) 5 MG/100ML SOLN; Infuse 100 mLs intravenously once for 1 dose Per year  Dispense: 100 mL; Refill: 0    6. Subclinical hypothyroidism  Monitor and due to memory , may need to treat.    - TSH with Reflex    7. Current use of proton pump inhibitor  Monitor labs. - CBC with Auto Differential  - Vitamin B12  - Magnesium    8. High risk medication use  - Valproic Acid Level, Total    9. Seasonal affective disorder ([facility] [address]  No longer on treatment. PHQ Scores 2023 2022 3/16/2022 2018 2017 10/24/2016   PHQ2 Score 0 0 2 2 2 2   PHQ9 Score 1 0 2 10 2 2     Interpretation of Total Score Depression Severity: 1-4 = Minimal depression, 5-9 = Mild depression, 10-14 = Moderate depression, 15-19 = Moderately severe depression, 20-27 = Severe depression      10. Localization-related focal epilepsy with simple partial seizures ([address]  On chronic Depakote.   Seeing neurology CNP with [facility]. Not sure when last appt but may need to follow up. 11. Venous angioma of brain [facility])  Per neurology. Incidental finding. 12. Need for influenza vaccination  - Influenza, FLUAD, (age 72 y+), IM, Preservative Free, 0.5 mL      FOLLOW UP:  6 months at latest.  Maybe sooner    HPI  General follow up. Last seen 6/13/22.  6 month follow up    Check ears. Never had hearing aides cleaned. Not hearing well with them    Every once in a while gets palpitations. Regular with a skip -- maybe 2 times a week. Daughter is concerned about her slurring words. Asked daughter if she had any other concerns and not sure if she was fearful to mention in front of her mother or not but she said no. Has fallen since last visit. Last time had urgent BM and started to have incontinent diarrhea. Was using rollator but it got away from her and she fell. Does have a collapsible aluminum walker at home but prefers to use the rollator. I reviewed her AWV info. She thinks her BP is up because she did not sleep well due to worrying about the memory testing. She was \"passed\" on the cognitive screen but she does not know the difference between short and long hands and 2x tells me the short is minutes and the long is hours. She says she did not do the time right at first because she could not hear the RN. It ws 11:10 and she first put one  hand on the 10 and one hand on the 11. I drew a clock with very distinct hand size changes for 3 o'clock and she told me it read \"quarter to 3.\"     She due/overdue for lab. NOT FASTING but difficult to get her in or back so will draw not fasting. Review of Systems    OBJECTIVE:    Vitals:    01/18/23 1342 01/18/23 1359   BP: (!) 155/85 (!) 145/80   Site: Left Upper Arm    Pulse: 68    SpO2: 96%    Weight: 207 lb 9.6 oz (94.2 kg)        Physical Exam  Vitals reviewed. Constitutional:       General: She is not in acute distress.      Appearance: Normal appearance. She is well-developed. She is obese. She is not diaphoretic. Comments: Using a rollator. Hunched forward to use this. Unable to walk steadily without support. Eyes:      General: No scleral icterus. Neck:      Thyroid: No thyroid mass or thyromegaly. Vascular: No carotid bruit. Cardiovascular:      Rate and Rhythm: Normal rate and regular rhythm. Heart sounds: Normal heart sounds, S1 normal and S2 normal. No murmur heard. No gallop. Comments: No irregularity and she is not currently feeling symptoms. Pulmonary:      Effort: Pulmonary effort is normal. No respiratory distress. Breath sounds: Normal breath sounds. No decreased breath sounds, wheezing, rhonchi or rales. Abdominal:      General: Bowel sounds are normal. There is no abdominal bruit. Palpations: Abdomen is soft. There is no hepatomegaly or mass. Tenderness: There is no abdominal tenderness. Musculoskeletal:      Cervical back: Neck supple. Right lower leg: No edema. Left lower leg: No edema. Lymphadenopathy:      Cervical: No cervical adenopathy. Skin:     General: Skin is warm and dry. Coloration: Skin is not pale. Nails: There is no clubbing. Neurological:      Mental Status: She is alert and oriented to person, place, and time. Motor: No tremor or abnormal muscle tone. Coordination: Coordination normal.      Gait: Gait normal.      Comments: Gait is poor due to past orthopedic problems. Chronically mild slurred speech. Psychiatric:         Mood and Affect: Mood and affect normal.         Speech: Speech normal.         Behavior: Behavior normal.         Cognition and Memory: Memory is impaired. Comments: Poor insight into possible memory deficit. Talkative. May be a little inattentive         An electronic signature was used to authenticate this note.     John Pollack MD

## 2023-01-18 NOTE — PROGRESS NOTES
Medicare Annual Wellness Visit    Roselyn Mayberry is here for Medicare AWV    Assessment & Plan   Medicare annual wellness visit, subsequent  (Has one prior AWV in 2017)      Recommendations for Preventive Services Due: see orders and patient instructions/AVS.  Recommended screening schedule for the next 5-10 years is provided to the patient in written form: see Patient Instructions/AVS.     No follow-ups on file. Subjective   The following acute and/or chronic problems were also addressed today:  Discussed with patient she is going to be following up about epidual for back to see if it helps with mobility issues. Patient confirms she is going to complete DEXA scan. ACP note done today with patient. Patient's complete Health Risk Assessment and screening values have been reviewed and are found in Flowsheets. The following problems were reviewed today and where indicated follow up appointments were made and/or referrals ordered. Positive Risk Factor Screenings with Interventions:    Fall Risk:  Do you feel unsteady or are you worried about falling? : (!) yes  2 or more falls in past year?: no  Fall with injury in past year?: (!) yes (mild soreness to back)     Interventions:    Patient comments: she did therapy and it did not help she then was referred to orthopedics again and they believe her \"shakiness\" in legs is due to back issues and plans to do epidual.             General HRA Questions:  Select all that apply: (!) New or Increased Pain, Loneliness (back pain when she walks)    Pain Interventions:  Patient has been having increased back pain with movement, patient encouraged to follow up with Ortho to get epidural scheduled. Loneliness Interventions:  Discussed with patient things she can do to help with loneliness. Patient states she feels like it is not a concern she is worried about at this time.         Weight and Activity:  Physical Activity: Insufficiently Active    Days of Exercise per Week: 3 days    Minutes of Exercise per Session: 10 min     On average, how many days per week do you engage in moderate to strenuous exercise (like a brisk walk)?: 3 days  Have you lost any weight without trying in the past 3 months?: No  Body mass index: (!) 32.51    Obesity Interventions:  See AVS for additional education material           Hearing Screen:  Do you or your family notice any trouble with your hearing that hasn't been managed with hearing aids?: (!) Yes    Interventions:  Referred to follow up with her audiologist/otologist  to get hearing aids checked and/pr cleaned. ADL's:   Patient reports needing help with:  Select all that apply: (!) Laundry, Housekeeping  Interventions:  Patient declined any further interventions or treatment, patient stays with her family who helps with these tasks. Objective   Vitals:    01/18/23 1259 01/18/23 1346 01/18/23 1359   BP:  (!) 155/85 (!) 145/80   Pulse: 68     SpO2: 96%     Weight: 207 lb 9.6 oz (94.2 kg)     Height: 5' 7\" (1.702 m)        Body mass index is 32.51 kg/m². Allergies   Allergen Reactions    Keppra     Indocin [Indomethacin] Rash     Could be photosensitivity instead of allergy. Lovastatin Other (See Comments)     Leg cramps     Other      Should not have MRI of brain due to old metal plate    Topamax      Prior to Visit Medications    Medication Sig Taking? Authorizing Provider   rosuvastatin (CRESTOR) 10 MG tablet TAKE 1 TABLET EVERY OTHER DAY ON EVEN DAYS OF THE MONTH. Yes Brody Oconnor MD   vitamin D (ERGOCALCIFEROL) 1.25 MG (68861 UT) CAPS capsule TAKE 1 CAPSULE BY MOUTH ONE TIME PER WEEK Yes Brody Oconnor MD   rOPINIRole (REQUIP) 0.5 MG tablet TAKE 2-3 TABLETS BY MOUTH AT BEDTIME OR IN EVENING FOR RESTLESS LEGS Yes Brody Oconnor MD   furosemide (LASIX) 20 MG tablet TAKE 1 OR 2 TABS DAILY AT LUNCH OR IN THE AFTERNOON UNTIL SWELLING DOWN.  Yes Brody Oconnor MD   valsartan-hydroCHLOROthiazide (DIOVAN-HCT) 160-12.5 MG per tablet TAKE 1 TABLET BY MOUTH EVERY DAY Yes Taty Snyder MD   famotidine (PEPCID) 20 MG tablet Take 1 tablet by mouth nightly Yes Taty Snyder MD   omeprazole (PRILOSEC) 20 MG delayed release capsule Take 1 capsule by mouth Daily In AM before first bite of food Yes Taty Snyder MD   Coenzyme Q10 (CO Q 10) 10 MG CAPS Take by mouth Yes Historical Provider, MD   Biotin 5000 MCG CAPS Take 5,000 mcg by mouth Yes Historical Provider, MD   aspirin 81 MG tablet Take 81 mg by mouth daily Yes Historical Provider, MD   divalproex (DEPAKOTE ER) 500 MG ER tablet Take 1 tablet by mouth daily Take 1 tablet by mouth  daily  Patient taking differently: Take 1,000 mg by mouth daily Take 1 tablet by mouth  daily Yes Natalie Bernheim, MD   Multiple Vitamins-Minerals (ICAPS) CAPS Take by mouth daily   Yes Historical Provider, MD   zoledronic acid (RECLAST) 5 MG/100ML SOLN Infuse 100 mLs intravenously once for 1 dose Per year  Taty Snyder MD       [facility] (Including outside providers/suppliers regularly involved in providing care):   Patient Care Team:  Taty Snyder MD as PCP - Sydni Wright MD as PCP - [facility] Empaneled Provider  Ovidio Garcia MD as Consulting Physician (Neurology)  Winifred Workman MD as Consulting Physician (Orthopedic Surgery)     Reviewed and updated this visit:  Tobacco  Allergies  Meds  Med Hx  Surg Hx  Soc Hx  Fam Hx        I, Sinan Gross RN, 1/18/2023, performed the documented evaluation under the direct supervision of the attending physician. This encounter was performed under my, Phu Aquino, direct supervision, 1/18/2023.

## 2023-01-18 NOTE — ACP (ADVANCE CARE PLANNING)
Advance Care Planning     Advance Care Planning (ACP) Physician/NP/PA Conversation    Date of Conversation: 1/18/2023  Conducted with: Patient with Decision Making Capacity    Healthcare Decision Maker:      Primary Decision Maker: Daviangel Duran - [phone]    Primary Decision Maker: Claude Martinez - [phone]    Click here to complete Healthcare Decision Makers including selection of the Healthcare Decision Maker Relationship (ie \"Primary\")      Care Preferences:    Hospitalization: \"If your health worsens and it becomes clear that your chance of recovery is unlikely, what would be your preference regarding hospitalization? \"  The patient would prefer comfort-focused treatment without hospitalization. Ventilation: \"If you were unable to breath on your own and your chance of recovery was unlikely, what would be your preference about the use of a ventilator (breathing machine) if it was available to you? \"  The patient would NOT desire the use of a ventilator. Resuscitation: \"In the event your heart stopped as a result of an underlying serious health condition, would you want attempts made to restart your heart, or would you prefer a natural death? \"  Yes, attempt to resuscitate. treatment goals    Conversation Outcomes / Follow-Up Plan:    Reviewed DNR/DNI and patient elects Full Code (Attempt Resuscitation)    Length of Voluntary ACP Conversation in minutes:  <16 minutes (Non-Billable)    Charito Wilburn RN    This encounter was performed under mySeymour MDs, direct supervision, 1/18/2023.

## 2023-01-19 ENCOUNTER — TELEPHONE (OUTPATIENT)
Dept: INTERNAL MEDICINE CLINIC | Age: 78
End: 2023-01-19

## 2023-01-19 ENCOUNTER — CARE COORDINATION (OUTPATIENT)
Dept: CARE COORDINATION | Age: 78
End: 2023-01-19

## 2023-01-19 DIAGNOSIS — G25.81 RESTLESS LEGS SYNDROME: ICD-10-CM

## 2023-01-19 DIAGNOSIS — E78.00 PURE HYPERCHOLESTEROLEMIA: ICD-10-CM

## 2023-01-19 DIAGNOSIS — E55.9 VITAMIN D DEFICIENCY: ICD-10-CM

## 2023-01-19 DIAGNOSIS — K21.9 GASTROESOPHAGEAL REFLUX DISEASE WITHOUT ESOPHAGITIS: ICD-10-CM

## 2023-01-19 DIAGNOSIS — R60.0 BILATERAL LEG EDEMA: ICD-10-CM

## 2023-01-19 DIAGNOSIS — I10 ESSENTIAL HYPERTENSION: ICD-10-CM

## 2023-01-19 RX ORDER — VALSARTAN AND HYDROCHLOROTHIAZIDE 160; 12.5 MG/1; MG/1
TABLET, FILM COATED ORAL
Qty: 90 TABLET | Refills: 3 | Status: SHIPPED | OUTPATIENT
Start: 2023-01-19

## 2023-01-19 RX ORDER — FAMOTIDINE 20 MG/1
20 TABLET, FILM COATED ORAL NIGHTLY
Qty: 90 TABLET | Refills: 3 | Status: SHIPPED | OUTPATIENT
Start: 2023-01-19

## 2023-01-19 RX ORDER — ERGOCALCIFEROL 1.25 MG/1
CAPSULE ORAL
Qty: 12 CAPSULE | Refills: 1 | Status: SHIPPED | OUTPATIENT
Start: 2023-01-19

## 2023-01-19 RX ORDER — OMEPRAZOLE 20 MG/1
20 CAPSULE, DELAYED RELEASE ORAL DAILY
Qty: 90 CAPSULE | Refills: 3 | Status: SHIPPED | OUTPATIENT
Start: 2023-01-19

## 2023-01-19 RX ORDER — ROSUVASTATIN CALCIUM 10 MG/1
TABLET, COATED ORAL
Qty: 45 TABLET | Refills: 3 | Status: SHIPPED | OUTPATIENT
Start: 2023-01-19

## 2023-01-19 RX ORDER — ROPINIROLE 0.5 MG/1
TABLET, FILM COATED ORAL
Qty: 270 TABLET | Refills: 1 | Status: SHIPPED | OUTPATIENT
Start: 2023-01-19

## 2023-01-19 RX ORDER — FUROSEMIDE 20 MG/1
TABLET ORAL
Qty: 180 TABLET | Refills: 1 | Status: SHIPPED | OUTPATIENT
Start: 2023-01-19

## 2023-01-22 NOTE — RESULT ENCOUNTER NOTE
Labs are normal or at goal.  Depakote level is good.  It is in range.  (ask patient if would like actual copy of results;  offer to put at  or mail results to them)

## 2023-01-23 ENCOUNTER — CARE COORDINATION (OUTPATIENT)
Dept: CARE COORDINATION | Age: 78
End: 2023-01-23

## 2023-01-23 NOTE — CARE COORDINATION
PCP referral to care coordination; SLUMS, medication review & compliance, discuss dme - rollator vs 2 wheeled walker, falls. Outreach attempt #2. Patient answered, states she expecting an incoming call and will call RN-CC back at later date & time. My contact number was provided to patient. Will wait for call return.

## 2023-01-26 ENCOUNTER — CARE COORDINATION (OUTPATIENT)
Dept: CARE COORDINATION | Age: 78
End: 2023-01-26

## 2023-01-26 SDOH — ECONOMIC STABILITY: HOUSING INSECURITY: IN THE LAST 12 MONTHS, HOW MANY PLACES HAVE YOU LIVED?: 1

## 2023-01-26 SDOH — ECONOMIC STABILITY: INCOME INSECURITY: IN THE LAST 12 MONTHS, WAS THERE A TIME WHEN YOU WERE NOT ABLE TO PAY THE MORTGAGE OR RENT ON TIME?: NO

## 2023-01-26 SDOH — ECONOMIC STABILITY: HOUSING INSECURITY
IN THE LAST 12 MONTHS, WAS THERE A TIME WHEN YOU DID NOT HAVE A STEADY PLACE TO SLEEP OR SLEPT IN A SHELTER (INCLUDING NOW)?: NO

## 2023-01-26 ASSESSMENT — SOCIAL DETERMINANTS OF HEALTH (SDOH)
HOW OFTEN DO YOU GET TOGETHER WITH FRIENDS OR RELATIVES?: TWICE A WEEK
IN A TYPICAL WEEK, HOW MANY TIMES DO YOU TALK ON THE PHONE WITH FAMILY, FRIENDS, OR NEIGHBORS?: TWICE A WEEK
DO YOU BELONG TO ANY CLUBS OR ORGANIZATIONS SUCH AS CHURCH GROUPS UNIONS, FRATERNAL OR ATHLETIC GROUPS, OR SCHOOL GROUPS?: NO
HOW OFTEN DO YOU ATTEND CHURCH OR RELIGIOUS SERVICES?: NEVER
HOW OFTEN DO YOU ATTENT MEETINGS OF THE CLUB OR ORGANIZATION YOU BELONG TO?: NEVER

## 2023-01-26 NOTE — CARE COORDINATION
PCP referral to care coordination; SLUMS, medication review & compliance, discuss dme - rollator vs 2 wheeled walker, falls. Outreach attempt #3. No answer; HIPPA compliant message left through VM requesting call return. RN-CC will follow up at later date & time.

## 2023-01-26 NOTE — CARE COORDINATION
Ambulatory Care Coordination Note  1/26/2023    ACC: Libby Johnson, SILVESTRE  PCP referral to care coordination; fall precautions, discuss rollator vs 2 wheeled walker, SLUMS, medication review and compliance. RN-CC received incoming call from patient; introduced self and role of care coordinator. Patient agreeable to enrollment. Patient is a 68 yr old female with pmhx of . Her son lives with her in her single family home - she lives in the main home, he lives in the mother in law suite. He works from home and is available to provide support most hours of the day. She also has a supportive daughter that assists with transportation, showering and grocery shopping when needed. She reports 1 fall without injury over the last year. She is currently using a rollator for ambulatory assistance. We discussed using a 2 wheeled walker vs a rollator for more stability - she is agreeable to using a 2 wheeled walker and states she has many of them in her home. Fall precautions discussed; removing loose rugs, keeping hallways well lit and free of clutter, pt vu. Her home is handicap equipped and she uses an Apple Watch as a life alert. She cleans her home independently and has  that comes twice yearly to deep clean. Medications reviewed in depth. Patient states she was told to increase Rosuvastatin 10 mg to daily if she could tolerate it without side effects. She states she increased it to daily about 1 year ago and has been tolerating the increase in dose just fine. No discrepancies noted during med review. She fills her weekly mediset and denies missed doses. She states she is due for a mammogram screening - last completed on 2/5/2020. She would like to schedule the mammogram, Dexa Scan on the same day as her Reclast infusion if possible. She wasn't aware and EKG was ordered. I informed her there is an order for an EKG but testing can completed as a walk in. RN-CC will outreach pcp regarding a mammogram order. The non-visual portion of the SLUMs test was completed with patient during our call. Results recorded and will be emailed to pcp. Patient denies additional needs or support from RN-CC at this time. All question answered. I provided patient with my contact information and encouraged her to contact me with questions or concerns, pt vu    PLAN:    F/u on falls - is she using the 2 wheeled walker  F/u on mammogram, dexa scan, ekg  F/u on needs & concerns      Offered patient enrollment in the Remote Patient Monitoring (RPM) program for in-home monitoring: NA. Ambulatory Care Coordination Assessment    Care Coordination Protocol  Referral from Primary Care Provider: No  Week 1 - Initial Assessment     Do you have all of your prescriptions and are they filled?: Yes  Barriers to medication adherence: None  How often do you have trouble taking your medications the way you have been told to take them?: I always take them as prescribed. Do you have Home O2 Therapy?: No      Ability to seek help/take action for Emergent Urgent situations i.e. fire, crime, inclement weather or health crisis. : Independent  Ability to ambulate to restroom: Independent  Ability handle personal hygeine needs (bathing/dressing/grooming): Needs Assistance  Ability to manage Medications: Independent  Ability to prepare Food Preparation: Needs Assistance  Ability to maintain home (clean home, laundry): Needs Assistance  Ability to drive and/or has transportation: Dependent  Ability to do shopping: Needs Assistance  Ability to manage finances:  Independent  Is patient able to live independently?: Yes     Current Housing: Private Residence        Per the Fall Risk Screening, did the patient have 2 or more falls or 1 fall with injury in the past year?: No     Frequent urination at night?: No  Do you use rails/bars?: Yes  Do you have a non-slip tub mat?: Yes        Suggested Interventions and Community Resources                  Prior to Admission medications    Medication Sig Start Date End Date Taking? Authorizing Provider   rosuvastatin (CRESTOR) 10 MG tablet TAKE 1 TABLET EVERY OTHER DAY ON EVEN DAYS OF THE MONTH. Patient taking differently: TAKES DAILY 1/19/23  Yes Phani Knox MD   vitamin D (ERGOCALCIFEROL) 1.25 MG (39662 UT) CAPS capsule TAKE 1 CAPSULE BY MOUTH ONE TIME PER WEEK 1/19/23  Yes Phani Knox MD   rOPINIRole (REQUIP) 0.5 MG tablet TAKE 2-3 TABLETS BY MOUTH AT BEDTIME OR IN EVENING FOR RESTLESS LEGS 1/19/23  Yes Phani Knox MD   furosemide (LASIX) 20 MG tablet TAKE 1 OR 2 TABS DAILY AT LUNCH OR IN THE AFTERNOON UNTIL SWELLING DOWN. Patient taking differently: PATIENT TAKING EVERY OTHER DAY 1/19/23  Yes Phani Knox MD   valsartan-hydroCHLOROthiazide (DIOVAN-HCT) 160-12.5 MG per tablet TAKE 1 TABLET BY MOUTH EVERY DAY 1/19/23  Yes Phani Knox MD   famotidine (PEPCID) 20 MG tablet Take 1 tablet by mouth nightly 1/19/23  Yes Phani Knox MD   omeprazole (PRILOSEC) 20 MG delayed release capsule Take 1 capsule by mouth Daily In AM before first bite of food 1/19/23  Yes Phani Knox MD   Coenzyme Q10 (CO Q 10) 10 MG CAPS Take by mouth   Yes Historical Provider, MD   Biotin 5000 MCG CAPS Take 5,000 mcg by mouth   Yes Historical Provider, MD   aspirin 81 MG tablet Take 81 mg by mouth daily   Yes Historical Provider, MD   divalproex (DEPAKOTE ER) 500 MG ER tablet Take 1 tablet by mouth daily Take 1 tablet by mouth  daily  Patient taking differently: Take 500 mg by mouth daily Normally takes 500 mg daily but will take a second dose when arm starts shaking.  3/9/16  Yes Kathy Ramirez MD   Multiple Vitamins-Minerals (ICAPS) CAPS Take by mouth daily     Yes Historical Provider, MD   zoledronic acid (RECLAST) 5 MG/100ML SOLN Infuse 100 mLs intravenously once for 1 dose Per year 1/18/23 1/18/23  Phani Knox MD       Future Appointments   Date Time Provider Jarred Melgar   2/1/2023 10:30 AM ROOM 2A Shanti ALCANTARFZ [facility]   7/19/2023 10:50 AM MD CLAUDIA [facility] [place] [facility]      and   General Assessment    Do you have any symptoms that are causing concern?: No

## 2023-02-01 ENCOUNTER — HOSPITAL ENCOUNTER (OUTPATIENT)
Age: 78
Discharge: HOME | End: 2023-02-01
Payer: MEDICARE

## 2023-02-01 ENCOUNTER — HOSPITAL ENCOUNTER (OUTPATIENT)
Dept: ONCOLOGY | Age: 78
Setting detail: INFUSION SERIES
Discharge: HOME | End: 2023-02-01
Payer: MEDICARE

## 2023-02-01 VITALS
HEART RATE: 80 BPM | RESPIRATION RATE: 16 BRPM | DIASTOLIC BLOOD PRESSURE: 79 MMHG | TEMPERATURE: 97.2 F | SYSTOLIC BLOOD PRESSURE: 137 MMHG

## 2023-02-01 DIAGNOSIS — M81.6 LOCALIZED OSTEOPOROSIS WITHOUT CURRENT PATHOLOGICAL FRACTURE: Primary | ICD-10-CM

## 2023-02-01 DIAGNOSIS — R00.2 PALPITATIONS: ICD-10-CM

## 2023-02-01 LAB
EKG ATRIAL RATE: 82 BPM
EKG DIAGNOSIS: NORMAL
EKG P AXIS: 59 DEGREES
EKG P-R INTERVAL: 124 MS
EKG Q-T INTERVAL: 412 MS
EKG QRS DURATION: 96 MS
EKG QTC CALCULATION (BAZETT): 481 MS
EKG R AXIS: -5 DEGREES
EKG T AXIS: 34 DEGREES
EKG VENTRICULAR RATE: 82 BPM

## 2023-02-01 PROCEDURE — 99211 OFF/OP EST MAY X REQ PHY/QHP: CPT

## 2023-02-01 PROCEDURE — 2580000003 HC RX 258: Performed by: FAMILY MEDICINE

## 2023-02-01 PROCEDURE — 93010 ELECTROCARDIOGRAM REPORT: CPT | Performed by: INTERNAL MEDICINE

## 2023-02-01 PROCEDURE — 93005 ELECTROCARDIOGRAM TRACING: CPT

## 2023-02-01 PROCEDURE — 96365 THER/PROPH/DIAG IV INF INIT: CPT

## 2023-02-01 PROCEDURE — 6360000002 HC RX W HCPCS: Performed by: FAMILY MEDICINE

## 2023-02-01 RX ORDER — ZOLEDRONIC ACID 5 MG/100ML
5 INJECTION, SOLUTION INTRAVENOUS ONCE
Status: CANCELLED | OUTPATIENT
Start: 2023-02-01 | End: 2023-02-01

## 2023-02-01 RX ORDER — SODIUM CHLORIDE 0.9 % (FLUSH) 0.9 %
5-40 SYRINGE (ML) INJECTION PRN
Status: DISCONTINUED | OUTPATIENT
Start: 2023-02-01 | End: 2023-02-01

## 2023-02-01 RX ORDER — SODIUM CHLORIDE 9 MG/ML
5-250 INJECTION, SOLUTION INTRAVENOUS PRN
Status: CANCELLED | OUTPATIENT
Start: 2023-02-01

## 2023-02-01 RX ORDER — SODIUM CHLORIDE 9 MG/ML
5-250 INJECTION, SOLUTION INTRAVENOUS PRN
Status: DISCONTINUED | OUTPATIENT
Start: 2023-02-01 | End: 2023-02-01

## 2023-02-01 RX ORDER — SODIUM CHLORIDE 0.9 % (FLUSH) 0.9 %
5-40 SYRINGE (ML) INJECTION PRN
Status: CANCELLED | OUTPATIENT
Start: 2023-02-01

## 2023-02-01 RX ORDER — ZOLEDRONIC ACID 5 MG/100ML
5 INJECTION, SOLUTION INTRAVENOUS ONCE
Status: DISCONTINUED | OUTPATIENT
Start: 2023-02-01 | End: 2023-02-01

## 2023-02-01 RX ADMIN — SODIUM CHLORIDE 100 ML/HR: 9 INJECTION, SOLUTION INTRAVENOUS at 10:38

## 2023-02-01 RX ADMIN — SODIUM CHLORIDE, PRESERVATIVE FREE 10 ML: 5 INJECTION INTRAVENOUS at 10:38

## 2023-02-01 RX ADMIN — ZOLEDRONIC ACID 5 MG: 0.05 INJECTION, SOLUTION INTRAVENOUS at 10:39

## 2023-02-01 NOTE — DISCHARGE INSTRUCTIONS
zoledronic acid  Pronunciation:  BRIANNA Denton ik AS id  Brand:  Reclast, Zometa  What is the most important information I should know about zoledronic acid? Do not use if you are pregnant. Use effective birth control, and ask your doctor how long to prevent pregnancy after you stop using zoledronic acid. Zoledronic acid can cause serious kidney problems,  especially if you are dehydrated, if you take diuretic medicine, or if you already have kidney disease. Call your doctor if you urinate less than usual, if you have swelling in your feet or ankles, or if you feel tired or short of breath. Also call your doctor if you have muscle spasms, numbness or tingling (in hands and feet or around the mouth), new or unusual hip pain, or severe pain in your joints, bones, or muscles. What is zoledronic acid? Reclast and Zometa are two different brands of zoledronic acid. Reclast is used to treat or prevent osteoporosis caused by menopause, or steroid use. This medicine also increases bone mass in men with osteoporosis. Reclast is for use when you have a high risk of bone fracture. Reclast is also used to treat Paget's disease of bone. Zometa is used to treat high blood levels of calcium caused by cancer (also called hypercalcemia of malignancy). This medicine also treats multiple myeloma (a type of bone marrow cancer) or bone cancer that has spread from elsewhere in the body. You should not use Reclast and Zometa at the same time. Zoledronic acid may also be used for purposes not listed in this medication guide. What should I discuss with my healthcare provider before receiving zoledronic acid? You should not be treated with zoledronic acid if you are allergic to it. You also should not receive Reclast if you have:  low levels of calcium in your blood (hypocalcemia); or  severe kidney disease.   You should not be treated with zoledronic acid if are currently using any other bisphosphonate (such as alendronate, etidronate, ibandronate, pamidronate, risedronate, or tiludronate). Tell your doctor if you have ever had:  kidney disease;  hypocalcemia;  thyroid or parathyroid surgery;  surgery to remove part of your intestine;  asthma caused by taking aspirin;  any condition that makes it hard for your body to absorb nutrients from food (malabsorption);  a dental problem (you may need a dental exam before you receive zoledronic acid);  if you are dehydrated; or  if you take a diuretic or \"water pill\". Zoledronic acid can cause serious kidney problems,  especially if you are dehydrated, if you take diuretic medicine, or if you already have kidney disease. This medicine may cause jaw bone problems (osteonecrosis). The risk is highest in people with cancer, blood cell disorders, pre-existing dental problems, or people treated with steroids, chemotherapy, or radiation. Ask your doctor about your own risk. You may need to have a negative pregnancy test before starting this treatment. Do not use if you are pregnant. This medicine may harm an unborn baby or cause birth defects. Zoledronic acid can remain in your body for weeks or years after your last dose. Use effective birth control to prevent pregnancy while using this medicine. Talk with your doctor about the need to prevent pregnancy after you stop using zoledronic acid. This medicine may affect fertility (ability to have children) in women. However, it is important to use birth control to prevent pregnancy because zoledronic acid can harm an unborn baby. You should not breastfeed while using zoledronic acid. How is zoledronic acid given? Zoledronic acid is given as an infusion into a vein. A healthcare provider will give you this injection. Zoledronic acid is sometimes given as a single dose only one time. It may also be given once every 1 or 2 years. How often you receive zoledronic acid will depend on why you are using this medicine.  Follow your doctor's instructions. Drink at least 2 glasses of water within a few hours before your injection to keep from getting dehydrated. You will need frequent medical tests. Pay special attention to your dental hygiene while using zoledronic acid. Brush and floss your teeth regularly. If you need to have any dental work (especially surgery), tell the dentist ahead of time that you are using zoledronic acid. Zoledronic acid is only part of a complete program of treatment that may also include diet changes and taking calcium and vitamin supplements. Follow your doctor's instructions very closely. Your doctor will determine how long to treat you with this medicine. Zoledronic acid is often given for only 3 to 5 years. What happens if I miss a dose? Call your doctor for instructions if you miss an appointment for your zoledronic acid injection. What happens if I overdose? Seek emergency medical attention or call the Poison Help line at 1-676.906.9733. What should I avoid while receiving zoledronic acid? Avoid smoking, or try to quit. Smoking can reduce your bone mineral density, making fractures more likely. Avoid drinking large amounts of alcohol. Heavy drinking can also cause bone loss. What are the possible side effects of zoledronic acid? Get emergency medical help if you have signs of an allergic reaction: hives; wheezing, chest tightness, trouble breathing; swelling of your face, lips, tongue, or throat.   Call your doctor at once if you have:  new or unusual pain in your thigh or hip;  jaw pain or numbness, red or swollen gums, loose teeth, or slow healing after dental work;  severe joint, bone, or muscle pain;  kidney problems --little or no urination, swelling in your feet or ankles, feeling tired;  low red blood cells (anemia) --pale skin, unusual tiredness, feeling light-headed or short of breath, cold hands and feet; or  low calcium levels --muscle spasms or contractions, numbness or tingly feeling (around your mouth, or in your fingers and toes). Serious side effects on the kidneys may be more likely in older adults. Common side effects may include:  trouble breathing;  nausea, vomiting, diarrhea, constipation;  bone pain, muscle or joint pain;  fever or other flu symptoms;  tiredness;  eye pain or swelling;  pain in your arms or legs;  headache; or  anemia. This is not a complete list of side effects and others may occur. Call your doctor for medical advice about side effects. You may report side effects to FDA at 0-183-FDA-7886. What other drugs will affect zoledronic acid? Zoledronic acid can harm your kidneys, especially if you also use certain medicines for infections, cancer, osteoporosis, organ transplant rejection, bowel disorders, high blood pressure, or pain or arthritis (including Advil, Motrin, and Aleve). Other drugs may affect zoledronic acid, including prescription and over-the-counter medicines, vitamins, and herbal products. Tell your doctor about all your current medicines and any medicine you start or stop using. Where can I get more information? Your doctor or pharmacist can provide more information about zoledronic acid. Remember, keep this and all other medicines out of the reach of children, never share your medicines with others, and use this medication only for the indication prescribed. Every effort has been made to ensure that the information provided by [address] [address] is accurate, up-to-date, and complete, but no guarantee is made to that effect. Drug information contained herein may be time sensitive. [facility] information has been compiled for use by healthcare practitioners and consumers in the [place] and therefore [facility] does not warrant that uses outside of the [place] are appropriate, unless specifically indicated otherwise. [facility] drug information does not endorse drugs, diagnose patients or recommend therapy.  [facility] drug information is an informational resource designed to assist licensed healthcare practitioners in caring for their patients and/or to serve consumers viewing this service as a supplement to, and not a substitute for, the expertise, skill, knowledge and judgment of healthcare practitioners. The absence of a warning for a given drug or drug combination in no way should be construed to indicate that the drug or drug combination is safe, effective or appropriate for any given patient. [facility] does not assume any responsibility for any aspect of healthcare administered with the aid of information [facility] provides. The information contained herein is not intended to cover all possible uses, directions, precautions, warnings, drug interactions, allergic reactions, or adverse effects. If you have questions about the drugs you are taking, check with your doctor, nurse or pharmacist.  Copyright 0511-4667 167  Taqueria: 18.01. Revision date: 2/26/2021. Care instructions adapted under license by [facility] ([facility]). If you have questions about a medical condition or this instruction, always ask your healthcare professional. [place] [address] any warranty or liability for your use of this information.

## 2023-02-16 ENCOUNTER — CARE COORDINATION (OUTPATIENT)
Dept: CARE COORDINATION | Age: 78
End: 2023-02-16

## 2023-02-16 NOTE — CARE COORDINATION
RN-CC attempted to outreach patient for cc follow up; falls, 2 wheeled walker, mammo/dexa scan/ekg, needs & concerns. No answer, HIPPA compliant message left through  requesting call return. RN-CC will follow up at later date & time.

## 2023-03-08 ENCOUNTER — CARE COORDINATION (OUTPATIENT)
Dept: CARE COORDINATION | Age: 78
End: 2023-03-08

## 2023-03-08 DIAGNOSIS — Z12.31 ENCOUNTER FOR SCREENING MAMMOGRAM FOR BREAST CANCER: Primary | ICD-10-CM

## 2023-03-08 NOTE — CARE COORDINATION
Ambulatory Care Coordination Note  3/8/2023    Patient Current Location: [facility]     ACM contacted the patient by telephone. Verified name and  with patient as identifiers. Provided introduction to self, and explanation of the ACM role. Challenges to be reviewed by the provider   Additional needs identified to be addressed with provider: Yes  Patient requesting order for routine mammogram               Method of communication with provider: chart routing. ACM: Lucia Richard RN  RN-CC outreached patient for cc follow up; 2 wheeled walker, blood pressure, mammogram, dexa scan, falls, needs & concerns. Patient denies new falls. She states she is doing well. RN-CC reinforced using a 2 wheeled walker for stability, pt vu. She states she has 5 walkers, 1 being a 2 wheeled walker. She is agreeable to use for added stability to decrease risk for falls. Dexa scan - she has not scheduled her dexa scan. She states [facility] no longer offers [address] parking, so she has to coordinate with her daughter for transportation. She wants to schedule a routine mammogram at the same time as her dexa scan. She states she attempted to schedule her routine yearly mammogram through [facility] but they told her they could not schedule without an order. RN-CC informed patient that I will outreach her pcp to request an order. Blood pressure - she monitor her blood pressure daily and reports her sbp does not go over. She states her blood pressure usually stays around  142/65. Per Dr. Marie Thompson office note: Your goal is 145/85 and lower most of the time. A top number rarely in the 150s is OK. Do not panic unless the top number over 200. EKG completed    PLAN:    Outreach pcp regarding order for mammogram  Schedule mammogram and dexa scan  F/u on needs & concerns       Offered patient enrollment in the Remote Patient Monitoring (RPM) program for in-home monitoring: Patient declined.     Lab Results       None            Care Coordination Interventions    Referral from Primary Care Provider: No  Suggested Interventions and Community Resources          Goals Addressed                   This Visit's Progress     Reduce Falls         I will reduce my risk of falls by the following: Remove rugs or use non slip rugs  Use walking aids like cane or walker    Barriers: lack of motivation  Plan for overcoming my barriers: Patient will seek support from RN-CC as needed. Confidence: 7/10  Anticipated Goal Completion Date: 2/26/23    3/8/23 - patient has 2 wheeled walker and reports she is using. She denies recent falls.                Future Appointments   Date Time Provider Jarred Melgar   7/19/2023 10:50 AM Cesar Bravo MD [facility] [place] [facility]    and   General Assessment    Do you have any symptoms that are causing concern?: No

## 2023-03-15 ENCOUNTER — CARE COORDINATION (OUTPATIENT)
Dept: CARE COORDINATION | Age: 78
End: 2023-03-15

## 2023-03-15 NOTE — CARE COORDINATION
RN-CC attempted to outreach patient for cc follow up; mammogram and dexa scan. No answer; HIPPA compliant message left through  requesting call return. RN-CC will follow up at later date & time.

## 2023-03-28 ENCOUNTER — CARE COORDINATION (OUTPATIENT)
Dept: CARE COORDINATION | Age: 78
End: 2023-03-28

## 2023-03-28 NOTE — CARE COORDINATION
if future care coordination needs change, pt dane. PLAN:    Discharge from care coordination - goals met    Offered patient enrollment in the Remote Patient Monitoring (RPM) program for in-home monitoring: Patient declined. Lab Results       None            Care Coordination Interventions    Referral from Primary Care Provider: No  Suggested Interventions and Community Resources          Goals Addressed                   This Visit's Progress     Reduce Falls         I will reduce my risk of falls by the following: Remove rugs or use non slip rugs  Use walking aids like cane or walker    Barriers: lack of motivation  Plan for overcoming my barriers: Patient will seek support from RN-CC as needed. Confidence: 7/10  Anticipated Goal Completion Date: 2/26/23    3/8/23 - patient has 2 wheeled walker and reports she is using. She denies recent falls. 3/28/23 -patient states she has multiple 2 wheeled walkers at her home and states she is using them. She denies new or recent falls. Fall precautions reinforced, pt dane.                Future Appointments   Date Time Provider Jarred Melgar   4/11/2023 10:00 AM [facility] DEXA RM 1 [facility] [facility]   4/11/2023 11:00 AM [facility] MAMMO RM 2 [facility] WOMENS Radha Nickel   7/19/2023 10:50 AM MD CLAUDIA Ding [facility] [place] - [facility]    and   General Assessment    Do you have any symptoms that are causing concern?: No

## 2023-04-11 ENCOUNTER — ANCILLARY ORDERS (OUTPATIENT)
Dept: INTERNAL MEDICINE CLINIC | Age: 78
End: 2023-04-11

## 2023-04-11 DIAGNOSIS — Z12.31 ENCOUNTER FOR SCREENING MAMMOGRAM FOR BREAST CANCER: ICD-10-CM

## 2023-04-11 DIAGNOSIS — R92.8 ABNORMAL SCREENING MAMMOGRAM: Primary | ICD-10-CM

## 2023-05-10 ENCOUNTER — HOSPITAL ENCOUNTER (OUTPATIENT)
Dept: ULTRASOUND IMAGING | Age: 78
Discharge: HOME | End: 2023-05-10
Payer: MEDICARE

## 2023-05-10 ENCOUNTER — HOSPITAL ENCOUNTER (OUTPATIENT)
Dept: WOMENS IMAGING | Age: 78
Discharge: HOME | End: 2023-05-10
Payer: MEDICARE

## 2023-05-10 DIAGNOSIS — R92.8 ABNORMAL SCREENING MAMMOGRAM: ICD-10-CM

## 2023-05-10 PROCEDURE — 76642 ULTRASOUND BREAST LIMITED: CPT

## 2023-05-10 PROCEDURE — G0279 TOMOSYNTHESIS, MAMMO: HCPCS

## 2023-05-10 NOTE — RESULT ENCOUNTER NOTE
Looks like benign tissue. Recommend to recheck testing in 6 months to make sure. I put in the orders to get done in early November.

## 2023-07-17 DIAGNOSIS — R60.0 BILATERAL LEG EDEMA: ICD-10-CM

## 2023-07-17 DIAGNOSIS — G25.81 RESTLESS LEGS SYNDROME: ICD-10-CM

## 2023-07-18 RX ORDER — FUROSEMIDE 20 MG/1
TABLET ORAL
Qty: 90 TABLET | Refills: 0 | Status: SHIPPED | OUTPATIENT
Start: 2023-07-18

## 2023-07-18 RX ORDER — ROPINIROLE 0.5 MG/1
TABLET, FILM COATED ORAL
Qty: 270 TABLET | Refills: 1 | Status: SHIPPED | OUTPATIENT
Start: 2023-07-18

## 2023-09-27 ENCOUNTER — OFFICE VISIT (OUTPATIENT)
Dept: INTERNAL MEDICINE CLINIC | Age: 78
End: 2023-09-27

## 2023-09-27 VITALS
DIASTOLIC BLOOD PRESSURE: 82 MMHG | WEIGHT: 211 LBS | BODY MASS INDEX: 33.12 KG/M2 | HEART RATE: 88 BPM | SYSTOLIC BLOOD PRESSURE: 138 MMHG | HEIGHT: 67 IN

## 2023-09-27 DIAGNOSIS — Z51.81 MEDICATION MONITORING ENCOUNTER: ICD-10-CM

## 2023-09-27 DIAGNOSIS — E55.9 VITAMIN D DEFICIENCY: ICD-10-CM

## 2023-09-27 DIAGNOSIS — M81.6 LOCALIZED OSTEOPOROSIS WITHOUT CURRENT PATHOLOGICAL FRACTURE: ICD-10-CM

## 2023-09-27 DIAGNOSIS — Z23 NEED FOR INFLUENZA VACCINATION: Primary | ICD-10-CM

## 2023-09-27 DIAGNOSIS — G40.109 LOCALIZATION-RELATED FOCAL EPILEPSY WITH SIMPLE PARTIAL SEIZURES (HCC): ICD-10-CM

## 2023-09-27 DIAGNOSIS — E53.8 B12 DEFICIENCY: ICD-10-CM

## 2023-09-27 DIAGNOSIS — K21.9 GASTROESOPHAGEAL REFLUX DISEASE WITHOUT ESOPHAGITIS: ICD-10-CM

## 2023-09-27 DIAGNOSIS — M79.605 LEFT LEG PAIN: ICD-10-CM

## 2023-09-27 DIAGNOSIS — M41.9 ACQUIRED SCOLIOSIS: ICD-10-CM

## 2023-09-27 DIAGNOSIS — E03.8 SUBCLINICAL HYPOTHYROIDISM: ICD-10-CM

## 2023-09-27 DIAGNOSIS — E78.00 PURE HYPERCHOLESTEROLEMIA: ICD-10-CM

## 2023-09-27 LAB
ALBUMIN SERPL-MCNC: 4.6 G/DL (ref 3.4–5)
ALBUMIN/GLOB SERPL: 1.9 {RATIO} (ref 1.1–2.2)
ALP SERPL-CCNC: 67 U/L (ref 40–129)
ALT SERPL-CCNC: 10 U/L (ref 10–40)
ANION GAP SERPL CALCULATED.3IONS-SCNC: 12 MMOL/L (ref 3–16)
AST SERPL-CCNC: 14 U/L (ref 15–37)
BASOPHILS # BLD: 0 K/UL (ref 0–0.2)
BASOPHILS NFR BLD: 0.6 %
BILIRUB SERPL-MCNC: 0.4 MG/DL (ref 0–1)
BUN SERPL-MCNC: 23 MG/DL (ref 7–20)
CALCIUM SERPL-MCNC: 9.9 MG/DL (ref 8.3–10.6)
CHLORIDE SERPL-SCNC: 98 MMOL/L (ref 99–110)
CO2 SERPL-SCNC: 31 MMOL/L (ref 21–32)
CREAT SERPL-MCNC: 0.7 MG/DL (ref 0.6–1.2)
DEPRECATED RDW RBC AUTO: 14.5 % (ref 12.4–15.4)
EOSINOPHIL # BLD: 0.1 K/UL (ref 0–0.6)
EOSINOPHIL NFR BLD: 2 %
GFR SERPLBLD CREATININE-BSD FMLA CKD-EPI: >60 ML/MIN/{1.73_M2}
GLUCOSE SERPL-MCNC: 97 MG/DL (ref 70–99)
HCT VFR BLD AUTO: 42.9 % (ref 36–48)
HGB BLD-MCNC: 14 G/DL (ref 12–16)
LYMPHOCYTES # BLD: 1.8 K/UL (ref 1–5.1)
LYMPHOCYTES NFR BLD: 25.6 %
MAGNESIUM SERPL-MCNC: 2.2 MG/DL (ref 1.8–2.4)
MCH RBC QN AUTO: 30 PG (ref 26–34)
MCHC RBC AUTO-ENTMCNC: 32.7 G/DL (ref 31–36)
MCV RBC AUTO: 91.8 FL (ref 80–100)
MONOCYTES # BLD: 0.6 K/UL (ref 0–1.3)
MONOCYTES NFR BLD: 8.2 %
NEUTROPHILS # BLD: 4.4 K/UL (ref 1.7–7.7)
NEUTROPHILS NFR BLD: 63.6 %
PLATELET # BLD AUTO: 311 K/UL (ref 135–450)
PMV BLD AUTO: 8.8 FL (ref 5–10.5)
POTASSIUM SERPL-SCNC: 4 MMOL/L (ref 3.5–5.1)
PROT SERPL-MCNC: 7 G/DL (ref 6.4–8.2)
RBC # BLD AUTO: 4.68 M/UL (ref 4–5.2)
SODIUM SERPL-SCNC: 141 MMOL/L (ref 136–145)
T4 FREE SERPL-MCNC: 1.5 NG/DL (ref 0.9–1.8)
TSH SERPL DL<=0.005 MIU/L-ACNC: 5.86 UIU/ML (ref 0.27–4.2)
VALPROATE SERPL-MCNC: 57.9 UG/ML (ref 50–100)
WBC # BLD AUTO: 6.9 K/UL (ref 4–11)

## 2023-09-27 RX ORDER — DIVALPROEX SODIUM 500 MG/1
TABLET, EXTENDED RELEASE ORAL
Qty: 90 TABLET | Refills: 0 | Status: SHIPPED | OUTPATIENT
Start: 2023-09-27

## 2023-09-27 NOTE — PROGRESS NOTES
2023    Braxton Reed (:  1945) is a 66 y.o. female, here for evaluation of the following chief complaint(s):  6 Month Follow-Up    ASSESSMENT/PLAN:    1. Localization-related focal epilepsy with simple partial seizures [facility])  Discussed that neurologist that go to [facility] are with [facility] and she is OK with this. Just does not want to go to [facility] or [facility]. - Charissa Acosta MD, Neurology, [facility]  - Comprehensive Metabolic Panel  - Valproic Acid Level, Total    2. Acquired scoliosis  Recommend ortho back surgery since [place] will not usually schedule without an MRI. Will see if any special consideration due to inability to have MRI. She requested doctor with [facility].   - External Referral To Spine Surgery    3. Left leg pain  May be due to spinal stenosis. Recommend ortho back surgery since [place] will not usually schedule without an MRI. Will see if any special consideration  - External Referral To Spine Surgery    4. Osteoporosis without current pathological fracture  On Zolendreic acid infusion. Last 2023.    5. Hypercholesterolemia  Lab Results   Component Value Date    LDLCALC 118 (H) 2023    LDLDIRECT 126 (H) 2015     Above goal.   She may be taking this daily now. - Comprehensive Metabolic Panel    6. B12 deficiency  Lab Results   Component Value Date    [id number] 2023     Now normal.      7. Subclinical hypothyroidism  Monitor   - TSH with Reflex    8. Vitamin D deficiency  On high dose Vitamin D.  Levels are adequate but not too high. 9. Gastroesophageal reflux disease without esophagitis  On daily PPI  - CBC with Auto Differential    10. Medication monitoring encounter  - CBC with Auto Differential  - Comprehensive Metabolic Panel  - Magnesium    11.  Need for influenza vaccination  - Influenza, FLUAD, (age 72 y+), IM, Preservative Free, 0.5 mL        FOLLOW UP:  Return in about 6 months (around

## 2023-09-30 NOTE — RESULT ENCOUNTER NOTE
Labs are all normal or at goal.  We need to continue to watch your thyroid. Losing 10 -20 lbs will make the thyroid work more efficiently. It is not bad enough to take thyroid pills. Please also mail her a copy of her labs.

## 2023-11-22 ENCOUNTER — TELEPHONE (OUTPATIENT)
Dept: INTERNAL MEDICINE CLINIC | Age: 78
End: 2023-11-22

## 2023-11-22 RX ORDER — DIVALPROEX SODIUM 500 MG/1
TABLET, EXTENDED RELEASE ORAL
Qty: 90 TABLET | Refills: 0 | Status: SHIPPED | OUTPATIENT
Start: 2023-11-22

## 2023-11-22 NOTE — TELEPHONE ENCOUNTER
Pt calling about the Depakote---had never gotten---told her script was sent 9/27/23 but she never got from [facility]---it was suppose to go to [facility] ---can you please redo and send to [facility] 90 day fill.   Thanks

## 2023-12-26 RX ORDER — DIVALPROEX SODIUM 500 MG/1
TABLET, EXTENDED RELEASE ORAL
Qty: 90 TABLET | Refills: 1 | Status: SHIPPED | OUTPATIENT
Start: 2023-12-26

## 2024-03-12 ENCOUNTER — TELEPHONE (OUTPATIENT)
Dept: INTERNAL MEDICINE CLINIC | Age: 79
End: 2024-03-12

## 2024-03-12 DIAGNOSIS — R92.8 ABNORMAL MAMMOGRAM: Primary | ICD-10-CM

## 2024-03-12 NOTE — TELEPHONE ENCOUNTER
[facility] calling--need order for Diagnostic Bilateral Mammogram----the US order is still good-Thanks.

## 2024-03-26 ENCOUNTER — TELEPHONE (OUTPATIENT)
Dept: INTERNAL MEDICINE CLINIC | Age: 79
End: 2024-03-26

## 2024-03-26 NOTE — TELEPHONE ENCOUNTER
Spoke with patient about scheduling AWV, patient stated she has a lot to talk about with PCP tomorrow prefers not to schedule at this time.

## 2024-03-27 ENCOUNTER — OFFICE VISIT (OUTPATIENT)
Dept: INTERNAL MEDICINE CLINIC | Age: 79
End: 2024-03-27
Payer: MEDICARE

## 2024-03-27 VITALS
DIASTOLIC BLOOD PRESSURE: 86 MMHG | BODY MASS INDEX: 34.03 KG/M2 | HEART RATE: 81 BPM | OXYGEN SATURATION: 97 % | WEIGHT: 216.8 LBS | SYSTOLIC BLOOD PRESSURE: 140 MMHG | HEIGHT: 67 IN

## 2024-03-27 DIAGNOSIS — R00.2 PALPITATIONS: ICD-10-CM

## 2024-03-27 DIAGNOSIS — M81.6 LOCALIZED OSTEOPOROSIS WITHOUT CURRENT PATHOLOGICAL FRACTURE: ICD-10-CM

## 2024-03-27 DIAGNOSIS — E78.00 PURE HYPERCHOLESTEROLEMIA: Primary | ICD-10-CM

## 2024-03-27 DIAGNOSIS — F33.8 SEASONAL AFFECTIVE DISORDER (HCC): ICD-10-CM

## 2024-03-27 DIAGNOSIS — K21.9 GASTROESOPHAGEAL REFLUX DISEASE WITHOUT ESOPHAGITIS: ICD-10-CM

## 2024-03-27 DIAGNOSIS — G40.109 LOCALIZATION-RELATED FOCAL EPILEPSY WITH SIMPLE PARTIAL SEIZURES (HCC): ICD-10-CM

## 2024-03-27 DIAGNOSIS — D18.02 VENOUS ANGIOMA OF BRAIN (HCC): ICD-10-CM

## 2024-03-27 DIAGNOSIS — I10 PRIMARY HYPERTENSION: ICD-10-CM

## 2024-03-27 DIAGNOSIS — R29.898 LEFT LEG WEAKNESS: ICD-10-CM

## 2024-03-27 DIAGNOSIS — E03.8 SUBCLINICAL HYPOTHYROIDISM: ICD-10-CM

## 2024-03-27 DIAGNOSIS — R41.3 MEMORY CHANGE: ICD-10-CM

## 2024-03-27 LAB
ALBUMIN SERPL-MCNC: 4.5 G/DL (ref 3.4–5)
ALBUMIN/GLOB SERPL: 2.1 {RATIO} (ref 1.1–2.2)
ALP SERPL-CCNC: 67 U/L (ref 40–129)
ALT SERPL-CCNC: 9 U/L (ref 10–40)
ANION GAP SERPL CALCULATED.3IONS-SCNC: 11 MMOL/L (ref 3–16)
AST SERPL-CCNC: 13 U/L (ref 15–37)
BASOPHILS # BLD: 0 K/UL (ref 0–0.2)
BASOPHILS NFR BLD: 0.6 %
BILIRUB SERPL-MCNC: 0.3 MG/DL (ref 0–1)
BUN SERPL-MCNC: 8 MG/DL (ref 7–20)
CALCIUM SERPL-MCNC: 9.4 MG/DL (ref 8.3–10.6)
CHLORIDE SERPL-SCNC: 94 MMOL/L (ref 99–110)
CHOLEST SERPL-MCNC: 250 MG/DL (ref 0–199)
CO2 SERPL-SCNC: 29 MMOL/L (ref 21–32)
CREAT SERPL-MCNC: 0.6 MG/DL (ref 0.6–1.2)
DEPRECATED RDW RBC AUTO: 14.6 % (ref 12.4–15.4)
EOSINOPHIL # BLD: 0.1 K/UL (ref 0–0.6)
EOSINOPHIL NFR BLD: 1.6 %
GFR SERPLBLD CREATININE-BSD FMLA CKD-EPI: >90 ML/MIN/{1.73_M2}
GLUCOSE SERPL-MCNC: 91 MG/DL (ref 70–99)
HCT VFR BLD AUTO: 43 % (ref 36–48)
HDLC SERPL-MCNC: 83 MG/DL (ref 40–60)
HGB BLD-MCNC: 14.6 G/DL (ref 12–16)
LDL CHOLESTEROL CALCULATED: 148 MG/DL
LYMPHOCYTES # BLD: 1.6 K/UL (ref 1–5.1)
LYMPHOCYTES NFR BLD: 26.4 %
MAGNESIUM SERPL-MCNC: 2 MG/DL (ref 1.8–2.4)
MCH RBC QN AUTO: 32 PG (ref 26–34)
MCHC RBC AUTO-ENTMCNC: 33.9 G/DL (ref 31–36)
MCV RBC AUTO: 94.4 FL (ref 80–100)
MONOCYTES # BLD: 0.5 K/UL (ref 0–1.3)
MONOCYTES NFR BLD: 8.4 %
NEUTROPHILS # BLD: 3.8 K/UL (ref 1.7–7.7)
NEUTROPHILS NFR BLD: 63 %
PLATELET # BLD AUTO: 279 K/UL (ref 135–450)
PMV BLD AUTO: 8.6 FL (ref 5–10.5)
POTASSIUM SERPL-SCNC: 4.5 MMOL/L (ref 3.5–5.1)
PROT SERPL-MCNC: 6.6 G/DL (ref 6.4–8.2)
RBC # BLD AUTO: 4.55 M/UL (ref 4–5.2)
SODIUM SERPL-SCNC: 134 MMOL/L (ref 136–145)
T4 FREE SERPL-MCNC: 1.2 NG/DL (ref 0.9–1.8)
TRIGL SERPL-MCNC: 96 MG/DL (ref 0–150)
TSH SERPL DL<=0.005 MIU/L-ACNC: 6.63 UIU/ML (ref 0.27–4.2)
VIT B12 SERPL-MCNC: 413 PG/ML (ref 211–911)
VLDLC SERPL CALC-MCNC: 19 MG/DL
WBC # BLD AUTO: 6 K/UL (ref 4–11)

## 2024-03-27 PROCEDURE — 1123F ACP DISCUSS/DSCN MKR DOCD: CPT | Performed by: FAMILY MEDICINE

## 2024-03-27 PROCEDURE — 3079F DIAST BP 80-89 MM HG: CPT | Performed by: FAMILY MEDICINE

## 2024-03-27 PROCEDURE — 3077F SYST BP >= 140 MM HG: CPT | Performed by: FAMILY MEDICINE

## 2024-03-27 PROCEDURE — 99215 OFFICE O/P EST HI 40 MIN: CPT | Performed by: FAMILY MEDICINE

## 2024-03-27 SDOH — ECONOMIC STABILITY: FOOD INSECURITY: WITHIN THE PAST 12 MONTHS, THE FOOD YOU BOUGHT JUST DIDN'T LAST AND YOU DIDN'T HAVE MONEY TO GET MORE.: NEVER TRUE

## 2024-03-27 SDOH — ECONOMIC STABILITY: FOOD INSECURITY: WITHIN THE PAST 12 MONTHS, YOU WORRIED THAT YOUR FOOD WOULD RUN OUT BEFORE YOU GOT MONEY TO BUY MORE.: NEVER TRUE

## 2024-03-27 SDOH — ECONOMIC STABILITY: INCOME INSECURITY: HOW HARD IS IT FOR YOU TO PAY FOR THE VERY BASICS LIKE FOOD, HOUSING, MEDICAL CARE, AND HEATING?: NOT HARD AT ALL

## 2024-03-27 ASSESSMENT — PATIENT HEALTH QUESTIONNAIRE - PHQ9
SUM OF ALL RESPONSES TO PHQ QUESTIONS 1-9: 3
3. TROUBLE FALLING OR STAYING ASLEEP: NOT AT ALL
SUM OF ALL RESPONSES TO PHQ9 QUESTIONS 1 & 2: 0
6. FEELING BAD ABOUT YOURSELF - OR THAT YOU ARE A FAILURE OR HAVE LET YOURSELF OR YOUR FAMILY DOWN: NOT AT ALL
1. LITTLE INTEREST OR PLEASURE IN DOING THINGS: NOT AT ALL
7. TROUBLE CONCENTRATING ON THINGS, SUCH AS READING THE NEWSPAPER OR WATCHING TELEVISION: NOT AT ALL
9. THOUGHTS THAT YOU WOULD BE BETTER OFF DEAD, OR OF HURTING YOURSELF: NOT AT ALL
SUM OF ALL RESPONSES TO PHQ QUESTIONS 1-9: 3
SUM OF ALL RESPONSES TO PHQ QUESTIONS 1-9: 3
4. FEELING TIRED OR HAVING LITTLE ENERGY: NOT AT ALL
10. IF YOU CHECKED OFF ANY PROBLEMS, HOW DIFFICULT HAVE THESE PROBLEMS MADE IT FOR YOU TO DO YOUR WORK, TAKE CARE OF THINGS AT HOME, OR GET ALONG WITH OTHER PEOPLE: NOT DIFFICULT AT ALL
SUM OF ALL RESPONSES TO PHQ QUESTIONS 1-9: 3
2. FEELING DOWN, DEPRESSED OR HOPELESS: NOT AT ALL
5. POOR APPETITE OR OVEREATING: NEARLY EVERY DAY
8. MOVING OR SPEAKING SO SLOWLY THAT OTHER PEOPLE COULD HAVE NOTICED. OR THE OPPOSITE, BEING SO FIGETY OR RESTLESS THAT YOU HAVE BEEN MOVING AROUND A LOT MORE THAN USUAL: NOT AT ALL

## 2024-03-27 NOTE — PROGRESS NOTES
TABLET BY MOUTH DAILY AND DOSE A SECOND TABLET IF ARM  SHAKING 90 tablet 1    Handicap Placard MISC Length of need:  Lifetime 2 each 0    furosemide (LASIX) 20 MG tablet PATIENT TAKING EVERY OTHER DAY 90 tablet 0    rOPINIRole (REQUIP) 0.5 MG tablet TAKE 2-3 TABLETS BY MOUTH AT BEDTIME OR IN EVENING FOR RESTLESS LEGS 270 tablet 1    rosuvastatin (CRESTOR) 10 MG tablet TAKE 1 TABLET EVERY OTHER DAY ON EVEN DAYS OF THE MONTH. (Patient taking differently: TAKES DAILY) 45 tablet 3    vitamin D (ERGOCALCIFEROL) 1.25 MG (39918 UT) CAPS capsule TAKE 1 CAPSULE BY MOUTH ONE TIME PER WEEK 12 capsule 1    valsartan-hydroCHLOROthiazide (DIOVAN-HCT) 160-12.5 MG per tablet TAKE 1 TABLET BY MOUTH EVERY DAY 90 tablet 3    famotidine (PEPCID) 20 MG tablet Take 1 tablet by mouth nightly 90 tablet 3    omeprazole (PRILOSEC) 20 MG delayed release capsule Take 1 capsule by mouth Daily In AM before first bite of food 90 capsule 3    Coenzyme Q10 (CO Q 10) 10 MG CAPS Take by mouth      Biotin 5000 MCG CAPS Take 5,000 mcg by mouth      aspirin 81 MG tablet Take 1 tablet by mouth daily      Multiple Vitamins-Minerals (ICAPS) CAPS Take by mouth daily           Review of Systems    OBJECTIVE:    Vitals:    03/27/24 0956   Weight: 98.3 kg (216 lb 12.8 oz)   Height: 1.702 m (5' 7\")     Height: 1.702 m (5' 7\"), Weight - Scale: 98.3 kg (216 lb 12.8 oz), BP: (!) 142/90    Pulse Readings from Last 3 Encounters:   03/27/24 81   09/27/23 88   02/01/23 80         Physical Exam  Vitals reviewed.   Constitutional:       General: She is not in acute distress.     Appearance: Normal appearance. She is well-developed. She is not diaphoretic.      Comments: Using a rollator.  Hunched forward to use this.  Unable to walk steadily without support.   Eyes:      General: No scleral icterus.  Neck:      Thyroid: No thyroid mass or thyromegaly.      Vascular: No carotid bruit.   Cardiovascular:      Rate and Rhythm: Normal rate and regular rhythm.      Heart

## 2024-03-29 ENCOUNTER — TELEPHONE (OUTPATIENT)
Dept: INTERNAL MEDICINE CLINIC | Age: 79
End: 2024-03-29

## 2024-03-29 DIAGNOSIS — E78.00 PURE HYPERCHOLESTEROLEMIA: ICD-10-CM

## 2024-03-29 RX ORDER — ROSUVASTATIN CALCIUM 10 MG/1
10 TABLET, COATED ORAL DAILY
Qty: 90 TABLET | Refills: 1 | Status: SHIPPED | OUTPATIENT
Start: 2024-03-29

## 2024-03-29 RX ORDER — ROSUVASTATIN CALCIUM 10 MG/1
TABLET, COATED ORAL
Qty: 90 TABLET | Refills: 3 | Status: CANCELLED | OUTPATIENT
Start: 2024-03-29

## 2024-03-29 NOTE — TELEPHONE ENCOUNTER
----- Message from Mariajose Mcgee MA sent at 3/29/2024 11:47 AM EDT -----  Pt has been taking her Crestor every other day. Per Dr Ravi the pt should take it every day. Medication is pended to be sent to the pharmacy. I have pended the medication to be sent to the pharmacy.  
36.3

## 2024-03-29 NOTE — RESULT ENCOUNTER NOTE
Labs are normal, except   1. Thyroid is still working hard to keep up but the thyroid hormone level is still normal.  Losing some weight normally helps this normalize.  2. Cholesterol is too high.  Are you still taking the rosuvastatin = Crestor 10 mg every day?  Do you need refills sent to correct this to daily?   If you are not taking, is there a reason why you are not taking it?   (ask patient if would like actual copy of results;  offer to put a copy at  or mail)    (ask patient if would like actual copy of results;  offer to put a copy at  or mail)

## 2024-04-30 ENCOUNTER — TELEPHONE (OUTPATIENT)
Dept: INTERNAL MEDICINE CLINIC | Age: 79
End: 2024-04-30

## 2024-04-30 DIAGNOSIS — G40.109 LOCALIZATION-RELATED FOCAL EPILEPSY WITH SIMPLE PARTIAL SEIZURES (HCC): Primary | ICD-10-CM

## 2024-04-30 NOTE — TELEPHONE ENCOUNTER
LVM for pt stating new referral, face sheet and copies of insurance cards were faxed and she was asked to give them a call in the next few days to schedule.

## 2024-04-30 NOTE — TELEPHONE ENCOUNTER
Pt calling in about the neuropsychiatry referral from 9/27/23 to [facility]. Pt reporting that office did not get any info. Referral is now closed. Updated referral made and faxed to [facility] with pt demographic info.    [facility]  Phone [phone]  Fax [phone]

## 2024-05-22 DIAGNOSIS — E78.00 PURE HYPERCHOLESTEROLEMIA: ICD-10-CM

## 2024-05-22 DIAGNOSIS — E55.9 VITAMIN D DEFICIENCY: ICD-10-CM

## 2024-05-22 DIAGNOSIS — G25.81 RESTLESS LEGS SYNDROME: ICD-10-CM

## 2024-05-22 DIAGNOSIS — I10 ESSENTIAL HYPERTENSION: ICD-10-CM

## 2024-05-22 DIAGNOSIS — K21.9 GASTROESOPHAGEAL REFLUX DISEASE WITHOUT ESOPHAGITIS: ICD-10-CM

## 2024-05-22 RX ORDER — ROPINIROLE 0.5 MG/1
TABLET, FILM COATED ORAL
Qty: 270 TABLET | Refills: 1 | Status: SHIPPED | OUTPATIENT
Start: 2024-05-22

## 2024-05-22 RX ORDER — ROSUVASTATIN CALCIUM 10 MG/1
TABLET, COATED ORAL
Qty: 45 TABLET | Refills: 1 | Status: SHIPPED | OUTPATIENT
Start: 2024-05-22

## 2024-05-22 RX ORDER — VALSARTAN AND HYDROCHLOROTHIAZIDE 160; 12.5 MG/1; MG/1
TABLET, FILM COATED ORAL
Qty: 90 TABLET | Refills: 1 | Status: SHIPPED | OUTPATIENT
Start: 2024-05-22

## 2024-05-22 RX ORDER — OMEPRAZOLE 20 MG/1
20 CAPSULE, DELAYED RELEASE ORAL DAILY
Qty: 90 CAPSULE | Refills: 1 | Status: SHIPPED | OUTPATIENT
Start: 2024-05-22

## 2024-05-22 RX ORDER — ERGOCALCIFEROL 1.25 MG/1
CAPSULE ORAL
Qty: 12 CAPSULE | Refills: 1 | Status: SHIPPED | OUTPATIENT
Start: 2024-05-22

## 2024-06-15 NOTE — FLOWSHEET NOTE
[facility] [place] [facility]    Physical Therapy  Cancellation/No-show Note  Patient Name:  Magy Gibson  :  1945   Date:  2021  Cancelled visits to date: 6  No-shows to date: 0    For today's appointment patient:  [x]  Cancelled  []  Rescheduled appointment  []  No-show     Reason given by patient:  []  Patient ill  []  Conflicting appointment   []  No transportation    []  Conflict with work  []  No reason given  [x]  Other:     Comments:   Immediate family members have tested positive for COVID and she doesn't want to risk spreading if she is a carrier    Electronically signed by:  Chantelle Ward PT No care due was identified.  [facility] Embedded Care Due Messages. Reference number: [id number].   6/15/2024 3:38:48 PM CDT

## 2024-08-12 LAB
ALBUMIN: NORMAL
ALP BLD-CCNC: NORMAL U/L
ALT SERPL-CCNC: NORMAL U/L
ANION GAP SERPL CALCULATED.3IONS-SCNC: NORMAL MMOL/L
AST SERPL-CCNC: NORMAL U/L
BASOPHILS ABSOLUTE: NORMAL
BASOPHILS RELATIVE PERCENT: NORMAL
BILIRUB SERPL-MCNC: NORMAL MG/DL
BUN BLDV-MCNC: NORMAL MG/DL
CALCIUM SERPL-MCNC: NORMAL MG/DL
CHLORIDE BLD-SCNC: NORMAL MMOL/L
CO2: NORMAL
CREAT SERPL-MCNC: NORMAL MG/DL
EOSINOPHILS ABSOLUTE: NORMAL
EOSINOPHILS RELATIVE PERCENT: NORMAL
GFR, ESTIMATED: NORMAL
GLUCOSE BLD-MCNC: NORMAL MG/DL
HCT VFR BLD CALC: NORMAL %
HEMOGLOBIN: NORMAL
LYMPHOCYTES ABSOLUTE: NORMAL
LYMPHOCYTES RELATIVE PERCENT: NORMAL
MCH RBC QN AUTO: NORMAL PG
MCHC RBC AUTO-ENTMCNC: NORMAL G/DL
MCV RBC AUTO: NORMAL FL
MONOCYTES ABSOLUTE: NORMAL
MONOCYTES RELATIVE PERCENT: NORMAL
NEUTROPHILS ABSOLUTE: NORMAL
NEUTROPHILS RELATIVE PERCENT: NORMAL
PLATELET # BLD: NORMAL 10*3/UL
PMV BLD AUTO: NORMAL FL
POTASSIUM SERPL-SCNC: NORMAL MMOL/L
RBC # BLD: NORMAL 10*6/UL
SODIUM BLD-SCNC: NORMAL MMOL/L
TOTAL PROTEIN: NORMAL
TSH SERPL DL<=0.05 MIU/L-ACNC: NORMAL M[IU]/L
WBC # BLD: NORMAL 10*3/UL

## 2024-10-03 ENCOUNTER — OFFICE VISIT (OUTPATIENT)
Dept: INTERNAL MEDICINE CLINIC | Age: 79
End: 2024-10-03
Payer: MEDICARE

## 2024-10-03 ENCOUNTER — TELEPHONE (OUTPATIENT)
Dept: INTERNAL MEDICINE CLINIC | Age: 79
End: 2024-10-03

## 2024-10-03 ENCOUNTER — OFFICE VISIT (OUTPATIENT)
Dept: INTERNAL MEDICINE CLINIC | Age: 79
End: 2024-10-03

## 2024-10-03 VITALS
WEIGHT: 213.8 LBS | DIASTOLIC BLOOD PRESSURE: 82 MMHG | OXYGEN SATURATION: 98 % | BODY MASS INDEX: 33.49 KG/M2 | HEART RATE: 97 BPM | SYSTOLIC BLOOD PRESSURE: 140 MMHG

## 2024-10-03 VITALS
BODY MASS INDEX: 33.56 KG/M2 | DIASTOLIC BLOOD PRESSURE: 82 MMHG | SYSTOLIC BLOOD PRESSURE: 140 MMHG | HEIGHT: 67 IN | HEART RATE: 97 BPM | OXYGEN SATURATION: 98 % | WEIGHT: 213.85 LBS

## 2024-10-03 DIAGNOSIS — E78.00 PURE HYPERCHOLESTEROLEMIA: ICD-10-CM

## 2024-10-03 DIAGNOSIS — M79.89 SOFT TISSUE MASS: Primary | ICD-10-CM

## 2024-10-03 DIAGNOSIS — E53.8 B12 DEFICIENCY: ICD-10-CM

## 2024-10-03 DIAGNOSIS — G40.109 LOCALIZATION-RELATED FOCAL EPILEPSY WITH SIMPLE PARTIAL SEIZURES (HCC): ICD-10-CM

## 2024-10-03 DIAGNOSIS — Z23 FLU VACCINE NEED: ICD-10-CM

## 2024-10-03 DIAGNOSIS — E53.8 B12 DEFICIENCY: Primary | ICD-10-CM

## 2024-10-03 DIAGNOSIS — I10 PRIMARY HYPERTENSION: ICD-10-CM

## 2024-10-03 DIAGNOSIS — R41.3 MEMORY CHANGE: ICD-10-CM

## 2024-10-03 DIAGNOSIS — Z00.00 MEDICARE ANNUAL WELLNESS VISIT, SUBSEQUENT: Primary | ICD-10-CM

## 2024-10-03 PROCEDURE — 3077F SYST BP >= 140 MM HG: CPT | Performed by: FAMILY MEDICINE

## 2024-10-03 PROCEDURE — 1123F ACP DISCUSS/DSCN MKR DOCD: CPT | Performed by: FAMILY MEDICINE

## 2024-10-03 PROCEDURE — G0439 PPPS, SUBSEQ VISIT: HCPCS | Performed by: FAMILY MEDICINE

## 2024-10-03 PROCEDURE — 3079F DIAST BP 80-89 MM HG: CPT | Performed by: FAMILY MEDICINE

## 2024-10-03 RX ORDER — LANOLIN ALCOHOL/MO/W.PET/CERES
1000 CREAM (GRAM) TOPICAL DAILY
COMMUNITY

## 2024-10-03 RX ORDER — ROSUVASTATIN CALCIUM 10 MG/1
10 TABLET, COATED ORAL DAILY
Qty: 90 TABLET | Refills: 1 | Status: SHIPPED | OUTPATIENT
Start: 2024-10-03

## 2024-10-03 RX ORDER — LANOLIN ALCOHOL/MO/W.PET/CERES
1000 CREAM (GRAM) TOPICAL DAILY
Qty: 30 TABLET | Status: CANCELLED | OUTPATIENT
Start: 2024-10-03

## 2024-10-03 ASSESSMENT — PATIENT HEALTH QUESTIONNAIRE - PHQ9
1. LITTLE INTEREST OR PLEASURE IN DOING THINGS: MORE THAN HALF THE DAYS
10. IF YOU CHECKED OFF ANY PROBLEMS, HOW DIFFICULT HAVE THESE PROBLEMS MADE IT FOR YOU TO DO YOUR WORK, TAKE CARE OF THINGS AT HOME, OR GET ALONG WITH OTHER PEOPLE: SOMEWHAT DIFFICULT
5. POOR APPETITE OR OVEREATING: NOT AT ALL
9. THOUGHTS THAT YOU WOULD BE BETTER OFF DEAD, OR OF HURTING YOURSELF: NOT AT ALL
SUM OF ALL RESPONSES TO PHQ QUESTIONS 1-9: 7
8. MOVING OR SPEAKING SO SLOWLY THAT OTHER PEOPLE COULD HAVE NOTICED. OR THE OPPOSITE, BEING SO FIGETY OR RESTLESS THAT YOU HAVE BEEN MOVING AROUND A LOT MORE THAN USUAL: SEVERAL DAYS
7. TROUBLE CONCENTRATING ON THINGS, SUCH AS READING THE NEWSPAPER OR WATCHING TELEVISION: NOT AT ALL
SUM OF ALL RESPONSES TO PHQ QUESTIONS 1-9: 7
3. TROUBLE FALLING OR STAYING ASLEEP: SEVERAL DAYS
6. FEELING BAD ABOUT YOURSELF - OR THAT YOU ARE A FAILURE OR HAVE LET YOURSELF OR YOUR FAMILY DOWN: MORE THAN HALF THE DAYS
SUM OF ALL RESPONSES TO PHQ QUESTIONS 1-9: 7
SUM OF ALL RESPONSES TO PHQ QUESTIONS 1-9: 7
4. FEELING TIRED OR HAVING LITTLE ENERGY: SEVERAL DAYS

## 2024-10-03 ASSESSMENT — LIFESTYLE VARIABLES
HOW OFTEN DO YOU HAVE A DRINK CONTAINING ALCOHOL: 2-4 TIMES A MONTH
HOW MANY STANDARD DRINKS CONTAINING ALCOHOL DO YOU HAVE ON A TYPICAL DAY: 1 OR 2

## 2024-10-03 NOTE — PATIENT INSTRUCTIONS
Consider taking a Thiamine pill daily.  This is also called B1.  One pill a day of any over the counter Thiamine is fine.    GoLo is fine and I do recommend you try this.

## 2024-10-03 NOTE — PROGRESS NOTES
10/3/2024    Katiuska Rhodes (:  1945) is a 79 y.o. female, here for evaluation of the following chief complaint(s):  6 Month Follow-Up (Slums exam/Edema in bilateral lower extremities)        ASSESSMENT/PLAN:        FOLLOW UP:  1. Soft tissue mass  Unclear if this is a lipoma or something else.  Will have her ultrasound the area to further define it.  I do not think it is a blood clot as there is no firmness or swelling below the soft tissue area.  - US SOFT TISSUE LIMITED AREA; Future    2. Flu vaccine need  - Influenza, FLUAD Trivalent, (age 65 y+), IM, Preservative Free, 0.5mL    3. Localization-related focal epilepsy with simple partial seizures (HCC)  - vitamin B-12 (CYANOCOBALAMIN) 1000 MCG tablet; Take 1 tablet by mouth daily    4. Memory change  May have mild cognitive impairment versus decreased memory on testing because of hearing loss.  Encouraged her to take the vitamin B12 as recommended by her neurologist but also a thiamine pill once a day.  - vitamin B-12 (CYANOCOBALAMIN) 1000 MCG tablet; Take 1 tablet by mouth daily    5. Primary hypertension  Blood pressure is adequately controlled for age.  Continue current treatment.    6. Pure hypercholesterolemia  Lab Results   Component Value Date     (H) 2024    LDLDIRECT 126 (H) 2015     LDL is not at goal.    She is supposed to be taking the rosuvastatin every day.  It looks like her refill history is very patchy.  She refilled 45 pills on , and 45 pills on May 22.  This means she would not be taking it faithfully.  It looks like compliance in the last 2 years is approximately once every fourth day.    HPI  Here for a 6-month checkup.  She has hypertension hyperlipidemia seizure disorder and chronic gait instability.  She did see a new neurologist at [facility].  She actually went to the EMU and was observed with findings of her spells but nothing on EEG at the time.  They are considering an additional or change of

## 2024-10-03 NOTE — TELEPHONE ENCOUNTER
After you left and I had more time to review the chart, I would like you to get some labs done before your next visit.  I will put the order in and you can come to my office or any [facility] outpatient lab.    I would recommend that you fast at least 10 hours, but drink plenty of water before the fasting time.    I would like you to take rosuvastatin 10 mg every day or 2 pills which would be 20 mg every other day.  If you feel 10 mg daily gives you side effects, try the 20 mg every other day.  If there is some reason that you cannot take any of this or minimal, let us know.  Your cholesterol was too high last check.    I also want to have that B12 level rechecked with you on the supplement to make sure you are boosting up.    [facility] did your thyroid, vitamin D level and basic chemistries.  I do not think we need to repeat those right now.

## 2024-10-03 NOTE — PROGRESS NOTES
Medicare Annual Wellness Visit    Katiuska Rhodes is here for Medicare AWV    Assessment & Plan   Medicare annual wellness visit, subsequent  Recommendations for Preventive Services Due: see orders and patient instructions/AVS.  Recommended screening schedule for the next 5-10 years is provided to the patient in written form: see Patient Instructions/AVS.     Return in 1 year (on 10/3/2025) for Medicare AWV.     Subjective   Discuss with patient the importance of the recommended Care Gaps of: Dtap, RSV, and Covid 19 vaccine. Patient states that she will follow up at her pharmacy to have vaccinations completed.     Patient's complete Health Risk Assessment and screening values have been reviewed and are found in Flowsheets. The following problems were reviewed today and where indicated follow up appointments were made and/or referrals ordered.    Positive Risk Factor Screenings with Interventions:    Fall Risk:  Do you feel unsteady or are you worried about falling? : (!) yes (Worried about falling)  2 or more falls in past year?: no  Fall with injury in past year?: no     Interventions:    Reviewed medications, home hazards, visual acuity, and co-morbidities that can increase risk for falls  See AVS for additional education material     Depression:  PHQ-9 Total Score: 7  Total Score Interpretation: 5-9 = mild depression  Interventions:  LPN INTERVENTION GUIDE: SCORE 5-14 = MODERATE DEPRESSION: FOLLOW UP IN 1 WEEK          General HRA Questions:  Select all that apply: (!) Social Isolation  Interventions - Social Isolation:  See AVS for additional education material      Inactivity:  On average, how many days per week do you engage in moderate to strenuous exercise (like a brisk walk)?: 0 days (!) Abnormal  On average, how many minutes do you engage in exercise at this level?: 0 min  Interventions:  See AVS for additional education material     Abnormal BMI (obese):  Body mass index is 33.49 kg/m². (!)

## 2024-10-03 NOTE — PATIENT INSTRUCTIONS
room.  You read aloud from a small card that you hold in your hand.  Refraction   You look into a special device.  The device puts lenses of different strengths in front of each eye to see how strong your glasses or contact lenses need to be.  Visual field tests   Your doctor may have you look through special machines.  Or your doctor may simply have you stare straight ahead while they move a finger into and out of your field of vision.  Color vision test   You look at pieces of printed test patterns in various colors. You say what number or symbol you see.  Your doctor may have you trace the number or symbol using a pointer.  How do these tests feel?  There is very little chance of having a problem from this test. If dilating drops are used for a vision test, they may make the eyes sting and cause a medicine taste in the mouth.  Follow-up care is a key part of your treatment and safety. Be sure to make and go to all appointments, and call your doctor if you are having problems. It's also a good idea to know your test results and keep a list of the medicines you take.  Where can you learn more?  Go to [email or web address].[email or web address] and enter G551 to learn more about \"Learning About Vision Tests.\"  Current as of: June 5, 2023  Content Version: 14.2  © 2024 [employer].   Care instructions adapted under license by [employer]. If you have questions about a medical condition or this instruction, always ask your healthcare professional. [employer] disclaims any warranty or liability for your use of this information.           Learning About Activities of Daily Living  What are activities of daily living?     Activities of daily living (ADLs) are the basic self-care tasks you do every day. These include eating, bathing, dressing, and moving around.  As you age, and if you have health problems, you may find that it's harder to do some of these tasks. If so, your doctor can suggest ideas

## 2024-11-17 DIAGNOSIS — I10 ESSENTIAL HYPERTENSION: ICD-10-CM

## 2024-11-17 DIAGNOSIS — G25.81 RESTLESS LEGS SYNDROME: ICD-10-CM

## 2024-11-17 DIAGNOSIS — K21.9 GASTROESOPHAGEAL REFLUX DISEASE WITHOUT ESOPHAGITIS: ICD-10-CM

## 2024-11-17 RX ORDER — ROPINIROLE 0.5 MG/1
TABLET, FILM COATED ORAL
Qty: 270 TABLET | Refills: 1 | Status: SHIPPED | OUTPATIENT
Start: 2024-11-17

## 2024-11-17 RX ORDER — VALSARTAN AND HYDROCHLOROTHIAZIDE 160; 12.5 MG/1; MG/1
TABLET, FILM COATED ORAL
Qty: 90 TABLET | Refills: 1 | Status: SHIPPED | OUTPATIENT
Start: 2024-11-17

## 2024-11-20 ENCOUNTER — HOSPITAL ENCOUNTER (OUTPATIENT)
Dept: ULTRASOUND IMAGING | Age: 79
Discharge: HOME | End: 2024-11-20
Attending: FAMILY MEDICINE
Payer: MEDICARE

## 2024-11-20 DIAGNOSIS — M79.89 SOFT TISSUE MASS: ICD-10-CM

## 2024-11-20 PROCEDURE — 76882 US LMTD JT/FCL EVL NVASC XTR: CPT

## 2024-11-20 NOTE — RESULT ENCOUNTER NOTE
The swelling on the right calf is a lipoma = a benign fatty tumor.  It can be removed by a general surgeon if painful.  If it gets bigger, it needs to be retested.

## 2025-04-24 ENCOUNTER — OFFICE VISIT (OUTPATIENT)
Dept: INTERNAL MEDICINE CLINIC | Age: 80
End: 2025-04-24
Payer: MEDICARE

## 2025-04-24 VITALS
DIASTOLIC BLOOD PRESSURE: 88 MMHG | SYSTOLIC BLOOD PRESSURE: 130 MMHG | OXYGEN SATURATION: 99 % | HEIGHT: 67 IN | HEART RATE: 82 BPM | WEIGHT: 214 LBS | BODY MASS INDEX: 33.59 KG/M2

## 2025-04-24 VITALS
SYSTOLIC BLOOD PRESSURE: 130 MMHG | HEART RATE: 82 BPM | HEIGHT: 67 IN | BODY MASS INDEX: 33.71 KG/M2 | DIASTOLIC BLOOD PRESSURE: 88 MMHG | WEIGHT: 214.8 LBS | OXYGEN SATURATION: 99 %

## 2025-04-24 DIAGNOSIS — Z13.31 POSITIVE DEPRESSION SCREENING: ICD-10-CM

## 2025-04-24 DIAGNOSIS — I10 PRIMARY HYPERTENSION: ICD-10-CM

## 2025-04-24 DIAGNOSIS — R60.0 BILATERAL LEG EDEMA: ICD-10-CM

## 2025-04-24 DIAGNOSIS — E03.8 SUBCLINICAL HYPOTHYROIDISM: ICD-10-CM

## 2025-04-24 DIAGNOSIS — E55.9 VITAMIN D DEFICIENCY: ICD-10-CM

## 2025-04-24 DIAGNOSIS — D18.02 VENOUS ANGIOMA OF BRAIN (HCC): ICD-10-CM

## 2025-04-24 DIAGNOSIS — M81.6 LOCALIZED OSTEOPOROSIS WITHOUT CURRENT PATHOLOGICAL FRACTURE: ICD-10-CM

## 2025-04-24 DIAGNOSIS — E66.811 CLASS 1 OBESITY DUE TO EXCESS CALORIES WITH SERIOUS COMORBIDITY AND BODY MASS INDEX (BMI) OF 32.0 TO 32.9 IN ADULT: ICD-10-CM

## 2025-04-24 DIAGNOSIS — Z79.899 CURRENT USE OF PROTON PUMP INHIBITOR: ICD-10-CM

## 2025-04-24 DIAGNOSIS — Z00.00 MEDICARE ANNUAL WELLNESS VISIT, SUBSEQUENT: ICD-10-CM

## 2025-04-24 DIAGNOSIS — G40.109 LOCALIZATION-RELATED FOCAL EPILEPSY WITH SIMPLE PARTIAL SEIZURES (HCC): ICD-10-CM

## 2025-04-24 DIAGNOSIS — M81.6 LOCALIZED OSTEOPOROSIS WITHOUT CURRENT PATHOLOGICAL FRACTURE: Primary | ICD-10-CM

## 2025-04-24 DIAGNOSIS — E78.00 PURE HYPERCHOLESTEROLEMIA: ICD-10-CM

## 2025-04-24 DIAGNOSIS — G25.81 RESTLESS LEGS SYNDROME: ICD-10-CM

## 2025-04-24 DIAGNOSIS — E66.09 CLASS 1 OBESITY DUE TO EXCESS CALORIES WITH SERIOUS COMORBIDITY AND BODY MASS INDEX (BMI) OF 32.0 TO 32.9 IN ADULT: ICD-10-CM

## 2025-04-24 DIAGNOSIS — G40.109 LOCALIZATION-RELATED FOCAL EPILEPSY WITH SIMPLE PARTIAL SEIZURES (HCC): Primary | ICD-10-CM

## 2025-04-24 DIAGNOSIS — K21.9 GASTROESOPHAGEAL REFLUX DISEASE WITHOUT ESOPHAGITIS: ICD-10-CM

## 2025-04-24 LAB
25(OH)D3 SERPL-MCNC: 44.7 NG/ML
ALBUMIN SERPL-MCNC: 4.2 G/DL (ref 3.4–5)
ALBUMIN/GLOB SERPL: 1.8 {RATIO} (ref 1.1–2.2)
ALP SERPL-CCNC: 69 U/L (ref 40–129)
ALT SERPL-CCNC: 13 U/L (ref 10–40)
ANION GAP SERPL CALCULATED.3IONS-SCNC: 12 MMOL/L (ref 3–16)
AST SERPL-CCNC: 18 U/L (ref 15–37)
BASOPHILS # BLD: 0 K/UL (ref 0–0.2)
BASOPHILS NFR BLD: 0.6 %
BILIRUB SERPL-MCNC: 0.5 MG/DL (ref 0–1)
BUN SERPL-MCNC: 8 MG/DL (ref 7–20)
CALCIUM SERPL-MCNC: 9.1 MG/DL (ref 8.3–10.6)
CHLORIDE SERPL-SCNC: 95 MMOL/L (ref 99–110)
CHOLEST SERPL-MCNC: 212 MG/DL (ref 0–199)
CO2 SERPL-SCNC: 29 MMOL/L (ref 21–32)
CREAT SERPL-MCNC: 0.6 MG/DL (ref 0.6–1.2)
DEPRECATED RDW RBC AUTO: 14.2 % (ref 12.4–15.4)
EOSINOPHIL # BLD: 0.2 K/UL (ref 0–0.6)
EOSINOPHIL NFR BLD: 2.2 %
GFR SERPLBLD CREATININE-BSD FMLA CKD-EPI: >90 ML/MIN/{1.73_M2}
GLUCOSE SERPL-MCNC: 87 MG/DL (ref 70–99)
HCT VFR BLD AUTO: 40.7 % (ref 36–48)
HDLC SERPL-MCNC: 69 MG/DL (ref 40–60)
HGB BLD-MCNC: 13.7 G/DL (ref 12–16)
LDL CHOLESTEROL: 127 MG/DL
LYMPHOCYTES # BLD: 1.6 K/UL (ref 1–5.1)
LYMPHOCYTES NFR BLD: 22.3 %
MAGNESIUM SERPL-MCNC: 2.13 MG/DL (ref 1.8–2.4)
MCH RBC QN AUTO: 30.9 PG (ref 26–34)
MCHC RBC AUTO-ENTMCNC: 33.7 G/DL (ref 31–36)
MCV RBC AUTO: 91.5 FL (ref 80–100)
MONOCYTES # BLD: 0.5 K/UL (ref 0–1.3)
MONOCYTES NFR BLD: 7.7 %
NEUTROPHILS # BLD: 4.8 K/UL (ref 1.7–7.7)
NEUTROPHILS NFR BLD: 67.2 %
PLATELET # BLD AUTO: 285 K/UL (ref 135–450)
PMV BLD AUTO: 9.2 FL (ref 5–10.5)
POTASSIUM SERPL-SCNC: 4.2 MMOL/L (ref 3.5–5.1)
PROT SERPL-MCNC: 6.5 G/DL (ref 6.4–8.2)
RBC # BLD AUTO: 4.45 M/UL (ref 4–5.2)
SODIUM SERPL-SCNC: 136 MMOL/L (ref 136–145)
TRIGL SERPL-MCNC: 79 MG/DL (ref 0–150)
TSH SERPL DL<=0.005 MIU/L-ACNC: 3.76 UIU/ML (ref 0.27–4.2)
VALPROATE SERPL-MCNC: 53 UG/ML (ref 50–100)
VIT B12 SERPL-MCNC: 647 PG/ML (ref 211–911)
VLDLC SERPL CALC-MCNC: 16 MG/DL
WBC # BLD AUTO: 7.1 K/UL (ref 4–11)

## 2025-04-24 PROCEDURE — G8417 CALC BMI ABV UP PARAM F/U: HCPCS | Performed by: FAMILY MEDICINE

## 2025-04-24 PROCEDURE — 1036F TOBACCO NON-USER: CPT | Performed by: FAMILY MEDICINE

## 2025-04-24 PROCEDURE — 1090F PRES/ABSN URINE INCON ASSESS: CPT | Performed by: FAMILY MEDICINE

## 2025-04-24 PROCEDURE — G8427 DOCREV CUR MEDS BY ELIG CLIN: HCPCS | Performed by: FAMILY MEDICINE

## 2025-04-24 PROCEDURE — 1160F RVW MEDS BY RX/DR IN RCRD: CPT | Performed by: FAMILY MEDICINE

## 2025-04-24 PROCEDURE — 1159F MED LIST DOCD IN RCRD: CPT | Performed by: FAMILY MEDICINE

## 2025-04-24 PROCEDURE — 3075F SYST BP GE 130 - 139MM HG: CPT | Performed by: FAMILY MEDICINE

## 2025-04-24 PROCEDURE — 99214 OFFICE O/P EST MOD 30 MIN: CPT | Performed by: FAMILY MEDICINE

## 2025-04-24 PROCEDURE — 1123F ACP DISCUSS/DSCN MKR DOCD: CPT | Performed by: FAMILY MEDICINE

## 2025-04-24 PROCEDURE — 3079F DIAST BP 80-89 MM HG: CPT | Performed by: FAMILY MEDICINE

## 2025-04-24 PROCEDURE — G0439 PPPS, SUBSEQ VISIT: HCPCS | Performed by: FAMILY MEDICINE

## 2025-04-24 PROCEDURE — G8399 PT W/DXA RESULTS DOCUMENT: HCPCS | Performed by: FAMILY MEDICINE

## 2025-04-24 RX ORDER — FAMOTIDINE 20 MG/1
20 TABLET, FILM COATED ORAL NIGHTLY
COMMUNITY
Start: 2025-04-24

## 2025-04-24 RX ORDER — OMEPRAZOLE 20 MG/1
20 CAPSULE, DELAYED RELEASE ORAL DAILY
Qty: 90 CAPSULE | Refills: 1 | Status: SHIPPED | OUTPATIENT
Start: 2025-04-24

## 2025-04-24 RX ORDER — FUROSEMIDE 20 MG/1
TABLET ORAL
Qty: 90 TABLET | Refills: 1 | Status: SHIPPED | OUTPATIENT
Start: 2025-04-24

## 2025-04-24 RX ORDER — ROSUVASTATIN CALCIUM 10 MG/1
10 TABLET, COATED ORAL DAILY
Qty: 90 TABLET | Refills: 1 | Status: SHIPPED | OUTPATIENT
Start: 2025-04-24

## 2025-04-24 RX ORDER — VALSARTAN AND HYDROCHLOROTHIAZIDE 160; 12.5 MG/1; MG/1
TABLET, FILM COATED ORAL
Qty: 90 TABLET | Refills: 1 | Status: SHIPPED | OUTPATIENT
Start: 2025-04-24

## 2025-04-24 RX ORDER — ROPINIROLE 0.5 MG/1
TABLET, FILM COATED ORAL
Qty: 270 TABLET | Refills: 1 | Status: SHIPPED | OUTPATIENT
Start: 2025-04-24

## 2025-04-24 RX ORDER — DIVALPROEX SODIUM 500 MG/1
TABLET, FILM COATED, EXTENDED RELEASE ORAL
Qty: 120 TABLET | Refills: 0
Start: 2025-04-24

## 2025-04-24 RX ORDER — FUROSEMIDE 20 MG/1
TABLET ORAL
Status: SHIPPED | COMMUNITY
Start: 2025-04-24 | End: 2025-04-24 | Stop reason: SDUPTHER

## 2025-04-24 SDOH — ECONOMIC STABILITY: FOOD INSECURITY: WITHIN THE PAST 12 MONTHS, YOU WORRIED THAT YOUR FOOD WOULD RUN OUT BEFORE YOU GOT MONEY TO BUY MORE.: NEVER TRUE

## 2025-04-24 SDOH — ECONOMIC STABILITY: FOOD INSECURITY: WITHIN THE PAST 12 MONTHS, THE FOOD YOU BOUGHT JUST DIDN'T LAST AND YOU DIDN'T HAVE MONEY TO GET MORE.: NEVER TRUE

## 2025-04-24 ASSESSMENT — PATIENT HEALTH QUESTIONNAIRE - PHQ9
7. TROUBLE CONCENTRATING ON THINGS, SUCH AS READING THE NEWSPAPER OR WATCHING TELEVISION: SEVERAL DAYS
SUM OF ALL RESPONSES TO PHQ QUESTIONS 1-9: 9
9. THOUGHTS THAT YOU WOULD BE BETTER OFF DEAD, OR OF HURTING YOURSELF: SEVERAL DAYS
8. MOVING OR SPEAKING SO SLOWLY THAT OTHER PEOPLE COULD HAVE NOTICED. OR THE OPPOSITE, BEING SO FIGETY OR RESTLESS THAT YOU HAVE BEEN MOVING AROUND A LOT MORE THAN USUAL: SEVERAL DAYS
7. TROUBLE CONCENTRATING ON THINGS, SUCH AS READING THE NEWSPAPER OR WATCHING TELEVISION: SEVERAL DAYS
5. POOR APPETITE OR OVEREATING: SEVERAL DAYS
5. POOR APPETITE OR OVEREATING: SEVERAL DAYS
SUM OF ALL RESPONSES TO PHQ QUESTIONS 1-9: 8
SUM OF ALL RESPONSES TO PHQ QUESTIONS 1-9: 9
2. FEELING DOWN, DEPRESSED OR HOPELESS: SEVERAL DAYS
SUM OF ALL RESPONSES TO PHQ QUESTIONS 1-9: 9
SUM OF ALL RESPONSES TO PHQ QUESTIONS 1-9: 9
1. LITTLE INTEREST OR PLEASURE IN DOING THINGS: SEVERAL DAYS
3. TROUBLE FALLING OR STAYING ASLEEP: SEVERAL DAYS
10. IF YOU CHECKED OFF ANY PROBLEMS, HOW DIFFICULT HAVE THESE PROBLEMS MADE IT FOR YOU TO DO YOUR WORK, TAKE CARE OF THINGS AT HOME, OR GET ALONG WITH OTHER PEOPLE: NOT DIFFICULT AT ALL
1. LITTLE INTEREST OR PLEASURE IN DOING THINGS: SEVERAL DAYS
8. MOVING OR SPEAKING SO SLOWLY THAT OTHER PEOPLE COULD HAVE NOTICED. OR THE OPPOSITE, BEING SO FIGETY OR RESTLESS THAT YOU HAVE BEEN MOVING AROUND A LOT MORE THAN USUAL: SEVERAL DAYS
4. FEELING TIRED OR HAVING LITTLE ENERGY: SEVERAL DAYS
4. FEELING TIRED OR HAVING LITTLE ENERGY: SEVERAL DAYS
SUM OF ALL RESPONSES TO PHQ QUESTIONS 1-9: 8
10. IF YOU CHECKED OFF ANY PROBLEMS, HOW DIFFICULT HAVE THESE PROBLEMS MADE IT FOR YOU TO DO YOUR WORK, TAKE CARE OF THINGS AT HOME, OR GET ALONG WITH OTHER PEOPLE: NOT DIFFICULT AT ALL
SUM OF ALL RESPONSES TO PHQ QUESTIONS 1-9: 9
9. THOUGHTS THAT YOU WOULD BE BETTER OFF DEAD, OR OF HURTING YOURSELF: SEVERAL DAYS
3. TROUBLE FALLING OR STAYING ASLEEP: SEVERAL DAYS
2. FEELING DOWN, DEPRESSED OR HOPELESS: SEVERAL DAYS
6. FEELING BAD ABOUT YOURSELF - OR THAT YOU ARE A FAILURE OR HAVE LET YOURSELF OR YOUR FAMILY DOWN: SEVERAL DAYS
6. FEELING BAD ABOUT YOURSELF - OR THAT YOU ARE A FAILURE OR HAVE LET YOURSELF OR YOUR FAMILY DOWN: SEVERAL DAYS
SUM OF ALL RESPONSES TO PHQ QUESTIONS 1-9: 9

## 2025-04-24 ASSESSMENT — LIFESTYLE VARIABLES
HOW OFTEN DURING THE LAST YEAR HAVE YOU HAD A FEELING OF GUILT OR REMORSE AFTER DRINKING: NEVER
HAVE YOU OR SOMEONE ELSE BEEN INJURED AS A RESULT OF YOUR DRINKING: NO
HOW OFTEN DO YOU HAVE A DRINK CONTAINING ALCOHOL: 4 OR MORE TIMES A WEEK
HOW OFTEN DURING THE LAST YEAR HAVE YOU FOUND THAT YOU WERE NOT ABLE TO STOP DRINKING ONCE YOU HAD STARTED: NEVER
HOW OFTEN DURING THE LAST YEAR HAVE YOU FAILED TO DO WHAT WAS NORMALLY EXPECTED FROM YOU BECAUSE OF DRINKING: NEVER
HOW MANY STANDARD DRINKS CONTAINING ALCOHOL DO YOU HAVE ON A TYPICAL DAY: 1 OR 2
HAS A RELATIVE, FRIEND, DOCTOR, OR ANOTHER HEALTH PROFESSIONAL EXPRESSED CONCERN ABOUT YOUR DRINKING OR SUGGESTED YOU CUT DOWN: NO
HOW OFTEN DURING THE LAST YEAR HAVE YOU BEEN UNABLE TO REMEMBER WHAT HAPPENED THE NIGHT BEFORE BECAUSE YOU HAD BEEN DRINKING: NEVER
HOW OFTEN DURING THE LAST YEAR HAVE YOU NEEDED AN ALCOHOLIC DRINK FIRST THING IN THE MORNING TO GET YOURSELF GOING AFTER A NIGHT OF HEAVY DRINKING: NEVER

## 2025-04-24 ASSESSMENT — COLUMBIA-SUICIDE SEVERITY RATING SCALE - C-SSRS
1. WITHIN THE PAST MONTH, HAVE YOU WISHED YOU WERE DEAD OR WISHED YOU COULD GO TO SLEEP AND NOT WAKE UP?: NO
6. HAVE YOU EVER DONE ANYTHING, STARTED TO DO ANYTHING, OR PREPARED TO DO ANYTHING TO END YOUR LIFE?: NO
2. HAVE YOU ACTUALLY HAD ANY THOUGHTS OF KILLING YOURSELF?: NO

## 2025-04-24 NOTE — PROGRESS NOTES
Medicare Annual Wellness Visit    Katiuska Rhodes is here for Medicare AWV    Assessment & Plan   Localized osteoporosis without current pathological fracture  -     DEXA BONE DENSITY AXIAL SKELETON; Future  Medicare annual wellness visit, subsequent       No follow-ups on file.     Subjective   The following acute and/or chronic problems were also addressed today:  Discussed care gaps. Order placed for DEXA scan. May receive RSV and Tdap vaccinations at pharmacy, given information on vaccines in wrap-up.    Patient's complete Health Risk Assessment and screening values have been reviewed and are found in Flowsheets. The following problems were reviewed today and where indicated follow up appointments were made and/or referrals ordered.    Positive Risk Factor Screenings with Interventions:    Fall Risk:  Do you feel unsteady or are you worried about falling? : (!) yes  2 or more falls in past year?: no  Fall with injury in past year?: no     Interventions:    Reviewed medications, home hazards, visual acuity, and co-morbidities that can increase risk for falls  Patient feels unsteady since she fractured her hip, has chronic left leg weakness. Utilizes cane to ambulate. Given handout on fall prevention in wrap-up.     Depression:  PHQ-2 Score: 2  PHQ-9 Total Score: 9  Total Score Interpretation: 5-9 = mild depression  Interventions:  Provider reviewed depression screening with patient  prior to AWV           Inactivity:  On average, how many days per week do you engage in moderate to strenuous exercise (like a brisk walk)?: 0 days (!) Abnormal  On average, how many minutes do you engage in exercise at this level?: 0 min  Interventions:  Patient encouraged to add routine exercise as she is comfortably able. Given handout on senior fitness in wrap-up.     Abnormal BMI (obese):  Body mass index is 33.52 kg/m². (!) Abnormal  Interventions:  Patient declines any further evaluation or treatment      Dentist Screen:  Have you

## 2025-04-24 NOTE — PATIENT INSTRUCTIONS
Will wait for the next DEXA = bone density scan to decide if more treatment is needed.      Planning or Worry Journal  Take 20-30 minutes a day in evening or afternoon.  Write down in a notebook all the things you worry about or try to get planned and everything that might pop in your head when you climb into bed.  Set a timer.  When the timer goes off, close the book.  When you get in bed and your mind starts going to those thing, remind yourself that you will deal with it the next day at your planning session.

## 2025-04-24 NOTE — PROGRESS NOTES
breath sounds. No decreased breath sounds, wheezing, rhonchi or rales.   Abdominal:      General: Bowel sounds are normal. There is no abdominal bruit.      Palpations: Abdomen is soft. There is no hepatomegaly or mass.      Tenderness: There is no abdominal tenderness.   Musculoskeletal:      Cervical back: Neck supple.      Right lower leg: No edema.      Left lower leg: No swelling. No edema.   Lymphadenopathy:      Cervical: No cervical adenopathy.   Skin:     General: Skin is warm and dry.      Coloration: Skin is not pale.      Nails: There is no clubbing.   Neurological:      Mental Status: She is alert and oriented to person, place, and time.      Motor: No tremor or abnormal muscle tone.      Coordination: Coordination normal.      Gait: Gait normal.      Comments: Gait is poor due to past orthopedic problems.    Chronically mild slurred speech.  No cogwheeling noted.  No resting tremor noted.     Psychiatric:         Mood and Affect: Mood and affect normal.         Behavior: Behavior normal.      Comments: .

## 2025-04-27 ENCOUNTER — RESULTS FOLLOW-UP (OUTPATIENT)
Dept: INTERNAL MEDICINE CLINIC | Age: 80
End: 2025-04-27

## 2025-04-27 NOTE — RESULT ENCOUNTER NOTE
Labs are all normal except for cholesterol.  I do not see a recent refill on rosuvastatin.  Are you taking it every day?  If not why?  You should be on some sort of cholesterol-lowering medication, and we need to get the bad cholesterol lower than it is right now.  Depakote level is at goal.  Your neurologist should be able to check the [facility] lab to get the results.

## 2025-05-07 NOTE — TELEPHONE ENCOUNTER
----- Message from Dr. Jazmine Ravi MD sent at 4/27/2025  4:30 PM EDT -----  Labs are all normal except for cholesterol.  I do not see a recent refill on rosuvastatin.  Are you taking it every day?  If not why?  You should be on some sort of cholesterol-lowering medication, and we need to get the bad cholesterol lower than it is right now.  Depakote level is at goal.  Your neurologist should be able to check the [facility] lab to get the results.

## 2025-05-07 NOTE — TELEPHONE ENCOUNTER
Finally reached patient. She was honest and stated that sometimes she takes her meds and sometimes she doesn't. I expressed the importance of taking the cholesterol meds and she voiced understanding.